# Patient Record
Sex: FEMALE | Race: WHITE | Employment: OTHER | URBAN - METROPOLITAN AREA
[De-identification: names, ages, dates, MRNs, and addresses within clinical notes are randomized per-mention and may not be internally consistent; named-entity substitution may affect disease eponyms.]

---

## 2018-01-12 ENCOUNTER — OFFICE VISIT (OUTPATIENT)
Dept: FAMILY MEDICINE CLINIC | Age: 83
End: 2018-01-12

## 2018-01-12 VITALS
SYSTOLIC BLOOD PRESSURE: 124 MMHG | BODY MASS INDEX: 43.78 KG/M2 | HEART RATE: 88 BPM | WEIGHT: 223 LBS | HEIGHT: 60 IN | TEMPERATURE: 99.2 F | DIASTOLIC BLOOD PRESSURE: 74 MMHG | OXYGEN SATURATION: 92 %

## 2018-01-12 DIAGNOSIS — F32.A DEPRESSION, UNSPECIFIED DEPRESSION TYPE: ICD-10-CM

## 2018-01-12 DIAGNOSIS — G60.9 IDIOPATHIC PERIPHERAL NEUROPATHY: ICD-10-CM

## 2018-01-12 DIAGNOSIS — J44.9 CHRONIC OBSTRUCTIVE PULMONARY DISEASE, UNSPECIFIED COPD TYPE (HCC): ICD-10-CM

## 2018-01-12 DIAGNOSIS — J30.2 CHRONIC SEASONAL ALLERGIC RHINITIS, UNSPECIFIED TRIGGER: Primary | ICD-10-CM

## 2018-01-12 DIAGNOSIS — N39.3 STRESS INCONTINENCE OF URINE: ICD-10-CM

## 2018-01-12 DIAGNOSIS — I50.9 CHRONIC CONGESTIVE HEART FAILURE, UNSPECIFIED CONGESTIVE HEART FAILURE TYPE: ICD-10-CM

## 2018-01-12 DIAGNOSIS — E03.9 HYPOTHYROIDISM, UNSPECIFIED TYPE: ICD-10-CM

## 2018-01-12 PROCEDURE — 99204 OFFICE O/P NEW MOD 45 MIN: CPT | Performed by: PHYSICIAN ASSISTANT

## 2018-01-12 RX ORDER — ALBUTEROL SULFATE 90 UG/1
2 AEROSOL, METERED RESPIRATORY (INHALATION) EVERY 6 HOURS PRN
Qty: 1 INHALER | Refills: 3 | Status: SHIPPED | OUTPATIENT
Start: 2018-01-12 | End: 2019-07-09 | Stop reason: SDUPTHER

## 2018-01-12 RX ORDER — MONTELUKAST SODIUM 10 MG/1
10 TABLET ORAL DAILY
Qty: 30 TABLET | Refills: 5 | Status: SHIPPED | OUTPATIENT
Start: 2018-01-12 | End: 2019-01-17 | Stop reason: SDUPTHER

## 2018-01-12 RX ORDER — OXYBUTYNIN CHLORIDE 5 MG/1
5 TABLET ORAL 3 TIMES DAILY
Qty: 90 TABLET | Refills: 3 | Status: SHIPPED | OUTPATIENT
Start: 2018-01-12 | End: 2018-03-09 | Stop reason: SDUPTHER

## 2018-01-12 RX ORDER — CITALOPRAM 40 MG/1
40 TABLET ORAL DAILY
Qty: 30 TABLET | Refills: 5 | Status: SHIPPED | OUTPATIENT
Start: 2018-01-12 | End: 2018-05-04

## 2018-01-12 RX ORDER — FUROSEMIDE 40 MG/1
40 TABLET ORAL DAILY
Qty: 30 TABLET | Refills: 3 | Status: SHIPPED | OUTPATIENT
Start: 2018-01-12 | End: 2018-04-30 | Stop reason: SDUPTHER

## 2018-01-12 RX ORDER — LEVOTHYROXINE SODIUM 0.07 MG/1
75 TABLET ORAL DAILY
Qty: 30 TABLET | Refills: 3 | Status: SHIPPED | OUTPATIENT
Start: 2018-01-12 | End: 2018-04-12 | Stop reason: SDUPTHER

## 2018-01-12 RX ORDER — FLUTICASONE FUROATE AND VILANTEROL 100; 25 UG/1; UG/1
1 POWDER RESPIRATORY (INHALATION) DAILY
Qty: 1 EACH | Refills: 5 | Status: SHIPPED | OUTPATIENT
Start: 2018-01-12 | End: 2018-08-28 | Stop reason: ALTCHOICE

## 2018-01-12 RX ORDER — GABAPENTIN 300 MG/1
300 CAPSULE ORAL 3 TIMES DAILY
Qty: 90 CAPSULE | Refills: 5 | Status: ON HOLD | OUTPATIENT
Start: 2018-01-12 | End: 2018-07-02 | Stop reason: HOSPADM

## 2018-01-12 ASSESSMENT — ENCOUNTER SYMPTOMS
TROUBLE SWALLOWING: 0
SHORTNESS OF BREATH: 0
DIARRHEA: 0
EYE PAIN: 0
ABDOMINAL PAIN: 0
CHEST TIGHTNESS: 0
SORE THROAT: 0
BACK PAIN: 0
COUGH: 0
CONSTIPATION: 0
VOICE CHANGE: 0

## 2018-01-12 NOTE — PROGRESS NOTES
Subjective:      Patient ID: Lisseth Morales is a [de-identified] y.o. female. HPI  Patient is here today as a new patient to establish care. She is entering [facility] today as a resident. She is just moving here from [place], has been in [place] now for 8 weeks. She has no complaints today. She has been living with her son until today when she will be going to [facility]. She has some leaking and stress incontinence. She has had some diarrhea lately for a few days. She has taken Immodium. She does get routine eye exams. She needs to get new glasses and hearing aids. She does have stable COPD and CHF so she needs a new pulmonologist and cardiologist here in [place]. She has stable depression, taking Citalopram daily. No SE's. She said last labs were a few months ago. She had a colonoscopy last year. Review of Systems   Constitutional: Negative for appetite change, fatigue and unexpected weight change. HENT: Negative for congestion, dental problem, ear pain, hearing loss, sore throat, trouble swallowing and voice change. Eyes: Negative for pain and visual disturbance. Respiratory: Negative for cough, chest tightness and shortness of breath. Cardiovascular: Negative for chest pain and palpitations. Gastrointestinal: Negative for abdominal pain, constipation and diarrhea. Genitourinary: Negative for difficulty urinating. Incontinence   Musculoskeletal: Negative for arthralgias, back pain, myalgias and neck pain. Skin: Negative for rash. Neurological: Positive for numbness (and pain in legs). Negative for dizziness, speech difficulty, weakness and headaches. Hematological: Negative for adenopathy. Psychiatric/Behavioral: Negative for confusion and sleep disturbance. The patient is not nervous/anxious. Objective:   Physical Exam   Constitutional: She is oriented to person, place, and time. Vital signs are normal. She appears well-developed and well-nourished.  She is albuterol sulfate HFA (VENTOLIN HFA) 108 (90 Base) MCG/ACT inhaler; Inhale 2 puffs into the lungs every 6 hours as needed for Wheezing  -     levothyroxine (SYNTHROID) 75 MCG tablet; Take 1 tablet by mouth Daily  -     furosemide (LASIX) 40 MG tablet; Take 1 tablet by mouth daily  -     montelukast (SINGULAIR) 10 MG tablet; Take 1 tablet by mouth daily  -     citalopram (CELEXA) 40 MG tablet; Take 1 tablet by mouth daily  -     gabapentin (NEURONTIN) 300 MG capsule; Take 1 capsule by mouth 3 times daily for 30 days. -     fluticasone-vilanterol (BREO ELLIPTA) 100-25 MCG/INH AEPB inhaler; Inhale 1 puff into the lungs daily             Plan:      Nathaniel Pruitt to go into Nursing home. Return here in 2 months. Getting labs today.

## 2018-01-16 ENCOUNTER — TELEPHONE (OUTPATIENT)
Dept: PULMONOLOGY | Age: 83
End: 2018-01-16

## 2018-02-02 ENCOUNTER — OFFICE VISIT (OUTPATIENT)
Dept: CARDIOLOGY CLINIC | Age: 83
End: 2018-02-02

## 2018-02-02 ENCOUNTER — TELEPHONE (OUTPATIENT)
Dept: FAMILY MEDICINE CLINIC | Age: 83
End: 2018-02-02

## 2018-02-02 VITALS
BODY MASS INDEX: 43.98 KG/M2 | DIASTOLIC BLOOD PRESSURE: 76 MMHG | WEIGHT: 224 LBS | HEART RATE: 78 BPM | SYSTOLIC BLOOD PRESSURE: 128 MMHG | HEIGHT: 60 IN

## 2018-02-02 DIAGNOSIS — R06.02 SOB (SHORTNESS OF BREATH): ICD-10-CM

## 2018-02-02 DIAGNOSIS — I50.9 CHRONIC CONGESTIVE HEART FAILURE, UNSPECIFIED CONGESTIVE HEART FAILURE TYPE: Primary | ICD-10-CM

## 2018-02-02 DIAGNOSIS — I49.9 IRREGULAR HEART RATE: ICD-10-CM

## 2018-02-02 PROBLEM — J44.9 CHRONIC OBSTRUCTIVE PULMONARY DISEASE (HCC): Chronic | Status: ACTIVE | Noted: 2018-01-12

## 2018-02-02 PROCEDURE — 93000 ELECTROCARDIOGRAM COMPLETE: CPT | Performed by: INTERNAL MEDICINE

## 2018-02-02 PROCEDURE — 99204 OFFICE O/P NEW MOD 45 MIN: CPT | Performed by: INTERNAL MEDICINE

## 2018-02-02 RX ORDER — LISINOPRIL 5 MG/1
5 TABLET ORAL DAILY
Qty: 90 TABLET | Refills: 3 | Status: SHIPPED | OUTPATIENT
Start: 2018-02-02 | End: 2018-06-07 | Stop reason: SDUPTHER

## 2018-02-02 NOTE — PROGRESS NOTES
[facility]  Advanced CHF/Pulmonary Hypertension   Cardiac Evaluation      Mery Avila  YOB: 1935    Date of Visit:  2/2/18         Chief Complaint   Patient presents with   Letty Weinberg Doctor     Newly moved from [place]     History of Present Illness:  Mery Avila is a [de-identified] y.o. female who presents from referral from Armando Ho CNP for consultation and management of congestive heart failure. She recently moved from [place] where she lived with her younger son, to [place] to be near more family. She is living at [facility] independent/partially assited living. She states that she has a history of chf and was admitted with fluid overload and was diuresed with improvement in 2016. She has copd and gets shortness of breath with activity and exertion and she is suppose to use her prn oxygen. She has chronic bilateral leg swelling. She has not been wearing compression hose because she is unable to get them on. She uses cpap at night for her sleep apnea. She was managed in [place] by a cardiologist but was told that she does not have heart blockages. Her son is here with her ands state that she had not been compliant with taking her lasix daily because of frequent urination with it. She was recently started on Ditropan by her CNP. [facility] gives her her medications and she is taking it everyday and her leg swelling is down from what it was. She was told that her heart function was weak in the past. She has no problems sleeping. We reviewed blood work from 1/15/2018 and it looks good. She denies chest pain. She has no family history of heart problems her 2 sons are healthy.        No Known Allergies  Current Outpatient Prescriptions   Medication Sig Dispense Refill    albuterol sulfate HFA (VENTOLIN HFA) 108 (90 Base) MCG/ACT inhaler Inhale 2 puffs into the lungs every 6 hours as needed for Wheezing 1 Inhaler 3    levothyroxine (SYNTHROID) 75 MCG tablet Take 1 tablet by mouth Daily 30 tablet 3    furosemide (LASIX) 40 MG tablet Take 1 tablet by mouth daily 30 tablet 3    montelukast (SINGULAIR) 10 MG tablet Take 1 tablet by mouth daily 30 tablet 5    citalopram (CELEXA) 40 MG tablet Take 1 tablet by mouth daily 30 tablet 5    gabapentin (NEURONTIN) 300 MG capsule Take 1 capsule by mouth 3 times daily for 30 days. 90 capsule 5    fluticasone-vilanterol (BREO ELLIPTA) 100-25 MCG/INH AEPB inhaler Inhale 1 puff into the lungs daily 1 each 5    oxybutynin (DITROPAN) 5 MG tablet Take 1 tablet by mouth 3 times daily 90 tablet 3     No current facility-administered medications for this visit. Past Medical History:   Diagnosis Date    Allergic rhinitis     CHF [facility]congestive heart failure) ([facility])     COPD (chronic obstructive pulmonary disease) (HCC)     Depression     Hypothyroidism      Past Surgical History:   Procedure Laterality Date    CARPAL TUNNEL RELEASE      bilateral    CHOLECYSTECTOMY      EYE SURGERY      bilateral cataracts    HYSTERECTOMY, TOTAL ABDOMINAL       Family History   Problem Relation Age of Onset    Cancer Mother      breast    Cancer Father      prostate    Cancer Sister      breast     Social History     Social History    Marital status:      Spouse name: N/A    Number of children: N/A    Years of education: N/A     Occupational History    Not on file. Social History Main Topics    Smoking status: Never Smoker    Smokeless tobacco: Never Used    Alcohol use No    Drug use: No    Sexual activity: Not on file     Other Topics Concern    Not on file     Social History Narrative    No narrative on file       Review of Systems:   · Constitutional: there has been no unanticipated weight loss. There's been no change in energy level, sleep pattern, or activity level. · Eyes: No visual changes or diplopia. No scleral icterus. · ENT: No Headaches, hearing loss or vertigo.  No mouth sores or sore throat. · Cardiovascular: Reviewed in HPI  · Respiratory: No cough or wheezing, no sputum production. No hematemesis. · Gastrointestinal: No abdominal pain, appetite loss, blood in stools. No change in bowel or bladder habits. · Genitourinary: No dysuria, trouble voiding, or hematuria. · Musculoskeletal:  No gait disturbance, weakness or joint complaints. · Integumentary: No rash or pruritis. · Neurological: No headache, diplopia, change in muscle strength, numbness or tingling. No change in gait, balance, coordination, mood, affect, memory, mentation, behavior. · Psychiatric: No anxiety, no depression. · Endocrine: No malaise, fatigue or temperature intolerance. No excessive thirst, fluid intake, or urination. No tremor. · Hematologic/Lymphatic: No abnormal bruising or bleeding, blood clots or swollen lymph nodes. · Allergic/Immunologic: No nasal congestion or hives. Physical Examination:    Vitals:    02/02/18 1049   BP: 128/76   Pulse: 78   Weight: 224 lb (101.6 kg)   Height: 5' (1.524 m)     Body mass index is 43.75 kg/m². Wt Readings from Last 3 Encounters:   01/12/18 223 lb (101.2 kg)     BP Readings from Last 3 Encounters:   01/12/18 124/74     Constitutional and General Appearance:   WD/WN in NAD  HEENT:  NC/AT  DARIN  No problems with hearing  Skin:  Warm, dry  Respiratory:  · Normal excursion and expansion without use of accessory muscles  · Resp Auscultation: Normal breath sounds without dullness  Cardiovascular:  · The apical impulses not displaced  · Heart tones are crisp and normal  · Cervical veins are not engorged  · The carotid upstroke is normal in amplitude and contour without delay or bruit  · JVP less than 8 cm H2O  RRR with nl S1 and S2 without m,r,g  · Peripheral pulses are symmetrical and full  · There is no clubbing, cyanosis of the extremities.   · No edema  · Femoral Arteries: 2+ and equal  · Pedal Pulses: 2+ and equal   Neck:  · No thyromegaly  Abdomen:  · No masses or

## 2018-02-02 NOTE — TELEPHONE ENCOUNTER
Ramy Brown from Dr Marcia García office called to see if we had any records from the patient for her time in [place] - states gave a packet to [address] when they saw her 1/12      Please advise if the records are avail for scanning

## 2018-02-14 ENCOUNTER — OFFICE VISIT (OUTPATIENT)
Dept: PULMONOLOGY | Age: 83
End: 2018-02-14

## 2018-02-14 ENCOUNTER — TELEPHONE (OUTPATIENT)
Dept: CARDIOLOGY CLINIC | Age: 83
End: 2018-02-14

## 2018-02-14 VITALS — OXYGEN SATURATION: 87 % | DIASTOLIC BLOOD PRESSURE: 66 MMHG | HEART RATE: 64 BPM | SYSTOLIC BLOOD PRESSURE: 116 MMHG

## 2018-02-14 DIAGNOSIS — I50.9 CHRONIC CONGESTIVE HEART FAILURE, UNSPECIFIED CONGESTIVE HEART FAILURE TYPE: Chronic | ICD-10-CM

## 2018-02-14 DIAGNOSIS — G47.33 OSA (OBSTRUCTIVE SLEEP APNEA): ICD-10-CM

## 2018-02-14 DIAGNOSIS — R05.9 COUGH: ICD-10-CM

## 2018-02-14 DIAGNOSIS — G47.33 OBSTRUCTIVE SLEEP APNEA ON CPAP: ICD-10-CM

## 2018-02-14 DIAGNOSIS — Z99.89 OBSTRUCTIVE SLEEP APNEA ON CPAP: ICD-10-CM

## 2018-02-14 DIAGNOSIS — J44.9 COPD, SEVERITY TO BE DETERMINED (HCC): Chronic | ICD-10-CM

## 2018-02-14 DIAGNOSIS — R06.02 SOB (SHORTNESS OF BREATH): Primary | ICD-10-CM

## 2018-02-14 PROCEDURE — 99204 OFFICE O/P NEW MOD 45 MIN: CPT | Performed by: INTERNAL MEDICINE

## 2018-02-14 RX ORDER — IPRATROPIUM BROMIDE AND ALBUTEROL SULFATE 2.5; .5 MG/3ML; MG/3ML
1 SOLUTION RESPIRATORY (INHALATION) EVERY 4 HOURS
COMMUNITY
End: 2018-03-13 | Stop reason: SDUPTHER

## 2018-02-14 ASSESSMENT — ENCOUNTER SYMPTOMS
CONSTIPATION: 0
SINUS PRESSURE: 0
NAUSEA: 0
DIARRHEA: 0
ABDOMINAL PAIN: 0

## 2018-02-14 NOTE — PROGRESS NOTES
Nasal mucosa, teeth and gums and oropharynx are clear. Neck: Neck supple. No JVD present. No tracheal deviation present. No thyromegaly (There are no other neck masses noted.) present. Cardiovascular: Normal rate, regular rhythm, normal heart sounds and intact distal pulses. No murmur heard. Pulmonary/Chest: Effort normal and breath sounds normal. No respiratory distress. She has no wheezes. She has no rales. She exhibits no tenderness. The chest is symmetric in. There is no dullness to percussion or tactile fremitus noted. Abdominal: Soft. Bowel sounds are normal. She exhibits no distension. Mass: There is no hepatosplenomegaly. There is no tenderness. Musculoskeletal: She exhibits edema (There is no clubbingor cyanosis of the digits). She exhibits no tenderness (Muscle strength is normal and there is no obvious atrophy). Lymphadenopathy:     She has no cervical adenopathy. She has no axillary adenopathy. Right: No inguinal adenopathy present. Left: No inguinal adenopathy present. Skin: Skin is warm and dry. No rash noted. Psychiatric: She has a normal mood and affect. Oriented to person place and time       Assessment:      1. SOB (shortness of breath)  CT Chest WO Contrast    FULL PFT STUDY   2. MARIAA (obstructive sleep apnea)  [facility] -Nael Mancera   3. Cough  CT Chest WO Contrast   4. COPD, severity to be determined ([address]     5. Chronic congestive heart failure, unspecified congestive heart failure type ([address]             Plan:      1. Dyspnea is multifactorial in her. COPD and heart failure are contributors. However obesity and profound deconditioning are also contributing to dyspnea in her as well. 2.  PFT, CT chest  3. We'll also be interested in results of her echocardiogram  4. Continue Breo  5. Change albuterol HHN to Duoneb TID  6. Have her see Dr Sigrid Akins. She may be better served by BiP.   She does continue to have symptoms of excessive daytime sleepiness despite wearing her CPAP. 7.  She needs to be on oxygen 24 hours a day. Her resting oxygen saturation in the office was low.   8.  Follow-up in one month

## 2018-02-14 NOTE — LETTER
Pulmonary, Critical Care & Sleep  [address], [address] [facility] [address]  Phone: [phone]  Fax: [phone]          February 14, 2018       Patient: Сергей Linn   MR Number: [id number]   YOB: 1935   Date of Visit: 2/14/2018       Dear Dr. Christopher Garcia, PA:    Thank you for the request for consultation. Below are the relevant portions of my assessment and plan of care. If you have questions, please do not hesitate to call me. I look forward to following [place] Fam along with you.     Sincerely,        Christ Lim MD    CC providers:  No Recipients

## 2018-02-15 ENCOUNTER — TELEPHONE (OUTPATIENT)
Dept: SLEEP MEDICINE | Age: 83
End: 2018-02-15

## 2018-02-22 ENCOUNTER — TELEPHONE (OUTPATIENT)
Dept: SLEEP MEDICINE | Age: 83
End: 2018-02-22

## 2018-02-23 ENCOUNTER — HOSPITAL ENCOUNTER (OUTPATIENT)
Dept: PULMONOLOGY | Age: 83
Discharge: HOME | End: 2018-02-23
Attending: INTERNAL MEDICINE | Admitting: INTERNAL MEDICINE

## 2018-02-23 DIAGNOSIS — R06.02 SOB (SHORTNESS OF BREATH): ICD-10-CM

## 2018-02-23 DIAGNOSIS — R05.9 COUGH: ICD-10-CM

## 2018-02-23 DIAGNOSIS — R06.02 SHORTNESS OF BREATH: ICD-10-CM

## 2018-02-23 RX ORDER — ALBUTEROL SULFATE 90 UG/1
4 AEROSOL, METERED RESPIRATORY (INHALATION) ONCE
Status: COMPLETED | OUTPATIENT
Start: 2018-02-23 | End: 2018-02-23

## 2018-02-23 RX ADMIN — ALBUTEROL SULFATE 4 PUFF: 90 AEROSOL, METERED RESPIRATORY (INHALATION) at 14:45

## 2018-02-27 ENCOUNTER — TELEPHONE (OUTPATIENT)
Dept: SLEEP MEDICINE | Age: 83
End: 2018-02-27

## 2018-03-01 NOTE — PROCEDURES
[facility] [address] [facility]                      [facility], [address]                                PULMONARY FUNCTION    PATIENT NAME: Michelet Otero                      :        1935  MED REC NO:   [id number]                          ROOM:  ACCOUNT NO:   [de-identified]                          ADMIT DATE: 2018  PROVIDER:     Nasim Davis MD    DATE OF PROCEDURE:  2018    INTERPRETATION:  Spirometry reveals decreased FVC at 1.65 liters, which is  78% predicted. FEV1 is decreased at 0.81 liters, which is 52% predicted. FEV1/FVC ratio is reduced at 49%. There is no significant change after  inhaled bronchodilators. Lungs volumes reveal normal total lung capacity  and vital capacity. Residual volume has increased at 142% predicted. Diffusion capacity is reduced at 39% predicted. IMPRESSION:  Moderate to severe obstructive lung disease with air trapping.         Janeen Carmona MD    D: 2018 17:11:23       T: 2018 2:50:45     [id number]  Job#: [id number]     Doc#: [id number]    CC:

## 2018-03-09 ENCOUNTER — OFFICE VISIT (OUTPATIENT)
Dept: FAMILY MEDICINE CLINIC | Age: 83
End: 2018-03-09

## 2018-03-09 ENCOUNTER — TELEPHONE (OUTPATIENT)
Dept: FAMILY MEDICINE CLINIC | Age: 83
End: 2018-03-09

## 2018-03-09 VITALS
OXYGEN SATURATION: 96 % | DIASTOLIC BLOOD PRESSURE: 78 MMHG | HEART RATE: 75 BPM | WEIGHT: 235 LBS | SYSTOLIC BLOOD PRESSURE: 124 MMHG | BODY MASS INDEX: 45.9 KG/M2

## 2018-03-09 DIAGNOSIS — R31.9 URINARY TRACT INFECTION WITH HEMATURIA, SITE UNSPECIFIED: ICD-10-CM

## 2018-03-09 DIAGNOSIS — R60.0 BILATERAL LEG EDEMA: ICD-10-CM

## 2018-03-09 DIAGNOSIS — R63.5 WEIGHT GAIN: ICD-10-CM

## 2018-03-09 DIAGNOSIS — E78.2 MIXED HYPERLIPIDEMIA: ICD-10-CM

## 2018-03-09 DIAGNOSIS — E03.9 HYPOTHYROIDISM, UNSPECIFIED TYPE: ICD-10-CM

## 2018-03-09 DIAGNOSIS — F32.A DEPRESSION, UNSPECIFIED DEPRESSION TYPE: ICD-10-CM

## 2018-03-09 DIAGNOSIS — I50.9 CHRONIC CONGESTIVE HEART FAILURE, UNSPECIFIED CONGESTIVE HEART FAILURE TYPE: Primary | Chronic | ICD-10-CM

## 2018-03-09 DIAGNOSIS — N39.3 STRESS INCONTINENCE OF URINE: ICD-10-CM

## 2018-03-09 DIAGNOSIS — N39.0 URINARY TRACT INFECTION WITH HEMATURIA, SITE UNSPECIFIED: ICD-10-CM

## 2018-03-09 DIAGNOSIS — G60.9 IDIOPATHIC PERIPHERAL NEUROPATHY: ICD-10-CM

## 2018-03-09 LAB
BACTERIA URINE, POC: ABNORMAL
BILIRUBIN URINE: 0 MG/DL
BLOOD, URINE: NEGATIVE
CASTS URINE, POC: 0
CLARITY: ABNORMAL
COLOR: YELLOW
CRYSTALS URINE, POC: 0
EPI CELLS URINE, POC: 0
GLUCOSE URINE: NEGATIVE
KETONES, URINE: NEGATIVE
LEUKOCYTE EST, POC: ABNORMAL
NITRITE, URINE: POSITIVE
PH UA: 7 (ref 4.5–8)
PROTEIN UA: NEGATIVE
RBC URINE, POC: 6
SPECIFIC GRAVITY UA: 1.01 (ref 1–1.03)
UROBILINOGEN, URINE: NORMAL
WBC URINE, POC: 0
YEAST URINE, POC: 0

## 2018-03-09 PROCEDURE — 99214 OFFICE O/P EST MOD 30 MIN: CPT | Performed by: PHYSICIAN ASSISTANT

## 2018-03-09 PROCEDURE — 81000 URINALYSIS NONAUTO W/SCOPE: CPT | Performed by: PHYSICIAN ASSISTANT

## 2018-03-09 RX ORDER — NITROFURANTOIN 25; 75 MG/1; MG/1
100 CAPSULE ORAL 2 TIMES DAILY
Qty: 14 CAPSULE | Refills: 0 | Status: SHIPPED | OUTPATIENT
Start: 2018-03-09 | End: 2018-03-19

## 2018-03-09 RX ORDER — ATORVASTATIN CALCIUM 10 MG/1
10 TABLET, FILM COATED ORAL DAILY
Qty: 30 TABLET | Refills: 5 | Status: SHIPPED | OUTPATIENT
Start: 2018-03-09 | End: 2018-08-01 | Stop reason: SDUPTHER

## 2018-03-09 RX ORDER — OXYBUTYNIN CHLORIDE 5 MG/1
5 TABLET ORAL 3 TIMES DAILY
Qty: 90 TABLET | Refills: 3 | Status: SHIPPED | OUTPATIENT
Start: 2018-03-09 | End: 2018-07-16 | Stop reason: SDUPTHER

## 2018-03-09 ASSESSMENT — PATIENT HEALTH QUESTIONNAIRE - PHQ9
1. LITTLE INTEREST OR PLEASURE IN DOING THINGS: 0
SUM OF ALL RESPONSES TO PHQ QUESTIONS 1-9: 0
2. FEELING DOWN, DEPRESSED OR HOPELESS: 0
SUM OF ALL RESPONSES TO PHQ9 QUESTIONS 1 & 2: 0

## 2018-03-09 ASSESSMENT — ENCOUNTER SYMPTOMS
VOMITING: 0
CONSTIPATION: 0
DIARRHEA: 0
NAUSEA: 0
BACK PAIN: 0
SHORTNESS OF BREATH: 0
ABDOMINAL PAIN: 0

## 2018-03-09 NOTE — PATIENT INSTRUCTIONS
Lorraine Fam was seen today for 3 month follow-up. Diagnoses and all orders for this visit:    Chronic congestive heart failure, unspecified congestive heart failure type ([address]    Stress incontinence of urine  -     oxybutynin (DITROPAN) 5 MG tablet; Take 1 tablet by mouth 3 times daily    Weight gain    Idiopathic peripheral neuropathy    Hypothyroidism, unspecified type    Depression, unspecified depression type    Mixed hyperlipidemia  -     atorvastatin (LIPITOR) 10 MG tablet; Take 1 tablet by mouth daily    Bilateral leg edema    Urinary tract infection with hematuria, site unspecified  -     URINE CULTURE  -     nitrofurantoin, macrocrystal-monohydrate, (MACROBID) 100 MG capsule; Take 1 capsule by mouth 2 times daily for 10 days    Other orders  -     POC URINE with Microscopic       Start oxybutynin today TID, start lipitor qhs, wear compression stockings during the day, macrobid for UTI, repeat UA after macrobid is finished, routine labs in a month.

## 2018-03-11 LAB
ORGANISM: ABNORMAL
ORGANISM: ABNORMAL
URINE CULTURE, ROUTINE: ABNORMAL

## 2018-03-13 ENCOUNTER — TELEPHONE (OUTPATIENT)
Dept: FAMILY MEDICINE CLINIC | Age: 83
End: 2018-03-13

## 2018-03-13 DIAGNOSIS — J44.9 COPD, SEVERITY TO BE DETERMINED (HCC): Primary | Chronic | ICD-10-CM

## 2018-03-13 RX ORDER — IPRATROPIUM BROMIDE AND ALBUTEROL SULFATE 2.5; .5 MG/3ML; MG/3ML
1 SOLUTION RESPIRATORY (INHALATION) EVERY 4 HOURS
Qty: 360 ML | Refills: 0 | Status: SHIPPED | OUTPATIENT
Start: 2018-03-13

## 2018-03-13 RX ORDER — IPRATROPIUM BROMIDE AND ALBUTEROL SULFATE 2.5; .5 MG/3ML; MG/3ML
1 SOLUTION RESPIRATORY (INHALATION) EVERY 4 HOURS
Qty: 360 ML | Refills: 0 | Status: SHIPPED | OUTPATIENT
Start: 2018-03-13 | End: 2018-03-13 | Stop reason: CLARIF

## 2018-03-13 NOTE — TELEPHONE ENCOUNTER
ipratropium-albuterol (DUONEB) 0.5-2.5 (3) MG/3ML SOLN nebulizer solution        Sig - Route: Inhale 1 vial into the lungs every 4 hours - Inhalation      [facility] in chart

## 2018-03-19 ENCOUNTER — TELEPHONE (OUTPATIENT)
Dept: SLEEP MEDICINE | Age: 83
End: 2018-03-19

## 2018-03-22 ENCOUNTER — TELEPHONE (OUTPATIENT)
Dept: FAMILY MEDICINE CLINIC | Age: 83
End: 2018-03-22

## 2018-03-22 DIAGNOSIS — R82.79 URINE CULTURE POSITIVE: Primary | ICD-10-CM

## 2018-03-22 RX ORDER — CIPROFLOXACIN 500 MG/1
500 TABLET, FILM COATED ORAL 2 TIMES DAILY
Qty: 28 TABLET | Refills: 0 | Status: SHIPPED | OUTPATIENT
Start: 2018-03-22 | End: 2018-04-05

## 2018-03-26 ENCOUNTER — TELEPHONE (OUTPATIENT)
Dept: PULMONOLOGY | Age: 83
End: 2018-03-26

## 2018-04-02 ENCOUNTER — TELEPHONE (OUTPATIENT)
Dept: FAMILY MEDICINE CLINIC | Age: 83
End: 2018-04-02

## 2018-04-04 ENCOUNTER — OFFICE VISIT (OUTPATIENT)
Dept: PULMONOLOGY | Age: 83
End: 2018-04-04

## 2018-04-04 VITALS — OXYGEN SATURATION: 84 % | DIASTOLIC BLOOD PRESSURE: 70 MMHG | SYSTOLIC BLOOD PRESSURE: 126 MMHG | HEART RATE: 78 BPM

## 2018-04-04 DIAGNOSIS — J18.9 MULTIFOCAL PNEUMONIA: ICD-10-CM

## 2018-04-04 DIAGNOSIS — Z99.89 OBSTRUCTIVE SLEEP APNEA ON CPAP: ICD-10-CM

## 2018-04-04 DIAGNOSIS — J18.9 PNEUMONIA DUE TO ORGANISM: Primary | ICD-10-CM

## 2018-04-04 DIAGNOSIS — G47.33 OBSTRUCTIVE SLEEP APNEA ON CPAP: ICD-10-CM

## 2018-04-04 DIAGNOSIS — J44.9 COPD, SEVERE (HCC): Chronic | ICD-10-CM

## 2018-04-04 DIAGNOSIS — J43.2 CENTRILOBULAR EMPHYSEMA (HCC): ICD-10-CM

## 2018-04-04 PROCEDURE — 99214 OFFICE O/P EST MOD 30 MIN: CPT | Performed by: INTERNAL MEDICINE

## 2018-04-06 ENCOUNTER — TELEPHONE (OUTPATIENT)
Dept: PULMONOLOGY | Age: 83
End: 2018-04-06

## 2018-04-10 ENCOUNTER — TELEPHONE (OUTPATIENT)
Dept: FAMILY MEDICINE CLINIC | Age: 83
End: 2018-04-10

## 2018-04-12 ENCOUNTER — TELEPHONE (OUTPATIENT)
Dept: FAMILY MEDICINE CLINIC | Age: 83
End: 2018-04-12

## 2018-04-12 DIAGNOSIS — E03.9 ACQUIRED HYPOTHYROIDISM: Primary | ICD-10-CM

## 2018-04-12 RX ORDER — LEVOTHYROXINE SODIUM 0.07 MG/1
75 TABLET ORAL DAILY
Qty: 30 TABLET | Refills: 3 | Status: SHIPPED | OUTPATIENT
Start: 2018-04-12 | End: 2018-07-10 | Stop reason: SDUPTHER

## 2018-04-30 RX ORDER — FUROSEMIDE 40 MG/1
40 TABLET ORAL DAILY
Qty: 30 TABLET | Refills: 3 | Status: SHIPPED | OUTPATIENT
Start: 2018-04-30 | End: 2018-06-18 | Stop reason: SDUPTHER

## 2018-05-03 ENCOUNTER — TELEPHONE (OUTPATIENT)
Dept: CARDIOLOGY CLINIC | Age: 83
End: 2018-05-03

## 2018-05-04 ENCOUNTER — HOSPITAL ENCOUNTER (OUTPATIENT)
Dept: ONCOLOGY | Age: 83
Discharge: HOME | End: 2018-05-31
Admitting: CLINICAL NURSE SPECIALIST

## 2018-05-04 ENCOUNTER — OFFICE VISIT (OUTPATIENT)
Dept: CARDIOLOGY CLINIC | Age: 83
End: 2018-05-04

## 2018-05-04 VITALS
HEIGHT: 60 IN | DIASTOLIC BLOOD PRESSURE: 70 MMHG | BODY MASS INDEX: 47.63 KG/M2 | HEART RATE: 80 BPM | SYSTOLIC BLOOD PRESSURE: 110 MMHG | OXYGEN SATURATION: 93 % | WEIGHT: 242.6 LBS

## 2018-05-04 DIAGNOSIS — R60.0 BILATERAL LEG EDEMA: ICD-10-CM

## 2018-05-04 DIAGNOSIS — G47.33 OBSTRUCTIVE SLEEP APNEA ON CPAP: ICD-10-CM

## 2018-05-04 DIAGNOSIS — I50.9 CHRONIC CONGESTIVE HEART FAILURE, UNSPECIFIED CONGESTIVE HEART FAILURE TYPE: Primary | Chronic | ICD-10-CM

## 2018-05-04 DIAGNOSIS — Z99.89 OBSTRUCTIVE SLEEP APNEA ON CPAP: ICD-10-CM

## 2018-05-04 DIAGNOSIS — R63.5 WEIGHT GAIN: ICD-10-CM

## 2018-05-04 DIAGNOSIS — J44.9 COPD, SEVERE (HCC): Chronic | ICD-10-CM

## 2018-05-04 LAB
ANION GAP SERPL CALCULATED.3IONS-SCNC: 11 MMOL/L (ref 3–16)
BUN BLDV-MCNC: 30 MG/DL (ref 7–20)
CALCIUM SERPL-MCNC: 9.4 MG/DL (ref 8.3–10.6)
CHLORIDE BLD-SCNC: 97 MMOL/L (ref 99–110)
CO2: 30 MMOL/L (ref 21–32)
CREAT SERPL-MCNC: 1 MG/DL (ref 0.6–1.2)
GFR AFRICAN AMERICAN: >60
GFR NON-AFRICAN AMERICAN: 53
GLUCOSE BLD-MCNC: 112 MG/DL (ref 70–99)
POTASSIUM SERPL-SCNC: 4.7 MMOL/L (ref 3.5–5.1)
PRO-BNP: 201 PG/ML (ref 0–449)
SODIUM BLD-SCNC: 138 MMOL/L (ref 136–145)

## 2018-05-04 PROCEDURE — 99214 OFFICE O/P EST MOD 30 MIN: CPT | Performed by: CLINICAL NURSE SPECIALIST

## 2018-05-04 RX ORDER — DOXYCYCLINE 100 MG/1
100 CAPSULE ORAL DAILY
Refills: 0 | COMMUNITY
Start: 2018-04-06 | End: 2018-06-21 | Stop reason: ALTCHOICE

## 2018-05-25 ENCOUNTER — OFFICE VISIT (OUTPATIENT)
Dept: PULMONOLOGY | Age: 83
End: 2018-05-25

## 2018-05-25 ENCOUNTER — HOSPITAL ENCOUNTER (OUTPATIENT)
Dept: NON INVASIVE DIAGNOSTICS | Age: 83
Discharge: HOME | End: 2018-05-25
Attending: INTERNAL MEDICINE | Admitting: INTERNAL MEDICINE

## 2018-05-25 VITALS — SYSTOLIC BLOOD PRESSURE: 124 MMHG | HEART RATE: 78 BPM | DIASTOLIC BLOOD PRESSURE: 68 MMHG | OXYGEN SATURATION: 95 %

## 2018-05-25 DIAGNOSIS — J18.9 MULTIFOCAL PNEUMONIA: ICD-10-CM

## 2018-05-25 DIAGNOSIS — R06.02 SHORTNESS OF BREATH: ICD-10-CM

## 2018-05-25 DIAGNOSIS — Z99.89 OBSTRUCTIVE SLEEP APNEA ON CPAP: ICD-10-CM

## 2018-05-25 DIAGNOSIS — I50.9 CHRONIC CONGESTIVE HEART FAILURE, UNSPECIFIED CONGESTIVE HEART FAILURE TYPE: Chronic | ICD-10-CM

## 2018-05-25 DIAGNOSIS — R06.02 SOB (SHORTNESS OF BREATH): Primary | ICD-10-CM

## 2018-05-25 DIAGNOSIS — J44.9 COPD, SEVERE (HCC): Chronic | ICD-10-CM

## 2018-05-25 DIAGNOSIS — G47.33 OBSTRUCTIVE SLEEP APNEA ON CPAP: ICD-10-CM

## 2018-05-25 LAB
LEFT VENTRICULAR EJECTION FRACTION HIGH VALUE: 60 %
LEFT VENTRICULAR EJECTION FRACTION MODE: NORMAL
LV EF: 55 %
LV EF: 58 %
LVEF MODALITY: NORMAL

## 2018-05-25 PROCEDURE — 99214 OFFICE O/P EST MOD 30 MIN: CPT | Performed by: INTERNAL MEDICINE

## 2018-06-01 ENCOUNTER — TELEPHONE (OUTPATIENT)
Dept: CARDIOLOGY CLINIC | Age: 83
End: 2018-06-01

## 2018-06-01 ENCOUNTER — HOSPITAL ENCOUNTER (OUTPATIENT)
Dept: ONCOLOGY | Age: 83
Discharge: HOME | End: 2018-06-30
Attending: CLINICAL NURSE SPECIALIST | Admitting: CLINICAL NURSE SPECIALIST

## 2018-06-07 ENCOUNTER — TELEPHONE (OUTPATIENT)
Dept: FAMILY MEDICINE CLINIC | Age: 83
End: 2018-06-07

## 2018-06-07 ENCOUNTER — TELEPHONE (OUTPATIENT)
Dept: PULMONOLOGY | Age: 83
End: 2018-06-07

## 2018-06-07 DIAGNOSIS — I50.9 CHRONIC CONGESTIVE HEART FAILURE, UNSPECIFIED CONGESTIVE HEART FAILURE TYPE: ICD-10-CM

## 2018-06-07 DIAGNOSIS — I49.9 IRREGULAR HEART RATE: ICD-10-CM

## 2018-06-07 RX ORDER — LISINOPRIL 5 MG/1
5 TABLET ORAL DAILY
Qty: 90 TABLET | Refills: 3 | Status: ON HOLD | OUTPATIENT
Start: 2018-06-07 | End: 2018-07-02 | Stop reason: HOSPADM

## 2018-06-18 RX ORDER — FUROSEMIDE 40 MG/1
TABLET ORAL
Qty: 45 TABLET | Refills: 3 | Status: SHIPPED | OUTPATIENT
Start: 2018-06-18 | End: 2018-06-21 | Stop reason: SDUPTHER

## 2018-06-21 ENCOUNTER — OFFICE VISIT (OUTPATIENT)
Dept: FAMILY MEDICINE CLINIC | Age: 83
End: 2018-06-21

## 2018-06-21 ENCOUNTER — HOSPITAL ENCOUNTER (OUTPATIENT)
Dept: ONCOLOGY | Age: 83
Discharge: HOME | End: 2018-06-22
Admitting: CLINICAL NURSE SPECIALIST

## 2018-06-21 ENCOUNTER — HOSPITAL ENCOUNTER (OUTPATIENT)
Dept: OTHER | Age: 83
Discharge: HOME | End: 2018-06-21
Attending: PHYSICIAN ASSISTANT | Admitting: PHYSICIAN ASSISTANT

## 2018-06-21 ENCOUNTER — OFFICE VISIT (OUTPATIENT)
Dept: CARDIOLOGY CLINIC | Age: 83
End: 2018-06-21

## 2018-06-21 VITALS
OXYGEN SATURATION: 90 % | HEART RATE: 96 BPM | DIASTOLIC BLOOD PRESSURE: 68 MMHG | SYSTOLIC BLOOD PRESSURE: 128 MMHG | BODY MASS INDEX: 49.18 KG/M2 | WEIGHT: 251.8 LBS

## 2018-06-21 VITALS
HEIGHT: 60 IN | WEIGHT: 251 LBS | HEART RATE: 84 BPM | BODY MASS INDEX: 49.28 KG/M2 | DIASTOLIC BLOOD PRESSURE: 68 MMHG | SYSTOLIC BLOOD PRESSURE: 118 MMHG

## 2018-06-21 VITALS — SYSTOLIC BLOOD PRESSURE: 124 MMHG | HEART RATE: 81 BPM | DIASTOLIC BLOOD PRESSURE: 66 MMHG | TEMPERATURE: 96.2 F

## 2018-06-21 DIAGNOSIS — K59.00 CONSTIPATION, UNSPECIFIED CONSTIPATION TYPE: ICD-10-CM

## 2018-06-21 DIAGNOSIS — J44.9 COPD, SEVERE (HCC): Chronic | ICD-10-CM

## 2018-06-21 DIAGNOSIS — R06.02 SHORTNESS OF BREATH: ICD-10-CM

## 2018-06-21 DIAGNOSIS — G47.33 OBSTRUCTIVE SLEEP APNEA ON CPAP: ICD-10-CM

## 2018-06-21 DIAGNOSIS — I50.32 CHRONIC DIASTOLIC HEART FAILURE (HCC): ICD-10-CM

## 2018-06-21 DIAGNOSIS — I50.9 CHRONIC CONGESTIVE HEART FAILURE, UNSPECIFIED CONGESTIVE HEART FAILURE TYPE: Primary | Chronic | ICD-10-CM

## 2018-06-21 DIAGNOSIS — R06.02 SOB (SHORTNESS OF BREATH): ICD-10-CM

## 2018-06-21 DIAGNOSIS — I50.9 CHRONIC CONGESTIVE HEART FAILURE, UNSPECIFIED CONGESTIVE HEART FAILURE TYPE: Chronic | ICD-10-CM

## 2018-06-21 DIAGNOSIS — Z99.89 OBSTRUCTIVE SLEEP APNEA ON CPAP: ICD-10-CM

## 2018-06-21 DIAGNOSIS — R06.02 SHORTNESS OF BREATH: Primary | ICD-10-CM

## 2018-06-21 DIAGNOSIS — R60.0 BILATERAL LOWER EXTREMITY EDEMA: ICD-10-CM

## 2018-06-21 LAB
A/G RATIO: 0.9 (ref 1.1–2.2)
ALBUMIN SERPL-MCNC: 3.8 G/DL (ref 3.4–5)
ALP BLD-CCNC: 79 U/L (ref 40–129)
ALT SERPL-CCNC: 13 U/L (ref 10–40)
ANION GAP SERPL CALCULATED.3IONS-SCNC: 13 MMOL/L (ref 3–16)
AST SERPL-CCNC: 16 U/L (ref 15–37)
BILIRUB SERPL-MCNC: 0.3 MG/DL (ref 0–1)
BUN BLDV-MCNC: 32 MG/DL (ref 7–20)
CALCIUM SERPL-MCNC: 9.4 MG/DL (ref 8.3–10.6)
CHLORIDE BLD-SCNC: 95 MMOL/L (ref 99–110)
CO2: 28 MMOL/L (ref 21–32)
CREAT SERPL-MCNC: 1.1 MG/DL (ref 0.6–1.2)
GFR AFRICAN AMERICAN: 58
GFR NON-AFRICAN AMERICAN: 48
GLOBULIN: 4.3 G/DL
GLUCOSE BLD-MCNC: 115 MG/DL (ref 70–99)
HCT VFR BLD CALC: 36.7 % (ref 36–48)
HEMOGLOBIN: 12.1 G/DL (ref 12–16)
MCH RBC QN AUTO: 29.9 PG (ref 26–34)
MCHC RBC AUTO-ENTMCNC: 32.9 G/DL (ref 31–36)
MCV RBC AUTO: 90.7 FL (ref 80–100)
PDW BLD-RTO: 13.3 % (ref 12.4–15.4)
PLATELET # BLD: 207 K/UL (ref 135–450)
PMV BLD AUTO: 8.7 FL (ref 5–10.5)
POTASSIUM SERPL-SCNC: 4.8 MMOL/L (ref 3.5–5.1)
PRO-BNP: 349 PG/ML (ref 0–449)
RBC # BLD: 4.05 M/UL (ref 4–5.2)
SODIUM BLD-SCNC: 136 MMOL/L (ref 136–145)
TOTAL PROTEIN: 8.1 G/DL (ref 6.4–8.2)
WBC # BLD: 9.4 K/UL (ref 4–11)

## 2018-06-21 PROCEDURE — 99214 OFFICE O/P EST MOD 30 MIN: CPT | Performed by: PHYSICIAN ASSISTANT

## 2018-06-21 PROCEDURE — 99215 OFFICE O/P EST HI 40 MIN: CPT | Performed by: INTERNAL MEDICINE

## 2018-06-21 RX ORDER — FUROSEMIDE 40 MG/1
TABLET ORAL
Qty: 270 TABLET | Refills: 3 | Status: SHIPPED | OUTPATIENT
Start: 2018-06-21 | End: 2018-06-21 | Stop reason: SDUPTHER

## 2018-06-21 RX ORDER — FUROSEMIDE 10 MG/ML
120 INJECTION INTRAMUSCULAR; INTRAVENOUS ONCE
Status: COMPLETED | OUTPATIENT
Start: 2018-06-21 | End: 2018-06-21

## 2018-06-21 RX ORDER — FUROSEMIDE 10 MG/ML
INJECTION INTRAMUSCULAR; INTRAVENOUS
Status: COMPLETED
Start: 2018-06-21 | End: 2018-06-21

## 2018-06-21 RX ORDER — FUROSEMIDE 40 MG/1
TABLET ORAL
Qty: 270 TABLET | Refills: 3 | Status: ON HOLD | OUTPATIENT
Start: 2018-06-21 | End: 2018-07-02 | Stop reason: HOSPADM

## 2018-06-21 RX ORDER — SODIUM CHLORIDE 0.9 % (FLUSH) 0.9 %
SYRINGE (ML) INJECTION
Status: DISPENSED
Start: 2018-06-21 | End: 2018-06-22

## 2018-06-21 RX ADMIN — FUROSEMIDE 120 MG: 10 INJECTION INTRAMUSCULAR; INTRAVENOUS at 14:29

## 2018-06-21 ASSESSMENT — ENCOUNTER SYMPTOMS
BACK PAIN: 0
CONSTIPATION: 1
COUGH: 1
ABDOMINAL PAIN: 0
WHEEZING: 1
SHORTNESS OF BREATH: 1

## 2018-06-22 DIAGNOSIS — S74.20XA: Primary | ICD-10-CM

## 2018-06-25 ENCOUNTER — TELEPHONE (OUTPATIENT)
Dept: OTHER | Age: 83
End: 2018-06-25

## 2018-06-25 ENCOUNTER — HOSPITAL ENCOUNTER (OUTPATIENT)
Dept: OTHER | Age: 83
Discharge: HOME | End: 2018-06-25
Attending: PHYSICIAN ASSISTANT | Admitting: PHYSICIAN ASSISTANT

## 2018-06-25 DIAGNOSIS — S74.20XA: ICD-10-CM

## 2018-06-26 ENCOUNTER — TELEPHONE (OUTPATIENT)
Dept: FAMILY MEDICINE CLINIC | Age: 83
End: 2018-06-26

## 2018-06-27 ENCOUNTER — TELEPHONE (OUTPATIENT)
Dept: FAMILY MEDICINE CLINIC | Age: 83
End: 2018-06-27

## 2018-06-28 ENCOUNTER — TELEPHONE (OUTPATIENT)
Dept: FAMILY MEDICINE CLINIC | Age: 83
End: 2018-06-28

## 2018-06-28 PROBLEM — I10 ESSENTIAL HYPERTENSION: Status: ACTIVE | Noted: 2018-06-28

## 2018-06-28 PROBLEM — J96.11 CHRONIC RESPIRATORY FAILURE WITH HYPOXIA (HCC): Status: ACTIVE | Noted: 2018-06-28

## 2018-06-28 PROBLEM — I50.33 ACUTE ON CHRONIC DIASTOLIC CHF (CONGESTIVE HEART FAILURE) (HCC): Chronic | Status: ACTIVE | Noted: 2018-06-21

## 2018-06-28 PROBLEM — E78.2 MIXED HYPERLIPIDEMIA: Status: ACTIVE | Noted: 2018-06-28

## 2018-06-28 PROBLEM — R60.9 PITTING EDEMA: Status: ACTIVE | Noted: 2018-06-28

## 2018-06-28 PROBLEM — N17.9 AKI (ACUTE KIDNEY INJURY) (HCC): Status: ACTIVE | Noted: 2018-06-28

## 2018-06-29 NOTE — TELEPHONE ENCOUNTER
Spoke to son of patient who just wanted to inform me that his mom is in the hospital with CHF and kidney failure.  Spoke to him and answered whatever I could but just told him that the hospitalist would be taking care of her while she is there, along with her cardiologist and pulmonologist.

## 2018-07-01 ENCOUNTER — HOSPITAL ENCOUNTER (OUTPATIENT)
Dept: ONCOLOGY | Age: 83
Discharge: HOME | End: 2018-07-31
Attending: CLINICAL NURSE SPECIALIST | Admitting: CLINICAL NURSE SPECIALIST

## 2018-07-02 ENCOUNTER — TELEPHONE (OUTPATIENT)
Dept: PULMONOLOGY | Age: 83
End: 2018-07-02

## 2018-07-03 ENCOUNTER — CARE COORDINATION (OUTPATIENT)
Dept: CASE MANAGEMENT | Age: 83
End: 2018-07-03

## 2018-07-03 DIAGNOSIS — I50.32 CHRONIC DIASTOLIC HEART FAILURE (HCC): Primary | Chronic | ICD-10-CM

## 2018-07-03 PROCEDURE — 1111F DSCHRG MED/CURRENT MED MERGE: CPT

## 2018-07-03 NOTE — CARE COORDINATION
[facility] Transitions Initial Follow Up Call    Call within 2 business days of discharge: Yes    Patient: Keyla Estrada Patient : 1935   MRN: [id number]  Reason for Admission: SOB 2/2 COPD exacerbation     Discharge Date: 18 RARS: Readmission Risk Score: 17     Spoke with: [facility]    Non-face-to-face services provided:  Obtained and reviewed discharge summary and/or continuity of care documents  Education of patient/family/caregiver/guardian to support self-management-Discharge Instruction; diet, activity, daily weights,  hospital follow up  Assessment and support for treatment adherence and medication management-medication review, reconciliation   1111f completed    Care Transitions 24 Hour Call    Patient DME:  [facility]  Patient Home Equipment:  Oxygen  Do you have support at home?:  Alone  Are you an active caregiver in your home?:  No  Care Transitions Interventions      Returned to [facility] assisted living and is doing well. Had RN visit and PT/OT visit today as well. Has all of her new medications. Edema to legs has reduced significantly. Breathing is less labored and improving.   Has discharge instructions and reviewed diet: Low sodium, low fat diet,   COPD Zone tools, is using prednisone and antibiotics  Home oxygen 3L/NC, CPAP at HS  CHF following diet, daily weight, taking meds, specifically the furosemide monitoring intake of sodium  Unable to schedule specialist appts today, patient's son acknowledges he will set up HFU appt with pulmonology and PCP on  after the [facility]     Cardiology appt is scheduled for   Care transition will continue to follow    Follow Up  Future Appointments  Date Time Provider Moe Dial   2018 2:00 PM JOSE ALFREDO Patrick - CNP FF Cardio [facility]   2018 10:30 AM Su Kc MD PULM & CC [facility]   2018 2:20 PM Bibiana Flaherty MD FF SLEEP MED [facility]       Nettie Keith, RN

## 2018-07-06 ENCOUNTER — TELEPHONE (OUTPATIENT)
Dept: OTHER | Age: 83
End: 2018-07-06

## 2018-07-09 ENCOUNTER — CARE COORDINATION (OUTPATIENT)
Dept: CASE MANAGEMENT | Age: 83
End: 2018-07-09

## 2018-07-09 NOTE — CARE COORDINATION
[facility] Transitions Follow Up Call    2018    Patient: Leo Busby  Patient : 1935   MRN: [id number]  Reason for Admission: SOB 2/2 COPD exacerbation  Discharge Date: 18 RARS: Readmission Risk Score: 17       Follow up call attempted, left contact info on     Follow Up  Future Appointments  Date Time Provider Moe Dial   2018 11:00 AM [address], Willi Singletary [facility]   2018 1:00 PM JOSE ALFREDO Edgar - CNP FF Cardio [facility]   2018 10:30 AM Sherryll Shone, MD PULM & CC [facility]   2018 2:20 PM Radha Coelho MD FF SLEEP MED [facility]       Brian Us RN

## 2018-07-10 ENCOUNTER — TELEPHONE (OUTPATIENT)
Dept: FAMILY MEDICINE CLINIC | Age: 83
End: 2018-07-10

## 2018-07-10 DIAGNOSIS — F32.A DEPRESSION, UNSPECIFIED DEPRESSION TYPE: ICD-10-CM

## 2018-07-10 DIAGNOSIS — E03.9 ACQUIRED HYPOTHYROIDISM: ICD-10-CM

## 2018-07-10 DIAGNOSIS — F32.A DEPRESSION, UNSPECIFIED DEPRESSION TYPE: Primary | ICD-10-CM

## 2018-07-10 RX ORDER — CITALOPRAM 10 MG/1
10 TABLET ORAL DAILY
Qty: 30 TABLET | Refills: 2 | Status: SHIPPED | OUTPATIENT
Start: 2018-07-10 | End: 2018-07-10 | Stop reason: SDUPTHER

## 2018-07-10 RX ORDER — CITALOPRAM 10 MG/1
10 TABLET ORAL DAILY
Qty: 30 TABLET | Refills: 2 | Status: SHIPPED | OUTPATIENT
Start: 2018-07-10 | End: 2018-09-04 | Stop reason: SDUPTHER

## 2018-07-10 RX ORDER — LEVOTHYROXINE SODIUM 0.07 MG/1
75 TABLET ORAL DAILY
Qty: 30 TABLET | Refills: 3 | Status: SHIPPED | OUTPATIENT
Start: 2018-07-10 | End: 2018-10-30 | Stop reason: SDUPTHER

## 2018-07-10 NOTE — TELEPHONE ENCOUNTER
Talked to patient's son Sabi Berg and informed that celexa 10mg has been sent to the [facility] [address] today.

## 2018-07-11 ENCOUNTER — TELEPHONE (OUTPATIENT)
Dept: FAMILY MEDICINE CLINIC | Age: 83
End: 2018-07-11

## 2018-07-16 DIAGNOSIS — N39.3 STRESS INCONTINENCE OF URINE: ICD-10-CM

## 2018-07-16 RX ORDER — OXYBUTYNIN CHLORIDE 5 MG/1
TABLET ORAL
Qty: 90 TABLET | Refills: 3 | Status: SHIPPED | OUTPATIENT
Start: 2018-07-16 | End: 2018-10-12 | Stop reason: SDUPTHER

## 2018-07-19 ENCOUNTER — OFFICE VISIT (OUTPATIENT)
Dept: PULMONOLOGY | Age: 83
End: 2018-07-19

## 2018-07-19 ENCOUNTER — OFFICE VISIT (OUTPATIENT)
Dept: CARDIOLOGY CLINIC | Age: 83
End: 2018-07-19

## 2018-07-19 VITALS
WEIGHT: 227 LBS | BODY MASS INDEX: 37.77 KG/M2 | DIASTOLIC BLOOD PRESSURE: 56 MMHG | OXYGEN SATURATION: 97 % | SYSTOLIC BLOOD PRESSURE: 100 MMHG | HEART RATE: 80 BPM

## 2018-07-19 VITALS
HEIGHT: 65 IN | OXYGEN SATURATION: 92 % | HEART RATE: 67 BPM | DIASTOLIC BLOOD PRESSURE: 68 MMHG | SYSTOLIC BLOOD PRESSURE: 126 MMHG

## 2018-07-19 DIAGNOSIS — I50.32 CHRONIC DIASTOLIC HEART FAILURE (HCC): Primary | Chronic | ICD-10-CM

## 2018-07-19 DIAGNOSIS — R91.8 PULMONARY NODULES: ICD-10-CM

## 2018-07-19 DIAGNOSIS — G47.33 OSA (OBSTRUCTIVE SLEEP APNEA): ICD-10-CM

## 2018-07-19 DIAGNOSIS — R60.0 BILATERAL LOWER EXTREMITY EDEMA: ICD-10-CM

## 2018-07-19 DIAGNOSIS — I27.20 PULMONARY HYPERTENSION (HCC): ICD-10-CM

## 2018-07-19 DIAGNOSIS — J44.9 COPD, SEVERE (HCC): Chronic | ICD-10-CM

## 2018-07-19 DIAGNOSIS — Z09 HOSPITAL DISCHARGE FOLLOW-UP: Primary | ICD-10-CM

## 2018-07-19 PROCEDURE — 99214 OFFICE O/P EST MOD 30 MIN: CPT | Performed by: NURSE PRACTITIONER

## 2018-07-19 RX ORDER — POLYETHYLENE GLYCOL 3350 17 G/17G
17 POWDER, FOR SOLUTION ORAL DAILY PRN
COMMUNITY
End: 2018-08-01 | Stop reason: SDUPTHER

## 2018-07-19 ASSESSMENT — ENCOUNTER SYMPTOMS
CONSTIPATION: 0
COLOR CHANGE: 0
SHORTNESS OF BREATH: 1
ABDOMINAL PAIN: 0
COUGH: 0

## 2018-07-19 NOTE — PROGRESS NOTES
[facility] Follow Up Note    Subjective:   CHIEF COMPLAINT / HPI: Hospitalization from 6/27-7/2   The patient is [de-identified] y.o. female who presents today for a routine follow up visit. There is a PMH significant for dCHF, severe COPD and depression. She presented to the ED with increased LE edema and SOB. She was initially found to have BASIA. Diuresis was managed by Cardiology but she also had some wheezing thus pulmonary was consulted. Repeat CT scan done in the hospital revealed persisted scattered pulmonary nodules up to 6mm primarily in the RML. This was similar in appearance to CT scan done in February of this year. She was treated with Doxycycline during her hospitalization. She also received 30 days of Doxycyline in April for known nodules. Presently she reports that her breathing has improved since her hospitalization as has her edema. She is not coughing. She has struggled with her low sodium diet. Her daily weights have been acceptable per her report. She is down to 2L NC continuously from previously required 4L / min. She reports compliance with Anoro and Duoneb 3-4 x daily and PRN. She says this makes her jittery.         Past Medical History:   Diagnosis Date    Allergic rhinitis     CHF (congestive heart failure) (HCC)     COPD (chronic obstructive pulmonary disease) (HCC)     Depression     Hypothyroidism      Social History:    History   Smoking Status    Never Smoker   Smokeless Tobacco    Never Used     Current Medications:  Current Outpatient Prescriptions on File Prior to Visit   Medication Sig Dispense Refill    oxybutynin (DITROPAN) 5 MG tablet TAKE ONE TABLET BY MOUTH THREE TIMES A DAY 90 tablet 3    levothyroxine (SYNTHROID) 75 MCG tablet TAKE 1 TABLET BY MOUTH DAILY 30 tablet 3    citalopram (CELEXA) 10 MG tablet Take 1 tablet by mouth daily 30 tablet 2    lisinopril (PRINIVIL;ZESTRIL) 2.5 MG tablet Take 1 tablet by mouth daily 30 tablet 3    furosemide (LASIX) 40 MG tablet Take 1 tablet by mouth 2 times daily 60 tablet 3    ipratropium-albuterol (DUONEB) 0.5-2.5 (3) MG/3ML SOLN nebulizer solution Inhale 3 mLs into the lungs every 4 hours 360 mL 0    atorvastatin (LIPITOR) 10 MG tablet Take 1 tablet by mouth daily 30 tablet 5    albuterol sulfate HFA (VENTOLIN HFA) 108 (90 Base) MCG/ACT inhaler Inhale 2 puffs into the lungs every 6 hours as needed for Wheezing 1 Inhaler 3    montelukast (SINGULAIR) 10 MG tablet Take 1 tablet by mouth daily 30 tablet 5    fluticasone-vilanterol (BREO ELLIPTA) 100-25 MCG/INH AEPB inhaler Inhale 1 puff into the lungs daily 1 each 5     No current facility-administered medications on file prior to visit. Review of Systems   Constitutional: Negative for chills and fever. HENT: Negative for congestion and postnasal drip. Respiratory: Positive for shortness of breath. Negative for cough. Cardiovascular: Positive for leg swelling. Negative for chest pain. Gastrointestinal: Negative for abdominal pain and constipation. Musculoskeletal: Negative for arthralgias and myalgias. Skin: Negative for color change and pallor. Allergic/Immunologic: Negative for environmental allergies and food allergies. Hematological: Negative for adenopathy. Psychiatric/Behavioral: Negative for agitation and confusion. Objective:       VITALS:  /68   Pulse 67   Ht 5' 5\" (1.651 m)   SpO2 92% Comment: 2 lpm      Physical Exam   Constitutional: She is oriented to person, place, and time. She appears well-developed and well-nourished. No distress. HENT:   Head: Normocephalic. Mouth/Throat: No oropharyngeal exudate. Eyes: Conjunctivae are normal. Pupils are equal, round, and reactive to light. Neck: Normal range of motion. Neck supple. No tracheal deviation present. No thyromegaly present. Cardiovascular: Normal rate, regular rhythm and intact distal pulses. Exam reveals no friction rub.     Pulmonary/Chest: Effort normal.

## 2018-07-19 NOTE — PROGRESS NOTES
[facility] - Cardiology      Chief Complaint: \"I am doing better\"    Chief Complaint   Patient presents with    Shortness of Breath       History of present illness: Vini Forte is an [de-identified] y.o. female with past medical history significant for diastolic heart failure and severe COPD. She was hospitalized 6/28-7/2 after presenting to hospital with BLE edema with weeping and shortness of breath. Lasix dose was recently increased for the same and she also received IV Lasix but swelling continued to worsen. CXR with no overt edema, proBNP ~ 1100. Evidence of BASIA on admission. She was treated for COPD exacerbation. Patient is here today for hospital follow up chronic diastolic heart failure. She presents per electric scooter. Patient says she is \"doing better, getting stronger. \" She does not like the low sodium diet though. Lower extremity swelling much improved. Weight today 226 lbs compared to [place] weight 231 lbs. Denies orthopnea, PND. She is resident at [facility] (assisted living). Past Medical History:   Diagnosis Date    Allergic rhinitis     CHF (congestive heart failure) (HCC)     COPD (chronic obstructive pulmonary disease) (HCC)     Depression     Hypothyroidism      Past Surgical History:   Procedure Laterality Date    CARPAL TUNNEL RELEASE      bilateral    CHOLECYSTECTOMY      EYE SURGERY      bilateral cataracts    HYSTERECTOMY, TOTAL ABDOMINAL       Social History   Substance Use Topics    Smoking status: Never Smoker    Smokeless tobacco: Never Used    Alcohol use No       Review of Systems:   Constitutional: + weight loss, + fatigue, + weakness (improved). HEENT: No change in vision or ringing in the ears. Respiratory: + BOSE (improved), no PND, orthopnea or cough. Cardiovascular: No chest pain, palpitations, + edema (improved)  GI: No n/v, abdominal pain, + constipation.  No melena, no hematochezia  : No dysuria or hematuria. Skin: No rash or new skin lesions. Musculoskeletal: No new muscle or joint pain. Neurological: No lightheadedness, dizziness, syncope or TIA-like symptoms. Psychiatric: No anxiety, insomnia or depression    Physical Exam:  BP (!) 100/56   Pulse 80   Wt 227 lb (103 kg)   SpO2 97%   BMI 37.77 kg/m²     General:  Awake, alert, oriented in NAD  Skin:  Warm and dry. No unusual bruising or rash  HEENT: Normocephalic and atraumatic. Oral mucosa moist, no cyanosis. Neck:  Supple. No JVP appreciated  Chest:  Normal effort. Few basilar crackles  Cardiovascular:  RRR, S1/S2, + ARMAND, no gallop/rub  Abdomen:  Soft, nontender, +bowel sounds  Extremities:  1+ BLE edema  Neurological: No focal deficits  Psychological: Normal mood and affect      Current Outpatient Prescriptions   Medication Sig Dispense Refill    oxybutynin (DITROPAN) 5 MG tablet TAKE ONE TABLET BY MOUTH THREE TIMES A DAY 90 tablet 3    levothyroxine (SYNTHROID) 75 MCG tablet TAKE 1 TABLET BY MOUTH DAILY 30 tablet 3    citalopram (CELEXA) 10 MG tablet Take 1 tablet by mouth daily 30 tablet 2    lisinopril (PRINIVIL;ZESTRIL) 2.5 MG tablet Take 1 tablet by mouth daily 30 tablet 3    furosemide (LASIX) 40 MG tablet Take 1 tablet by mouth 2 times daily 60 tablet 3    ipratropium-albuterol (DUONEB) 0.5-2.5 (3) MG/3ML SOLN nebulizer solution Inhale 3 mLs into the lungs every 4 hours 360 mL 0    atorvastatin (LIPITOR) 10 MG tablet Take 1 tablet by mouth daily 30 tablet 5    albuterol sulfate HFA (VENTOLIN HFA) 108 (90 Base) MCG/ACT inhaler Inhale 2 puffs into the lungs every 6 hours as needed for Wheezing 1 Inhaler 3    montelukast (SINGULAIR) 10 MG tablet Take 1 tablet by mouth daily 30 tablet 5    fluticasone-vilanterol (BREO ELLIPTA) 100-25 MCG/INH AEPB inhaler Inhale 1 puff into the lungs daily 1 each 5     No current facility-administered medications for this visit.         Labs:   Lab Results   Component Value Date    WBC 11.3 (H) 07/02/2018    HGB 11.5 (L) 07/02/2018    HCT 35.2 (L) 07/02/2018    MCV 89.1 07/02/2018     07/02/2018     Lab Results   Component Value Date     07/02/2018    K 4.1 07/02/2018    CL 95 07/02/2018    CO2 37 07/02/2018    BUN 32 07/02/2018    CREATININE 1.0 07/02/2018    GLUCOSE 123 07/02/2018    CALCIUM 9.5 07/02/2018      No results found for: TRIG, HDL, LDLCALC, LDLDIRECT, LABVLDL    Diagnostics:    Echo 5/25/2018:  Summary   -Normal left ventricle size and systolic function with an estimated ejection fraction of 55-60%. -There is mild concentric left ventricular hypertrophy.   -No regional wall motion abnormalities are seen.   -Normal diastolic function. -Mild mitral regurgitation.   -Mild aortic stenosis. -Mild tricuspid regurgitation with a RVSP of 58 mmHg. Venous doppler 6/29/2018:  Summary    -No evidence of deep vein or superficial vein thrombosis involving the  bilateral lower extremities. Assessment:  1. Chronic diastolic heart failure. NYHA class III. Appears compensated. On ACE    2.  Pulmonary hypertension. RVSP 58 mmHg. Most likely secondary to combination COPD and diastolic heart failure. 3. Bilateral lower extremity edema. Multifactorial: chronic venous stasis, sedentary lifestyle, chronic diastolic heart failure, pulmonary hypertension. Plan:  1. Continue current medications. 2. Continue no added salt diet. 3. Continue to elevate legs throughout the day. 4. Check labs (BMP/BNP) this week or early next week. Fax results to [phone]   5. Follow up in 4 weeks, earlier for worsening. Thank you for allowing me to participate in the care of your patient. Diego Maki CNP        QUALITY MEASURES  1. Tobacco Cessation Counseling: N/A  2. BP retake if >140/90: N/A  3. Communication to PCP: Office note forwarded/faxed to PCP  4. CAD antiplatelet therapy: N/A  5. CAD lipid lowering therapy: N/A (on atorvastatin)  6. HF A.  Fib on anticoagulation: N/A

## 2018-07-19 NOTE — PATIENT INSTRUCTIONS
1. Continue current medications. 2. Continue no added salt diet. 3. Continue to elevate legs throughout the day. 4. Check labs this week or early next week. Fax results to [phone]     5. Follow up in 4 weeks, earlier for worsening.

## 2018-07-24 ENCOUNTER — TELEPHONE (OUTPATIENT)
Dept: CARDIOLOGY CLINIC | Age: 83
End: 2018-07-24

## 2018-07-30 NOTE — TELEPHONE ENCOUNTER
Pt phone number wrong. She is in [employer] . I spoke with pt and relayed message per [facility]. She verbalized understanding.  Adebayole number changed in Epic

## 2018-08-01 DIAGNOSIS — E78.2 MIXED HYPERLIPIDEMIA: ICD-10-CM

## 2018-08-01 DIAGNOSIS — K59.00 CONSTIPATION, UNSPECIFIED CONSTIPATION TYPE: Primary | ICD-10-CM

## 2018-08-01 RX ORDER — ATORVASTATIN CALCIUM 10 MG/1
TABLET, FILM COATED ORAL
Qty: 30 TABLET | Refills: 2 | Status: SHIPPED | OUTPATIENT
Start: 2018-08-01 | End: 2018-10-30 | Stop reason: SDUPTHER

## 2018-08-01 RX ORDER — POLYETHYLENE GLYCOL 3350 17 G/17G
17 POWDER, FOR SOLUTION ORAL DAILY PRN
Qty: 527 G | Refills: 1 | Status: SHIPPED | OUTPATIENT
Start: 2018-08-01 | End: 2019-03-04 | Stop reason: SDUPTHER

## 2018-08-14 ENCOUNTER — TELEPHONE (OUTPATIENT)
Dept: PULMONOLOGY | Age: 83
End: 2018-08-14

## 2018-08-28 ENCOUNTER — OFFICE VISIT (OUTPATIENT)
Dept: CARDIOLOGY CLINIC | Age: 83
End: 2018-08-28

## 2018-08-28 ENCOUNTER — OFFICE VISIT (OUTPATIENT)
Dept: PULMONOLOGY | Age: 83
End: 2018-08-28

## 2018-08-28 VITALS — SYSTOLIC BLOOD PRESSURE: 133 MMHG | HEART RATE: 79 BPM | OXYGEN SATURATION: 91 % | DIASTOLIC BLOOD PRESSURE: 77 MMHG

## 2018-08-28 VITALS
OXYGEN SATURATION: 90 % | BODY MASS INDEX: 36.49 KG/M2 | RESPIRATION RATE: 17 BRPM | DIASTOLIC BLOOD PRESSURE: 74 MMHG | SYSTOLIC BLOOD PRESSURE: 126 MMHG | HEART RATE: 60 BPM | WEIGHT: 219 LBS | HEIGHT: 65 IN

## 2018-08-28 DIAGNOSIS — I50.32 CHRONIC DIASTOLIC HEART FAILURE (HCC): Chronic | ICD-10-CM

## 2018-08-28 DIAGNOSIS — J44.9 COPD, SEVERE (HCC): Chronic | ICD-10-CM

## 2018-08-28 DIAGNOSIS — G47.33 OSA TREATED WITH BIPAP: Chronic | ICD-10-CM

## 2018-08-28 DIAGNOSIS — R60.0 BILATERAL LOWER EXTREMITY EDEMA: ICD-10-CM

## 2018-08-28 DIAGNOSIS — I50.32 CHRONIC DIASTOLIC HEART FAILURE (HCC): Primary | Chronic | ICD-10-CM

## 2018-08-28 DIAGNOSIS — R06.02 SOB (SHORTNESS OF BREATH): Primary | Chronic | ICD-10-CM

## 2018-08-28 DIAGNOSIS — I27.20 PULMONARY HYPERTENSION (HCC): ICD-10-CM

## 2018-08-28 PROCEDURE — 99214 OFFICE O/P EST MOD 30 MIN: CPT | Performed by: INTERNAL MEDICINE

## 2018-08-28 PROCEDURE — 99214 OFFICE O/P EST MOD 30 MIN: CPT | Performed by: NURSE PRACTITIONER

## 2018-08-28 NOTE — PROGRESS NOTES
ears.  Respiratory: + BOSE (stable), no PND, orthopnea, + cough. Cardiovascular: No chest pain, palpitations, + edema (improved)  GI: No n/v, abdominal pain, change in bowel habits. No melena, no hematochezia  : No dysuria or hematuria. Skin: No rash or new skin lesions. Musculoskeletal: No new muscle or joint pain. Neurological: No lightheadedness, dizziness, syncope or TIA-like symptoms. Psychiatric: No anxiety, insomnia or depression    Physical Exam:  /74 (Site: Left Arm, Position: Sitting, Cuff Size: Large Adult)   Pulse 60   Resp 17   Ht 5' 5\" (1.651 m)   Wt 219 lb (99.3 kg) Comment: at the [facility] this morning  SpO2 90%   BMI 36.44 kg/m²     General:  Awake, alert, oriented in NAD  Skin:  Warm and dry. No unusual bruising or rash  HEENT: Normocephalic and atraumatic. Oral mucosa moist, no cyanosis. Neck:  Supple. No JVP appreciated  Chest:  Normal effort.   Clear to auscultation  Cardiovascular:  RRR, S1/S2, + ARMAND, no gallop/rub  Abdomen:  Soft, nontender, +bowel sounds  Extremities:  1+ BLE edema  Neurological: No focal deficits  Psychological: Normal mood and affect      Current Outpatient Prescriptions   Medication Sig Dispense Refill    atorvastatin (LIPITOR) 10 MG tablet TAKE ONE (1) TABLET BY MOUTH DAILY 30 tablet 2    polyethylene glycol (GLYCOLAX) packet Take 17 g by mouth daily as needed for Constipation 527 g 1    umeclidinium-vilanterol (ANORO ELLIPTA) 62.5-25 MCG/INH AEPB inhaler Inhale 1 puff into the lungs daily      OXYGEN Inhale 2 L into the lungs      oxybutynin (DITROPAN) 5 MG tablet TAKE ONE TABLET BY MOUTH THREE TIMES A DAY 90 tablet 3    levothyroxine (SYNTHROID) 75 MCG tablet TAKE 1 TABLET BY MOUTH DAILY 30 tablet 3    citalopram (CELEXA) 10 MG tablet Take 1 tablet by mouth daily 30 tablet 2    lisinopril (PRINIVIL;ZESTRIL) 2.5 MG tablet Take 1 tablet by mouth daily 30 tablet 3    furosemide (LASIX) 40 MG tablet Take 1 tablet by mouth 2 times daily 60 salt diet. 4. Continue to weigh daily and notify office ([phone]) for weight gain/loss of 5 lbs in a week. 5. Follow up in 6 months, earlier for worsening. Recommended earlier appointment (3 mos) but she prefers to wait until she follows up with Dr Ciaran Rcahel. Thank you for allowing me to participate in the care of your patient. Nagi Torres CNP        QUALITY MEASURES  1. Tobacco Cessation Counseling: N/A  2. BP retake if >140/90: N/A  3. Communication to PCP: Office note forwarded/faxed to PCP  4. CAD antiplatelet therapy: N/A  5. CAD lipid lowering therapy: N/A (on atorvastatin)  6. HF A.  Fib on anticoagulation: N/A

## 2018-08-28 NOTE — PROGRESS NOTES
[facility]  Progress Note  Natalie Mantilla MD       Rishabh Ortega   YOB: 1935    Date of Visit:  8/28/2018    Assessment/Plan:  1. SOB (shortness of breath)  Unchanged. Still pretty short of breath  She is wheelchair bound but more for orthopedic issues then for pulmonary issues    2. COPD, severe ([address]  PFT   INTERPRETATION:  Spirometry reveals decreased FVC at 1.65 liters, which is  78% predicted. FEV1 is decreased at 0.81 liters, which is 52% predicted. FEV1/FVC ratio is reduced at 49%. There is no significant change after  inhaled bronchodilators. Lungs volumes reveal normal total lung capacity  and vital capacity. Residual volume has increased at 142% predicted. Diffusion capacity is reduced at 39% predicted.     IMPRESSION:  Moderate to severe obstructive lung disease with air trapping.     Breo ==> Anoro 2/2 hoarseness  Anoroi ==> Bevespi 2/2 cough with Anoro    3. Multifocal pneumonia  Repeat CT ordered but she didn't get it  She has completed the Abx    4. Obstructive sleep apnea on CPAP  Continue CPAP plus oxygen  Follow-up with Dr. Wesley Finch    5. Chronic congestive heart failure, unspecified congestive heart failure type ([address]  She has significant peripheral pitting edema  Echocardiogram today was reviewed  LVEF is normal  RVSP is elevated  Follow-up with cardiology      FOLLOW UP: 4 months      Chief Complaint   Patient presents with    3 Month Follow-Up     was in hospital back in July and saw Linnette Ryan for [place]. Still having issues with [employer] and trying to get a new mask for her CPAP    Discuss Medications     right when she draws in the Anoro it makes her cough    Cough     still having issues with this. HPI  The patient presents with a chief complaint of Shortness of breath. She is known to have severe COPD by PFTs. She also has chronic diastolic heart failure with an RVSP near 60. She also has history of A. fib.   She

## 2018-08-28 NOTE — PATIENT INSTRUCTIONS
1. Continue current medications. 2. Check labs this week (BMP/BNP) and fax results to [phone]. 3. Continue no added salt diet. 4. Continue to weigh daily and notify office ([phone]) for weight gain/loss of 5 lbs in a week. 5. Follow up in 6 months, earlier for worsening.

## 2018-08-30 ENCOUNTER — TELEPHONE (OUTPATIENT)
Dept: CARDIOLOGY CLINIC | Age: 83
End: 2018-08-30

## 2018-08-30 ENCOUNTER — TELEPHONE (OUTPATIENT)
Dept: FAMILY MEDICINE CLINIC | Age: 83
End: 2018-08-30

## 2018-08-30 DIAGNOSIS — I50.32 CHRONIC DIASTOLIC HEART FAILURE (HCC): Primary | Chronic | ICD-10-CM

## 2018-09-04 DIAGNOSIS — F32.A DEPRESSION, UNSPECIFIED DEPRESSION TYPE: ICD-10-CM

## 2018-09-04 RX ORDER — CITALOPRAM 10 MG/1
10 TABLET ORAL DAILY
Qty: 30 TABLET | Refills: 2 | Status: SHIPPED | OUTPATIENT
Start: 2018-09-04 | End: 2018-11-02 | Stop reason: SDUPTHER

## 2018-09-18 ENCOUNTER — TELEPHONE (OUTPATIENT)
Dept: SLEEP MEDICINE | Age: 83
End: 2018-09-18

## 2018-09-18 ENCOUNTER — OFFICE VISIT (OUTPATIENT)
Dept: SLEEP MEDICINE | Age: 83
End: 2018-09-18

## 2018-09-18 VITALS
HEART RATE: 92 BPM | WEIGHT: 227 LBS | HEIGHT: 60 IN | OXYGEN SATURATION: 95 % | DIASTOLIC BLOOD PRESSURE: 60 MMHG | SYSTOLIC BLOOD PRESSURE: 122 MMHG | BODY MASS INDEX: 44.57 KG/M2

## 2018-09-18 DIAGNOSIS — J96.11 CHRONIC RESPIRATORY FAILURE WITH HYPOXIA (HCC): Chronic | ICD-10-CM

## 2018-09-18 DIAGNOSIS — E03.9 ACQUIRED HYPOTHYROIDISM: Chronic | ICD-10-CM

## 2018-09-18 DIAGNOSIS — G47.33 OBSTRUCTIVE SLEEP APNEA SYNDROME: Primary | ICD-10-CM

## 2018-09-18 DIAGNOSIS — J44.9 COPD, SEVERE (HCC): Chronic | ICD-10-CM

## 2018-09-18 DIAGNOSIS — I10 ESSENTIAL HYPERTENSION: Chronic | ICD-10-CM

## 2018-09-18 DIAGNOSIS — I50.9 CHRONIC CONGESTIVE HEART FAILURE, UNSPECIFIED HEART FAILURE TYPE (HCC): Chronic | ICD-10-CM

## 2018-09-18 DIAGNOSIS — E66.01 MORBID OBESITY, UNSPECIFIED OBESITY TYPE (HCC): Chronic | ICD-10-CM

## 2018-09-18 DIAGNOSIS — F32.A DEPRESSION, UNSPECIFIED DEPRESSION TYPE: Chronic | ICD-10-CM

## 2018-09-18 PROCEDURE — 99215 OFFICE O/P EST HI 40 MIN: CPT | Performed by: INTERNAL MEDICINE

## 2018-09-18 ASSESSMENT — ENCOUNTER SYMPTOMS
RHINORRHEA: 0
CHOKING: 0
ABDOMINAL PAIN: 0
NAUSEA: 0
PHOTOPHOBIA: 0
VOMITING: 0
ALLERGIC/IMMUNOLOGIC NEGATIVE: 1
ABDOMINAL DISTENTION: 0
EYE PAIN: 0
APNEA: 0
SHORTNESS OF BREATH: 1
CHEST TIGHTNESS: 0

## 2018-09-18 ASSESSMENT — SLEEP AND FATIGUE QUESTIONNAIRES
HOW LIKELY ARE YOU TO NOD OFF OR FALL ASLEEP WHILE SITTING AND TALKING TO SOMEONE: 0
HOW LIKELY ARE YOU TO NOD OFF OR FALL ASLEEP WHILE LYING DOWN TO REST IN THE AFTERNOON WHEN CIRCUMSTANCES PERMIT: 1
HOW LIKELY ARE YOU TO NOD OFF OR FALL ASLEEP WHILE WATCHING TV: 2
ESS TOTAL SCORE: 6
NECK CIRCUMFERENCE (INCHES): 15
HOW LIKELY ARE YOU TO NOD OFF OR FALL ASLEEP WHILE SITTING INACTIVE IN A PUBLIC PLACE: 0
HOW LIKELY ARE YOU TO NOD OFF OR FALL ASLEEP WHEN YOU ARE A PASSENGER IN A CAR FOR AN HOUR WITHOUT A BREAK: 0
HOW LIKELY ARE YOU TO NOD OFF OR FALL ASLEEP WHILE SITTING QUIETLY AFTER LUNCH WITHOUT ALCOHOL: 1
HOW LIKELY ARE YOU TO NOD OFF OR FALL ASLEEP IN A CAR, WHILE STOPPED FOR A FEW MINUTES IN TRAFFIC: 0
HOW LIKELY ARE YOU TO NOD OFF OR FALL ASLEEP WHILE SITTING AND READING: 2

## 2018-09-18 NOTE — LETTER
[facility] Sleep Medicine  [address] [place] [address]  Phone: [phone]  Fax: [phone]      September 18, 2018       Patient: Shagufta Saavedra   MR Number: [id number]   YOB: 1935   Date of Visit: 9/18/2018     Thank you for allowing me to participate in the care of Soco Meade. Here is my assessment and plan. Also attached is a copy of her consult note:    ASSESSMENT:  Machelle Rich was seen today for sleep apnea. Diagnoses and all orders for this visit:    Obstructive sleep apnea syndrome  Comments:  Needs work up and old records    Chronic respiratory failure with hypoxia (HCC)    COPD, severe ([address]    Essential hypertension    Chronic congestive heart failure, unspecified heart failure type ([address]    Acquired hypothyroidism    Depression, unspecified depression type    Morbid obesity, unspecified obesity type ([address]        Plan: Will attempt to get old records. Reviewed compliance download with pt. Supplies and parts as needed for her machine. These are medically necessary. Continue medications per her PCP and other physicians. Limit caffeine use after 3pm.  Encouraged her to work on weight loss through diet and exercise. The primary encounter diagnosis was Obstructive sleep apnea syndrome. Diagnoses of Chronic respiratory failure with hypoxia ([address] COPD, severe ([address] Essential hypertension, Chronic congestive heart failure, unspecified heart failure type ([address] Acquired hypothyroidism, Depression, unspecified depression type, and Morbid obesity, unspecified obesity type ([address] were also pertinent to this visit. The chronic medical conditions listed are directly related to the primary diagnosis listed above. The management of the primary diagnosis affects the secondary diagnosis and vice versa.    If able to get old polysomnography then patient will need bilevel titration with O2 to adjust current pressure and O2 needs. If not able to get old polysomnography will do split-night study. Patient may need a new machine at anytime. 2-3 month f/u after titration. If you have questions or concerns, please do not hesitate to call me. I look forward to following Esthela Alas along with you.     Sincerely,      Lesly Bazzi MD    CC providers:  Jonny Finn MD  [address], [address] [facility] [address]  [address] [place] [address] In [address] , [address] [address] [address]  [address] [facility] [address]  VIA In [place]

## 2018-09-18 NOTE — TELEPHONE ENCOUNTER
Spoke to guero they are checking their records to see if they have her sleep study and will call if they dont and fax  if they do.

## 2018-09-18 NOTE — PROGRESS NOTES
referral letter(s). Pertinent data has been documented. Hana - Total score: 6    Caffeine Intake - 1 cups of caffeinated coffee per day(s)    Social History     Social History    Marital status:      Spouse name: N/A    Number of children: N/A    Years of education: N/A     Occupational History    Not on file.      Social History Main Topics    Smoking status: Never Smoker    Smokeless tobacco: Never Used    Alcohol use No    Drug use: No    Sexual activity: No     Other Topics Concern    Not on file     Social History Narrative    No narrative on file        Current Outpatient Prescriptions   Medication Sig Dispense Refill    citalopram (CELEXA) 10 MG tablet TAKE 1 TABLET BY MOUTH DAILY 30 tablet 2    glycopyrrolate-formoterol (BEVESPI AEROSPHERE) 9-4.8 MCG/ACT AERO Inhale 2 puffs into the lungs 2 times daily      glycopyrrolate-formoterol (BEVESPI AEROSPHERE) 9-4.8 MCG/ACT AERO Inhale 2 puffs into the lungs 2 times daily      atorvastatin (LIPITOR) 10 MG tablet TAKE ONE (1) TABLET BY MOUTH DAILY 30 tablet 2    polyethylene glycol (GLYCOLAX) packet Take 17 g by mouth daily as needed for Constipation 527 g 1    OXYGEN Inhale 2 L into the lungs      oxybutynin (DITROPAN) 5 MG tablet TAKE ONE TABLET BY MOUTH THREE TIMES A DAY 90 tablet 3    levothyroxine (SYNTHROID) 75 MCG tablet TAKE 1 TABLET BY MOUTH DAILY 30 tablet 3    lisinopril (PRINIVIL;ZESTRIL) 2.5 MG tablet Take 1 tablet by mouth daily 30 tablet 3    furosemide (LASIX) 40 MG tablet Take 1 tablet by mouth 2 times daily 60 tablet 3    ipratropium-albuterol (DUONEB) 0.5-2.5 (3) MG/3ML SOLN nebulizer solution Inhale 3 mLs into the lungs every 4 hours 360 mL 0    montelukast (SINGULAIR) 10 MG tablet Take 1 tablet by mouth daily 30 tablet 5    albuterol sulfate HFA (VENTOLIN HFA) 108 (90 Base) MCG/ACT inhaler Inhale 2 puffs into the lungs every 6 hours as needed for Wheezing 1 Inhaler 3     No current facility-administered Genitourinary: Negative for difficulty urinating, dysuria, frequency and urgency. Musculoskeletal: Positive for gait problem. Negative for neck pain and neck stiffness. Skin: Negative. Allergic/Immunologic: Negative. Neurological: Negative for tremors, seizures, syncope and weakness. Hematological: Negative for adenopathy. Does not bruise/bleed easily. Psychiatric/Behavioral: Positive for sleep disturbance. Negative for agitation, behavioral problems and confusion. Objective:     Vitals:  Weight BMI   Wt Readings from Last 3 Encounters:   09/18/18 227 lb (103 kg)   08/28/18 219 lb (99.3 kg)   07/19/18 227 lb (103 kg)    Body mass index is 44.33 kg/m². BP HR SaO2   BP Readings from Last 3 Encounters:   09/18/18 122/60   08/28/18 126/74   08/28/18 133/77    Pulse Readings from Last 3 Encounters:   09/18/18 92   08/28/18 60   08/28/18 79    SpO2 Readings from Last 3 Encounters:   09/18/18 95%   08/28/18 90%   08/28/18 91%        Physical Exam   Constitutional: She is oriented to person, place, and time. Vital signs are normal. She appears well-developed and well-nourished. Non-toxic appearance. She does not have a sickly appearance. She is not intubated. HENT:   Head: Normocephalic and atraumatic. Not macrocephalic and not microcephalic. Right Ear: External ear normal.   Left Ear: External ear normal.   Nose: Mucosal edema and septal deviation present. Mouth/Throat: Uvula is midline and mucous membranes are normal. Posterior oropharyngeal edema present. No oropharyngeal exudate or tonsillar abscesses. Tonsils:   absent S/P UP3 changes  Post. Pharynx:   normal mucosa  Neck circumference: 15  Inches     Eyes: Pupils are equal, round, and reactive to light. Conjunctivae, EOM and lids are normal.   Neck: Trachea normal and normal range of motion. Neck supple. No tracheal deviation present. No thyroid mass and no thyromegaly present.    Cardiovascular: Normal rate, regular rhythm, S1 normal

## 2018-09-21 ENCOUNTER — TELEPHONE (OUTPATIENT)
Dept: PULMONOLOGY | Age: 83
End: 2018-09-21

## 2018-09-21 NOTE — TELEPHONE ENCOUNTER
Patient called stated that she couldn't afford the Bevespi and then you sent in for Anoro however, patient stated that one she can't take either and the price is not affordable to her.  She would like a return call [phone]

## 2018-09-21 NOTE — TELEPHONE ENCOUNTER
Called and got pt's Dipak Minus approved [facility]#[id number] good thru 9-21-19.  Pt and FF Pharmay informed

## 2018-09-25 ENCOUNTER — TELEPHONE (OUTPATIENT)
Dept: PULMONOLOGY | Age: 83
End: 2018-09-25

## 2018-09-25 DIAGNOSIS — G47.33 OBSTRUCTIVE SLEEP APNEA (ADULT) (PEDIATRIC): Primary | ICD-10-CM

## 2018-10-08 ENCOUNTER — TELEPHONE (OUTPATIENT)
Dept: CARDIOLOGY CLINIC | Age: 83
End: 2018-10-08

## 2018-10-08 ENCOUNTER — CARE COORDINATION (OUTPATIENT)
Dept: FAMILY MEDICINE CLINIC | Age: 83
End: 2018-10-08

## 2018-10-11 ENCOUNTER — OFFICE VISIT (OUTPATIENT)
Dept: FAMILY MEDICINE CLINIC | Age: 83
End: 2018-10-11
Payer: MEDICARE

## 2018-10-11 ENCOUNTER — TELEPHONE (OUTPATIENT)
Dept: FAMILY MEDICINE CLINIC | Age: 83
End: 2018-10-11

## 2018-10-11 VITALS
DIASTOLIC BLOOD PRESSURE: 80 MMHG | SYSTOLIC BLOOD PRESSURE: 112 MMHG | HEART RATE: 104 BPM | BODY MASS INDEX: 43.36 KG/M2 | WEIGHT: 222 LBS | OXYGEN SATURATION: 96 %

## 2018-10-11 DIAGNOSIS — H66.011 ACUTE SUPPURATIVE OTITIS MEDIA OF RIGHT EAR WITH SPONTANEOUS RUPTURE OF TYMPANIC MEMBRANE, RECURRENCE NOT SPECIFIED: ICD-10-CM

## 2018-10-11 DIAGNOSIS — Z23 NEED FOR IMMUNIZATION AGAINST INFLUENZA: Primary | ICD-10-CM

## 2018-10-11 PROCEDURE — 99213 OFFICE O/P EST LOW 20 MIN: CPT | Performed by: PHYSICIAN ASSISTANT

## 2018-10-11 PROCEDURE — G0008 ADMIN INFLUENZA VIRUS VAC: HCPCS | Performed by: PHYSICIAN ASSISTANT

## 2018-10-11 PROCEDURE — 90662 IIV NO PRSV INCREASED AG IM: CPT | Performed by: PHYSICIAN ASSISTANT

## 2018-10-11 RX ORDER — AMOXICILLIN 500 MG/1
500 CAPSULE ORAL 3 TIMES DAILY
Qty: 30 CAPSULE | Refills: 0 | Status: SHIPPED | OUTPATIENT
Start: 2018-10-11 | End: 2018-10-21

## 2018-10-11 ASSESSMENT — ENCOUNTER SYMPTOMS
COUGH: 0
SORE THROAT: 0
EYE PAIN: 0
WHEEZING: 0

## 2018-10-11 NOTE — TELEPHONE ENCOUNTER
Yuan Lu, Nurse from [facility] states that pt came back from nuvia couch/[facility] on 10/10 w/ATB and needs to know the diagnosis. Needs to know part of body, and why given. P: [phone]      Please advise.

## 2018-10-12 DIAGNOSIS — N39.3 STRESS INCONTINENCE OF URINE: ICD-10-CM

## 2018-10-12 RX ORDER — OXYBUTYNIN CHLORIDE 5 MG/1
TABLET ORAL
Qty: 90 TABLET | Refills: 3 | Status: SHIPPED | OUTPATIENT
Start: 2018-10-12 | End: 2019-03-21 | Stop reason: SDUPTHER

## 2018-10-21 ENCOUNTER — HOSPITAL ENCOUNTER (OUTPATIENT)
Dept: SLEEP CENTER | Age: 83
Discharge: HOME | End: 2018-10-21
Payer: MEDICARE

## 2018-10-21 DIAGNOSIS — G47.33 OBSTRUCTIVE SLEEP APNEA (ADULT) (PEDIATRIC): ICD-10-CM

## 2018-10-21 PROCEDURE — 95811 POLYSOM 6/>YRS CPAP 4/> PARM: CPT

## 2018-10-23 PROCEDURE — 95811 POLYSOM 6/>YRS CPAP 4/> PARM: CPT | Performed by: INTERNAL MEDICINE

## 2018-10-24 ENCOUNTER — TELEPHONE (OUTPATIENT)
Dept: PULMONOLOGY | Age: 83
End: 2018-10-24

## 2018-10-30 ENCOUNTER — CARE COORDINATION (OUTPATIENT)
Dept: FAMILY MEDICINE CLINIC | Age: 83
End: 2018-10-30

## 2018-10-30 DIAGNOSIS — E78.2 MIXED HYPERLIPIDEMIA: ICD-10-CM

## 2018-10-30 DIAGNOSIS — E03.9 ACQUIRED HYPOTHYROIDISM: ICD-10-CM

## 2018-10-30 RX ORDER — LEVOTHYROXINE SODIUM 0.07 MG/1
75 TABLET ORAL DAILY
Qty: 30 TABLET | Refills: 3 | Status: SHIPPED | OUTPATIENT
Start: 2018-10-30 | End: 2018-11-02 | Stop reason: SDUPTHER

## 2018-10-30 RX ORDER — ATORVASTATIN CALCIUM 10 MG/1
TABLET, FILM COATED ORAL
Qty: 30 TABLET | Refills: 2 | Status: SHIPPED | OUTPATIENT
Start: 2018-10-30 | End: 2018-11-02 | Stop reason: SDUPTHER

## 2018-11-02 DIAGNOSIS — E03.9 ACQUIRED HYPOTHYROIDISM: ICD-10-CM

## 2018-11-02 DIAGNOSIS — E78.2 MIXED HYPERLIPIDEMIA: ICD-10-CM

## 2018-11-02 DIAGNOSIS — F32.A DEPRESSION, UNSPECIFIED DEPRESSION TYPE: ICD-10-CM

## 2018-11-02 RX ORDER — LEVOTHYROXINE SODIUM 0.07 MG/1
75 TABLET ORAL DAILY
Qty: 30 TABLET | Refills: 3 | Status: SHIPPED | OUTPATIENT
Start: 2018-11-02 | End: 2019-03-14 | Stop reason: SDUPTHER

## 2018-11-02 RX ORDER — LISINOPRIL 2.5 MG/1
2.5 TABLET ORAL DAILY
Qty: 30 TABLET | Refills: 3 | Status: SHIPPED | OUTPATIENT
Start: 2018-11-02 | End: 2019-03-01 | Stop reason: SDUPTHER

## 2018-11-02 RX ORDER — FUROSEMIDE 40 MG/1
40 TABLET ORAL 2 TIMES DAILY
Qty: 60 TABLET | Refills: 3 | Status: SHIPPED | OUTPATIENT
Start: 2018-11-02 | End: 2019-03-14 | Stop reason: SDUPTHER

## 2018-11-02 RX ORDER — CITALOPRAM 10 MG/1
10 TABLET ORAL DAILY
Qty: 30 TABLET | Refills: 2 | Status: SHIPPED | OUTPATIENT
Start: 2018-11-02 | End: 2019-04-08 | Stop reason: SDUPTHER

## 2018-11-02 RX ORDER — ATORVASTATIN CALCIUM 10 MG/1
TABLET, FILM COATED ORAL
Qty: 30 TABLET | Refills: 3 | Status: SHIPPED | OUTPATIENT
Start: 2018-11-02 | End: 2019-07-12 | Stop reason: SDUPTHER

## 2018-11-05 ENCOUNTER — TELEPHONE (OUTPATIENT)
Dept: PULMONOLOGY | Age: 83
End: 2018-11-05

## 2018-11-07 ENCOUNTER — TELEPHONE (OUTPATIENT)
Dept: PULMONOLOGY | Age: 83
End: 2018-11-07

## 2018-11-07 ENCOUNTER — CARE COORDINATION (OUTPATIENT)
Dept: FAMILY MEDICINE CLINIC | Age: 83
End: 2018-11-07

## 2018-11-16 ENCOUNTER — CARE COORDINATION (OUTPATIENT)
Dept: FAMILY MEDICINE CLINIC | Age: 83
End: 2018-11-16

## 2018-12-06 ENCOUNTER — TELEPHONE (OUTPATIENT)
Dept: FAMILY MEDICINE CLINIC | Age: 83
End: 2018-12-06

## 2018-12-10 ENCOUNTER — TELEPHONE (OUTPATIENT)
Dept: FAMILY MEDICINE CLINIC | Age: 83
End: 2018-12-10

## 2019-01-09 ENCOUNTER — TELEPHONE (OUTPATIENT)
Dept: PULMONOLOGY | Age: 84
End: 2019-01-09

## 2019-01-15 ENCOUNTER — OFFICE VISIT (OUTPATIENT)
Dept: PULMONOLOGY | Age: 84
End: 2019-01-15
Payer: MEDICARE

## 2019-01-15 VITALS
WEIGHT: 222 LBS | BODY MASS INDEX: 43.59 KG/M2 | OXYGEN SATURATION: 97 % | DIASTOLIC BLOOD PRESSURE: 70 MMHG | HEART RATE: 80 BPM | SYSTOLIC BLOOD PRESSURE: 112 MMHG | HEIGHT: 60 IN

## 2019-01-15 DIAGNOSIS — I10 ESSENTIAL HYPERTENSION: Chronic | ICD-10-CM

## 2019-01-15 DIAGNOSIS — E66.01 MORBID OBESITY, UNSPECIFIED OBESITY TYPE (HCC): ICD-10-CM

## 2019-01-15 DIAGNOSIS — E03.9 ACQUIRED HYPOTHYROIDISM: Chronic | ICD-10-CM

## 2019-01-15 DIAGNOSIS — I50.32 CHRONIC DIASTOLIC HEART FAILURE (HCC): Chronic | ICD-10-CM

## 2019-01-15 DIAGNOSIS — J44.9 COPD, SEVERE (HCC): Chronic | ICD-10-CM

## 2019-01-15 DIAGNOSIS — G47.33 OBSTRUCTIVE SLEEP APNEA (ADULT) (PEDIATRIC): Primary | Chronic | ICD-10-CM

## 2019-01-15 PROCEDURE — 99214 OFFICE O/P EST MOD 30 MIN: CPT | Performed by: INTERNAL MEDICINE

## 2019-01-15 ASSESSMENT — ENCOUNTER SYMPTOMS
SHORTNESS OF BREATH: 1
APNEA: 0
CHOKING: 0
COUGH: 0
RHINORRHEA: 0

## 2019-01-15 ASSESSMENT — SLEEP AND FATIGUE QUESTIONNAIRES
HOW LIKELY ARE YOU TO NOD OFF OR FALL ASLEEP WHEN YOU ARE A PASSENGER IN A CAR FOR AN HOUR WITHOUT A BREAK: 0
HOW LIKELY ARE YOU TO NOD OFF OR FALL ASLEEP WHILE SITTING INACTIVE IN A PUBLIC PLACE: 0
HOW LIKELY ARE YOU TO NOD OFF OR FALL ASLEEP IN A CAR, WHILE STOPPED FOR A FEW MINUTES IN TRAFFIC: 0
HOW LIKELY ARE YOU TO NOD OFF OR FALL ASLEEP WHILE LYING DOWN TO REST IN THE AFTERNOON WHEN CIRCUMSTANCES PERMIT: 1
HOW LIKELY ARE YOU TO NOD OFF OR FALL ASLEEP WHILE SITTING AND TALKING TO SOMEONE: 0
ESS TOTAL SCORE: 5
HOW LIKELY ARE YOU TO NOD OFF OR FALL ASLEEP WHILE WATCHING TV: 2
HOW LIKELY ARE YOU TO NOD OFF OR FALL ASLEEP WHILE SITTING QUIETLY AFTER LUNCH WITHOUT ALCOHOL: 0
HOW LIKELY ARE YOU TO NOD OFF OR FALL ASLEEP WHILE SITTING AND READING: 2

## 2019-01-17 ENCOUNTER — TELEPHONE (OUTPATIENT)
Dept: FAMILY MEDICINE CLINIC | Age: 84
End: 2019-01-17

## 2019-01-17 RX ORDER — MONTELUKAST SODIUM 10 MG/1
10 TABLET ORAL DAILY
Qty: 30 TABLET | Refills: 5 | Status: SHIPPED | OUTPATIENT
Start: 2019-01-17 | End: 2019-06-11 | Stop reason: SDUPTHER

## 2019-02-26 ENCOUNTER — OFFICE VISIT (OUTPATIENT)
Dept: PULMONOLOGY | Age: 84
End: 2019-02-26
Payer: MEDICARE

## 2019-02-26 VITALS — SYSTOLIC BLOOD PRESSURE: 112 MMHG | DIASTOLIC BLOOD PRESSURE: 61 MMHG | HEART RATE: 84 BPM | OXYGEN SATURATION: 93 %

## 2019-02-26 DIAGNOSIS — R91.1 PULMONARY NODULE: ICD-10-CM

## 2019-02-26 DIAGNOSIS — R06.02 SOB (SHORTNESS OF BREATH): Primary | Chronic | ICD-10-CM

## 2019-02-26 DIAGNOSIS — I50.32 CHRONIC DIASTOLIC HEART FAILURE (HCC): Chronic | ICD-10-CM

## 2019-02-26 DIAGNOSIS — J18.9 MULTIFOCAL PNEUMONIA: ICD-10-CM

## 2019-02-26 DIAGNOSIS — G47.33 OBSTRUCTIVE SLEEP APNEA (ADULT) (PEDIATRIC): Chronic | ICD-10-CM

## 2019-02-26 DIAGNOSIS — J44.9 COPD, SEVERE (HCC): Chronic | ICD-10-CM

## 2019-02-26 PROCEDURE — 99214 OFFICE O/P EST MOD 30 MIN: CPT | Performed by: INTERNAL MEDICINE

## 2019-02-26 RX ORDER — FORMOTEROL FUMARATE 20 UG/2ML
20 SOLUTION RESPIRATORY (INHALATION) 2 TIMES DAILY
Qty: 120 ML | Refills: 11 | Status: SHIPPED | OUTPATIENT
Start: 2019-02-26 | End: 2019-07-09 | Stop reason: SDUPTHER

## 2019-03-01 DIAGNOSIS — I10 ESSENTIAL HYPERTENSION: Primary | Chronic | ICD-10-CM

## 2019-03-01 RX ORDER — LISINOPRIL 2.5 MG/1
2.5 TABLET ORAL DAILY
Qty: 30 TABLET | Refills: 5 | Status: SHIPPED | OUTPATIENT
Start: 2019-03-01 | End: 2019-07-29 | Stop reason: SDUPTHER

## 2019-03-04 DIAGNOSIS — K59.00 CONSTIPATION, UNSPECIFIED CONSTIPATION TYPE: ICD-10-CM

## 2019-03-04 RX ORDER — POLYETHYLENE GLYCOL 3350 17 G/17G
17 POWDER, FOR SOLUTION ORAL DAILY PRN
Qty: 527 G | Refills: 1 | Status: SHIPPED | OUTPATIENT
Start: 2019-03-04 | End: 2020-01-14 | Stop reason: ALTCHOICE

## 2019-03-07 ENCOUNTER — TELEPHONE (OUTPATIENT)
Dept: PULMONOLOGY | Age: 84
End: 2019-03-07

## 2019-03-07 NOTE — TELEPHONE ENCOUNTER
Mar from [facility] said a peer to peer would need to be done for patients CT scan since she had one done recently. Please call [phone] for peer to peer.

## 2019-03-11 ENCOUNTER — TELEPHONE (OUTPATIENT)
Dept: PULMONOLOGY | Age: 84
End: 2019-03-11

## 2019-03-14 DIAGNOSIS — R60.0 BILATERAL LEG EDEMA: Primary | ICD-10-CM

## 2019-03-14 DIAGNOSIS — E03.9 ACQUIRED HYPOTHYROIDISM: ICD-10-CM

## 2019-03-14 RX ORDER — FUROSEMIDE 40 MG/1
40 TABLET ORAL 2 TIMES DAILY
Qty: 60 TABLET | Refills: 3 | Status: SHIPPED | OUTPATIENT
Start: 2019-03-14 | End: 2019-06-11 | Stop reason: SDUPTHER

## 2019-03-14 RX ORDER — LEVOTHYROXINE SODIUM 0.07 MG/1
75 TABLET ORAL DAILY
Qty: 30 TABLET | Refills: 3 | Status: SHIPPED | OUTPATIENT
Start: 2019-03-14 | End: 2019-06-11 | Stop reason: SDUPTHER

## 2019-03-21 DIAGNOSIS — N39.3 STRESS INCONTINENCE OF URINE: ICD-10-CM

## 2019-03-21 RX ORDER — OXYBUTYNIN CHLORIDE 5 MG/1
TABLET ORAL
Qty: 90 TABLET | Refills: 3 | Status: SHIPPED | OUTPATIENT
Start: 2019-03-21 | End: 2019-06-20 | Stop reason: SDUPTHER

## 2019-04-16 ENCOUNTER — OFFICE VISIT (OUTPATIENT)
Dept: FAMILY MEDICINE CLINIC | Age: 84
End: 2019-04-16
Payer: MEDICARE

## 2019-04-16 VITALS
SYSTOLIC BLOOD PRESSURE: 100 MMHG | WEIGHT: 210 LBS | BODY MASS INDEX: 41.01 KG/M2 | OXYGEN SATURATION: 98 % | HEART RATE: 74 BPM | DIASTOLIC BLOOD PRESSURE: 60 MMHG

## 2019-04-16 DIAGNOSIS — G47.33 OBSTRUCTIVE SLEEP APNEA (ADULT) (PEDIATRIC): Chronic | ICD-10-CM

## 2019-04-16 DIAGNOSIS — E66.01 MORBID OBESITY, UNSPECIFIED OBESITY TYPE (HCC): Chronic | ICD-10-CM

## 2019-04-16 DIAGNOSIS — E03.9 ACQUIRED HYPOTHYROIDISM: Chronic | ICD-10-CM

## 2019-04-16 DIAGNOSIS — I10 ESSENTIAL HYPERTENSION: Chronic | ICD-10-CM

## 2019-04-16 DIAGNOSIS — F32.A DEPRESSION, UNSPECIFIED DEPRESSION TYPE: ICD-10-CM

## 2019-04-16 DIAGNOSIS — H66.004 RECURRENT ACUTE SUPPURATIVE OTITIS MEDIA OF RIGHT EAR WITHOUT SPONTANEOUS RUPTURE OF TYMPANIC MEMBRANE: ICD-10-CM

## 2019-04-16 DIAGNOSIS — I50.9 CHRONIC CONGESTIVE HEART FAILURE, UNSPECIFIED HEART FAILURE TYPE (HCC): Primary | Chronic | ICD-10-CM

## 2019-04-16 DIAGNOSIS — E78.2 MIXED HYPERLIPIDEMIA: ICD-10-CM

## 2019-04-16 PROCEDURE — 99214 OFFICE O/P EST MOD 30 MIN: CPT | Performed by: PHYSICIAN ASSISTANT

## 2019-04-16 RX ORDER — AMOXICILLIN 500 MG/1
500 CAPSULE ORAL 3 TIMES DAILY
Qty: 30 CAPSULE | Refills: 0 | Status: SHIPPED | OUTPATIENT
Start: 2019-04-16 | End: 2019-04-26

## 2019-04-16 ASSESSMENT — ENCOUNTER SYMPTOMS
VOICE CHANGE: 0
NAUSEA: 0
COUGH: 1
VOMITING: 0
ABDOMINAL PAIN: 0
CHEST TIGHTNESS: 0
TROUBLE SWALLOWING: 0
RHINORRHEA: 0
SORE THROAT: 0
EYE PAIN: 0
CONSTIPATION: 1
SHORTNESS OF BREATH: 1
DIARRHEA: 0

## 2019-04-16 NOTE — PROGRESS NOTES
Subjective:      Patient ID: Osmel Beckham is a 82 y.o. female. HPI Patient is here for management of chronic diseases. Acutely, patient is complaining of ear drainage that has been occurring for 3 weeks. She states she is having a clear jelly with a little bit of blood as well. She has been putting cotton balls in her ear. She denies ear pain. She states she is not aron to wear her hearing aid on that side. Patient denies dizziness, vertigo, fevers, chills. Depression: Patient states her depression has been well controlled on her current medication. She is taking celexa daily with no SEs. She states she feels she does not need the medication any more. She would like to decrease the dose if possible. Thyroid: She is taking 75mcg Levothyroxine daily. No SEs. Needs new TSH levels drawn. Incontinence: States she pees a lot because of her lasix, but is still taking her oxybutynin. CHF, HTN, HLD : Managed by cardiology. She is taking her atrovastatin, lisinopril, and lasix daily. She needs to schedule an appointment for her cardiologist.     COPD: managed my pulmonology. She sees them again in 1 month. They started her on Perforomist BID in her nebulizer. She is wondering if this can be switched because it costs her $200 out of pocket. She states she is only taking this new nebulizer and her albuterol as needed. She also uses oxygen daily when sitting in her chair on 3L. Sleep apnea: Patient saw a sleep doctor in October. They diagnosed her with sleep apnea. She wears a bipap nightly. She states she constantly fights with the machine because feels it doesn't feel she is used to it. She is not sure when she is supposed to see him again. On note from 09/2018 it states to follow up with Dr. Nani Torres in 2-3 months. She has not followed up. No diarrhea she has some constipation. She takes miralax daily. She needs to find a dentist. She states she saw the eye doctor last year.  She needs to 6 months.          Gallo Rosas

## 2019-04-25 ENCOUNTER — CLINICAL DOCUMENTATION (OUTPATIENT)
Dept: PULMONOLOGY | Age: 84
End: 2019-04-25

## 2019-04-25 ENCOUNTER — TELEPHONE (OUTPATIENT)
Dept: PULMONOLOGY | Age: 84
End: 2019-04-25

## 2019-04-25 NOTE — TELEPHONE ENCOUNTER
Patient called stating her insurance co needs a letter that she tried and failed C-Pap and needs a Bi-Pap. [phone]

## 2019-04-25 NOTE — TELEPHONE ENCOUNTER
Faxed letter tried and failed cpap to [facility]

## 2019-04-26 ENCOUNTER — TELEPHONE (OUTPATIENT)
Dept: PULMONOLOGY | Age: 84
End: 2019-04-26

## 2019-04-26 NOTE — TELEPHONE ENCOUNTER
Patient called today. LOV 01/15    See prior note regarding letter needed for insurance from Dr. Vianca Alvarez confirming Bi-Pap working better for patient than C-Pap. Patient said she spoke w/Jenna regarding the letter needed for insurance company. Told her the DME provider is Surjit Webb not [facility] (she looked on the machine and confirmed it said Surjit Webb). Told her this letter was sent or tried to be sent yesterday and she needed to call Surjit Webb to see if received letter. She needed to confirm w/[facility] if the letter was actually needed since the info was actully suggested by [facility]. If needed or not, Surjit Webb could also confirm with her how to get needed supplies. Her callbk # is [phone].

## 2019-05-01 ENCOUNTER — TELEPHONE (OUTPATIENT)
Dept: FAMILY MEDICINE CLINIC | Age: 84
End: 2019-05-01

## 2019-05-02 ENCOUNTER — TELEPHONE (OUTPATIENT)
Dept: FAMILY MEDICINE CLINIC | Age: 84
End: 2019-05-02

## 2019-05-02 DIAGNOSIS — Z86.69 HISTORY OF RECURRENT EAR INFECTION: Primary | ICD-10-CM

## 2019-05-02 NOTE — TELEPHONE ENCOUNTER
Georges Jeff has been advised, referral to dr. Zachary Rivero is already in chart, nurse has been advised.

## 2019-05-02 NOTE — TELEPHONE ENCOUNTER
Soledad Pagan with [facility]  [phone] is calling because the patient just finished a round of  antibiotics for ear infection in right ear and  Is still  complaining of  Drainage and pain . She wants to know does she need to have another round of antibiotics or should she follow up with an ENT ? Please advise .

## 2019-05-08 ENCOUNTER — TELEPHONE (OUTPATIENT)
Dept: PULMONOLOGY | Age: 84
End: 2019-05-08

## 2019-05-09 DIAGNOSIS — G47.33 OBSTRUCTIVE SLEEP APNEA (ADULT) (PEDIATRIC): Primary | ICD-10-CM

## 2019-05-09 NOTE — TELEPHONE ENCOUNTER
Spoke to [employer] ext [phone] at [facility], she explained to me that this pt is unable to get supplies due to non complaint within her first 90 days of usage of the bipap machine, for this pt to be able to keep the machine and get supplies moving forward she would have to do the following, have an ov with the doctor within 6 months, a new sleep study (she recommended a split night or cpap titration), and a new order for the bipap to be sent to [facility] within 90 days. I did tell her pt had a ov 1/15/19 and read to her what it stated and she that was fine she did not have to be reseen, as long as the study and new order were done prior to 7/15/19. I called pt to explain this to her, she understood but wanted to know if her insurance would pay for the study and advised her to call her insurance to find out. I advised her also that I would have geoffrey at the sleep center contact her to schedule the study.

## 2019-05-13 ENCOUNTER — TELEPHONE (OUTPATIENT)
Dept: PULMONOLOGY | Age: 84
End: 2019-05-13

## 2019-05-13 NOTE — TELEPHONE ENCOUNTER
Spoke w/ Arvin Martinez regarding patient's appointments/transportation. They were not aware of 05/14 appt (which is now being cancelled and needs to be rescheduled after 05/23 sleep study). Confirmed w/staff (Felisa/Luly) that 05/14 appointment will be cancelled but patient still needs transport for 05/23 08:00 p.m. Sleep study. Mssgs also left on patient's voice mail.

## 2019-05-23 ENCOUNTER — HOSPITAL ENCOUNTER (OUTPATIENT)
Dept: SLEEP CENTER | Age: 84
Discharge: HOME | End: 2019-05-23
Payer: MEDICARE

## 2019-05-23 DIAGNOSIS — G47.33 OBSTRUCTIVE SLEEP APNEA (ADULT) (PEDIATRIC): ICD-10-CM

## 2019-05-23 PROCEDURE — 95811 POLYSOM 6/>YRS CPAP 4/> PARM: CPT

## 2019-05-28 ENCOUNTER — TELEPHONE (OUTPATIENT)
Dept: PULMONOLOGY | Age: 84
End: 2019-05-28

## 2019-05-28 PROCEDURE — 95811 POLYSOM 6/>YRS CPAP 4/> PARM: CPT | Performed by: INTERNAL MEDICINE

## 2019-05-30 ENCOUNTER — TELEPHONE (OUTPATIENT)
Dept: PULMONOLOGY | Age: 84
End: 2019-05-30

## 2019-05-30 NOTE — TELEPHONE ENCOUNTER
Spoke with pt 05/29/2019. Pt has a unit and needs pressure changes made. Pt also states she needs supplies. DME was notified and order was sent for changes. F/U appointment was scheduled.

## 2019-06-04 ENCOUNTER — OFFICE VISIT (OUTPATIENT)
Dept: ENT CLINIC | Age: 84
End: 2019-06-04
Payer: MEDICARE

## 2019-06-04 VITALS
RESPIRATION RATE: 14 BRPM | SYSTOLIC BLOOD PRESSURE: 131 MMHG | OXYGEN SATURATION: 87 % | BODY MASS INDEX: 41.23 KG/M2 | WEIGHT: 210 LBS | HEART RATE: 81 BPM | DIASTOLIC BLOOD PRESSURE: 60 MMHG | HEIGHT: 60 IN

## 2019-06-04 DIAGNOSIS — H66.011 ACUTE SUPPURATIVE OTITIS MEDIA OF RIGHT EAR WITH SPONTANEOUS RUPTURE OF TYMPANIC MEMBRANE, RECURRENCE NOT SPECIFIED: Primary | ICD-10-CM

## 2019-06-04 DIAGNOSIS — H60.331 ACUTE SWIMMER'S EAR OF RIGHT SIDE: ICD-10-CM

## 2019-06-04 DIAGNOSIS — H72.01 CENTRAL PERFORATION OF TYMPANIC MEMBRANE OF RIGHT EAR: ICD-10-CM

## 2019-06-04 PROCEDURE — 99203 OFFICE O/P NEW LOW 30 MIN: CPT | Performed by: OTOLARYNGOLOGY

## 2019-06-04 RX ORDER — OXYBUTYNIN CHLORIDE 5 MG/1
TABLET, EXTENDED RELEASE ORAL
Refills: 3 | COMMUNITY
Start: 2019-03-21 | End: 2019-06-04 | Stop reason: SDUPTHER

## 2019-06-04 RX ORDER — CIPROFLOXACIN AND DEXAMETHASONE 3; 1 MG/ML; MG/ML
4 SUSPENSION/ DROPS AURICULAR (OTIC) 2 TIMES DAILY
Qty: 1 BOTTLE | Refills: 0 | Status: SHIPPED | OUTPATIENT
Start: 2019-06-04 | End: 2019-07-31 | Stop reason: ALTCHOICE

## 2019-06-04 RX ORDER — CEFUROXIME AXETIL 250 MG/1
250 TABLET ORAL 2 TIMES DAILY
Qty: 20 TABLET | Refills: 0 | Status: SHIPPED | OUTPATIENT
Start: 2019-06-04 | End: 2019-06-14

## 2019-06-04 NOTE — PROGRESS NOTES
APPEARANCE: WDWN NAD, A&Ox3. ABILITY TO COMMUNICATE/QUALITY OF VOICE:  Normal, no hoarseness or hot potato quality. SALIVARY GLANDS:  Parotid gland and submandibular gland normal bilaterally. INSPECTION, HEAD AND FACE:  Normal with no masses, lesions, tenderness, or deformities. FACIAL STRENGTH, MOTION:  Facial nerve function intact and equal bilaterally for all 5 branches. SINUSES:  Frontal sinuses and maxillary sinuses nontender, bilaterally. HEARING ASSESSMENT:  Hearing was intact to finger rub at the external meatus bilaterally. Tuning fork tests showed the Richardson test midline and Rinne test air conduction greater and bone conduction bilaterally, using a 512 Hz tuning fork. EXTERNAL EAR/NOSE:  The pinnae, mastoids, and external nose were normal bilaterally. EARS, OTOSCOPY:  The tympanic membranes and external auditory canals were normal bilaterally. (+) EARS, OTOMICROSCOPY:  The right TM was dull, thick, and  erythematous with a mound of granulation tissue over most of the posterior half of the TM with a pinpoint perforation in the center of the granulation tissue draining thick mucopurulent exudate. The EAC was mildly erythematous and edematous. Ciprodex drops instilled and cotton ball plug placed. The left TM was nonerythematous dull and thick with patches of sclerosis, and was normally mobile. The left EAC was normal  NOSE:  The nasal septum was midline. The inferior turbinates were normal bilaterally. Nasal mucosa and nasal secretions were normal bilaterally. LIPS, TEETH AND GUMS:  Normal.  (+) OROPHARYNX/ORAL CAVITY:  Post UPPP/tonsillectomy. Normal mucosa with no ulcerations, masses, lesions, erythema, exudate, or structural abnormalities. NECK:  Normal with no masses, tenderness, or tracheal deviation. THYROID:  Normal, with no nodules, goiter, or tenderness bilaterally. LYMPH NODES, CERVICAL, FACIAL AND SUPRACLAVICULAR:  No lymphadenopathy detected.         Page Coley / Stephanie Ryan / ORDERS:       Vasyl Gaffney was seen today for ear problem. Diagnoses and all orders for this visit:    Acute suppurative otitis media of right ear with spontaneous rupture of tympanic membrane, recurrence not specified  -     ciprofloxacin-dexamethasone (CIPRODEX) 0.3-0.1 % otic suspension; Place 4 drops into the right ear 2 times daily  -     cefUROXime (CEFTIN) 250 MG tablet; Take 1 tablet by mouth 2 times daily for 10 days    Acute swimmer's ear of right side  -     ciprofloxacin-dexamethasone (CIPRODEX) 0.3-0.1 % otic suspension; Place 4 drops into the right ear 2 times daily    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         RECOMMENDATIONS/PLAN:    Patient Instructions     WATER PRECAUTIONS  · Do not allow water to get into the right ear, as this may cause or worsen an ear infection. · Before bathing, showering or washing the hair, a plug of cotton coated with petroleum jelly should be placed in the opening of the ear canal.  After bathing the cotton should be removed and the outer part of the ear dried with a soft towel. Alternatively, you may use a silicone putty ear plug purchased from your pharmacy. · If water does enter the ear, drain the water out into a tissue or cotton ball. Then, instill into the ear four drops of the eardrops you were given. Follow-up  Return in about 2 weeks (around 6/18/2019) for recheck/follow-up, and sooner if condition worsens. >> Please note portions of this note were completed with a voice recognition program.  Efforts were made to edit the dictations but occasionally words are mis-transcribed.

## 2019-06-04 NOTE — PATIENT INSTRUCTIONS
WATER PRECAUTIONS  · Do not allow water to get into the right ear, as this may cause or worsen an ear infection. · Before bathing, showering or washing the hair, a plug of cotton coated with petroleum jelly should be placed in the opening of the ear canal.  After bathing the cotton should be removed and the outer part of the ear dried with a soft towel. Alternatively, you may use a silicone putty ear plug purchased from your pharmacy. · If water does enter the ear, drain the water out into a tissue or cotton ball. Then, instill into the ear four drops of the eardrops you were given.

## 2019-06-18 ENCOUNTER — OFFICE VISIT (OUTPATIENT)
Dept: ENT CLINIC | Age: 84
End: 2019-06-18
Payer: MEDICARE

## 2019-06-18 VITALS
OXYGEN SATURATION: 93 % | WEIGHT: 210 LBS | BODY MASS INDEX: 41.23 KG/M2 | RESPIRATION RATE: 14 BRPM | HEIGHT: 60 IN | DIASTOLIC BLOOD PRESSURE: 76 MMHG | HEART RATE: 66 BPM | SYSTOLIC BLOOD PRESSURE: 112 MMHG

## 2019-06-18 DIAGNOSIS — H66.011 ACUTE SUPPURATIVE OTITIS MEDIA OF RIGHT EAR WITH SPONTANEOUS RUPTURE OF TYMPANIC MEMBRANE, RECURRENCE NOT SPECIFIED: ICD-10-CM

## 2019-06-18 DIAGNOSIS — H60.331 ACUTE SWIMMER'S EAR OF RIGHT SIDE: ICD-10-CM

## 2019-06-18 DIAGNOSIS — H91.93 BILATERAL HEARING LOSS, UNSPECIFIED HEARING LOSS TYPE: ICD-10-CM

## 2019-06-18 DIAGNOSIS — H72.01 CENTRAL PERFORATION OF TYMPANIC MEMBRANE OF RIGHT EAR: Primary | ICD-10-CM

## 2019-06-18 PROCEDURE — 99213 OFFICE O/P EST LOW 20 MIN: CPT | Performed by: OTOLARYNGOLOGY

## 2019-06-18 NOTE — PROGRESS NOTES
[facility]        PCP:  ELISE Cevallos      CHIEF COMPLAINT:  Chief Complaint   Patient presents with    Otitis Media       HISTORY OF PRESENT ILLNESS:   Carli Calderon is a 82 y.o. female here for recheck and follow-up of a problem located in the right ear. The quality is ear infection. Associated symptoms include hearing loss in right ear. REVIEW OF SYSTEMS:   EARS, NOSE, THROAT:  (+) Otorrhea, there is some drainage from right ear at night when I go to bed.  (+) Chronic hearing loss bilateral, wears hearing aids. Denied otalgia and tinnitus. EXAMINATION:      VITALS SIGNS reviewed. Vitals:    06/18/19 1351   BP: 112/76   Pulse: 66   Resp: 14   SpO2: 93%   Weight: 210 lb (95.3 kg)   Height: 5' (1.524 m)      GENERAL APPEARANCE:  Well developed, well nourished, no apparent distress, no apparent deformities. ABILITY TO COMMUNICATE/QUALITY OF VOICE:  Communicated without difficulty. Normal voice. No hot potato voice. No hoarseness. EXTERNAL EAR/NOSE:  Normal pinnae and mastoids. Normal external nose. (+) HEARING ASSESSMENT:  Unable to hear finger rub bilaterally. Tuning fork tests, 512 Hertz tuning fork:  Unable to hear Richardson. Rinne air > bone   (+) EARS, OTOMICROSCOPY:  The left TM was non-erythematous, dull, and thickened with a persistent pinpoint perforation in the posterior-inferior quadrant, approximated 0.5 x 0.8 mm as measured using a #3 suction tip, and no exudate/PLACIDO. The right TM was dull, sclerotic, thickened, intact with no erythema. The EAC appeared to be normal bilaterally. MOOD AND AFFECT: Normal.  No evidence of depression, anxiety or agitation. IMPRESSION / Abhay Sero / Jodie Muckle / PROCEDURES:       Carli Calderon was seen today for otitis media.     Diagnoses and all orders for this visit:    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    Acute suppurative otitis media of right ear with spontaneous rupture of tympanic membrane, recurrence not specified  Comments:  resolved. Acute swimmer's ear of right side  Comments:  resolved. Bilateral hearing loss, unspecified hearing loss type           RECOMMENDATIONS / PLAN:    Audiogram.  See patient instructions on file for this visit, which were fully discussed with the patient. Patient Instructions   1. Proceed with audiogram.  If you have the testing done at your hearing aid provider, have a copy sent to me for review. 2. See your hearing aid provider to have your hearing aid device checked. WATER PRECAUTIONS  · Do not allow water to get into the right ear, as this may cause an ear infection. · Before bathing, showering or washing the hair, a plug of cotton coated with petroleum jelly should be placed in the opening of the ear canal.  After bathing the cotton should be removed and the outer part of the ear dried with a soft towel. Alternatively, you may use a silicone putty ear plug purchased from your pharmacy. · If water does enter the ear, drain the water out into a tissue or cotton ball. Follow-up  Return in about 6 weeks (around 7/30/2019). >> Please note portions of this note were completed with a voice recognition program.  Efforts were made to edit the dictations but occasionally words are mis-transcribed.

## 2019-06-18 NOTE — PATIENT INSTRUCTIONS
1. Proceed with audiogram.  If you have the testing done at your hearing aid provider, have a copy sent to me for review. 2. See your hearing aid provider to have your hearing aid device checked. WATER PRECAUTIONS  · Do not allow water to get into the right ear, as this may cause an ear infection. · Before bathing, showering or washing the hair, a plug of cotton coated with petroleum jelly should be placed in the opening of the ear canal.  After bathing the cotton should be removed and the outer part of the ear dried with a soft towel. Alternatively, you may use a silicone putty ear plug purchased from your pharmacy. · If water does enter the ear, drain the water out into a tissue or cotton ball.

## 2019-06-20 DIAGNOSIS — N39.3 STRESS INCONTINENCE OF URINE: ICD-10-CM

## 2019-06-20 RX ORDER — OXYBUTYNIN CHLORIDE 5 MG/1
TABLET ORAL
Qty: 90 TABLET | Refills: 1 | Status: SHIPPED | OUTPATIENT
Start: 2019-06-20 | End: 2019-08-19 | Stop reason: SDUPTHER

## 2019-07-02 ENCOUNTER — PROCEDURE VISIT (OUTPATIENT)
Dept: AUDIOLOGY | Age: 84
End: 2019-07-02
Payer: MEDICARE

## 2019-07-02 DIAGNOSIS — H90.8 MIXED CONDUCTIVE AND SENSORINEURAL HEARING LOSS, UNSPECIFIED LATERALITY: Primary | ICD-10-CM

## 2019-07-02 PROCEDURE — 92550 TYMPANOMETRY & REFLEX THRESH: CPT | Performed by: AUDIOLOGIST

## 2019-07-02 PROCEDURE — 92557 COMPREHENSIVE HEARING TEST: CPT | Performed by: AUDIOLOGIST

## 2019-07-02 NOTE — PROGRESS NOTES
Subjective:      Patient ID: Gerome Hammans is a 82 y.o. female who has a history of hearing loss and hearing aid use. Objective:       Assessment:      Moderate sensorineural bilaterally. Stiff tympanogram with large volume cavity noted right ear. Essentially normal tympanogram left ear.       Plan:      Medical follow-up  Water precaution  Hearing retest as needed  Repair of right hearing aid        Jose Pacheco, AuD

## 2019-07-05 ENCOUNTER — TELEPHONE (OUTPATIENT)
Dept: PULMONOLOGY | Age: 84
End: 2019-07-05

## 2019-07-08 ENCOUNTER — TELEPHONE (OUTPATIENT)
Dept: PULMONOLOGY | Age: 84
End: 2019-07-08

## 2019-07-09 ENCOUNTER — OFFICE VISIT (OUTPATIENT)
Dept: PULMONOLOGY | Age: 84
End: 2019-07-09
Payer: MEDICARE

## 2019-07-09 VITALS
SYSTOLIC BLOOD PRESSURE: 107 MMHG | DIASTOLIC BLOOD PRESSURE: 59 MMHG | BODY MASS INDEX: 34.99 KG/M2 | WEIGHT: 210 LBS | OXYGEN SATURATION: 95 % | RESPIRATION RATE: 18 BRPM | HEART RATE: 65 BPM | HEIGHT: 65 IN

## 2019-07-09 DIAGNOSIS — I50.32 CHRONIC DIASTOLIC HEART FAILURE (HCC): Chronic | ICD-10-CM

## 2019-07-09 DIAGNOSIS — J44.9 COPD, SEVERE (HCC): Chronic | ICD-10-CM

## 2019-07-09 DIAGNOSIS — J96.11 CHRONIC RESPIRATORY FAILURE WITH HYPOXIA (HCC): ICD-10-CM

## 2019-07-09 DIAGNOSIS — G47.33 OBSTRUCTIVE SLEEP APNEA (ADULT) (PEDIATRIC): Chronic | ICD-10-CM

## 2019-07-09 DIAGNOSIS — J30.2 CHRONIC SEASONAL ALLERGIC RHINITIS: ICD-10-CM

## 2019-07-09 DIAGNOSIS — R06.02 SOB (SHORTNESS OF BREATH): Primary | Chronic | ICD-10-CM

## 2019-07-09 PROCEDURE — 99214 OFFICE O/P EST MOD 30 MIN: CPT | Performed by: INTERNAL MEDICINE

## 2019-07-09 RX ORDER — ALBUTEROL SULFATE 90 UG/1
2 AEROSOL, METERED RESPIRATORY (INHALATION) EVERY 6 HOURS PRN
Qty: 1 INHALER | Refills: 3 | Status: SHIPPED | OUTPATIENT
Start: 2019-07-09 | End: 2022-10-11 | Stop reason: SDUPTHER

## 2019-07-09 RX ORDER — FORMOTEROL FUMARATE 20 UG/2ML
20 SOLUTION RESPIRATORY (INHALATION) 2 TIMES DAILY
Qty: 120 ML | Refills: 11 | Status: SHIPPED | OUTPATIENT
Start: 2019-07-09 | End: 2020-01-14 | Stop reason: SDUPTHER

## 2019-07-12 DIAGNOSIS — E78.2 MIXED HYPERLIPIDEMIA: ICD-10-CM

## 2019-07-12 RX ORDER — ATORVASTATIN CALCIUM 10 MG/1
TABLET, FILM COATED ORAL
Qty: 30 TABLET | Refills: 5 | Status: SHIPPED | OUTPATIENT
Start: 2019-07-12

## 2019-07-22 DIAGNOSIS — N39.3 STRESS INCONTINENCE OF URINE: ICD-10-CM

## 2019-07-22 RX ORDER — OXYBUTYNIN CHLORIDE 5 MG/1
TABLET ORAL
Qty: 90 TABLET | Refills: 1 | OUTPATIENT
Start: 2019-07-22

## 2019-07-29 ENCOUNTER — TELEPHONE (OUTPATIENT)
Dept: PULMONOLOGY | Age: 84
End: 2019-07-29

## 2019-07-29 DIAGNOSIS — I10 ESSENTIAL HYPERTENSION: Chronic | ICD-10-CM

## 2019-07-29 RX ORDER — LISINOPRIL 2.5 MG/1
TABLET ORAL
Qty: 30 TABLET | Refills: 2 | Status: SHIPPED | OUTPATIENT
Start: 2019-07-29 | End: 2021-05-21 | Stop reason: SDUPTHER

## 2019-07-31 ENCOUNTER — OFFICE VISIT (OUTPATIENT)
Dept: ENT CLINIC | Age: 84
End: 2019-07-31
Payer: MEDICARE

## 2019-07-31 VITALS
WEIGHT: 211.6 LBS | BODY MASS INDEX: 35.21 KG/M2 | DIASTOLIC BLOOD PRESSURE: 67 MMHG | SYSTOLIC BLOOD PRESSURE: 110 MMHG | HEART RATE: 86 BPM

## 2019-07-31 DIAGNOSIS — H90.3 BILATERAL SENSORINEURAL HEARING LOSS: ICD-10-CM

## 2019-07-31 DIAGNOSIS — H72.01 CENTRAL PERFORATION OF TYMPANIC MEMBRANE OF RIGHT EAR: Primary | ICD-10-CM

## 2019-07-31 PROCEDURE — 99214 OFFICE O/P EST MOD 30 MIN: CPT | Performed by: OTOLARYNGOLOGY

## 2019-08-19 DIAGNOSIS — N39.3 STRESS INCONTINENCE OF URINE: ICD-10-CM

## 2019-08-20 ENCOUNTER — OFFICE VISIT (OUTPATIENT)
Dept: PULMONOLOGY | Age: 84
End: 2019-08-20
Payer: MEDICARE

## 2019-08-20 VITALS
OXYGEN SATURATION: 98 % | SYSTOLIC BLOOD PRESSURE: 108 MMHG | BODY MASS INDEX: 35.16 KG/M2 | WEIGHT: 211 LBS | DIASTOLIC BLOOD PRESSURE: 58 MMHG | HEART RATE: 60 BPM | HEIGHT: 65 IN

## 2019-08-20 DIAGNOSIS — G47.33 OBSTRUCTIVE SLEEP APNEA (ADULT) (PEDIATRIC): Primary | Chronic | ICD-10-CM

## 2019-08-20 DIAGNOSIS — I10 ESSENTIAL HYPERTENSION: Chronic | ICD-10-CM

## 2019-08-20 DIAGNOSIS — J44.9 COPD, SEVERE (HCC): Chronic | ICD-10-CM

## 2019-08-20 DIAGNOSIS — I50.32 CHRONIC DIASTOLIC HEART FAILURE (HCC): Chronic | ICD-10-CM

## 2019-08-20 DIAGNOSIS — E66.01 MORBID OBESITY, UNSPECIFIED OBESITY TYPE (HCC): Chronic | ICD-10-CM

## 2019-08-20 PROCEDURE — 99214 OFFICE O/P EST MOD 30 MIN: CPT | Performed by: NURSE PRACTITIONER

## 2019-08-20 RX ORDER — OXYBUTYNIN CHLORIDE 5 MG/1
TABLET ORAL
Qty: 90 TABLET | Refills: 1 | Status: SHIPPED | OUTPATIENT
Start: 2019-08-20

## 2019-08-20 ASSESSMENT — ENCOUNTER SYMPTOMS
EYE REDNESS: 0
SHORTNESS OF BREATH: 0
RHINORRHEA: 0
SINUS PRESSURE: 0
EYE PAIN: 0
ABDOMINAL DISTENTION: 0
APNEA: 0
ABDOMINAL PAIN: 0
COUGH: 0

## 2019-08-20 ASSESSMENT — SLEEP AND FATIGUE QUESTIONNAIRES
HOW LIKELY ARE YOU TO NOD OFF OR FALL ASLEEP WHILE WATCHING TV: 1
HOW LIKELY ARE YOU TO NOD OFF OR FALL ASLEEP WHILE LYING DOWN TO REST IN THE AFTERNOON WHEN CIRCUMSTANCES PERMIT: 1
HOW LIKELY ARE YOU TO NOD OFF OR FALL ASLEEP WHILE SITTING AND TALKING TO SOMEONE: 0
HOW LIKELY ARE YOU TO NOD OFF OR FALL ASLEEP WHEN YOU ARE A PASSENGER IN A CAR FOR AN HOUR WITHOUT A BREAK: 0
HOW LIKELY ARE YOU TO NOD OFF OR FALL ASLEEP IN A CAR, WHILE STOPPED FOR A FEW MINUTES IN TRAFFIC: 0
HOW LIKELY ARE YOU TO NOD OFF OR FALL ASLEEP WHILE SITTING QUIETLY AFTER LUNCH WITHOUT ALCOHOL: 0
HOW LIKELY ARE YOU TO NOD OFF OR FALL ASLEEP WHILE SITTING AND READING: 0
ESS TOTAL SCORE: 2
HOW LIKELY ARE YOU TO NOD OFF OR FALL ASLEEP WHILE SITTING INACTIVE IN A PUBLIC PLACE: 0

## 2019-08-20 NOTE — PROGRESS NOTES
Understands how to change humidification and/or tubing temperature for comfort while at home  [x] Yes  [] No     Difficulties falling asleep  [] Yes  [x] No   Difficulties staying asleep  [] Yes  [x] No   Approximate time to bed  9pm   Approximate wake time  4:45-6am   Taking Naps  occasional   If taking naps usual length  1+ hours    If taking naps using the machine  [x] Yes  [] No  [] NA   Drowsy when driving  [] Yes  [x] No     Does patient carry a DOT/CDL  [] Yes  [x] No     Does patient carry FAA/[employer] License   [] Yes  [x] No      Any concerns noted with the machine at this time  [] Yes  [x] No        Diagnosis Orders   1. Obstructive sleep apnea (adult) (pediatric)     2. Morbid obesity, unspecified obesity type ([facility]     3. Essential hypertension     4. Chronic diastolic heart failure ([facility] [address] [facility]     5. COPD, severe ([facility] [address]         The chronic medical conditions listed are directly related to the primary diagnosis listed above. The management of the primary diagnosis affects the secondary diagnosis and vice versa. Review of Systems   Constitutional: Negative for appetite change, chills, fatigue and fever. HENT: Negative for congestion, nosebleeds, rhinorrhea and sinus pressure. Eyes: Negative for pain and redness. Respiratory: Negative for apnea, cough and shortness of breath. Cardiovascular: Negative for chest pain and palpitations. Gastrointestinal: Negative for abdominal distention and abdominal pain. Neurological: Negative for dizziness and headaches. Psychiatric/Behavioral: Negative for sleep disturbance. Social History     Socioeconomic History    Marital status:       Spouse name: Not on file    Number of children: Not on file    Years of education: Not on file    Highest education level: Not on file   Occupational History    Not on file   Social Needs    Financial resource strain: Not on file    Food insecurity:     Worry: Not on file     Inability: Not on file   Sanjay Crowe Transportation needs:     Medical: Not on file     Non-medical: Not on file   Tobacco Use    Smoking status: Never Smoker    Smokeless tobacco: Never Used   Substance and Sexual Activity    Alcohol use: No    Drug use: No    Sexual activity: Never   Lifestyle    Physical activity:     Days per week: Not on file     Minutes per session: Not on file    Stress: Not on file   Relationships    Social connections:     Talks on phone: Not on file     Gets together: Not on file     Attends Jain service: Not on file     Active member of club or organization: Not on file     Attends meetings of clubs or organizations: Not on file     Relationship status: Not on file    Intimate partner violence:     Fear of current or ex partner: Not on file     Emotionally abused: Not on file     Physically abused: Not on file     Forced sexual activity: Not on file   Other Topics Concern    Not on file   Social History Narrative    Not on file       Prior to Admission medications    Medication Sig Start Date End Date Taking?  Authorizing Provider   oxybutynin (DITROPAN) 5 MG tablet TAKE ONE TABLET BY MOUTH THREE TIMES A DAY 8/20/19  Yes Jessa Brooks MD   lisinopril (PRINIVIL;ZESTRIL) 2.5 MG tablet TAKE ONE (1) TABLET BY MOUTH DAILY 7/29/19  Yes Jessa Brooks MD   atorvastatin (LIPITOR) 10 MG tablet TAKE 1 TABLET BY MOUTH DAILY AT BEDTIME. 7/12/19  Yes Nuria Mckeon MD   formoterol (PERFOROMIST) 20 MCG/2ML nebulizer solution Take 2 mLs by nebulization 2 times daily 7/9/19  Yes Doroteo Merrill MD   albuterol sulfate HFA (VENTOLIN HFA) 108 (90 Base) MCG/ACT inhaler Inhale 2 puffs into the lungs every 6 hours as needed for Wheezing 7/9/19  Yes Doroteo Merrill MD   furosemide (LASIX) 40 MG tablet TAKE 1 TABLET BY MOUTH 2 TIMES DAILY 6/11/19  Yes Jessa Brooks MD   levothyroxine (SYNTHROID) 75 MCG tablet TAKE 1 TABLET BY MOUTH DAILY 6/11/19  Yes Jessa Brooks MD   montelukast (SINGULAIR) 10 MG tablet TAKE BiPAP 2/14/2018       Past Surgical History:   Procedure Laterality Date    CARPAL TUNNEL RELEASE      bilateral    CHOLECYSTECTOMY      EYE SURGERY      bilateral cataracts    HYSTERECTOMY, TOTAL ABDOMINAL      TONSILLECTOMY      UVULECTOMY         Family History   Problem Relation Age of Onset    Cancer Mother         breast    Cancer Father         prostate    Cancer Sister         breast       Vitals:  Weight BMI   Wt Readings from Last 3 Encounters:   08/20/19 211 lb (95.7 kg)   07/31/19 211 lb 9.6 oz (96 kg)   07/09/19 210 lb (95.3 kg)    Body mass index is 35.11 kg/m². BP HR SaO2   BP Readings from Last 3 Encounters:   08/20/19 (!) 108/58   07/31/19 110/67   07/09/19 (!) 107/59    Pulse Readings from Last 3 Encounters:   08/20/19 60   07/31/19 86   07/09/19 65    SpO2 Readings from Last 3 Encounters:   08/20/19 98%   07/09/19 95%   06/18/19 93%        Assessment/Plan: Morbid obesity, unspecified obesity type ([address]  Chronic-Stable. Encouraged her to work on weight loss through diet and exercise. Essential hypertension  Chronic- Stable. Cont meds per PCP and other physicians. Chronic diastolic heart failure (HCC)  Chronic- Stable. Cont meds per PCP and other physicians. COPD, severe ([address]  Chronic- Stable. Cont meds per PCP and other physicians. Obstructive sleep apnea (adult) (pediatric)  Reviewed compliance download with pt. Supplies and parts as needed for her machine. These are medically necessary. Continue medications per her PCP and other physicians. Limit caffeine use after 3pm.  Encouraged her to work on weight loss through diet and exercise. Diagnoses of Morbid obesity, unspecified obesity type ([address] Essential hypertension, Chronic diastolic heart failure ([address] and COPD, severe ([address] were pertinent to this visit. The chronic medical conditions listed are directly related to the primary diagnosis listed above.   The management of the primary diagnosis

## 2019-08-20 NOTE — PROGRESS NOTES
Avery Solo         : 1935    Diagnosis: [x] MARIAA (G47.33) [] CSA (G47.31) [x] Apnea (G47.30)   Length of Need: [x] 12 Months [] 99 Months [x] Other: Hypoxia   Machine (BERTRAND!): [x] Respironics Dream Station      Auto [] ResMed AirSense     Auto [] Other:     []  CPAP () [] Bilevel ()   Mode: [] Auto [] Spontaneous    Mode: [] Auto [] Spontaneous                            Comfort Settings:   - Ramp Pressure:  cmH2O                                        - Ramp time: 15 min                                     -  Flex/EPR - 3 full time                                    - For ResMed Bilevel (TiMax-4 sec   TiMin- 0.2 sec)     Humidifier: [x] Heated ()        [] Water chamber replacement ()/ 1 per 6 months        Mask:   [] Nasal () /1 per 3 months [] Full Face () /1 per 3 months   [] Patient choice -Size and fit mask [] Patient Choice - Size and fit mask   [] Dispense:  [] Dispense:    [] Headgear () / 1 per 3 months [] Headgear () / 1 per 3 months   [] Replacement Nasal Cushion ()/2 per month [] Interface Replacement ()/1 per month   [] Replacement Nasal Pillows ()/2 per month         Tubing: [x] Heated ()/1 per 3 months    [] Standard ()/1 per 3 months [] Other:           Filters: [] Non-disposable ()/1 per 6 months     [] Ultra-Fine, Disposable ()/2 per month        Miscellaneous: [] Chin Strap ()/ 1 per 6 months [] O2 bleed-in:       LPM   [x] Oximetry on CPAP/Bilevel []  Other:          Start Order Date: 19    MEDICAL JUSTIFICATION:  I, the undersigned, certify that the above prescribed supplies are medically necessary for this patients wellbeing. In my opinion, the supplies are both reasonable and necessary in reference to accepted standards of medicalpractice in treatment of this patients condition.     JOSE ALFREDO Yuan - CNP      NPI: [id number]       Order Signed Date: 19    Electronically signed by

## 2019-08-20 NOTE — ASSESSMENT & PLAN NOTE
Reviewed compliance download with pt. Supplies and parts as needed for her machine. These are medically necessary. Continue medications per her PCP and other physicians. Limit caffeine use after 3pm.  Encouraged her to work on weight loss through diet and exercise. Diagnoses of Morbid obesity, unspecified obesity type ([address] Essential hypertension, Chronic diastolic heart failure ([address] and COPD, severe ([address] were pertinent to this visit. The chronic medical conditions listed are directly related to the primary diagnosis listed above. The management of the primary diagnosis affects the secondary diagnosis and vice versa.

## 2019-08-20 NOTE — PROGRESS NOTES
Andrae Mow         : 1935    Diagnosis: [x] MARIAA (G47.33) [] CSA (G47.31) [] Apnea (G47.30)   Length of Need: [x] 12 Months [] 99 Months [] Other:    Machine (BERTRAND!): [x] Respironics Dream Station      Auto [] ResMed AirSense     Auto [] Other:     []  CPAP () [] Bilevel ()   Mode: [] Auto [] Spontaneous    Mode: [] Auto [] Spontaneous                            Comfort Settings:   - Ramp Pressure:  cmH2O                                        - Ramp time: 15 min                                     -  Flex/EPR - 3 full time                                    - For ResMed Bilevel (TiMax-4 sec   TiMin- 0.2 sec)     Humidifier: [x] Heated ()        [x] Water chamber replacement ()/ 1 per 6 months        Mask:   [] Nasal () /1 per 3 months [x] Full Face () /1 per 3 months   [] Patient choice -Size and fit mask [x] Patient Choice - Size and fit mask   [] Dispense:  [] Dispense:    [] Headgear () / 1 per 3 months [x] Headgear () / 1 per 3 months   [] Replacement Nasal Cushion ()/2 per month [x] Interface Replacement ()/1 per month   [] Replacement Nasal Pillows ()/2 per month         Tubing: [x] Heated ()/1 per 3 months    [] Standard ()/1 per 3 months [] Other:           Filters: [x] Non-disposable ()/1 per 6 months     [x] Ultra-Fine, Disposable ()/2 per month        Miscellaneous: [] Chin Strap ()/ 1 per 6 months [] O2 bleed-in:       LPM   [] Oximetry on CPAP/Bilevel []  Other:          Start Order Date: 19    MEDICAL JUSTIFICATION:  I, the undersigned, certify that the above prescribed supplies are medically necessary for this patients wellbeing. In my opinion, the supplies are both reasonable and necessary in reference to accepted standards of medicalpractice in treatment of this patients condition.     Fred VelasquezJOSE ALFREDO - CNP      NPI: [id number]       Order Signed Date: 19    Electronically signed by JOSE ALFREDO Ayon - CNP on 8/20/2019 at 11:53 AM

## 2019-08-27 ENCOUNTER — TELEPHONE (OUTPATIENT)
Dept: PULMONOLOGY | Age: 84
End: 2019-08-27

## 2019-08-27 NOTE — TELEPHONE ENCOUNTER
[facility] WILL END THE OVERNIGHT OXIMETRY OUT TO THE PATIENT.  THE ORDER WAS SENT 08/20/19

## 2019-09-20 ENCOUNTER — TELEPHONE (OUTPATIENT)
Dept: PULMONOLOGY | Age: 84
End: 2019-09-20

## 2019-09-20 NOTE — TELEPHONE ENCOUNTER
Spoke to the regarding her overnight oximetry results.  She is to continue to use 2 L oxygen with her bipap

## 2020-01-14 ENCOUNTER — OFFICE VISIT (OUTPATIENT)
Dept: PULMONOLOGY | Age: 85
End: 2020-01-14
Payer: MEDICARE

## 2020-01-14 VITALS — OXYGEN SATURATION: 95 % | SYSTOLIC BLOOD PRESSURE: 107 MMHG | DIASTOLIC BLOOD PRESSURE: 61 MMHG | HEART RATE: 74 BPM

## 2020-01-14 PROCEDURE — 99214 OFFICE O/P EST MOD 30 MIN: CPT | Performed by: INTERNAL MEDICINE

## 2020-01-14 RX ORDER — ARFORMOTEROL TARTRATE 15 UG/2ML
1 SOLUTION RESPIRATORY (INHALATION) 2 TIMES DAILY
Qty: 120 ML | Refills: 11 | Status: SHIPPED | OUTPATIENT
Start: 2020-01-14 | End: 2020-07-28 | Stop reason: ALTCHOICE

## 2020-01-14 RX ORDER — FORMOTEROL FUMARATE 20 UG/2ML
20 SOLUTION RESPIRATORY (INHALATION) 2 TIMES DAILY
Qty: 120 ML | Refills: 11 | Status: SHIPPED | OUTPATIENT
Start: 2020-01-14 | End: 2020-07-28 | Stop reason: SDUPTHER

## 2020-01-14 NOTE — PROGRESS NOTES
[facility]  Progress Note  Maximus Cerna MD       Martín Barnett   YOB: 1935    Date of Visit:  1/14/2020    Assessment/Plan:  1. SOB (shortness of breath)  Stable    2. COPD, severe ([address]  PFT 2018:  INTERPRETATION:  Spirometry reveals decreased FVC at 1.65 liters, which is  78% predicted. FEV1 is decreased at 0.81 liters, which is 52% predicted. FEV1/FVC ratio is reduced at 49%. There is no significant change after  inhaled bronchodilators. Lungs volumes reveal normal total lung capacity  and vital capacity. Residual volume has increased at 142% predicted. Diffusion capacity is reduced at 39% predicted.     IMPRESSION:  Moderate to severe obstructive lung disease with air trapping.     Breo ==> hoarseness  Anoroi ==> cough  Bevespi ==> expense + she couldn't get a good treatment    Perforomist  Budesonide ==> Changed to Bevespit by the ECF  Patient states that she is not getting this  Also states that she just coughs out the medication from [facility]  Put her back on [place] as we had before. Albuterol    3. Multifocal pneumonia/PN  Still has not had CT chest  Patient now claims insurance wont cover for two years. This would not be the standard of care for nodules of 6 mm or greateer    4. Obstructive sleep apnea on BiPAP  Continue BiPAP plus oxygen  This has really helped her  Follow-up with Dr. Bashir Garcia    5.  Chronic diastolic congestive heart failure,  She has minimal peripheral pitting edema  Echocardiogram today was reviewed  LVEF is normal  RVSP is elevated  Follow-up with cardiology      FOLLOW UP: 6 months      Chief Complaint   Patient presents with    Shortness of Breath     6 month f.u.    Discuss Medications     pt would like to know how long she needs to stay on Performamist    Results     pt could not get CT Chest done because her insurance told her that theyu will only cover one in a 2 year period       HPI  The patient presents with a chief complaint of moderate Shortness of breath related to severe COPD of many years duration. She has moderate associated cough with clear sputum. As a modifying factor, she also has chronic diastolic heart failure with an RVSP near 60. She also has history of A. fib. She has obstructive sleep apnea and now uses BiPAP which she really likes. She is at [facility]. They changed her Perforomist and Brovana to [address] [place]. . There is no complaint of chest pain, nausea or vomiting. Review of Systems  As documented in HPI     Physical Exam:  Well developed, well nourished  Alert and oriented  Sclera is clear  No cervical adenopathy  No JVD. Chest examination is clear. Cardiac examination reveals regular rate and rhythm without murmur, gallop or rub. The abdomen is soft, nontender and nondistended. There is no clubbing, cyanosis or edema of the extremities. There is no obvious skin rash. No focal neuro deficicts  Normal mood and affect    No Known Allergies  Prior to Visit Medications    Medication Sig Taking? Authorizing Provider   glycopyrrolate-formoterol (BEVESPI AEROSPHERE) 9-4.8 MCG/ACT AERO Inhale 2 puffs into the lungs 2 times daily Yes Historical Provider, MD   oxybutynin (DITROPAN) 5 MG tablet TAKE ONE TABLET BY MOUTH THREE TIMES A DAY Yes Norm Terry MD   lisinopril (PRINIVIL;ZESTRIL) 2.5 MG tablet TAKE ONE (1) TABLET BY MOUTH DAILY  Patient taking differently: 5 mg  Yes Norm Terry MD   atorvastatin (LIPITOR) 10 MG tablet TAKE 1 TABLET BY MOUTH DAILY AT BEDTIME.  Yes Yoav Maynard MD   formoterol (PERFOROMIST) 20 MCG/2ML nebulizer solution Take 2 mLs by nebulization 2 times daily Yes Serge Cogan, MD   albuterol sulfate HFA (VENTOLIN HFA) 108 (90 Base) MCG/ACT inhaler Inhale 2 puffs into the lungs every 6 hours as needed for Wheezing Yes Serge Cogan, MD   furosemide (LASIX) 40 MG tablet TAKE 1 TABLET BY MOUTH 2 TIMES DAILY Yes Norm Terry MD   levothyroxine (SYNTHROID) 75 MCG tablet TAKE 1 TABLET BY MOUTH DAILY Yes Bobette Holter, MD   montelukast (SINGULAIR) 10 MG tablet TAKE ONE (1) TABLET BY MOUTH DAILY Yes Bobette Holter, MD   citalopram (CELEXA) 10 MG tablet TAKE ONE (1) TABLET BY MOUTH DAILY Yes Bobette Holter, MD   OXYGEN Inhale 2 L into the lungs Yes Andreina Provider, MD   ipratropium-albuterol (DUONEB) 0.5-2.5 (3) MG/3ML SOLN nebulizer solution Inhale 3 mLs into the lungs every 4 hours Yes Bobette Holter, MD       Vitals:    01/14/20 0932   BP: 107/61   Pulse: 74   SpO2: 95%     There is no height or weight on file to calculate BMI.      Wt Readings from Last 3 Encounters:   08/20/19 211 lb (95.7 kg)   07/31/19 211 lb 9.6 oz (96 kg)   07/09/19 210 lb (95.3 kg)     BP Readings from Last 3 Encounters:   01/14/20 107/61   08/20/19 (!) 108/58   07/31/19 110/67        Social History     Tobacco Use   Smoking Status Never Smoker   Smokeless Tobacco Never Used

## 2020-02-25 ENCOUNTER — OFFICE VISIT (OUTPATIENT)
Dept: PULMONOLOGY | Age: 85
End: 2020-02-25
Payer: MEDICARE

## 2020-02-25 VITALS
DIASTOLIC BLOOD PRESSURE: 62 MMHG | OXYGEN SATURATION: 91 % | WEIGHT: 211 LBS | SYSTOLIC BLOOD PRESSURE: 108 MMHG | HEIGHT: 65 IN | HEART RATE: 71 BPM | BODY MASS INDEX: 35.16 KG/M2

## 2020-02-25 PROBLEM — E66.9 CLASS 2 OBESITY WITHOUT SERIOUS COMORBIDITY WITH BODY MASS INDEX (BMI) OF 35.0 TO 35.9 IN ADULT: Status: ACTIVE | Noted: 2019-01-15

## 2020-02-25 PROCEDURE — 99214 OFFICE O/P EST MOD 30 MIN: CPT | Performed by: NURSE PRACTITIONER

## 2020-02-25 ASSESSMENT — SLEEP AND FATIGUE QUESTIONNAIRES
HOW LIKELY ARE YOU TO NOD OFF OR FALL ASLEEP WHILE SITTING INACTIVE IN A PUBLIC PLACE: 0
ESS TOTAL SCORE: 1
HOW LIKELY ARE YOU TO NOD OFF OR FALL ASLEEP WHEN YOU ARE A PASSENGER IN A CAR FOR AN HOUR WITHOUT A BREAK: 0
HOW LIKELY ARE YOU TO NOD OFF OR FALL ASLEEP WHILE SITTING AND READING: 0
HOW LIKELY ARE YOU TO NOD OFF OR FALL ASLEEP WHILE LYING DOWN TO REST IN THE AFTERNOON WHEN CIRCUMSTANCES PERMIT: 1
HOW LIKELY ARE YOU TO NOD OFF OR FALL ASLEEP WHILE WATCHING TV: 0
HOW LIKELY ARE YOU TO NOD OFF OR FALL ASLEEP IN A CAR, WHILE STOPPED FOR A FEW MINUTES IN TRAFFIC: 0
HOW LIKELY ARE YOU TO NOD OFF OR FALL ASLEEP WHILE SITTING QUIETLY AFTER LUNCH WITHOUT ALCOHOL: 0
HOW LIKELY ARE YOU TO NOD OFF OR FALL ASLEEP WHILE SITTING AND TALKING TO SOMEONE: 0

## 2020-02-25 ASSESSMENT — ENCOUNTER SYMPTOMS
ABDOMINAL PAIN: 0
COUGH: 0
EYE REDNESS: 0
SINUS PRESSURE: 0
ABDOMINAL DISTENTION: 0
APNEA: 0
EYE PAIN: 0
RHINORRHEA: 0
SHORTNESS OF BREATH: 0

## 2020-02-25 NOTE — PROGRESS NOTES
Tova Galloway MD, FAASM, [facility]  Kevin Herring, MSN, RN, CNP     [address] [place] [address] SLEEP MEDICINE  [address] [facility]  Keon Riggins [address]  Dept: [phone]  Dept Fax: : [facility] SLEEP MEDICINE  [address] [place] [id number] [phone]    Subjective:     Patient ID: Broderick Jennings is a 82 y.o. female. Chief Complaint   Patient presents with    Sleep Apnea       HPI:      Machine Present today:  Yes    Machine Modem/Download Info:  Compliance (hours/night): 8.25 hrs/night  Download AHI (/hour): 2.2 /HR     Average IPAP Pressure: 19.3 cmH2O  Average EPAP Pressure: 12.7 cmH2O         AUTO BIPAP - Settings (Yasir)  IPAP Max: 25 cmH2O  EPAP Min: 12 cmH2O  Pressure Support Min: 6  Pressure Support Max: 8             Comfort Settings  Humidity Level (0-8): 1  Heated Tubing (Yes/No): Yes  Flex/EPR (0-3): 3 PAP Mask  Mask Type: Full Face mask     [place] - Total score: 1    Follow-up :     Last Visit : August 2019      Patient reports the listed chronic Co-morbidities: CHF, HTN, Obesity, Resp failure, COPD    are well controlled and stable at this time. Subjective Health Changes: None      Over Night Oximetry: [x] Yes  [] No  [] NA [] WNL   Using O2: [x] Yes  [] No  [] NA    2 LO2   Patient is compliant with the machine  [x] Yes  [] No   Feeling rested when using the machine   [x] Yes  [] No     Pressure is comfortable with inspiration and expiration  [x] Yes  [] No     Noticed changes in pressure   [] Yes  [] No  [x] NA   Mask is fitting well  [x] Yes  [] No   Noting Mask Air Leak  [] Yes  [x] No   Having painful Aerophagia  [] Yes  [x] No   Nocturia   1  per night.    Having  HA upon waking  [] Yes  [x] No   Dry mouth upon waking   Dry Nose  Dry Eyes  [x] Yes  [] No   Congestion upon waking   [] Yes  [x] No    Nose Bleeds  [] Yes  [x] No   Using Sleep Aides    [x] NA Understands how to change humidification and/or tubing temperature for comfort while at home  [x] Yes  [] No     Difficulties falling asleep  [] Yes  [x] No   Difficulties staying asleep  [] Yes  [x] No   Approximate time to bed  8-8:30am   Approximate wake time  4:45-5am   Taking Naps  no   If taking naps usual length    [x] NA   If taking naps using the machine  [] Yes  [] No  [x] NA [] With and With out    Drowsy when driving  [] Yes  [x] No     Does patient carry a DOT/CDL  [] Yes  [x] No     Does patient carry FAA/Pilots License   [] Yes  [x] No      Any concerns noted with the machine at this time  [] Yes  [x] No        Diagnosis Orders   1. Obstructive sleep apnea (adult) (pediatric)     2. Chronic diastolic heart failure ([facility]     3. Essential hypertension     4. Class 2 obesity without serious comorbidity with body mass index (BMI) of 35.0 to 35.9 in adult, unspecified obesity type     5. Chronic respiratory failure with hypoxia (HCC)     6. COPD, severe ([address]         The chronic medical conditions listed are directly related to the primary diagnosis listed above. The management of the primary diagnosis affects the secondary diagnosis and vice versa. Review of Systems   Constitutional: Negative for appetite change, chills, fatigue and fever. HENT: Negative for congestion, nosebleeds, rhinorrhea and sinus pressure. Eyes: Negative for pain and redness. Respiratory: Negative for apnea, cough and shortness of breath. Cardiovascular: Negative for chest pain and palpitations. Gastrointestinal: Negative for abdominal distention and abdominal pain. Neurological: Negative for dizziness and headaches. Psychiatric/Behavioral: Negative for sleep disturbance. Social History     Socioeconomic History    Marital status:       Spouse name: Not on file    Number of children: Not on file    Years of education: Not on file    Highest education level: Not on file   Occupational History hypertension, Class 2 obesity without serious comorbidity with body mass index (BMI) of 35.0 to 35.9 in adult, unspecified obesity type, Chronic respiratory failure with hypoxia ([address] and COPD, severe ([address] were pertinent to this visit. The chronic medical conditions listed are directly related to the primary diagnosis listed above. The management of the primary diagnosis affects the secondary diagnosis and vice versa. The primary encounter diagnosis was Obstructive sleep apnea (adult) (pediatric). Diagnoses of Chronic diastolic heart failure ([address] Essential hypertension, Class 2 obesity without serious comorbidity with body mass index (BMI) of 35.0 to 35.9 in adult, unspecified obesity type, Chronic respiratory failure with hypoxia ([address] and COPD, severe ([address] were also pertinent to this visit. The chronic medical conditions listed are directly related to the primary diagnosis listed above. The management of the primary diagnosis affects the secondary diagnosis and vice versa. - Educated patient and reviewed compliance download with pt.    -Supplies and parts as needed for her machine, these are medically necessary.    - Patient using [employer] for supplies  -Continue medications per her PCP and other physicians.   -Limit caffeine use after 3pm.    -Encouraged her to work on weight loss through diet and exercise. - Will see need to speak to DME about a new mask  - Will need a over night oximetry on the next visit   -F/U: 6 month. No orders of the defined types were placed in this encounter. No orders of the defined types were placed in this encounter. No orders of the defined types were placed in this encounter.       Taryn Birch, MSN, RN, CNP

## 2020-02-25 NOTE — ASSESSMENT & PLAN NOTE
Reviewed compliance download with pt. Supplies and parts as needed for her machine. These are medically necessary. Continue medications per her PCP and other physicians. Limit caffeine use after 3pm.  Encouraged her to work on weight loss through diet and exercise. Diagnoses of Chronic diastolic heart failure ([address] Essential hypertension, Class 2 obesity without serious comorbidity with body mass index (BMI) of 35.0 to 35.9 in adult, unspecified obesity type, Chronic respiratory failure with hypoxia ([address] and COPD, severe ([address] were pertinent to this visit. The chronic medical conditions listed are directly related to the primary diagnosis listed above. The management of the primary diagnosis affects the secondary diagnosis and vice versa.

## 2020-02-25 NOTE — PROGRESS NOTES
Arleth Singleton         : 1935    Diagnosis: [x] MARIAA (G47.33) [] CSA (G47.31) [] Apnea (G47.30)   Length of Need: [x] 12 Months [] 99 Months [] Other:    Machine (BERTRAND!): [x] Respironics Dream Station      Auto [] ResMed AirSense     Auto [] Other:     []  CPAP () [] Bilevel ()   Mode: [] Auto [] Spontaneous    Mode: [] Auto [] Spontaneous                            Comfort Settings:   - Ramp Pressure:  cmH2O                                        - Ramp time: 15 min                                     -  Flex/EPR - 3 full time                                    - For ResMed Bilevel (TiMax-4 sec   TiMin- 0.2 sec)     Humidifier: [x] Heated ()        [x] Water chamber replacement ()/ 1 per 6 months        Mask:   [] Nasal () /1 per 3 months [x] Full Face () /1 per 3 months   [] Patient choice -Size and fit mask [x] Patient Choice - Size and fit mask   [] Dispense:  [] Dispense:    [] Headgear () / 1 per 3 months [x] Headgear () / 1 per 3 months   [] Replacement Nasal Cushion ()/2 per month [x] Interface Replacement ()/1 per month   [] Replacement Nasal Pillows ()/2 per month         Tubing: [x] Heated ()/1 per 3 months    [] Standard ()/1 per 3 months [] Other:           Filters: [x] Non-disposable ()/1 per 6 months     [x] Ultra-Fine, Disposable ()/2 per month        Miscellaneous: [] Chin Strap ()/ 1 per 6 months [] O2 bleed-in:       LPM   [] Oximetry on CPAP/Bilevel []  Other:    [x] Modem: ()         Start Order Date: 20    MEDICAL JUSTIFICATION:  I, the undersigned, certify that the above prescribed supplies are medically necessary for this patients wellbeing. In my opinion, the supplies are both reasonable and necessary in reference to accepted standards of medicalpractice in treatment of this patients condition.     JOSE ALFREDO Melendez - CNP      NPI: [id number]       Order Signed Date:

## 2020-07-13 ENCOUNTER — HOSPITAL ENCOUNTER (EMERGENCY)
Age: 85
Discharge: HOME | End: 2020-07-13
Attending: EMERGENCY MEDICINE
Payer: MEDICARE

## 2020-07-13 ENCOUNTER — APPOINTMENT (OUTPATIENT)
Dept: CT IMAGING | Age: 85
End: 2020-07-13
Payer: MEDICARE

## 2020-07-13 VITALS
BODY MASS INDEX: 42.21 KG/M2 | WEIGHT: 215 LBS | OXYGEN SATURATION: 96 % | RESPIRATION RATE: 16 BRPM | TEMPERATURE: 98.2 F | DIASTOLIC BLOOD PRESSURE: 55 MMHG | HEART RATE: 92 BPM | SYSTOLIC BLOOD PRESSURE: 118 MMHG | HEIGHT: 60 IN

## 2020-07-13 LAB
A/G RATIO: 0.9 (ref 1.1–2.2)
ALBUMIN SERPL-MCNC: 3.9 G/DL (ref 3.4–5)
ALP BLD-CCNC: 85 U/L (ref 40–129)
ALT SERPL-CCNC: 25 U/L (ref 10–40)
ANION GAP SERPL CALCULATED.3IONS-SCNC: 12 MMOL/L (ref 3–16)
AST SERPL-CCNC: 27 U/L (ref 15–37)
BACTERIA: ABNORMAL /HPF
BASOPHILS ABSOLUTE: 0.1 K/UL (ref 0–0.2)
BASOPHILS RELATIVE PERCENT: 1.2 %
BILIRUB SERPL-MCNC: 0.3 MG/DL (ref 0–1)
BILIRUBIN URINE: NEGATIVE
BLOOD, URINE: ABNORMAL
BUN BLDV-MCNC: 18 MG/DL (ref 7–20)
CALCIUM SERPL-MCNC: 9.5 MG/DL (ref 8.3–10.6)
CHLORIDE BLD-SCNC: 99 MMOL/L (ref 99–110)
CLARITY: CLEAR
CO2: 28 MMOL/L (ref 21–32)
COLOR: YELLOW
CREAT SERPL-MCNC: 0.8 MG/DL (ref 0.6–1.2)
EOSINOPHILS ABSOLUTE: 0.3 K/UL (ref 0–0.6)
EOSINOPHILS RELATIVE PERCENT: 2.2 %
EPITHELIAL CELLS, UA: 0 /HPF (ref 0–5)
GFR AFRICAN AMERICAN: >60
GFR NON-AFRICAN AMERICAN: >60
GLOBULIN: 4.2 G/DL
GLUCOSE BLD-MCNC: 113 MG/DL (ref 70–99)
GLUCOSE URINE: NEGATIVE MG/DL
HCT VFR BLD CALC: 39.4 % (ref 36–48)
HEMOGLOBIN: 12.9 G/DL (ref 12–16)
HYALINE CASTS: 0 /LPF (ref 0–8)
KETONES, URINE: NEGATIVE MG/DL
LEUKOCYTE ESTERASE, URINE: ABNORMAL
LYMPHOCYTES ABSOLUTE: 2.5 K/UL (ref 1–5.1)
LYMPHOCYTES RELATIVE PERCENT: 21.1 %
MCH RBC QN AUTO: 28.9 PG (ref 26–34)
MCHC RBC AUTO-ENTMCNC: 32.8 G/DL (ref 31–36)
MCV RBC AUTO: 88.3 FL (ref 80–100)
MICROSCOPIC EXAMINATION: YES
MONOCYTES ABSOLUTE: 1.4 K/UL (ref 0–1.3)
MONOCYTES RELATIVE PERCENT: 12.1 %
NEUTROPHILS ABSOLUTE: 7.6 K/UL (ref 1.7–7.7)
NEUTROPHILS RELATIVE PERCENT: 63.4 %
NITRITE, URINE: POSITIVE
PDW BLD-RTO: 13.1 % (ref 12.4–15.4)
PH UA: 6 (ref 5–8)
PLATELET # BLD: 219 K/UL (ref 135–450)
PMV BLD AUTO: 8.4 FL (ref 5–10.5)
POTASSIUM REFLEX MAGNESIUM: 4 MMOL/L (ref 3.5–5.1)
PROTEIN UA: NEGATIVE MG/DL
RBC # BLD: 4.47 M/UL (ref 4–5.2)
RBC UA: 1 /HPF (ref 0–4)
SODIUM BLD-SCNC: 139 MMOL/L (ref 136–145)
SPECIFIC GRAVITY UA: 1.01 (ref 1–1.03)
TOTAL PROTEIN: 8.1 G/DL (ref 6.4–8.2)
URINE TYPE: ABNORMAL
UROBILINOGEN, URINE: 0.2 E.U./DL
WBC # BLD: 12 K/UL (ref 4–11)
WBC UA: 22 /HPF (ref 0–5)

## 2020-07-13 PROCEDURE — 6360000002 HC RX W HCPCS: Performed by: EMERGENCY MEDICINE

## 2020-07-13 PROCEDURE — 96375 TX/PRO/DX INJ NEW DRUG ADDON: CPT

## 2020-07-13 PROCEDURE — 85025 COMPLETE CBC W/AUTO DIFF WBC: CPT

## 2020-07-13 PROCEDURE — 74176 CT ABD & PELVIS W/O CONTRAST: CPT

## 2020-07-13 PROCEDURE — 96374 THER/PROPH/DIAG INJ IV PUSH: CPT

## 2020-07-13 PROCEDURE — 81001 URINALYSIS AUTO W/SCOPE: CPT

## 2020-07-13 PROCEDURE — 80053 COMPREHEN METABOLIC PANEL: CPT

## 2020-07-13 PROCEDURE — 99284 EMERGENCY DEPT VISIT MOD MDM: CPT

## 2020-07-13 RX ORDER — PHENAZOPYRIDINE HYDROCHLORIDE 100 MG/1
100 TABLET, FILM COATED ORAL 3 TIMES DAILY PRN
Qty: 6 TABLET | Refills: 0 | Status: SHIPPED | OUTPATIENT
Start: 2020-07-13 | End: 2020-07-16

## 2020-07-13 RX ORDER — ONDANSETRON 4 MG/1
4 TABLET, FILM COATED ORAL DAILY PRN
Qty: 30 TABLET | Refills: 0 | Status: SHIPPED | OUTPATIENT
Start: 2020-07-13

## 2020-07-13 RX ORDER — CEFUROXIME AXETIL 250 MG/1
250 TABLET ORAL 2 TIMES DAILY
Qty: 14 TABLET | Refills: 0 | Status: SHIPPED | OUTPATIENT
Start: 2020-07-13 | End: 2020-07-20

## 2020-07-13 RX ORDER — HYDROMORPHONE HYDROCHLORIDE 1 MG/ML
0.5 INJECTION, SOLUTION INTRAMUSCULAR; INTRAVENOUS; SUBCUTANEOUS ONCE
Status: COMPLETED | OUTPATIENT
Start: 2020-07-13 | End: 2020-07-13

## 2020-07-13 RX ORDER — HYDROCODONE BITARTRATE AND ACETAMINOPHEN 5; 325 MG/1; MG/1
1 TABLET ORAL EVERY 4 HOURS PRN
Qty: 12 TABLET | Refills: 0 | Status: SHIPPED | OUTPATIENT
Start: 2020-07-13 | End: 2020-07-17 | Stop reason: SDUPTHER

## 2020-07-13 RX ORDER — KETOROLAC TROMETHAMINE 30 MG/ML
15 INJECTION, SOLUTION INTRAMUSCULAR; INTRAVENOUS ONCE
Status: COMPLETED | OUTPATIENT
Start: 2020-07-13 | End: 2020-07-13

## 2020-07-13 RX ADMIN — KETOROLAC TROMETHAMINE 15 MG: 30 INJECTION, SOLUTION INTRAMUSCULAR at 09:11

## 2020-07-13 RX ADMIN — HYDROMORPHONE HYDROCHLORIDE 0.5 MG: 1 INJECTION, SOLUTION INTRAMUSCULAR; INTRAVENOUS; SUBCUTANEOUS at 06:26

## 2020-07-13 RX ADMIN — Medication 1 G: at 06:35

## 2020-07-13 ASSESSMENT — PAIN SCALES - GENERAL
PAINLEVEL_OUTOF10: 6
PAINLEVEL_OUTOF10: 3
PAINLEVEL_OUTOF10: 10

## 2020-07-13 ASSESSMENT — PAIN DESCRIPTION - LOCATION: LOCATION: FLANK

## 2020-07-13 ASSESSMENT — PAIN DESCRIPTION - PAIN TYPE: TYPE: ACUTE PAIN

## 2020-07-13 NOTE — ED NOTES
Family is coming to take pt back to [facility] in private vehicle .      Angella Arnold RN  07/13/20 0171

## 2020-07-13 NOTE — ED PROVIDER NOTES
Perfusion:  intact  Respiratory:  Lungs clear to auscultation bilaterally. Respirations nonlabored. Abdominal:  llq tenderness to palpation, no rebound guarding or rigidity, neg Beltrán's sign, neg McBurney's point tenderness to palpation, normal bowel sounds, no masses, no organomegaly, no pulsatile masses  Back:  No CVA TTP  Extremity:    normal ROM   Neurological:  Alert and oriented times 3.       I have reviewed and interpreted all of the currently available lab results from this visit (if applicable):  Results for orders placed or performed during the hospital encounter of 07/13/20   CBC Auto Differential   Result Value Ref Range    WBC 12.0 (H) 4.0 - 11.0 K/uL    RBC 4.47 4.00 - 5.20 M/uL    Hemoglobin 12.9 12.0 - 16.0 g/dL    Hematocrit 39.4 36.0 - 48.0 %    MCV 88.3 80.0 - 100.0 fL    MCH 28.9 26.0 - 34.0 pg    MCHC 32.8 31.0 - 36.0 g/dL    RDW 13.1 12.4 - 15.4 %    Platelets 196 942 - 522 K/uL    MPV 8.4 5.0 - 10.5 fL    Neutrophils % 63.4 %    Lymphocytes % 21.1 %    Monocytes % 12.1 %    Eosinophils % 2.2 %    Basophils % 1.2 %    Neutrophils Absolute 7.6 1.7 - 7.7 K/uL    Lymphocytes Absolute 2.5 1.0 - 5.1 K/uL    Monocytes Absolute 1.4 (H) 0.0 - 1.3 K/uL    Eosinophils Absolute 0.3 0.0 - 0.6 K/uL    Basophils Absolute 0.1 0.0 - 0.2 K/uL   Comprehensive Metabolic Panel w/ Reflex to MG   Result Value Ref Range    Sodium 139 136 - 145 mmol/L    Potassium reflex Magnesium 4.0 3.5 - 5.1 mmol/L    Chloride 99 99 - 110 mmol/L    CO2 28 21 - 32 mmol/L    Anion Gap 12 3 - 16    Glucose 113 (H) 70 - 99 mg/dL    BUN 18 7 - 20 mg/dL    CREATININE 0.8 0.6 - 1.2 mg/dL    GFR Non-African American >60 >60    GFR African American >60 >60    Calcium 9.5 8.3 - 10.6 mg/dL    Total Protein 8.1 6.4 - 8.2 g/dL    Alb 3.9 3.4 - 5.0 g/dL    Albumin/Globulin Ratio 0.9 (L) 1.1 - 2.2    Total Bilirubin 0.3 0.0 - 1.0 mg/dL    Alkaline Phosphatase 85 40 - 129 U/L    ALT 25 10 - 40 U/L    AST 27 15 - 37 U/L    Globulin 4.2 g/dL Urinalysis, reflex to microscopic   Result Value Ref Range    Color, UA YELLOW Straw/Yellow    Clarity, UA Clear Clear    Glucose, Ur Negative Negative mg/dL    Bilirubin Urine Negative Negative    Ketones, Urine Negative Negative mg/dL    Specific Gravity, UA 1.008 1.005 - 1.030    Blood, Urine TRACE (A) Negative    pH, UA 6.0 5.0 - 8.0    Protein, UA Negative Negative mg/dL    Urobilinogen, Urine 0.2 <2.0 E.U./dL    Nitrite, Urine POSITIVE (A) Negative    Leukocyte Esterase, Urine MODERATE (A) Negative    Microscopic Examination YES     Urine Type NotGiven    Microscopic Urinalysis   Result Value Ref Range    Bacteria, UA 2+ (A) None Seen /HPF    Hyaline Casts, UA 0 0 - 8 /LPF    WBC, UA 22 (H) 0 - 5 /HPF    RBC, UA 1 0 - 4 /HPF    Epithelial Cells, UA 0 0 - 5 /HPF      Radiographs (if obtained):  Radiologist's Report Reviewed:  No results found. EKG (if obtained): (All EKG's are interpreted by myself in the absence of a cardiologist)      MDM:  Patient here with abdominal pain. I estimate there is LOW risk for an acute emergent intraabdominal process including, but not limited to, acute appendicitis, bowel obstruction, acute cholecystitis, ruptured diverticulitis, incarcerated/strangling hernia,  perforated bowel/ulcer. Workup reveals once of acute UTI with possible early pyelonephritis, with no distal obstructing stones. Patient is hemodynamically stable IV Rocephin was administered, analgesia she stable for outpatient management, oral antibiotics, return if worse or new symptoms    Patient's abdominal exam in the ED is nonsurgical.  I do feel the Patient can be discharged home. I have discussed the results, plan for treatment and possible risks, and patient agrees with discharging home with close follow-up. Patient understands and agrees with the plan, return warnings given. We have discussed the symptoms which are most concerning that necessitate immediate return. Clinical Impression:  1.  Urinary tract infection without hematuria, site unspecified      Disposition referral (if applicable):  Barry García, [address] [facility] [address]  [phone]          Disposition medications (if applicable):  Discharge Medication List as of 7/13/2020  9:14 AM      START taking these medications    Details   phenazopyridine (PYRIDIUM) 100 MG tablet Take 1 tablet by mouth 3 times daily as needed for Pain, Disp-6 tablet,R-0Print      cefUROXime (CEFTIN) 250 MG tablet Take 1 tablet by mouth 2 times daily for 7 days, Disp-14 tablet,R-0Print      ondansetron (ZOFRAN) 4 MG tablet Take 1 tablet by mouth daily as needed for Nausea or Vomiting, Disp-30 tablet,R-0Print      HYDROcodone-acetaminophen (NORCO) 5-325 MG per tablet Take 1 tablet by mouth every 4 hours as needed for Pain for up to 3 days. Intended supply: 3 days. Take lowest dose possible to manage pain, Disp-12 tablet,R-0Print             Comment: Please note this report has been produced using speech recognition software and may contain errors related to that system including errors in grammar, punctuation, and spelling, as well as words and phrases that may be inappropriate. Efforts were made to edit the dictations.       Kenneth Lazo MD  46/43/58 [phone]

## 2020-07-17 ENCOUNTER — TELEPHONE (OUTPATIENT)
Dept: FAMILY MEDICINE CLINIC | Age: 85
End: 2020-07-17

## 2020-07-17 RX ORDER — HYDROCODONE BITARTRATE AND ACETAMINOPHEN 5; 325 MG/1; MG/1
1 TABLET ORAL EVERY 6 HOURS PRN
Qty: 20 TABLET | Refills: 0 | Status: SHIPPED | OUTPATIENT
Start: 2020-07-17 | End: 2020-07-22

## 2020-07-17 RX ORDER — METHOCARBAMOL 500 MG/1
500 TABLET, FILM COATED ORAL 3 TIMES DAILY PRN
Qty: 30 TABLET | Refills: 0 | Status: SHIPPED | OUTPATIENT
Start: 2020-07-17 | End: 2020-07-27

## 2020-07-17 NOTE — TELEPHONE ENCOUNTER
----- Message from Mine Rebolledo sent at 7/17/2020  7:25 AM EDT -----  Subject: Hospital Follow Up    QUESTIONS  What hospital was the Patient Discharged [facility]  Date of Discharge? 2020-07-13  Discharge Location? Home  Reason for hospitalization as patient stated? PT FELL AND TWISTED BACK   SHE ALSO HAD A UTI  What question does the patient have   if applicable? PT SON CALLED REQUESTING NARCO (3 DAYS OF MED WAS GIVEN BY   ED VISIT) AND A MUSCLE RELAXER   DUE TO PT BEING IN PAIN. OKAY TO CALL . [phone]  ---------------------------------------------------------------------------  --------------  CALL BACK INFO  What is the best way for the office to contact you? OK to leave message on   voicemail  Preferred Call Back Phone Number? [phone]  ---------------------------------------------------------------------------  --------------  SCRIPT ANSWERS  Relationship to Patient? Other  Representative Name? JLUIS  Is the Representative on the appropriate HIPAA document in Epic?  Yes

## 2020-07-17 NOTE — TELEPHONE ENCOUNTER
Patient son is calling back into the office stating that the patient medication was sent to the wrong pharmacy. He would  Like it to go to this place. [facility] pharmacy is [facility] the [facility]

## 2020-07-20 NOTE — TELEPHONE ENCOUNTER
Yes, but original rx's need to be cancelled at wrong pharmacy before I send new ones in. Please make sure I get the medicines pended to me to correct pharmacy.

## 2020-07-28 ENCOUNTER — OFFICE VISIT (OUTPATIENT)
Dept: PULMONOLOGY | Age: 85
End: 2020-07-28
Payer: MEDICARE

## 2020-07-28 VITALS — SYSTOLIC BLOOD PRESSURE: 136 MMHG | OXYGEN SATURATION: 91 % | DIASTOLIC BLOOD PRESSURE: 74 MMHG | HEART RATE: 82 BPM

## 2020-07-28 PROCEDURE — 99214 OFFICE O/P EST MOD 30 MIN: CPT | Performed by: INTERNAL MEDICINE

## 2020-07-28 RX ORDER — FORMOTEROL FUMARATE 20 UG/2ML
20 SOLUTION RESPIRATORY (INHALATION) EVERY 12 HOURS
Qty: 120 ML | Refills: 6 | Status: SHIPPED | OUTPATIENT
Start: 2020-07-28 | End: 2022-10-11

## 2020-07-28 RX ORDER — BUDESONIDE 0.5 MG/2ML
500 INHALANT ORAL 2 TIMES DAILY
Qty: 60 AMPULE | Refills: 6 | Status: SHIPPED | OUTPATIENT
Start: 2020-07-28 | End: 2022-07-26

## 2020-07-28 NOTE — PROGRESS NOTES
[facility]  Progress Note  Fe Chacko MD       Ebergreg New   YOB: 1935    Date of Visit:  7/28/2020    Assessment/Plan:  1. SOB (shortness of breath)  Stable    2. COPD, severe ([address]  PFT 2018:  INTERPRETATION:  Spirometry reveals decreased FVC at 1.65 liters, which is  78% predicted. FEV1 is decreased at 0.81 liters, which is 52% predicted. FEV1/FVC ratio is reduced at 49%. There is no significant change after  inhaled bronchodilators. Lungs volumes reveal normal total lung capacity  and vital capacity. Residual volume has increased at 142% predicted. Diffusion capacity is reduced at 39% predicted.     IMPRESSION:  Moderate to severe obstructive lung disease with air trapping.     Breo ==> hoarseness  Anoro ==> cough  Bevespi ==> expense + she couldn't get a good treatment    She is taking both Brovana and Perforomist!  Stop one of those and start her on Budesonide. She can continue Duoneb prn. 3. Multifocal pneumonia/PN  Still has not had CT chest  Patient now claims insurance wont cover for two years. This would not be the standard of care for nodules of 6 mm or greater  Has now been two years, repeat CT chest.    4. Obstructive sleep apnea on BiPAP  Continue BiPAP plus oxygen  This has really helped her  Follow-up with Dr. Patricia Metz    5. Chronic diastolic congestive heart failure,  She has minimal peripheral pitting edema  Echocardiogram was reviewed  LVEF is normal  RVSP is elevated  Follow-up with cardiology      FOLLOW UP: 6 months      Chief Complaint   Patient presents with    Shortness of Breath     6 month Was just here at [address] for UTI and still dealing with  this Wears O2 at night with Bipap    Medication Problem     Brovana and Perforomist are very expensive       HPI  The patient presents with a chief complaint of moderate Shortness of breath related to severe COPD of many years duration.  She has moderate associated cough with clear sputum. As a modifying factor, she also has chronic diastolic heart failure with an RVSP near 60. She also has history of A. fib. She has obstructive sleep apnea and now uses BiPAP which she really likes. She is at [facility]. There is no complaint of chest pain, nausea or vomiting. Review of Systems  As documented in HPI     Physical Exam:  Well developed, well nourished  Alert and oriented  Sclera is clear  No cervical adenopathy  No JVD. Chest examination is clear. Cardiac examination reveals regular rate and rhythm without murmur, gallop or rub. The abdomen is soft, nontender and nondistended. There is no clubbing, cyanosis or edema of the extremities. There is no obvious skin rash. No focal neuro deficicts  Normal mood and affect    No Known Allergies  Prior to Visit Medications    Medication Sig Taking? Authorizing Provider   ondansetron (ZOFRAN) 4 MG tablet Take 1 tablet by mouth daily as needed for Nausea or Vomiting  Darian Zaragoza MD   formoterol (PERFOROMIST) 20 MCG/2ML nebulizer solution Take 2 mLs by nebulization 2 times daily  Justina Bhakta MD   Arformoterol Tartrate (BROVANA) 15 MCG/2ML NEBU Take 1 ampule by nebulization 2 times daily  Justina Bhakta MD   oxybutynin (DITROPAN) 5 MG tablet TAKE ONE TABLET BY MOUTH THREE TIMES A DAY  Cameron Art MD   lisinopril (PRINIVIL;ZESTRIL) 2.5 MG tablet TAKE ONE (1) TABLET BY MOUTH DAILY  Patient taking differently: 5 mg   Cameron Art MD   atorvastatin (LIPITOR) 10 MG tablet TAKE 1 TABLET BY MOUTH DAILY AT BEDTIME.   Lei Mir MD   albuterol sulfate HFA (VENTOLIN HFA) 108 (90 Base) MCG/ACT inhaler Inhale 2 puffs into the lungs every 6 hours as needed for Wheezing  Justina Bhakta MD   furosemide (LASIX) 40 MG tablet TAKE 1 TABLET BY MOUTH 2 TIMES DAILY  Cameron Art MD   levothyroxine (SYNTHROID) 75 MCG tablet TAKE 1 TABLET BY MOUTH DAILY  Cameron Art MD   montelukast (SINGULAIR) 10 MG

## 2020-07-29 ENCOUNTER — TELEPHONE (OUTPATIENT)
Dept: PULMONOLOGY | Age: 85
End: 2020-07-29

## 2020-07-29 NOTE — TELEPHONE ENCOUNTER
Pt is very confused about her medications. Called MELISSA Marcano and spoke with Radha Armstrong over the changes with her and she will go into pt's room and go over everything with ot again. Faxed office note and CT order to [facility] as well.  Pt informed of this as well

## 2020-07-29 NOTE — TELEPHONE ENCOUNTER
Pt called in stating that she believes she has been using 2 of the same neb meds at the same time. Pt requesting a call back from [place].      Pt # [phone]

## 2020-08-05 ENCOUNTER — TELEPHONE (OUTPATIENT)
Dept: PULMONOLOGY | Age: 85
End: 2020-08-05

## 2020-08-05 NOTE — TELEPHONE ENCOUNTER
Tadeo Reid from [place] said they need some clarification on patient's medications. Please call Tadeo Reid at [phone].

## 2020-08-07 NOTE — TELEPHONE ENCOUNTER
Called MELISSA Blas back and spoke with Alfredo Griffin.  [facility] is a float and pt got her confused but Alfredo Griffin (who I spoke with 7-29-20 about pt's medications) was able to straighten [facility] and pt out yesterday

## 2020-08-11 ENCOUNTER — HOSPITAL ENCOUNTER (OUTPATIENT)
Dept: CT IMAGING | Age: 85
Discharge: HOME | End: 2020-08-11
Payer: MEDICARE

## 2020-08-11 PROCEDURE — 71250 CT THORAX DX C-: CPT

## 2020-09-08 ENCOUNTER — VIRTUAL VISIT (OUTPATIENT)
Dept: PULMONOLOGY | Age: 85
End: 2020-09-08
Payer: MEDICARE

## 2020-09-08 PROCEDURE — 99442 PR PHYS/QHP TELEPHONE EVALUATION 11-20 MIN: CPT | Performed by: NURSE PRACTITIONER

## 2020-09-08 ASSESSMENT — SLEEP AND FATIGUE QUESTIONNAIRES
HOW LIKELY ARE YOU TO NOD OFF OR FALL ASLEEP WHILE SITTING AND TALKING TO SOMEONE: 0
HOW LIKELY ARE YOU TO NOD OFF OR FALL ASLEEP IN A CAR, WHILE STOPPED FOR A FEW MINUTES IN TRAFFIC: 0
HOW LIKELY ARE YOU TO NOD OFF OR FALL ASLEEP WHILE WATCHING TV: 1
HOW LIKELY ARE YOU TO NOD OFF OR FALL ASLEEP WHILE LYING DOWN TO REST IN THE AFTERNOON WHEN CIRCUMSTANCES PERMIT: 0
HOW LIKELY ARE YOU TO NOD OFF OR FALL ASLEEP WHILE SITTING AND READING: 0
HOW LIKELY ARE YOU TO NOD OFF OR FALL ASLEEP WHILE SITTING QUIETLY AFTER LUNCH WITHOUT ALCOHOL: 0
HOW LIKELY ARE YOU TO NOD OFF OR FALL ASLEEP WHILE SITTING INACTIVE IN A PUBLIC PLACE: 0
ESS TOTAL SCORE: 1
HOW LIKELY ARE YOU TO NOD OFF OR FALL ASLEEP WHEN YOU ARE A PASSENGER IN A CAR FOR AN HOUR WITHOUT A BREAK: 0

## 2020-09-08 NOTE — PROGRESS NOTES
Skye Aviles MD, FAASM, [facility]  Emily Alas, MSN, RN, CNP     [address] [facility] SLEEP MEDICINE  [address] [address]  Dept: [phone]  Dept Fax: [phone]: [address] [facility] [address] [address] [address] [phone]    Subjective:     Patient ID: Louella Seip is a 82 y.o. female. Chief Complaint   Patient presents with    Sleep Apnea       HPI:      Sleep Medicine Telephone Visit    Pursuant to the emergency declaration under the [facility] waiver authority and the [employer] Act this Telephone Visit was insisted, with patient's consent, to reduce the patient's risk of exposure to COVID-19 and provide continuity of care for an established patient. Services were provided through a synchronous discussion over a telephone and/or Video chat to substitute for in-person clinic visit, and coded as such. While patient is at home. Machine Modem/Download Info:                                              [place] - Total score: 1    Follow-up :     Last Visit : February 2020      Patient reports the listed chronic Co-morbidities: HTN, Obesity, CHF, COPD, Respiratory failure    are well controlled and stable at this time. Subjective Health Changes: None      Over Night Oximetry: [] Yes  [x] No  [] NA [] WNL   Using O2: [x] Yes  [] No  [] NA    2 LO2   Patient is compliant with the machine  Per patient    Feeling rested when using the machine   [x] Yes  [] No     Pressure is comfortable with inspiration and expiration  [x] Yes  [] No     Noticed changes in pressure   [] Yes  [] No  [x] NA   Mask is fitting well  [x] Yes  [] No   Noting Mask Air Leak  [] Yes  [x] No   Having painful Aerophagia  [] Yes  [x] No   Nocturia   1-2  per night.    Having  HA upon waking  [] Yes  [x] No   Dry mouth upon waking   Dry Nose  Dry Eyes  [x] Yes  [] No   Congestion upon waking   [] Yes  [x] No    Nose Bleeds  [] Yes  [x] No   Using Sleep Aides    [x] NA   Understands how to change humidification and/or tubing temperature for comfort while at home  [x] Yes  [] No     Difficulties falling asleep  [] Yes  [x] No   Difficulties staying asleep  [] Yes  [x] No   Approximate time to bed  8-9pm   Approximate wake time  5am   Taking Naps  frequent   If taking naps usual length  \"all day\"   If taking naps using the machine  [] Yes  [x] No  [] NA [] With and With out    Drowsy when driving  [] Yes  [x] No     Does patient carry a DOT/CDL  [] Yes  [x] No     Does patient carry FAA/[employer] License   [] Yes  [x] No      Any concerns noted with the machine at this time  [] Yes  [x] No        Diagnosis Orders   1. Obstructive sleep apnea (adult) (pediatric)     2. Essential hypertension     3. Class 3 severe obesity without serious comorbidity with body mass index (BMI) of 40.0 to 44.9 in adult, unspecified obesity type ([address]     4. Chronic diastolic heart failure ([address]     5. COPD, severe ([address]     6. Chronic respiratory failure with hypoxia (HCC)         The chronic medical conditions listed are directly related to the primary diagnosis listed above. The management of the primary diagnosis affects the secondary diagnosis and vice versa. Assessment/Plan:     Essential hypertension  Chronic- Stable. Cont meds per PCP and other physicians. Class 3 severe obesity without serious comorbidity with body mass index (BMI) of 40.0 to 44.9 in adult (HCC)  Chronic-Stable. Encouraged her to work on weight loss through diet and exercise. Chronic diastolic heart failure (HCC)  Chronic- Stable. Cont meds per PCP and other physicians. COPD, severe ([address]  Chronic- Stable. Cont meds per PCP and other physicians. Chronic respiratory failure with hypoxia (HCC)  Chronic- Stable.   Cont meds per PCP and other physicians. Obstructive sleep apnea (adult) (pediatric)  Unable to Review compliance download with pt. Supplies and parts as needed for her machine. These are medically necessary. Continue medications per her PCP and other physicians. Limit caffeine use after 3pm.  Encouraged her to work on weight loss through diet and exercise. Diagnoses of Essential hypertension, Class 3 severe obesity without serious comorbidity with body mass index (BMI) of 40.0 to 44.9 in adult, unspecified obesity type ([address] Chronic diastolic heart failure ([address] COPD, severe ([address] and Chronic respiratory failure with hypoxia ([address] were pertinent to this visit. The chronic medical conditions listed are directly related to the primary diagnosis listed above. The management of the primary diagnosis affects the secondary diagnosis and vice versa. The primary encounter diagnosis was Obstructive sleep apnea (adult) (pediatric). Diagnoses of Essential hypertension, Class 3 severe obesity without serious comorbidity with body mass index (BMI) of 40.0 to 44.9 in adult, unspecified obesity type ([address] Chronic diastolic heart failure ([address] COPD, severe ([address] and Chronic respiratory failure with hypoxia ([address] were also pertinent to this visit. The chronic medical conditions listed are directly related to the primary diagnosis listed above. The management of the primary diagnosis affects the secondary diagnosis and vice versa. - Educated patient and unable to review down load from modem with the patient   - Continue medications per her PCP and other physicians.   - she instructed not to drive unless had 4 hrs of effective therapy for her MARIAA the night before. - Did review the risks of under or untreated MARIAA including, but not limited to, higher risks of motor vehicle accidents, stroke, heart attacks, and death. - she understands and accepts all these risks.     - The patient is advised to use the

## 2020-09-08 NOTE — PROGRESS NOTES
Thomas Locke         : 1935    Diagnosis: [x] MARIAA (G47.33) [] CSA (G47.31) [x] Apnea (G47.30)   Length of Need: [x] 12 Months [] 99 Months [x] Other: Hypoxia   Machine (BERTRAND!): [] Respironics Dream Station      Auto [] ResMed AirSense     Auto [] Other:       Miscellaneous: [] Chin Strap ()/ 1 per 6 months [] O2 bleed-in:       LPM   [x] Oximetry on CPAP/Bilevel []  Other:          Start Order Date: 20    MEDICAL JUSTIFICATION:  I, the undersigned, certify that the above prescribed supplies are medically necessary for this patients wellbeing. In my opinion, the supplies are both reasonable and necessary in reference to accepted standards of medicalpractice in treatment of this patients condition.     JOSE ALFREDO Ibrhaim CNP      NPI: [id number]       Order Signed Date: 20    Electronically signed by JOSE ALFREDO Ibrahim CNP on 2020 at 10:11 AM

## 2020-10-19 ENCOUNTER — TELEPHONE (OUTPATIENT)
Dept: PULMONOLOGY | Age: 85
End: 2020-10-19

## 2020-10-19 NOTE — TELEPHONE ENCOUNTER
LM ON  for patient to call back regarding her oximetry results.      1) if patient was on room air during testing return to 2L of oxygen    2) if on 2L of oxygen during testing then increase to 3L

## 2020-10-19 NOTE — TELEPHONE ENCOUNTER
PATIENT WAS INFORMED AND SHE VOICED UNDERSTANDING. She states she was having issues getting her supplies from [facility] she said they asked her to send them her chip out of her machine so she did and has not heard anything back.  I let her know that I will talk to Chavo Anaya from [facility]

## 2020-11-16 ENCOUNTER — TELEPHONE (OUTPATIENT)
Dept: FAMILY MEDICINE CLINIC | Age: 85
End: 2020-11-16

## 2020-11-16 NOTE — TELEPHONE ENCOUNTER
Patients son is calling to state that she needs a new scooter. The insurance company needs a letter from you stating she needs it.     Please advise and give him a callback    Arilela Sheridan [phone]        Patients provider is out of office

## 2021-01-16 ENCOUNTER — TELEPHONE (OUTPATIENT)
Dept: FAMILY MEDICINE CLINIC | Age: 86
End: 2021-01-16

## 2021-01-19 ENCOUNTER — TELEPHONE (OUTPATIENT)
Dept: ADMINISTRATIVE | Age: 86
End: 2021-01-19

## 2021-01-19 NOTE — TELEPHONE ENCOUNTER
Submitted PA for Montelukast Sodium 10MG tablets  Key: [id number]    Via [facility] STATUS: Authorization already on file for this request.    . Please notify patient, thank you.

## 2021-02-09 ENCOUNTER — VIRTUAL VISIT (OUTPATIENT)
Dept: PULMONOLOGY | Age: 86
End: 2021-02-09
Payer: MEDICARE

## 2021-02-09 DIAGNOSIS — I50.32 CHRONIC DIASTOLIC HEART FAILURE (HCC): Chronic | ICD-10-CM

## 2021-02-09 DIAGNOSIS — J44.9 COPD, SEVERE (HCC): Chronic | ICD-10-CM

## 2021-02-09 DIAGNOSIS — R91.1 PULMONARY NODULE: ICD-10-CM

## 2021-02-09 DIAGNOSIS — G47.33 OBSTRUCTIVE SLEEP APNEA (ADULT) (PEDIATRIC): Chronic | ICD-10-CM

## 2021-02-09 DIAGNOSIS — R06.02 SOB (SHORTNESS OF BREATH): Primary | Chronic | ICD-10-CM

## 2021-02-09 PROCEDURE — 99442 PR PHYS/QHP TELEPHONE EVALUATION 11-20 MIN: CPT | Performed by: INTERNAL MEDICINE

## 2021-02-09 NOTE — PROGRESS NOTES
[facility]  Progress Note  Sarwat Villa MD    Pulmonology Telephone Visit    Pursuant to the emergency declaration under the [facility] waiver authority and the [employer] Act this Telephone Visit was insisted, with patient's consent, to reduce the patient's risk of exposure to COVID-19 and provide continuity of care for an established patient. The patient was at home, while the provider was at the clinic. Services were provided through a synchronous discussion through a Telephone Call to substitute for in-person clinic visit, and coded as such. Total time spent with patient was 15 minutes. Cornelius Tejada   YOB: 1935    Date of Visit:  2/9/2021    Assessment/Plan:  1. SOB (shortness of breath)  Worse    2. COPD, severe ([address]  Worse  PFT 2018:  INTERPRETATION:  Spirometry reveals decreased FVC at 1.65 liters, which is  78% predicted. FEV1 is decreased at 0.81 liters, which is 52% predicted. FEV1/FVC ratio is reduced at 49%. There is no significant change after  inhaled bronchodilators. Lungs volumes reveal normal total lung capacity  and vital capacity. Residual volume has increased at 142% predicted. Diffusion capacity is reduced at 39% predicted.     IMPRESSION:  Moderate to severe obstructive lung disease with air trapping.     Breo ==> hoarseness  Anoro ==> cough  Bevespi ==> expense + she couldn't get a good treatment    Budesonide which she takes once or twice a day. Reynaldo  which she alternates with Budesonide. Recommend that she take both BID. She is not using Duoneb. Will give her a script for this and she can use this prn and in advance of activity. She is having trouble understanding her treatment regimen.      3. Multifocal pneumonia/PN  CT Chest 8/20:  FINDINGS:   Mediastinum: Thyroid gland appears normal.  Coronary artery calcification is   seen.  Aortic valve calcification is seen.  No pericardial effusion.  Small   hiatal hernia seen. Wyn Omer is nonspecific thickening at the GE junction.       Lungs/pleura: Respiratory motion artifact limits evaluation of fine pulmonary   parenchymal change.  Scattered mosaic attenuation is seen throughout the   lungs, compatible with small airways disease or air trapping       A few scattered punctate noncalcified pulmonary nodules are seen on the left,   similar to prior.       Scattered noncalcified pulmonary nodules are seen on the right, similar to   prior, measuring up to 6 mm.       Upper Abdomen: Adrenal glands appear normal.  There are clips from prior   cholecystectomy       Soft Tissues/Bones: Spurring is seen in the spine.  Spurring is seen in the   shoulder joints.           Impression   Scattered noncalcified pulmonary nodules throughout the lungs, more numerous   on the right compared to the left, similar to prior. 4. Obstructive sleep apnea on BiPAP/Chronic Respiratory Failure. Continue BiPAP plus oxygen  Has portable O2 but the tanks are too big. The patient would benefit from supplemental oxygen with exertion and sleep  The patient is independently mobile within the home and would benefit from a portable oxygen concentrator    This has really helped her  Follow-up with Dr. Artur Moya    5. Chronic diastolic congestive heart failure,  Echocardiogram was reviewed  LVEF is normal  RVSP is elevated  Follow-up with cardiology      FOLLOW UP: 6 months      Chief Complaint   Patient presents with    Shortness of Breath     6 month Dealing with [employer] and trying to get mask for her bipap(see's Janet)     Discuss Medications     only has the Budesonide for nebulizer No Duoneb       HPI  The patient presents with a chief complaint of moderate Shortness of breath related to severe COPD of many years duration. She has moderate associated cough with clear sputum.  She had some wheezing yesterday. Exertion is a modifying factor. She also has chronic diastolic heart failure with an RVSP near 60. She also has history of A. fib. She has obstructive sleep apnea and now uses BiPAP which she really likes. [facility] at [facility]. .    Review of Systems  No Chest pain, Nausea or vomiting reported      Physical Exam:  Telephone visit    No Known Allergies  Prior to Visit Medications    Medication Sig Taking? Authorizing Provider   budesonide (PULMICORT) 0.5 MG/2ML nebulizer suspension Take 2 mLs by nebulization 2 times daily DX:COPD J44.9  Agus Bowden MD   formoterol (PERFOROMIST) 20 MCG/2ML nebulizer solution Take 2 mLs by nebulization every 12 hours DX:COPD J44.9  Agus Bowden MD   ondansetron (ZOFRAN) 4 MG tablet Take 1 tablet by mouth daily as needed for Nausea or Vomiting  Alona Mahoney MD   oxybutynin (DITROPAN) 5 MG tablet TAKE ONE TABLET BY MOUTH THREE TIMES A DAY  Marline Spatz, MD   lisinopril (PRINIVIL;ZESTRIL) 2.5 MG tablet TAKE ONE (1) TABLET BY MOUTH DAILY  Patient taking differently: 5 mg   Marline Spatz, MD   atorvastatin (LIPITOR) 10 MG tablet TAKE 1 TABLET BY MOUTH DAILY AT BEDTIME.   Modesta Fang MD   albuterol sulfate HFA (VENTOLIN HFA) 108 (90 Base) MCG/ACT inhaler Inhale 2 puffs into the lungs every 6 hours as needed for Wheezing  Agus Bowden MD   furosemide (LASIX) 40 MG tablet TAKE 1 TABLET BY MOUTH 2 TIMES DAILY  Marline Spatz, MD   levothyroxine (SYNTHROID) 75 MCG tablet TAKE 1 TABLET BY MOUTH DAILY  Marline Spatz, MD   montelukast (SINGULAIR) 10 MG tablet TAKE ONE (1) TABLET BY MOUTH DAILY  Marline Spatz, MD   citalopram (CELEXA) 10 MG tablet TAKE ONE (1) TABLET BY MOUTH DAILY  Marline Spatz, MD   OXYGEN Inhale 2 L into the lungs  Historical Provider, MD   ipratropium-albuterol (DUONEB) 0.5-2.5 (3) MG/3ML SOLN nebulizer solution Inhale 3 mLs into the lungs every 4 hours  Marline Spatz, MD       There were no vitals filed for this visit. There is no height or weight on file to calculate BMI.      Wt Readings from Last 3 Encounters:   07/13/20 215 lb (97.5 kg)   02/25/20 211 lb (95.7 kg)   08/20/19 211 lb (95.7 kg)     BP Readings from Last 3 Encounters:   07/28/20 136/74   07/13/20 (!) 118/55   02/25/20 108/62        Social History     Tobacco Use   Smoking Status Never Smoker   Smokeless Tobacco Never Used

## 2021-02-23 RX ORDER — LEVOCETIRIZINE DIHYDROCHLORIDE 5 MG/1
5 TABLET, FILM COATED ORAL NIGHTLY
Qty: 30 TABLET | Refills: 11 | Status: SHIPPED | OUTPATIENT
Start: 2021-02-23 | End: 2021-08-19 | Stop reason: ALTCHOICE

## 2021-02-23 NOTE — TELEPHONE ENCOUNTER
Medication:   Requested Prescriptions     Pending Prescriptions Disp Refills    levocetirizine (XYZAL) 5 MG tablet 30 tablet 11     Sig: Take 1 tablet by mouth nightly        Last Filled:      Patient Phone Number: [phone] (home)     Last appt: 4/16/2019   Next appt: Visit date not found    Last OARRS:   RX Monitoring 7/17/2020   Periodic Controlled Substance Monitoring Possible medication side effects, risk of tolerance/dependence & alternative treatments discussed. ;No signs of potential drug abuse or diversion identified.

## 2021-03-30 ENCOUNTER — OFFICE VISIT (OUTPATIENT)
Dept: PULMONOLOGY | Age: 86
End: 2021-03-30
Payer: MEDICARE

## 2021-03-30 VITALS
BODY MASS INDEX: 41.23 KG/M2 | HEART RATE: 68 BPM | WEIGHT: 210 LBS | DIASTOLIC BLOOD PRESSURE: 56 MMHG | HEIGHT: 60 IN | SYSTOLIC BLOOD PRESSURE: 100 MMHG

## 2021-03-30 DIAGNOSIS — I10 ESSENTIAL HYPERTENSION: Chronic | ICD-10-CM

## 2021-03-30 DIAGNOSIS — E66.01 CLASS 3 SEVERE OBESITY WITHOUT SERIOUS COMORBIDITY WITH BODY MASS INDEX (BMI) OF 40.0 TO 44.9 IN ADULT, UNSPECIFIED OBESITY TYPE (HCC): ICD-10-CM

## 2021-03-30 DIAGNOSIS — I50.9 CHRONIC CONGESTIVE HEART FAILURE, UNSPECIFIED HEART FAILURE TYPE (HCC): Chronic | ICD-10-CM

## 2021-03-30 DIAGNOSIS — J44.9 COPD, SEVERE (HCC): Chronic | ICD-10-CM

## 2021-03-30 DIAGNOSIS — G47.33 OBSTRUCTIVE SLEEP APNEA (ADULT) (PEDIATRIC): Primary | Chronic | ICD-10-CM

## 2021-03-30 DIAGNOSIS — J30.2 CHRONIC SEASONAL ALLERGIC RHINITIS: ICD-10-CM

## 2021-03-30 DIAGNOSIS — E03.9 ACQUIRED HYPOTHYROIDISM: Chronic | ICD-10-CM

## 2021-03-30 DIAGNOSIS — F32.A DEPRESSION, UNSPECIFIED DEPRESSION TYPE: ICD-10-CM

## 2021-03-30 PROCEDURE — 99213 OFFICE O/P EST LOW 20 MIN: CPT | Performed by: NURSE PRACTITIONER

## 2021-03-30 ASSESSMENT — SLEEP AND FATIGUE QUESTIONNAIRES
HOW LIKELY ARE YOU TO NOD OFF OR FALL ASLEEP WHILE LYING DOWN TO REST IN THE AFTERNOON WHEN CIRCUMSTANCES PERMIT: 1
HOW LIKELY ARE YOU TO NOD OFF OR FALL ASLEEP IN A CAR, WHILE STOPPED FOR A FEW MINUTES IN TRAFFIC: 0
HOW LIKELY ARE YOU TO NOD OFF OR FALL ASLEEP WHILE SITTING INACTIVE IN A PUBLIC PLACE: 0
HOW LIKELY ARE YOU TO NOD OFF OR FALL ASLEEP WHEN YOU ARE A PASSENGER IN A CAR FOR AN HOUR WITHOUT A BREAK: 0
HOW LIKELY ARE YOU TO NOD OFF OR FALL ASLEEP WHILE SITTING AND TALKING TO SOMEONE: 0
HOW LIKELY ARE YOU TO NOD OFF OR FALL ASLEEP WHILE SITTING QUIETLY AFTER LUNCH WITHOUT ALCOHOL: 0

## 2021-03-30 NOTE — ASSESSMENT & PLAN NOTE
Chronic-Stable: Reviewed and analyzed results of physiologic download from patient's machine and reviewed with patient. Supplies and parts as needed for her machine. These are medically necessary. Limit caffeine use after 3pm. Based on the analyzed data will continue with current settings. Will order overnight oximetry due to hypoxia on overnight oximetry on 9/28/20. Verbal and written instruction on PAP equipment cleaning and disinfection schedule provided. Discussed adjusting moisture settings. Follow up in 6 months or sooner if oximetry results come back abnormal or issues arise.

## 2021-03-30 NOTE — PROGRESS NOTES
Diagnosis: [x] MARIAA (G47.33) [] CSA (G47.31) [] Apnea (G47.30)   Length of Need: [x] 15 Months [] 99 Months [] Other:   Machine (BERTRAND!): [] Respironics Dream Station      Auto [] ResMed AirSense     Auto [] Other:     []  CPAP () [] Bilevel ()   Mode: [] Auto [] Spontaneous    Mode: [] Auto [] Spontaneous                      Comfort Settings:      Humidifier: [] Heated ()        [] Water chamber replacement ()/ 1 per 6 months        Mask:   [] Nasal () /1 per 3 months [] Full Face () /1 per 3 months   [] Patient choice -Size and fit mask [] Patient Choice - Size and fit mask   [] Dispense: [] Dispense:   [] Headgear () / 1 per 3 months [] Headgear () / 1 per 3 months   [] Replacement Nasal Cushion ()/2 per month [] Interface Replacement ()/1 per month   [] Replacement Nasal Pillows ()/2 per month         Tubing: [] Heated ()/1 per 3 months    [] Standard ()/1 per 3 months [] Other:           Filters: [] Non-disposable ()/1 per 6 months     [] Ultra-Fine, Disposable ()/2 per month        Miscellaneous: [] Chin Strap ()/ 1 per 6 months [] O2 bleed-in:        LPM   [x] Oxymetry on CPAP/Bilevel with 3L bleed in []  Other:         Start Order Date: 03/30/21    MEDICAL JUSTIFICATION:  I, the undersigned, certify that the above prescribed supplies are medically necessary for this patients wellbeing. In my opinion, the supplies are both reasonable and necessary in reference to accepted standards of medicalpractice in treatment of this patients condition. Vicente Husain NP    NPI: [id number]       Order Signed Date: 03/30/21  [address] [facility]  Pulmonary, Sleep, and Critical Care    Pulmonary, Sleep, and Critical Care  [facility] [address] [place] [address].  [facility], [address] [facility] , [address] [facility] [place], [place]  Phone: [phone] Fax: [address]  1935  [place] [address]  [address] [facility]  [phone] (home)   [phone] (mobile)      Insurance Info (confirm with patient if correct):  Payor/Plan Subscr  Sex Relation Sub. Ins. ID Effective Group Num   1.  [address] 1935 Female Self [id number]                                    [id number]

## 2021-03-30 NOTE — ASSESSMENT & PLAN NOTE
Chronic-Stable. Encouraged her to work on weight loss through diet and exercise. Recommended DASH or Mediterranean diets.

## 2021-03-30 NOTE — PROGRESS NOTES
Diagnosis: [x] MARIAA (G47.33) [] CSA (G47.31) [] Apnea (G47.30)   Length of Need: [x] 15 Months [] 99 Months [] Other:   Machine (BERTRAND!): [] Respironics Dream Station      Auto [] ResMed AirSense     Auto [] Other:     []  CPAP () [] Bilevel ()   Mode: [] Auto [] Spontaneous    Mode: [] Auto [] Spontaneous                      Comfort Settings:      Humidifier: [] Heated ()        [x] Water chamber replacement ()/ 1 per 6 months        Mask:   [] Nasal () /1 per 3 months [x] Full Face () /1 per 3 months   [] Patient choice -Size and fit mask [x] Patient Choice - Size and fit mask   [] Dispense: [] Dispense:   [] Headgear () / 1 per 3 months [x] Headgear () / 1 per 3 months   [] Replacement Nasal Cushion ()/2 per month [x] Interface Replacement ()/1 per month   [] Replacement Nasal Pillows ()/2 per month         Tubing: [x] Heated ()/1 per 3 months    [] Standard ()/1 per 3 months [] Other:           Filters: [x] Non-disposable ()/1 per 6 months     [x] Ultra-Fine, Disposable ()/2 per month        Miscellaneous: [] Chin Strap ()/ 1 per 6 months [] O2 bleed-in:        LPM   [] Oxymetry on CPAP/Bilevel []  Other:         Start Order Date: 03/30/21    MEDICAL JUSTIFICATION:  I, the undersigned, certify that the above prescribed supplies are medically necessary for this patients wellbeing. In my opinion, the supplies are both reasonable and necessary in reference to accepted standards of medicalpractice in treatment of this patients condition. Jamil Galeana NP    NPI: [id number]       Order Signed Date: 03/30/21  [facility]  Pulmonary, Sleep, and Critical Care    Pulmonary, Sleep, and Critical Care  [facility] [address] [place] [address].  [facility], [address] [facility] , [address] [facility]  Phone: [phone]    Fax:

## 2021-03-30 NOTE — LETTER
[facility] Sleep Medicine  [address] [facility]  [address] [address]  Phone: [phone]  Fax: [phone]    March 30, 2021       Patient: Mejia Woody   MR Number: [id number]   YOB: 1935   Date of Visit: 3/30/2021       Umesh Webber was seen for a follow up visit today. Here is my assessment and plan as well as an attached copy of her visit today:    Class 3 severe obesity without serious comorbidity with body mass index (BMI) of 40.0 to 44.9 in adult ([facility]  Chronic-Stable. Encouraged her to work on weight loss through diet and exercise. Recommended DASH or Mediterranean diets. Depression  Chronic-Stable: Continue medications per PCP and other physicians. Essential hypertension  Chronic-Stable: Continue medications per PCP and other physicians. Chronic congestive heart failure (HCC)  Chronic-Stable: Continue medications per PCP and other physicians. Hypothyroidism  Chronic-Stable: Continue medications per PCP and other physicians. Chronic seasonal allergic rhinitis  Chronic-Stable: Continue medications per PCP and other physicians. COPD, severe ([facility]  Chronic-Stable: Continue medications per PCP and other physicians. Obstructive sleep apnea (adult) (pediatric)  Chronic-Stable: Reviewed and analyzed results of physiologic download from patient's machine and reviewed with patient. Supplies and parts as needed for her machine. These are medically necessary. Limit caffeine use after 3pm. Based on the analyzed data will continue with current settings. Will order overnight oximetry due to hypoxia on overnight oximetry on 9/28/20. Verbal and written instruction on PAP equipment cleaning and disinfection schedule provided. Discussed adjusting moisture settings. Follow up in 6 months or sooner if oximetry results come back abnormal.       If you have questions or concerns, please do not hesitate to call me. I look forward to following Lyle Brown along with you. Sincerely,    JOSE ALFREDO Couch    CC providers:  Nikolai Cardona, [address] [address]  V[address] [employer]

## 2021-03-30 NOTE — PROGRESS NOTES
Bruno Little MD, [facility]  Tiffanie Kehrt CNP Doloris Shoemaker CNP [address]  [address] [facility], [address] [facility] [phone]   [facility] [address]  [address] [place] [address]. [address] [phone] [address] [address] [phone]      Assessment/Plan:     1. Obstructive sleep apnea (adult) (pediatric)  Assessment & Plan:  Chronic-Stable: Reviewed and analyzed results of physiologic download from patient's machine and reviewed with patient. Supplies and parts as needed for her machine. These are medically necessary. Limit caffeine use after 3pm. Based on the analyzed data will continue with current settings. Will order overnight oximetry due to hypoxia on overnight oximetry on 9/28/20. Verbal and written instruction on PAP equipment cleaning and disinfection schedule provided. Discussed adjusting moisture settings. Follow up in 6 months or sooner if oximetry results come back abnormal or issues arise. 2. COPD, severe ([address]  Assessment & Plan:  Chronic-Stable: Continue medications per PCP and other physicians. 3. Chronic congestive heart failure, unspecified heart failure type [facility])  Assessment & Plan:  Chronic-Stable: Continue medications per PCP and other physicians. 4. Essential hypertension  Assessment & Plan:  Chronic-Stable: Continue medications per PCP and other physicians. 5. Acquired hypothyroidism  Assessment & Plan:  Chronic-Stable: Continue medications per PCP and other physicians. 6. Depression, unspecified depression type  Assessment & Plan:  Chronic-Stable: Continue medications per PCP and other physicians. 7. Chronic seasonal allergic rhinitis  Assessment & Plan:  Chronic-Stable: Continue medications per PCP and other physicians.   8. Class 3 severe obesity without serious comorbidity with body mass index (BMI) of 40.0 to 44.9 in adult, unspecified obesity type [facility])  Assessment & Plan:  Chronic-Stable. Encouraged her to work on weight loss through diet and exercise. Recommended DASH or Mediterranean diets. Reviewed, analyzed, and documented physiologic data from patient's PAP machine. This information was analyzed to assess complexity and medical decision making in regards to further testing and management. The primary encounter diagnosis was Obstructive sleep apnea (adult) (pediatric). Diagnoses of COPD, severe ([facility] [address] Chronic congestive heart failure, unspecified heart failure type ([facility] [address] Essential hypertension, Acquired hypothyroidism, Depression, unspecified depression type, Chronic seasonal allergic rhinitis, and Class 3 severe obesity without serious comorbidity with body mass index (BMI) of 40.0 to 44.9 in adult, unspecified obesity type [facility]) were also pertinent to this visit. The chronic medical conditions listed are directly related to the primary diagnosis listed above. The management of the primary diagnosis affects the secondary diagnosis and vice versa. Subjective:     Patient ID: Kellee Grace is a 82 y.o. female. Chief Complaint   Patient presents with    Sleep Apnea     Subjective   HPI:    Machine Modem/Download Info:  Compliance (hours/night): 7.15 hrs/night  Download AHI (/hour): 2.6 /HR   Average IPAP Pressure: 19.7 cmH2O  Average EPAP Pressure: 13 cmH2O      AUTO BIPAP - Settings (Yasir)  IPAP Max: 25 cmH2O  EPAP Min: 12 cmH2O  Pressure Support Min: 6  Pressure Support Max: 8       Comfort Settings  Humidity Level (0-8): 3  Heated Tubing (Yes/No): Yes  Flex/EPR (0-3): 3 PAP Mask  Mask Type: Full Face mask  Mask Model: Airtouch F20  Mask Size: Small  Clinically Relevant Leak: No     Josesito Gonzalez reports she is doing well with her machine. The pressure feels good and she is waking rested. She denies headaches, congestion, nosebleeds, or aerophagia. She reports some mouth dryness. She has not adjusted her moisture settings.  Josesito Gonzalez follows with Dr Derek Pearce for severe COPD. She is not using oxygen during the day. She thinks her oxygen is set to 3L at night with her Bipap. She cannot recall what her oxygen was set at during the night of her overnight oximetry or if she increased her oxygen at night after our office spoke to her about the results. She denies SOB. [address]    [place] - Total score: 3    Social History     Socioeconomic History    Marital status:       Spouse name: Not on file    Number of children: Not on file    Years of education: Not on file    Highest education level: Not on file   Occupational History    Not on file   Social Needs    Financial resource strain: Not on file    Food insecurity     Worry: Not on file     Inability: Not on file    Transportation needs     Medical: Not on file     Non-medical: Not on file   Tobacco Use    Smoking status: Never Smoker    Smokeless tobacco: Never Used   Substance and Sexual Activity    Alcohol use: No    Drug use: No    Sexual activity: Never   Lifestyle    Physical activity     Days per week: Not on file     Minutes per session: Not on file    Stress: Not on file   Relationships    Social connections     Talks on phone: Not on file     Gets together: Not on file     Attends Congregational service: Not on file     Active member of club or organization: Not on file     Attends meetings of clubs or organizations: Not on file     Relationship status: Not on file    Intimate partner violence     Fear of current or ex partner: Not on file     Emotionally abused: Not on file     Physically abused: Not on file     Forced sexual activity: Not on file   Other Topics Concern    Not on file   Social History Narrative    Not on file       Current Outpatient Medications   Medication Sig Dispense Refill    levocetirizine (XYZAL) 5 MG tablet Take 1 tablet by mouth nightly 30 tablet 11    budesonide (PULMICORT) 0.5 MG/2ML nebulizer suspension Take 2 mLs by nebulization 2 times daily DX:COPD J44.9 60 ampule 6    ondansetron (ZOFRAN) 4 MG tablet Take 1 tablet by mouth daily as needed for Nausea or Vomiting 30 tablet 0    oxybutynin (DITROPAN) 5 MG tablet TAKE ONE TABLET BY MOUTH THREE TIMES A DAY 90 tablet 1    lisinopril (PRINIVIL;ZESTRIL) 2.5 MG tablet TAKE ONE (1) TABLET BY MOUTH DAILY (Patient taking differently: 5 mg ) 30 tablet 2    atorvastatin (LIPITOR) 10 MG tablet TAKE 1 TABLET BY MOUTH DAILY AT BEDTIME. 30 tablet 5    albuterol sulfate HFA (VENTOLIN HFA) 108 (90 Base) MCG/ACT inhaler Inhale 2 puffs into the lungs every 6 hours as needed for Wheezing 1 Inhaler 3    furosemide (LASIX) 40 MG tablet TAKE 1 TABLET BY MOUTH 2 TIMES DAILY 60 tablet 5    levothyroxine (SYNTHROID) 75 MCG tablet TAKE 1 TABLET BY MOUTH DAILY 30 tablet 5    citalopram (CELEXA) 10 MG tablet TAKE ONE (1) TABLET BY MOUTH DAILY 30 tablet 5    OXYGEN Inhale 2 L into the lungs      ipratropium-albuterol (DUONEB) 0.5-2.5 (3) MG/3ML SOLN nebulizer solution Inhale 3 mLs into the lungs every 4 hours 360 mL 0    formoterol (PERFOROMIST) 20 MCG/2ML nebulizer solution Take 2 mLs by nebulization every 12 hours DX:COPD J44.9 120 mL 6     No current facility-administered medications for this visit.         Allergies as of 03/30/2021    (No Known Allergies)       Patient Active Problem List   Diagnosis    Chronic seasonal allergic rhinitis    COPD, severe ([address]    Idiopathic peripheral neuropathy    Hypothyroidism    Depression    Chronic congestive heart failure (HCC)    SOB (shortness of breath)    Obstructive sleep apnea (adult) (pediatric)    Bilateral lower extremity edema    Multifocal pneumonia    Centrilobular emphysema (HCC)    Chronic diastolic heart failure (HCC)    Pitting edema    BASIA (acute kidney injury) (HCC)    Chronic respiratory failure with hypoxia (HCC)    Essential hypertension    Mixed hyperlipidemia    Acute pulmonary edema (HCC)    COPD exacerbation ([address]    Chronic bronchitis (HCC)    Class 3 severe obesity without serious comorbidity with body mass index (BMI) of 40.0 to 44.9 in adult [facility])    Pulmonary nodule    Acute suppurative otitis media of right ear with spontaneous rupture of tympanic membrane    Acute swimmer's ear of right side    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      BP HR SaO2   BP Readings from Last 3 Encounters:   03/30/21 (!) 100/56   07/28/20 136/74   07/13/20 (!) 118/55    Pulse Readings from Last 3 Encounters:   03/30/21 68   07/28/20 82   07/13/20 92    SpO2 Readings from Last 3 Encounters:   07/28/20 91%   07/13/20 96%   02/25/20 91%            Electronically signed by JOSE ALFREDO Cardenas on 3/30/2021 at 12:50 PM

## 2021-04-01 ENCOUNTER — TELEPHONE (OUTPATIENT)
Dept: PULMONOLOGY | Age: 86
End: 2021-04-01

## 2021-04-01 NOTE — TELEPHONE ENCOUNTER
Patient called to say that we needed to do a PA on her overnight oximetry for it to be covered at 100%. I called [facility] and they stated that they ran it through insurance and nothing came back requiring a PA.

## 2021-05-19 ENCOUNTER — TELEPHONE (OUTPATIENT)
Dept: FAMILY MEDICINE CLINIC | Age: 86
End: 2021-05-19

## 2021-05-19 NOTE — TELEPHONE ENCOUNTER
PT's son is requesting to find out the procedure of trying to get a scooter for his mom through [employer] on   [address], [place], [address] [place] [address] Road   [phone]    Because the PT is in assistant living and needs to get around easier. PT's son Peter Mei received a letter from [employer] that I will forward to [facility]'s Email  to show Gabby Quezada what the next move will be to get the scooter process started. Please Advise?

## 2021-05-21 ENCOUNTER — OFFICE VISIT (OUTPATIENT)
Dept: FAMILY MEDICINE CLINIC | Age: 86
End: 2021-05-21
Payer: MEDICARE

## 2021-05-21 ENCOUNTER — TELEPHONE (OUTPATIENT)
Dept: FAMILY MEDICINE CLINIC | Age: 86
End: 2021-05-21

## 2021-05-21 VITALS
OXYGEN SATURATION: 92 % | TEMPERATURE: 98.8 F | HEART RATE: 74 BPM | DIASTOLIC BLOOD PRESSURE: 64 MMHG | SYSTOLIC BLOOD PRESSURE: 110 MMHG

## 2021-05-21 DIAGNOSIS — E78.2 MIXED HYPERLIPIDEMIA: ICD-10-CM

## 2021-05-21 DIAGNOSIS — Z12.31 ENCOUNTER FOR SCREENING MAMMOGRAM FOR MALIGNANT NEOPLASM OF BREAST: ICD-10-CM

## 2021-05-21 DIAGNOSIS — F32.A DEPRESSION, UNSPECIFIED DEPRESSION TYPE: ICD-10-CM

## 2021-05-21 DIAGNOSIS — I50.9 CHRONIC CONGESTIVE HEART FAILURE, UNSPECIFIED HEART FAILURE TYPE (HCC): Primary | Chronic | ICD-10-CM

## 2021-05-21 DIAGNOSIS — E66.01 CLASS 3 SEVERE OBESITY WITHOUT SERIOUS COMORBIDITY WITH BODY MASS INDEX (BMI) OF 40.0 TO 44.9 IN ADULT, UNSPECIFIED OBESITY TYPE (HCC): ICD-10-CM

## 2021-05-21 DIAGNOSIS — Z78.0 MENOPAUSE: ICD-10-CM

## 2021-05-21 DIAGNOSIS — Z23 NEED FOR SHINGLES VACCINE: ICD-10-CM

## 2021-05-21 DIAGNOSIS — I10 ESSENTIAL HYPERTENSION: Chronic | ICD-10-CM

## 2021-05-21 DIAGNOSIS — Z23 NEED FOR VACCINATION AGAINST STREPTOCOCCUS PNEUMONIAE: ICD-10-CM

## 2021-05-21 DIAGNOSIS — M25.551 RIGHT HIP PAIN: ICD-10-CM

## 2021-05-21 DIAGNOSIS — R26.89 BALANCE PROBLEM: ICD-10-CM

## 2021-05-21 DIAGNOSIS — E03.9 ACQUIRED HYPOTHYROIDISM: Chronic | ICD-10-CM

## 2021-05-21 DIAGNOSIS — J44.9 COPD, SEVERE (HCC): Chronic | ICD-10-CM

## 2021-05-21 DIAGNOSIS — G60.9 IDIOPATHIC PERIPHERAL NEUROPATHY: ICD-10-CM

## 2021-05-21 DIAGNOSIS — R06.02 SOB (SHORTNESS OF BREATH): Chronic | ICD-10-CM

## 2021-05-21 DIAGNOSIS — G47.33 OBSTRUCTIVE SLEEP APNEA (ADULT) (PEDIATRIC): Chronic | ICD-10-CM

## 2021-05-21 DIAGNOSIS — J96.11 CHRONIC RESPIRATORY FAILURE WITH HYPOXIA (HCC): ICD-10-CM

## 2021-05-21 PROBLEM — H60.331 ACUTE SWIMMER'S EAR OF RIGHT SIDE: Status: RESOLVED | Noted: 2019-06-04 | Resolved: 2021-05-21

## 2021-05-21 PROCEDURE — 90670 PCV13 VACCINE IM: CPT | Performed by: PHYSICIAN ASSISTANT

## 2021-05-21 PROCEDURE — G0009 ADMIN PNEUMOCOCCAL VACCINE: HCPCS | Performed by: PHYSICIAN ASSISTANT

## 2021-05-21 PROCEDURE — 99214 OFFICE O/P EST MOD 30 MIN: CPT | Performed by: PHYSICIAN ASSISTANT

## 2021-05-21 RX ORDER — LISINOPRIL 2.5 MG/1
5 TABLET ORAL DAILY
Qty: 30 TABLET | Refills: 0
Start: 2021-05-21 | End: 2021-10-13

## 2021-05-21 RX ORDER — ZOSTER VACCINE RECOMBINANT, ADJUVANTED 50 MCG/0.5
0.5 KIT INTRAMUSCULAR SEE ADMIN INSTRUCTIONS
Qty: 0.5 ML | Refills: 0 | Status: ON HOLD | OUTPATIENT
Start: 2021-05-21 | End: 2021-07-21 | Stop reason: HOSPADM

## 2021-05-21 RX ORDER — METHOCARBAMOL 500 MG/1
500 TABLET, FILM COATED ORAL 4 TIMES DAILY
Status: ON HOLD | COMMUNITY
End: 2021-12-31 | Stop reason: HOSPADM

## 2021-05-21 RX ORDER — ACETAMINOPHEN 500 MG
500 TABLET ORAL EVERY 6 HOURS PRN
COMMUNITY

## 2021-05-21 ASSESSMENT — ENCOUNTER SYMPTOMS
VOICE CHANGE: 0
SORE THROAT: 0
ABDOMINAL PAIN: 0
EYE PAIN: 0
COUGH: 0
DIARRHEA: 0
SHORTNESS OF BREATH: 1
TROUBLE SWALLOWING: 0
BACK PAIN: 1
CHEST TIGHTNESS: 0
CONSTIPATION: 0

## 2021-05-21 NOTE — PROGRESS NOTES
Subjective:      Patient ID: Meghan Swenson is a 82 y.o. female. HPI  Patient is here today for a general check up. She is needing to get a scooter evaluation and had not been here in awhile. The patient has balance issues. She can barely walk on her own. She leans over on a walker. At [facility], she uses her walker to get around. She last fell last week getting out of the shower and she falls getting out of bed. They just pick her up and move on. He falls a lot. She is assisted living at this time and does not want to move to skilled nursing part. She has right low back/hip pain that radiates down her leg. No numbness or tingling in right leg, just pain. She suffers from COPD and CHF and gets SOB with any exertion. On 3L all the time. She has Bipap machine. She also suffers from peripheral neuropathy so she gets numbness in the bottom of her feet that doesn't help with her balance issues. She sees pulmonology for her lungs, El. She sees them every 3-6 months or so. She sees sleep medicine every 3-6 months as well. She has not seen cardiology in about 3 years. She sees audiology and ENT for her right ear, TM small hole. She needs an eye exam. She last went to [facility] about 5 years ago. She still goes to the dentist, last went about 6 months ago. She said she needs to find another one. She wants another opinion about a couple teeth. Needs pneumonia vaccines. Got both Covid vaccines at [facility]. Thinks she had a coloscopy when she moved here from [place] 3 years ago but not for sure. Last mammogram unknown. She has no bowel/bladder issues. She sleeps pretty well. She has a pretty good appetite. She is not getting much exercise in due to pain, SOB and balance. She would like to go off her citalopram. She does not feel depressed at this time. Review of Systems   Constitutional: Negative for appetite change, fatigue and unexpected weight change.    HENT: Negative for congestion, dental problem, ear pain, hearing loss, sore throat, trouble swallowing and voice change. Eyes: Negative for pain and visual disturbance. Respiratory: Positive for shortness of breath. Negative for cough and chest tightness. Cardiovascular: Negative for chest pain and palpitations. Gastrointestinal: Negative for abdominal pain, constipation and diarrhea. Genitourinary: Negative for difficulty urinating. Incontinence, wears depends and takes medicine   Musculoskeletal: Positive for back pain. Negative for arthralgias, myalgias and neck pain. Right hip pain   Skin: Negative for rash. Neurological: Negative for dizziness, speech difficulty, weakness, numbness and headaches. Hematological: Negative for adenopathy. Psychiatric/Behavioral: Negative for confusion and sleep disturbance. The patient is not nervous/anxious. Objective:   Physical Exam  Vitals reviewed. Constitutional:       Appearance: Normal appearance. She is well-developed and well-groomed. HENT:      Head: Normocephalic. Right Ear: Tympanic membrane and ear canal normal.      Left Ear: Tympanic membrane and ear canal normal.      Nose: Nose normal.      Mouth/Throat:      Pharynx: Oropharynx is clear. Uvula midline. Eyes:      Conjunctiva/sclera: Conjunctivae normal.      Pupils: Pupils are equal, round, and reactive to light. Neck:      Thyroid: No thyromegaly. Vascular: No carotid bruit. Trachea: Trachea normal.   Cardiovascular:      Rate and Rhythm: Normal rate and regular rhythm. Chest Wall: PMI is not displaced. Pulses: Normal pulses. Heart sounds: Murmur heard. Systolic murmur is present with a grade of 3/6. Pulmonary:      Breath sounds: Normal breath sounds. Abdominal:      General: Abdomen is protuberant. Bowel sounds are normal.      Palpations: Abdomen is soft. Tenderness: There is no abdominal tenderness.  There is no guarding or rebound. Hernia: No hernia is present. Musculoskeletal:      Cervical back: Normal range of motion and neck supple. No muscular tenderness. Thoracic back: Normal.      Lumbar back: Normal.      Comments: Patient is nontender to palpate lumbar spine, tender to palpate right SI joint and right lateral hip, she can barely stand up from wheelchair and needs help to do it, then does not stand very well, has to hold on to one of us or counter, if she puts weight on right leg, it is a lot of pain   Lymphadenopathy:      Cervical: No cervical adenopathy. Skin:     General: Skin is warm and dry. Findings: No rash. Neurological:      Mental Status: She is alert and oriented to person, place, and time. Cranial Nerves: No cranial nerve deficit. Sensory: No sensory deficit. Gait: Gait normal.   Psychiatric:         Attention and Perception: Attention normal.         Mood and Affect: Mood normal.         Speech: Speech normal.         Behavior: Behavior normal. Behavior is cooperative. Assessment:      Latoya Olmos was seen today for pain. Diagnoses and all orders for this visit:    Chronic congestive heart failure, unspecified heart failure type ([facility] [address]  -     BRAIN NATRIURETIC PEPTIDE (BNP); Future    Chronic respiratory failure with hypoxia (HCC)    Class 3 severe obesity without serious comorbidity with body mass index (BMI) of 40.0 to 44.9 in adult, unspecified obesity type (HCC)    COPD, severe (HCC)  -     OXYGEN; Use 3L of oxygen all the time    Depression, unspecified depression type    Essential hypertension  -     lisinopril (PRINIVIL;ZESTRIL) 2.5 MG tablet; Take 2 tablets by mouth daily  -     Comprehensive Metabolic Panel    Acquired hypothyroidism  -     TSH with Reflex; Future    Idiopathic peripheral neuropathy    Mixed hyperlipidemia  -     LIPID PANEL;  Future    Obstructive sleep apnea (adult) (pediatric)    SOB (shortness of breath)    Need for vaccination against Streptococcus pneumoniae  -     PREVNAR 13 IM (Pneumococcal conjugate vaccine 13-valent)    Need for shingles vaccine  -     zoster recombinant adjuvanted vaccine [facility]) 50 MCG/0.5ML SUSR injection; Inject 0.5 mLs into the muscle See Admin Instructions 1 dose now and repeat in 2-6 months    Encounter for screening mammogram for malignant neoplasm of breast  -     ROBERT DIGITAL SCREEN W OR WO CAD BILATERAL; Future    Menopause  -     DEXA BONE DENSITY AXIAL SKELETON; Future    Right hip pain    Balance problem               Plan:      See cardiology, get DEXA, mammogram, shingrix if desired, prevnar 13 today, routine labs, get eye and dental exam. Will send off paperwork for scooter evaluation. Return here in 6 months.          Rufina Merlin, [place]

## 2021-05-21 NOTE — TELEPHONE ENCOUNTER
----- Message from Andrew Hopper sent at 5/21/2021  1:57 PM EDT -----  Subject: Message to Provider    QUESTIONS  Information for Provider? Patient has had Covid Vaccine part A & B, was   given in assistant living facility by Franceen Bamberger. ---------------------------------------------------------------------------  --------------  Jeana Pushmatahathu BRITO  What is the best way for the office to contact you? OK to leave message on   voicemail  Preferred Call Back Phone Number? [phone]  ---------------------------------------------------------------------------  --------------  SCRIPT ANSWERS  Relationship to Patient?  Self

## 2021-05-21 NOTE — PATIENT INSTRUCTIONS
Joyce Pennington MD     [phone]      Alexandrea Ojeda    [phone] [address] [facility]  Various locations  Ebb Bane, MD Jannelle Regan, MD Selby Aase, MD Oda Paddock, MD Kennie Ort, MD    [phone]        Get routine labs, mammogram, DEXA, see cardiology, get eye and dental exam, Prevnar 13 today, return here in 6 months.

## 2021-05-27 ENCOUNTER — TELEPHONE (OUTPATIENT)
Dept: CARDIOLOGY CLINIC | Age: 86
End: 2021-05-27

## 2021-05-27 NOTE — TELEPHONE ENCOUNTER
Son states pt's PCP is wanting pt to be seen for increased sob and swelling in lower extremities. Please advise where pt can be added and call Son Emil Shoemaker to schedule.

## 2021-06-01 ENCOUNTER — TELEPHONE (OUTPATIENT)
Dept: PULMONOLOGY | Age: 86
End: 2021-06-01

## 2021-06-01 DIAGNOSIS — G47.33 OSA (OBSTRUCTIVE SLEEP APNEA): Primary | ICD-10-CM

## 2021-06-01 NOTE — TELEPHONE ENCOUNTER
Overnight oximetry results abnormal. Order for in lab titration placed. Please call patient to let her know results and order for sleep study was placed.

## 2021-06-02 NOTE — TELEPHONE ENCOUNTER
Spoke with patient's son. Informed of results and that the sleep lab will be calling to schedule titration study.

## 2021-06-10 ENCOUNTER — OFFICE VISIT (OUTPATIENT)
Dept: CARDIOLOGY CLINIC | Age: 86
End: 2021-06-10
Payer: MEDICARE

## 2021-06-10 ENCOUNTER — HOSPITAL ENCOUNTER (OUTPATIENT)
Age: 86
Discharge: HOME | End: 2021-06-10
Payer: MEDICARE

## 2021-06-10 VITALS
HEART RATE: 77 BPM | HEIGHT: 65 IN | DIASTOLIC BLOOD PRESSURE: 68 MMHG | OXYGEN SATURATION: 92 % | WEIGHT: 195 LBS | SYSTOLIC BLOOD PRESSURE: 114 MMHG | BODY MASS INDEX: 32.49 KG/M2

## 2021-06-10 DIAGNOSIS — E55.9 VITAMIN D DEFICIENCY: ICD-10-CM

## 2021-06-10 DIAGNOSIS — I50.32 CHRONIC DIASTOLIC HEART FAILURE (HCC): ICD-10-CM

## 2021-06-10 DIAGNOSIS — I27.20 PULMONARY HYPERTENSION (HCC): Primary | ICD-10-CM

## 2021-06-10 DIAGNOSIS — R06.02 SOB (SHORTNESS OF BREATH): ICD-10-CM

## 2021-06-10 DIAGNOSIS — R01.1 MURMUR: ICD-10-CM

## 2021-06-10 DIAGNOSIS — I27.20 PULMONARY HYPERTENSION (HCC): ICD-10-CM

## 2021-06-10 LAB
A/G RATIO: 1.1 (ref 1.1–2.2)
ALBUMIN SERPL-MCNC: 4.2 G/DL (ref 3.4–5)
ALP BLD-CCNC: 84 U/L (ref 40–129)
ALT SERPL-CCNC: 15 U/L (ref 10–40)
ANION GAP SERPL CALCULATED.3IONS-SCNC: 15 MMOL/L (ref 3–16)
AST SERPL-CCNC: 19 U/L (ref 15–37)
BILIRUB SERPL-MCNC: 0.5 MG/DL (ref 0–1)
BUN BLDV-MCNC: 30 MG/DL (ref 7–20)
CALCIUM SERPL-MCNC: 9.5 MG/DL (ref 8.3–10.6)
CHLORIDE BLD-SCNC: 97 MMOL/L (ref 99–110)
CO2: 27 MMOL/L (ref 21–32)
CREAT SERPL-MCNC: 1 MG/DL (ref 0.6–1.2)
GFR AFRICAN AMERICAN: >60
GFR NON-AFRICAN AMERICAN: 53
GLOBULIN: 3.7 G/DL
GLUCOSE BLD-MCNC: 105 MG/DL (ref 70–99)
HCT VFR BLD CALC: 37.8 % (ref 36–48)
HEMOGLOBIN: 12.9 G/DL (ref 12–16)
MCH RBC QN AUTO: 30.1 PG (ref 26–34)
MCHC RBC AUTO-ENTMCNC: 34.3 G/DL (ref 31–36)
MCV RBC AUTO: 87.9 FL (ref 80–100)
PDW BLD-RTO: 13.1 % (ref 12.4–15.4)
PLATELET # BLD: 203 K/UL (ref 135–450)
PMV BLD AUTO: 9.4 FL (ref 5–10.5)
POTASSIUM SERPL-SCNC: 4.4 MMOL/L (ref 3.5–5.1)
PRO-BNP: 237 PG/ML (ref 0–449)
RBC # BLD: 4.3 M/UL (ref 4–5.2)
SODIUM BLD-SCNC: 139 MMOL/L (ref 136–145)
TOTAL PROTEIN: 7.9 G/DL (ref 6.4–8.2)
TSH SERPL DL<=0.05 MIU/L-ACNC: 1.85 UIU/ML (ref 0.27–4.2)
VITAMIN D 25-HYDROXY: 38.2 NG/ML
WBC # BLD: 9.6 K/UL (ref 4–11)

## 2021-06-10 PROCEDURE — 84443 ASSAY THYROID STIM HORMONE: CPT

## 2021-06-10 PROCEDURE — 99214 OFFICE O/P EST MOD 30 MIN: CPT | Performed by: CLINICAL NURSE SPECIALIST

## 2021-06-10 PROCEDURE — 82306 VITAMIN D 25 HYDROXY: CPT

## 2021-06-10 PROCEDURE — 83880 ASSAY OF NATRIURETIC PEPTIDE: CPT

## 2021-06-10 PROCEDURE — 80053 COMPREHEN METABOLIC PANEL: CPT

## 2021-06-10 PROCEDURE — 85027 COMPLETE CBC AUTOMATED: CPT

## 2021-06-10 PROCEDURE — 36415 COLL VENOUS BLD VENIPUNCTURE: CPT

## 2021-06-10 RX ORDER — CITALOPRAM 10 MG/1
5 TABLET ORAL DAILY
COMMUNITY
End: 2021-08-09

## 2021-06-10 NOTE — PROGRESS NOTES
[address]  Progress Note    Primary Care Doctor:  Gallo Mcfarland    Chief Complaint   Patient presents with    Follow-up     having trouble walking for the last yearRt side is very painful    Shortness of Breath     getting worse     Edema     ankles are swollen    Congestive Heart Failure        History of Present Illness:  82 y.o. female with history of dHF, severe COPD on 3 L of oxygen. I had the pleasure of seeing Man Corley in follow up for dHF. She has not been in the office since 2018. She is in a wheel chair with her son, Alvaro Abreu. She is on 3 L of oxygen. She lives a [facility]. She is wheel chair bound and does no walking. She felt she was doing fine but started having some bilateral ankle edema. Her weight 215->195. She is taking 40 mg lasix bid. She has not had any recent labs. She is always shortness of breath, denies any chest pain or palpitations. Last echo done 2018  RVSP 58 and mild aortic stenosis. She has a loud murmur today. Past Medical History:   has a past medical history of Allergic rhinitis, CHF (congestive heart failure) ([address] COPD (chronic obstructive pulmonary disease) ([address] Depression, Hypothyroidism, and MARIAA treated with BiPAP. Surgical History:   has a past surgical history that includes Hysterectomy, total abdominal; Carpal tunnel release; Cholecystectomy; eye surgery; Tonsillectomy; and Uvulectomy. Social History:   reports that she has never smoked. She has never used smokeless tobacco. She reports that she does not drink alcohol and does not use drugs. Family History:   Family History   Problem Relation Age of Onset   [de-identified] Cancer Mother         breast    Cancer Father         prostate    Cancer Sister         breast       Home Medications:  Prior to Admission medications    Medication Sig Start Date End Date Taking?  Authorizing Provider   citalopram (CELEXA) 10 MG tablet Take 5 mg by mouth daily   Yes Historical Provider, MD Aylin Kapoor MD        Allergies:  Patient has no known allergies. Review of Systems:   · Constitutional: there has been no unanticipated weight loss. There's been no change in energy level, sleep pattern, or activity level. · Eyes: No visual changes or diplopia. No scleral icterus. · ENT: No Headaches, hearing loss or vertigo. No mouth sores or sore throat. · Cardiovascular: Reviewed in HPI  · Respiratory: No cough or wheezing, no sputum production. No hematemesis. · Gastrointestinal: No abdominal pain, appetite loss, blood in stools. No change in bowel or bladder habits. · Genitourinary: No dysuria, trouble voiding, or hematuria. · Musculoskeletal:  No gait disturbance, weakness or joint complaints. · Integumentary: No rash or pruritis. · Neurological: No headache, diplopia, change in muscle strength, numbness or tingling. No change in gait, balance, coordination, mood, affect, memory, mentation, behavior. · Psychiatric: No anxiety, no depression. · Endocrine: No malaise, fatigue or temperature intolerance. No excessive thirst, fluid intake, or urination. No tremor. · Hematologic/Lymphatic: No abnormal bruising or bleeding, blood clots or swollen lymph nodes. · Allergic/Immunologic: No nasal congestion or hives.     Physical Examination:    Vitals:    06/10/21 0933   BP: 114/68   Site: Left Upper Arm   Position: Sitting   Cuff Size: Medium Adult   Pulse: 77   SpO2: 92%   Weight: 195 lb (88.5 kg)   Height: 5' 5\" (1.651 m)        Constitutional and General Appearance: Warm and dry, no apparent distress, normal coloration  HEENT:  Normocephalic, atraumatic  Respiratory:  · Normal excursion and expansion without use of accessory muscles  · Resp Auscultation: Normal breath sounds without dullness  Cardiovascular:  · The apical impulses not displaced  · Heart tones are crisp and +murmur  · JVP normal cm H2O  · Regular rate and rhythm  · Peripheral pulses are symmetrical and full  · There is no clubbing, cyanosis of the extremities. · Bilateral L>R ankle edema  · Pedal Pulses: 2+ and equal   Abdomen:obese  · No masses or tenderness  · Liver/Spleen: No Abnormalities Noted  Neurological/Psychiatric:  · Alert and oriented in all spheres  · Moves all extremities well  · Exhibits normal gait balance and coordination  · No abnormalities of mood, affect, memory, mentation, or behavior are noted    Lab Data:    CBC:   Lab Results   Component Value Date    WBC 12.0 07/13/2020    WBC 11.3 07/02/2018    WBC 10.6 07/01/2018    RBC 4.47 07/13/2020    RBC 3.95 07/02/2018    RBC 3.76 07/01/2018    HGB 12.9 07/13/2020    HGB 11.5 07/02/2018    HGB 11.3 07/01/2018    HCT 39.4 07/13/2020    HCT 35.2 07/02/2018    HCT 33.9 07/01/2018    MCV 88.3 07/13/2020    MCV 89.1 07/02/2018    MCV 90.1 07/01/2018    RDW 13.1 07/13/2020    RDW 13.4 07/02/2018    RDW 13.5 07/01/2018     07/13/2020     07/02/2018     07/01/2018     BMP:  Lab Results   Component Value Date     07/13/2020     07/02/2018     07/01/2018    K 4.0 07/13/2020    K 4.1 07/02/2018    K 4.1 07/01/2018    K 4.3 06/30/2018    CL 99 07/13/2020    CL 95 07/02/2018    CL 94 07/01/2018    CO2 28 07/13/2020    CO2 37 07/02/2018    CO2 35 07/01/2018    PHOS 3.1 07/02/2018    PHOS 2.8 07/01/2018    PHOS 2.9 06/30/2018    BUN 18 07/13/2020    BUN 32 07/02/2018    BUN 32 07/01/2018    CREATININE 0.8 07/13/2020    CREATININE 1.0 07/02/2018    CREATININE 1.1 07/01/2018     BNP:   Lab Results   Component Value Date    PROBNP 1,113 07/01/2018    PROBNP 1,099 06/29/2018    PROBNP 312 06/27/2018     Cardiac Imaging:     Echo 5/25/2018:  Summary   -Normal left ventricle size and systolic function with an estimated ejection fraction of 55-60%.    -There is mild concentric left ventricular hypertrophy.   -No regional wall motion abnormalities are seen.   -Normal diastolic function.   -Mild mitral regurgitation.   -Mild aortic stenosis.   -Mild tricuspid regurgitation with a RVSP of 58 mmHg.     Assessment:    1. Pulmonary hypertension ([address]    2. Vitamin D deficiency    3. SOB (shortness of breath)    4. Chronic diastolic heart failure ([address]    5. Murmur        Plan:   Patient Instructions   1. Check blood work today  2. Schedule echo  3. Continue all current medications  4.  <2000 mg of sodium and <64 ounces of fluid a day  5.   RTO in 1-2 months      NYHA III    I appreciate the opportunity of cooperating in the care of this individual.    JOSE ALFREDO Betancourt - CNS, CNS, 6/10/2021, 1:04 PM

## 2021-06-10 NOTE — PATIENT INSTRUCTIONS
1. Check blood work today  2. Schedule echo  3. Continue all current medications  4.  <2000 mg of sodium and <64 ounces of fluid a day  5.   RTO in 1-2 months

## 2021-06-21 DIAGNOSIS — M81.0 AGE-RELATED OSTEOPOROSIS WITHOUT CURRENT PATHOLOGICAL FRACTURE: Primary | ICD-10-CM

## 2021-06-21 NOTE — TELEPHONE ENCOUNTER
Monika - speech therapist from [facility] [phone]    Verbal agreement for modified barium swallow study     Please advise     Provider out of office

## 2021-06-22 ENCOUNTER — TELEPHONE (OUTPATIENT)
Dept: FAMILY MEDICINE CLINIC | Age: 86
End: 2021-06-22

## 2021-06-22 NOTE — TELEPHONE ENCOUNTER
Spoke to Janett veras states pt has been gagging reflux, choking while trying to eat and having hard time swallowing. Please advise.

## 2021-06-23 ENCOUNTER — TELEPHONE (OUTPATIENT)
Dept: FAMILY MEDICINE CLINIC | Age: 86
End: 2021-06-23

## 2021-06-23 DIAGNOSIS — R13.10 DYSPHAGIA, UNSPECIFIED TYPE: ICD-10-CM

## 2021-06-23 DIAGNOSIS — T17.308A CHOKING, INITIAL ENCOUNTER: Primary | ICD-10-CM

## 2021-06-23 NOTE — TELEPHONE ENCOUNTER
Monika from [place] [id number] is requesting the order for speech therapy for the PT please fax it too [phone]

## 2021-06-25 ENCOUNTER — HOSPITAL ENCOUNTER (OUTPATIENT)
Dept: GENERAL RADIOLOGY | Age: 86
Discharge: HOME | End: 2021-06-25
Payer: MEDICARE

## 2021-06-25 ENCOUNTER — HOSPITAL ENCOUNTER (OUTPATIENT)
Dept: WOMENS IMAGING | Age: 86
Discharge: HOME | End: 2021-06-25
Payer: MEDICARE

## 2021-06-25 ENCOUNTER — TELEPHONE (OUTPATIENT)
Dept: FAMILY MEDICINE CLINIC | Age: 86
End: 2021-06-25

## 2021-06-25 ENCOUNTER — HOSPITAL ENCOUNTER (OUTPATIENT)
Age: 86
Discharge: HOME | End: 2021-06-25
Payer: MEDICARE

## 2021-06-25 DIAGNOSIS — Z78.0 MENOPAUSE: ICD-10-CM

## 2021-06-25 DIAGNOSIS — R32 URINARY INCONTINENCE, UNSPECIFIED TYPE: Primary | ICD-10-CM

## 2021-06-25 DIAGNOSIS — R32 URINARY INCONTINENCE, UNSPECIFIED TYPE: ICD-10-CM

## 2021-06-25 DIAGNOSIS — I50.9 CHRONIC CONGESTIVE HEART FAILURE, UNSPECIFIED HEART FAILURE TYPE (HCC): Chronic | ICD-10-CM

## 2021-06-25 DIAGNOSIS — E03.9 ACQUIRED HYPOTHYROIDISM: Chronic | ICD-10-CM

## 2021-06-25 DIAGNOSIS — M81.0 AGE-RELATED OSTEOPOROSIS WITHOUT CURRENT PATHOLOGICAL FRACTURE: Primary | ICD-10-CM

## 2021-06-25 DIAGNOSIS — R35.0 URINARY FREQUENCY: Primary | ICD-10-CM

## 2021-06-25 DIAGNOSIS — E78.2 MIXED HYPERLIPIDEMIA: ICD-10-CM

## 2021-06-25 DIAGNOSIS — Z12.31 ENCOUNTER FOR SCREENING MAMMOGRAM FOR MALIGNANT NEOPLASM OF BREAST: ICD-10-CM

## 2021-06-25 LAB
A/G RATIO: 1.1 (ref 1.1–2.2)
ALBUMIN SERPL-MCNC: 4.4 G/DL (ref 3.4–5)
ALP BLD-CCNC: 86 U/L (ref 40–129)
ALT SERPL-CCNC: 15 U/L (ref 10–40)
ANION GAP SERPL CALCULATED.3IONS-SCNC: 12 MMOL/L (ref 3–16)
AST SERPL-CCNC: 24 U/L (ref 15–37)
BILIRUB SERPL-MCNC: 0.5 MG/DL (ref 0–1)
BILIRUBIN URINE: NEGATIVE
BLOOD, URINE: NEGATIVE
BUN BLDV-MCNC: 22 MG/DL (ref 7–20)
CALCIUM SERPL-MCNC: 9.8 MG/DL (ref 8.3–10.6)
CHLORIDE BLD-SCNC: 97 MMOL/L (ref 99–110)
CHOLESTEROL, TOTAL: 167 MG/DL (ref 0–199)
CLARITY: CLEAR
CO2: 30 MMOL/L (ref 21–32)
COLOR: YELLOW
CREAT SERPL-MCNC: 0.9 MG/DL (ref 0.6–1.2)
GFR AFRICAN AMERICAN: >60
GFR NON-AFRICAN AMERICAN: 60
GLOBULIN: 3.9 G/DL
GLUCOSE BLD-MCNC: 113 MG/DL (ref 70–99)
GLUCOSE URINE: NEGATIVE MG/DL
HDLC SERPL-MCNC: 54 MG/DL (ref 40–60)
KETONES, URINE: NEGATIVE MG/DL
LDL CHOLESTEROL CALCULATED: 88 MG/DL
LEUKOCYTE ESTERASE, URINE: NEGATIVE
MICROSCOPIC EXAMINATION: NORMAL
NITRITE, URINE: NEGATIVE
PH UA: 7 (ref 5–8)
POTASSIUM SERPL-SCNC: 4.1 MMOL/L (ref 3.5–5.1)
PRO-BNP: 272 PG/ML (ref 0–449)
PROTEIN UA: NEGATIVE MG/DL
SODIUM BLD-SCNC: 139 MMOL/L (ref 136–145)
SPECIFIC GRAVITY UA: 1.01 (ref 1–1.03)
TOTAL PROTEIN: 8.3 G/DL (ref 6.4–8.2)
TRIGL SERPL-MCNC: 125 MG/DL (ref 0–150)
TSH REFLEX: 2.11 UIU/ML (ref 0.27–4.2)
URINE TYPE: NORMAL
UROBILINOGEN, URINE: 1 E.U./DL
VLDLC SERPL CALC-MCNC: 25 MG/DL

## 2021-06-25 PROCEDURE — 87086 URINE CULTURE/COLONY COUNT: CPT

## 2021-06-25 PROCEDURE — 36415 COLL VENOUS BLD VENIPUNCTURE: CPT

## 2021-06-25 PROCEDURE — 77080 DXA BONE DENSITY AXIAL: CPT

## 2021-06-25 PROCEDURE — 80061 LIPID PANEL: CPT

## 2021-06-25 PROCEDURE — 84443 ASSAY THYROID STIM HORMONE: CPT

## 2021-06-25 PROCEDURE — 81003 URINALYSIS AUTO W/O SCOPE: CPT

## 2021-06-25 PROCEDURE — 77063 BREAST TOMOSYNTHESIS BI: CPT

## 2021-06-25 PROCEDURE — 83880 ASSAY OF NATRIURETIC PEPTIDE: CPT

## 2021-06-25 PROCEDURE — 80053 COMPREHEN METABOLIC PANEL: CPT

## 2021-06-25 RX ORDER — ZOLEDRONIC ACID 5 MG/100ML
5 INJECTION, SOLUTION INTRAVENOUS ONCE
Qty: 100 ML | Refills: 0 | Status: SHIPPED | OUTPATIENT
Start: 2021-06-25 | End: 2021-10-13

## 2021-06-25 RX ORDER — ZOLEDRONIC ACID 5 MG/100ML
5 INJECTION, SOLUTION INTRAVENOUS ONCE
Qty: 100 ML | Refills: 0 | Status: CANCELLED | OUTPATIENT
Start: 2021-06-25 | End: 2021-06-25

## 2021-06-25 NOTE — TELEPHONE ENCOUNTER
Pt's son would like to know if there is something she can get a medication to help with her incontinence.

## 2021-06-25 NOTE — TELEPHONE ENCOUNTER
Pt son was in the office advised him that referral to urology has been placed need to call to set up appointment.

## 2021-06-25 NOTE — TELEPHONE ENCOUNTER
Patients son called to request a urine screen for a possible UTI. Darrell Wolf  Patient is at [facility] right now

## 2021-06-26 LAB — URINE CULTURE, ROUTINE: NORMAL

## 2021-06-28 DIAGNOSIS — M81.0 SENILE OSTEOPOROSIS: ICD-10-CM

## 2021-06-28 RX ORDER — ZOLEDRONIC ACID 5 MG/100ML
5 INJECTION, SOLUTION INTRAVENOUS ONCE
Status: CANCELLED | OUTPATIENT
Start: 2021-06-28 | End: 2021-06-28

## 2021-06-28 RX ORDER — SODIUM CHLORIDE 9 MG/ML
INJECTION, SOLUTION INTRAVENOUS ONCE
Status: CANCELLED | OUTPATIENT
Start: 2021-06-28 | End: 2021-06-28

## 2021-06-28 RX ORDER — SODIUM CHLORIDE 0.9 % (FLUSH) 0.9 %
5-40 SYRINGE (ML) INJECTION PRN
Status: CANCELLED | OUTPATIENT
Start: 2021-06-28

## 2021-07-14 ENCOUNTER — HOSPITAL ENCOUNTER (OUTPATIENT)
Dept: ONCOLOGY | Age: 86
Setting detail: INFUSION SERIES
Discharge: HOME | DRG: 291 | End: 2021-07-14
Payer: MEDICARE

## 2021-07-14 VITALS
DIASTOLIC BLOOD PRESSURE: 60 MMHG | HEART RATE: 69 BPM | RESPIRATION RATE: 16 BRPM | TEMPERATURE: 97.4 F | SYSTOLIC BLOOD PRESSURE: 110 MMHG

## 2021-07-14 DIAGNOSIS — M81.0 SENILE OSTEOPOROSIS: Primary | ICD-10-CM

## 2021-07-14 PROCEDURE — 6360000002 HC RX W HCPCS: Performed by: PHYSICIAN ASSISTANT

## 2021-07-14 PROCEDURE — 96365 THER/PROPH/DIAG IV INF INIT: CPT

## 2021-07-14 PROCEDURE — 2580000003 HC RX 258: Performed by: PHYSICIAN ASSISTANT

## 2021-07-14 RX ORDER — SODIUM CHLORIDE 0.9 % (FLUSH) 0.9 %
5-40 SYRINGE (ML) INJECTION PRN
Status: DISCONTINUED | OUTPATIENT
Start: 2021-07-14 | End: 2021-07-14

## 2021-07-14 RX ORDER — ZOLEDRONIC ACID 5 MG/100ML
5 INJECTION, SOLUTION INTRAVENOUS ONCE
Status: COMPLETED | OUTPATIENT
Start: 2021-07-14 | End: 2021-07-14

## 2021-07-14 RX ORDER — ZOLEDRONIC ACID 5 MG/100ML
5 INJECTION, SOLUTION INTRAVENOUS ONCE
Status: CANCELLED | OUTPATIENT
Start: 2021-07-14 | End: 2021-07-14

## 2021-07-14 RX ORDER — SODIUM CHLORIDE 9 MG/ML
INJECTION, SOLUTION INTRAVENOUS ONCE
Status: CANCELLED | OUTPATIENT
Start: 2021-07-14 | End: 2021-07-14

## 2021-07-14 RX ORDER — SODIUM CHLORIDE 0.9 % (FLUSH) 0.9 %
5-40 SYRINGE (ML) INJECTION PRN
Status: CANCELLED | OUTPATIENT
Start: 2021-07-14

## 2021-07-14 RX ORDER — SODIUM CHLORIDE 9 MG/ML
INJECTION, SOLUTION INTRAVENOUS ONCE
Status: COMPLETED | OUTPATIENT
Start: 2021-07-14 | End: 2021-07-14

## 2021-07-14 RX ADMIN — SODIUM CHLORIDE: 9 INJECTION, SOLUTION INTRAVENOUS at 09:07

## 2021-07-14 RX ADMIN — Medication 10 ML: at 09:07

## 2021-07-14 RX ADMIN — ZOLEDRONIC ACID 5 MG: 5 INJECTION, SOLUTION INTRAVENOUS at 09:09

## 2021-07-14 NOTE — PROGRESS NOTES
Patient to department for yearly Reclast infusion. Tolerated infusion well. Given avs with information about possible side effects. Encouraged to drink extra water today. Patient is taking vitamin d and calcium at home.  Pt to follow up with MD.

## 2021-07-16 ENCOUNTER — APPOINTMENT (OUTPATIENT)
Dept: GENERAL RADIOLOGY | Age: 86
DRG: 291 | End: 2021-07-16
Payer: MEDICARE

## 2021-07-16 ENCOUNTER — TELEPHONE (OUTPATIENT)
Dept: FAMILY MEDICINE CLINIC | Age: 86
End: 2021-07-16

## 2021-07-16 ENCOUNTER — HOSPITAL ENCOUNTER (INPATIENT)
Age: 86
LOS: 6 days | Discharge: HOME | DRG: 291 | End: 2021-07-22
Attending: STUDENT IN AN ORGANIZED HEALTH CARE EDUCATION/TRAINING PROGRAM | Admitting: FAMILY MEDICINE
Payer: MEDICARE

## 2021-07-16 DIAGNOSIS — R09.02 HYPOXIA: Primary | ICD-10-CM

## 2021-07-16 DIAGNOSIS — J96.21 ACUTE ON CHRONIC RESPIRATORY FAILURE WITH HYPOXIA (HCC): ICD-10-CM

## 2021-07-16 DIAGNOSIS — J44.1 COPD EXACERBATION (HCC): ICD-10-CM

## 2021-07-16 PROBLEM — J96.00 ACUTE RESPIRATORY FAILURE (HCC): Status: ACTIVE | Noted: 2021-07-16

## 2021-07-16 LAB
A/G RATIO: 1 (ref 1.1–2.2)
ALBUMIN SERPL-MCNC: 4.2 G/DL (ref 3.4–5)
ALP BLD-CCNC: 84 U/L (ref 40–129)
ALT SERPL-CCNC: 14 U/L (ref 10–40)
ANION GAP SERPL CALCULATED.3IONS-SCNC: 12 MMOL/L (ref 3–16)
AST SERPL-CCNC: 22 U/L (ref 15–37)
ATYPICAL LYMPHOCYTE RELATIVE PERCENT: 2 % (ref 0–6)
BASE EXCESS VENOUS: 5.6 MMOL/L (ref -3–3)
BASOPHILS ABSOLUTE: 0.1 K/UL (ref 0–0.2)
BASOPHILS RELATIVE PERCENT: 1 %
BILIRUB SERPL-MCNC: 0.5 MG/DL (ref 0–1)
BILIRUBIN URINE: NEGATIVE
BLOOD, URINE: NEGATIVE
BUN BLDV-MCNC: 21 MG/DL (ref 7–20)
CALCIUM SERPL-MCNC: 9.1 MG/DL (ref 8.3–10.6)
CARBOXYHEMOGLOBIN: 2.7 % (ref 0–1.5)
CHLORIDE BLD-SCNC: 94 MMOL/L (ref 99–110)
CLARITY: CLEAR
CO2: 29 MMOL/L (ref 21–32)
COLOR: YELLOW
CREAT SERPL-MCNC: 0.9 MG/DL (ref 0.6–1.2)
EKG ATRIAL RATE: 92 BPM
EKG DIAGNOSIS: NORMAL
EKG P AXIS: 89 DEGREES
EKG P-R INTERVAL: 172 MS
EKG Q-T INTERVAL: 354 MS
EKG QRS DURATION: 84 MS
EKG QTC CALCULATION (BAZETT): 437 MS
EKG R AXIS: -26 DEGREES
EKG T AXIS: 48 DEGREES
EKG VENTRICULAR RATE: 92 BPM
EOSINOPHILS ABSOLUTE: 0 K/UL (ref 0–0.6)
EOSINOPHILS RELATIVE PERCENT: 0 %
GFR AFRICAN AMERICAN: >60
GFR NON-AFRICAN AMERICAN: 60
GLOBULIN: 4.3 G/DL
GLUCOSE BLD-MCNC: 107 MG/DL (ref 70–99)
GLUCOSE URINE: NEGATIVE MG/DL
HCO3 VENOUS: 31.9 MMOL/L (ref 23–29)
HCT VFR BLD CALC: 37.6 % (ref 36–48)
HEMATOLOGY PATH CONSULT: NO
HEMOGLOBIN, VEN, REDUCED: 6 %
HEMOGLOBIN: 12.5 G/DL (ref 12–16)
KETONES, URINE: NEGATIVE MG/DL
LEUKOCYTE ESTERASE, URINE: NEGATIVE
LYMPHOCYTES ABSOLUTE: 3.9 K/UL (ref 1–5.1)
LYMPHOCYTES RELATIVE PERCENT: 31 %
MCH RBC QN AUTO: 29.6 PG (ref 26–34)
MCHC RBC AUTO-ENTMCNC: 33.2 G/DL (ref 31–36)
MCV RBC AUTO: 89 FL (ref 80–100)
METHEMOGLOBIN VENOUS: 0.5 %
MICROSCOPIC EXAMINATION: NORMAL
MONOCYTES ABSOLUTE: 1.5 K/UL (ref 0–1.3)
MONOCYTES RELATIVE PERCENT: 13 %
NEUTROPHILS ABSOLUTE: 6.2 K/UL (ref 1.7–7.7)
NEUTROPHILS RELATIVE PERCENT: 53 %
NITRITE, URINE: NEGATIVE
O2 CONTENT, VEN: 16 VOL %
O2 SAT, VEN: 94 %
O2 THERAPY: ABNORMAL
PCO2, VEN: 53.3 MMHG (ref 40–50)
PDW BLD-RTO: 13 % (ref 12.4–15.4)
PH UA: 6 (ref 5–8)
PH VENOUS: 7.39 (ref 7.35–7.45)
PLATELET # BLD: 196 K/UL (ref 135–450)
PLATELET SLIDE REVIEW: ADEQUATE
PMV BLD AUTO: 8.2 FL (ref 5–10.5)
PO2, VEN: 65.5 MMHG (ref 25–40)
POTASSIUM REFLEX MAGNESIUM: 3.8 MMOL/L (ref 3.5–5.1)
PRO-BNP: 1024 PG/ML (ref 0–449)
PROCALCITONIN: 0.18 NG/ML (ref 0–0.15)
PROTEIN UA: NEGATIVE MG/DL
RBC # BLD: 4.23 M/UL (ref 4–5.2)
RBC # BLD: NORMAL 10*6/UL
SLIDE REVIEW: ABNORMAL
SODIUM BLD-SCNC: 135 MMOL/L (ref 136–145)
SPECIFIC GRAVITY UA: 1.01 (ref 1–1.03)
TCO2 CALC VENOUS: 75 MMOL/L
TOTAL PROTEIN: 8.5 G/DL (ref 6.4–8.2)
TROPONIN: 0.02 NG/ML
URINE REFLEX TO CULTURE: NORMAL
URINE TYPE: NORMAL
UROBILINOGEN, URINE: 0.2 E.U./DL
WBC # BLD: 11.7 K/UL (ref 4–11)

## 2021-07-16 PROCEDURE — 93005 ELECTROCARDIOGRAM TRACING: CPT | Performed by: STUDENT IN AN ORGANIZED HEALTH CARE EDUCATION/TRAINING PROGRAM

## 2021-07-16 PROCEDURE — 81003 URINALYSIS AUTO W/O SCOPE: CPT

## 2021-07-16 PROCEDURE — 6370000000 HC RX 637 (ALT 250 FOR IP): Performed by: FAMILY MEDICINE

## 2021-07-16 PROCEDURE — 94640 AIRWAY INHALATION TREATMENT: CPT

## 2021-07-16 PROCEDURE — 99284 EMERGENCY DEPT VISIT MOD MDM: CPT

## 2021-07-16 PROCEDURE — 85025 COMPLETE CBC W/AUTO DIFF WBC: CPT

## 2021-07-16 PROCEDURE — 82803 BLOOD GASES ANY COMBINATION: CPT

## 2021-07-16 PROCEDURE — 80053 COMPREHEN METABOLIC PANEL: CPT

## 2021-07-16 PROCEDURE — 6360000002 HC RX W HCPCS: Performed by: FAMILY MEDICINE

## 2021-07-16 PROCEDURE — 93010 ELECTROCARDIOGRAM REPORT: CPT | Performed by: INTERNAL MEDICINE

## 2021-07-16 PROCEDURE — 94761 N-INVAS EAR/PLS OXIMETRY MLT: CPT

## 2021-07-16 PROCEDURE — 2060000000 HC ICU INTERMEDIATE R&B

## 2021-07-16 PROCEDURE — 84484 ASSAY OF TROPONIN QUANT: CPT

## 2021-07-16 PROCEDURE — 96374 THER/PROPH/DIAG INJ IV PUSH: CPT

## 2021-07-16 PROCEDURE — 36415 COLL VENOUS BLD VENIPUNCTURE: CPT

## 2021-07-16 PROCEDURE — 6370000000 HC RX 637 (ALT 250 FOR IP): Performed by: STUDENT IN AN ORGANIZED HEALTH CARE EDUCATION/TRAINING PROGRAM

## 2021-07-16 PROCEDURE — 94660 CPAP INITIATION&MGMT: CPT

## 2021-07-16 PROCEDURE — 2700000000 HC OXYGEN THERAPY PER DAY

## 2021-07-16 PROCEDURE — 6360000002 HC RX W HCPCS: Performed by: STUDENT IN AN ORGANIZED HEALTH CARE EDUCATION/TRAINING PROGRAM

## 2021-07-16 PROCEDURE — 71045 X-RAY EXAM CHEST 1 VIEW: CPT

## 2021-07-16 PROCEDURE — 84145 PROCALCITONIN (PCT): CPT

## 2021-07-16 PROCEDURE — 2580000003 HC RX 258: Performed by: FAMILY MEDICINE

## 2021-07-16 PROCEDURE — 83880 ASSAY OF NATRIURETIC PEPTIDE: CPT

## 2021-07-16 RX ORDER — ACETAMINOPHEN 325 MG/1
650 TABLET ORAL EVERY 6 HOURS PRN
Status: DISCONTINUED | OUTPATIENT
Start: 2021-07-16 | End: 2021-07-22 | Stop reason: HOSPADM

## 2021-07-16 RX ORDER — FUROSEMIDE 10 MG/ML
40 INJECTION INTRAMUSCULAR; INTRAVENOUS DAILY
Status: DISCONTINUED | OUTPATIENT
Start: 2021-07-17 | End: 2021-07-17

## 2021-07-16 RX ORDER — POLYETHYLENE GLYCOL 3350 17 G/17G
17 POWDER, FOR SOLUTION ORAL DAILY PRN
Status: DISCONTINUED | OUTPATIENT
Start: 2021-07-16 | End: 2021-07-22 | Stop reason: HOSPADM

## 2021-07-16 RX ORDER — ACETAMINOPHEN 650 MG/1
650 SUPPOSITORY RECTAL EVERY 6 HOURS PRN
Status: DISCONTINUED | OUTPATIENT
Start: 2021-07-16 | End: 2021-07-22 | Stop reason: HOSPADM

## 2021-07-16 RX ORDER — IPRATROPIUM BROMIDE AND ALBUTEROL SULFATE 2.5; .5 MG/3ML; MG/3ML
1 SOLUTION RESPIRATORY (INHALATION)
Status: DISCONTINUED | OUTPATIENT
Start: 2021-07-16 | End: 2021-07-22 | Stop reason: HOSPADM

## 2021-07-16 RX ORDER — FUROSEMIDE 10 MG/ML
40 INJECTION INTRAMUSCULAR; INTRAVENOUS ONCE
Status: COMPLETED | OUTPATIENT
Start: 2021-07-16 | End: 2021-07-16

## 2021-07-16 RX ORDER — METHYLPREDNISOLONE SODIUM SUCCINATE 40 MG/ML
40 INJECTION, POWDER, LYOPHILIZED, FOR SOLUTION INTRAMUSCULAR; INTRAVENOUS EVERY 12 HOURS
Status: DISCONTINUED | OUTPATIENT
Start: 2021-07-17 | End: 2021-07-20

## 2021-07-16 RX ORDER — ATORVASTATIN CALCIUM 10 MG/1
10 TABLET, FILM COATED ORAL NIGHTLY
Status: DISCONTINUED | OUTPATIENT
Start: 2021-07-16 | End: 2021-07-22 | Stop reason: HOSPADM

## 2021-07-16 RX ORDER — IPRATROPIUM BROMIDE AND ALBUTEROL SULFATE 2.5; .5 MG/3ML; MG/3ML
3 SOLUTION RESPIRATORY (INHALATION) ONCE
Status: COMPLETED | OUTPATIENT
Start: 2021-07-16 | End: 2021-07-16

## 2021-07-16 RX ORDER — LISINOPRIL 5 MG/1
5 TABLET ORAL DAILY
Status: DISCONTINUED | OUTPATIENT
Start: 2021-07-17 | End: 2021-07-16

## 2021-07-16 RX ORDER — SODIUM CHLORIDE 0.9 % (FLUSH) 0.9 %
5-40 SYRINGE (ML) INJECTION EVERY 12 HOURS SCHEDULED
Status: DISCONTINUED | OUTPATIENT
Start: 2021-07-16 | End: 2021-07-22 | Stop reason: HOSPADM

## 2021-07-16 RX ORDER — ONDANSETRON 4 MG/1
4 TABLET, ORALLY DISINTEGRATING ORAL EVERY 8 HOURS PRN
Status: DISCONTINUED | OUTPATIENT
Start: 2021-07-16 | End: 2021-07-22 | Stop reason: HOSPADM

## 2021-07-16 RX ORDER — ONDANSETRON 2 MG/ML
4 INJECTION INTRAMUSCULAR; INTRAVENOUS EVERY 6 HOURS PRN
Status: DISCONTINUED | OUTPATIENT
Start: 2021-07-16 | End: 2021-07-22 | Stop reason: HOSPADM

## 2021-07-16 RX ORDER — DEXAMETHASONE SODIUM PHOSPHATE 10 MG/ML
10 INJECTION, SOLUTION INTRAMUSCULAR; INTRAVENOUS ONCE
Status: COMPLETED | OUTPATIENT
Start: 2021-07-16 | End: 2021-07-16

## 2021-07-16 RX ORDER — CITALOPRAM 20 MG/1
5 TABLET ORAL DAILY
Status: DISCONTINUED | OUTPATIENT
Start: 2021-07-16 | End: 2021-07-16

## 2021-07-16 RX ORDER — SODIUM CHLORIDE 0.9 % (FLUSH) 0.9 %
5-40 SYRINGE (ML) INJECTION PRN
Status: DISCONTINUED | OUTPATIENT
Start: 2021-07-16 | End: 2021-07-22 | Stop reason: HOSPADM

## 2021-07-16 RX ORDER — LEVOTHYROXINE SODIUM 0.07 MG/1
75 TABLET ORAL DAILY
Status: DISCONTINUED | OUTPATIENT
Start: 2021-07-17 | End: 2021-07-22 | Stop reason: HOSPADM

## 2021-07-16 RX ORDER — SODIUM CHLORIDE 9 MG/ML
25 INJECTION, SOLUTION INTRAVENOUS PRN
Status: DISCONTINUED | OUTPATIENT
Start: 2021-07-16 | End: 2021-07-22 | Stop reason: HOSPADM

## 2021-07-16 RX ORDER — METHOCARBAMOL 500 MG/1
500 TABLET, FILM COATED ORAL 4 TIMES DAILY
Status: DISCONTINUED | OUTPATIENT
Start: 2021-07-16 | End: 2021-07-16

## 2021-07-16 RX ORDER — BUDESONIDE AND FORMOTEROL FUMARATE DIHYDRATE 160; 4.5 UG/1; UG/1
2 AEROSOL RESPIRATORY (INHALATION) 2 TIMES DAILY
Status: DISCONTINUED | OUTPATIENT
Start: 2021-07-16 | End: 2021-07-17

## 2021-07-16 RX ORDER — ACETAMINOPHEN 500 MG
500 TABLET ORAL EVERY 6 HOURS PRN
Status: DISCONTINUED | OUTPATIENT
Start: 2021-07-16 | End: 2021-07-16 | Stop reason: ALTCHOICE

## 2021-07-16 RX ADMIN — ATORVASTATIN CALCIUM 10 MG: 10 TABLET, FILM COATED ORAL at 21:12

## 2021-07-16 RX ADMIN — IPRATROPIUM BROMIDE AND ALBUTEROL SULFATE 3 AMPULE: .5; 3 SOLUTION RESPIRATORY (INHALATION) at 14:27

## 2021-07-16 RX ADMIN — IPRATROPIUM BROMIDE AND ALBUTEROL SULFATE 1 AMPULE: .5; 3 SOLUTION RESPIRATORY (INHALATION) at 21:04

## 2021-07-16 RX ADMIN — Medication 2 PUFF: at 21:04

## 2021-07-16 RX ADMIN — DEXAMETHASONE SODIUM PHOSPHATE 10 MG: 10 INJECTION, SOLUTION INTRAMUSCULAR; INTRAVENOUS at 14:30

## 2021-07-16 RX ADMIN — ENOXAPARIN SODIUM 40 MG: 40 INJECTION SUBCUTANEOUS at 21:13

## 2021-07-16 RX ADMIN — Medication 10 ML: at 21:13

## 2021-07-16 RX ADMIN — FUROSEMIDE 40 MG: 10 INJECTION, SOLUTION INTRAMUSCULAR; INTRAVENOUS at 16:14

## 2021-07-16 ASSESSMENT — PAIN SCALES - GENERAL: PAINLEVEL_OUTOF10: 0

## 2021-07-16 NOTE — ED NOTES
PT states she is breathing a little better since her breathing Tx but is still wheezing and RR is elevated. PT states she \"feels very tired\". Dr. Brijesh Duvall notified, gave verbal order to call respiratory to begin bipap.      Chevy Quintero RN  07/16/21 5006

## 2021-07-16 NOTE — PROGRESS NOTES
Pt brought up to unit at approximately 1700 via stretcher along with ED nurse. Pt transferred into bed x3 assist. Pt has on BiPap O2 sats 100%. Redness noted to bridge of nose applied small piece of foam border dressing. Patient has moisture throughout skin. Redness noted under right breast, groin and buttocks. Interdry applied under both breasts, abdominal folds and groin. +3 pitting edema to BLE, both legs elevated with pillow. Pt given turkey sandwich and apple juice. Donned nasal cannula @ 4L so pt can eat, O2 96%. No c/o pain/discomfort. Call light in reach.

## 2021-07-16 NOTE — TELEPHONE ENCOUNTER
Son returned call, advised him per Chana Jenkins, patient needs to report to ED for further evaluation. He understood and states she will go.

## 2021-07-16 NOTE — H&P
HOSPITALISTS HISTORY AND PHYSICAL    7/16/2021 7:47 PM    Patient Information:  Jill Brooks is a 82 y.o. female [id number]  PCP:  Gallo Blanc (Tel: [phone] )    Chief complaint:    Chief Complaint   Patient presents with    Shortness of Breath     From [facility] via EMS. SOB and dyspnea since last night, weakness. Expiratory wheezing. Given duoneb around 3-4 AM. O2 as needed at baseline. Hx of COPD, CHF. History of Present Illness:  Matthieu Joiner is a 82 y.o. female with h/o COPD , dCHF , HTN , MARIAA , chronic respiratory failure presents with c.o wheezing and dyspnea on going for one day. She received a dose of breathing treatment en- route to ED with minimal relief. She was placed on bipap in the ED.   lasbs revealed elevated pro bnp . Chest xray is neg for pneumonia. So she was given a 40 mg dose of IV lasix . She also received breathing treatments and decadron . REVIEW OF SYSTEMS:   Constitutional: Negative for fever,chills or night sweats  ENT: Negative for rhinorrhea, epistaxis, hoarseness, sore throat. Respiratory: +Ve  for shortness of breath,wheezing  Cardiovascular: Negative for chest pain, palpitations   Gastrointestinal: Negative for nausea, vomiting, diarrhea  Genitourinary: Negative for polyuria, dysuria   Hematologic/Lymphatic: Negative for bleeding tendency, easy bruising  Musculoskeletal: Negative for myalgias and arthralgias  Neurologic: Negative for confusion,dysarthria. Skin: Negative for itching,rash  Psychiatric: Negative for depression,anxiety, agitation. Endocrine: Negative for polydipsia,polyuria,heat /cold intolerance. Past Medical History:   has a past medical history of Allergic rhinitis, CHF (congestive heart failure) ([address] COPD (chronic obstructive pulmonary disease) ([facility] Depression, Hypothyroidism, and MARIAA treated with BiPAP.      Past Surgical History:   has a past surgical history that includes Hysterectomy, total abdominal; Carpal tunnel release; Cholecystectomy; eye surgery; Tonsillectomy; and Uvulectomy. Medications:  No current facility-administered medications on file prior to encounter.      Current Outpatient Medications on File Prior to Encounter   Medication Sig Dispense Refill    acetaminophen (TYLENOL) 500 MG tablet Take 500 mg by mouth every 6 hours as needed for Pain      lisinopril (PRINIVIL;ZESTRIL) 2.5 MG tablet Take 2 tablets by mouth daily 30 tablet 0    OXYGEN Use 3L of oxygen all the time 3 L 3    levocetirizine (XYZAL) 5 MG tablet Take 1 tablet by mouth nightly 30 tablet 11    albuterol sulfate HFA (VENTOLIN HFA) 108 (90 Base) MCG/ACT inhaler Inhale 2 puffs into the lungs every 6 hours as needed for Wheezing 1 Inhaler 3    furosemide (LASIX) 40 MG tablet TAKE 1 TABLET BY MOUTH 2 TIMES DAILY 60 tablet 5    levothyroxine (SYNTHROID) 75 MCG tablet TAKE 1 TABLET BY MOUTH DAILY 30 tablet 5    zoledronic acid (RECLAST) 5 MG/100ML SOLN Infuse 100 mLs intravenously once for 1 dose 100 mL 0    citalopram (CELEXA) 10 MG tablet Take 5 mg by mouth daily      methocarbamol (ROBAXIN) 500 MG tablet Take 500 mg by mouth 4 times daily      zoster recombinant adjuvanted vaccine (SHINGRIX) 50 MCG/0.5ML SUSR injection Inject 0.5 mLs into the muscle See Admin Instructions 1 dose now and repeat in 2-6 months 0.5 mL 0    budesonide (PULMICORT) 0.5 MG/2ML nebulizer suspension Take 2 mLs by nebulization 2 times daily DX:COPD J44.9 60 ampule 6    formoterol (PERFOROMIST) 20 MCG/2ML nebulizer solution Take 2 mLs by nebulization every 12 hours DX:COPD J44.9 120 mL 6    ondansetron (ZOFRAN) 4 MG tablet Take 1 tablet by mouth daily as needed for Nausea or Vomiting (Patient not taking: Reported on 5/21/2021) 30 tablet 0    oxybutynin (DITROPAN) 5 MG tablet TAKE ONE TABLET BY MOUTH THREE TIMES A DAY 90 tablet 1    atorvastatin (LIPITOR) 10 MG tablet TAKE 1 TABLET BY MOUTH DAILY AT BEDTIME. 30 tablet 5    ipratropium-albuterol (DUONEB) 0.5-2.5 (3) MG/3ML SOLN nebulizer solution Inhale 3 mLs into the lungs every 4 hours 360 mL 0     Current Facility-Administered Medications   Medication Dose Route Frequency Provider Last Rate Last Admin    ipratropium-albuterol (DUONEB) nebulizer solution 1 ampule  1 ampule Inhalation Q4H WA Teri Adams MD        [START ON 7/17/2021] methylPREDNISolone sodium (SOLU-MEDROL) injection 40 mg  40 mg Intravenous Q12H Teri Adams MD       [facility] [START ON 7/17/2021] furosemide (LASIX) injection 40 mg  40 mg Intravenous Daily Teri Adams MD        atorvastatin (LIPITOR) tablet 10 mg  10 mg Oral Nightly Teri Adams MD       [facility] ON 7/17/2021] levothyroxine (SYNTHROID) tablet 75 mcg  75 mcg Oral Daily Teri Adams MD       [facility] ON 7/17/2021] lisinopril (PRINIVIL;ZESTRIL) tablet 5 mg  5 mg Oral Daily Teri Adams MD        sodium chloride flush 0.9 % injection 5-40 mL  5-40 mL Intravenous 2 times per day Teri Adams MD        sodium chloride flush 0.9 % injection 5-40 mL  5-40 mL Intravenous PRN Teri Adams MD        0.9 % sodium chloride infusion  25 mL Intravenous PRN Teri Adams MD        enoxaparin (LOVENOX) injection 40 mg  40 mg Subcutaneous Nightly Teri Adams MD        ondansetron (ZOFRAN-ODT) disintegrating tablet 4 mg  4 mg Oral Q8H PRN Teri Adams MD        Or    ondansetron (ZOFRAN) injection 4 mg  4 mg Intravenous Q6H PRN Teri Adams MD        polyethylene glycol (GLYCOLAX) packet 17 g  17 g Oral Daily PRN Teri Adams MD        acetaminophen (TYLENOL) tablet 650 mg  650 mg Oral Q6H PRN Teri Adams MD        Or   [facility] acetaminophen (TYLENOL) suppository 650 mg  650 mg Rectal Q6H PRN Teri Adams MD        budesonide-formoterol (SYMBICORT) 160-4.5 MCG/ACT inhaler 2 puff  2 puff Inhalation BID Teri Adams MD         Allergies:  No Known Allergies     Social History:   reports that she has never smoked.  She has never used smokeless tobacco. She reports that she does not drink alcohol and does not use drugs. Family History:  family history includes Breast Cancer in her daughter, maternal grandmother, and mother; Cancer in her father, mother, and sister. ,     Physical Exam:  BP (!) 97/52   Pulse 103   Temp 98.1 °F (36.7 °C) (Axillary)   Resp 27   Ht 5' 5\" (1.651 m)   Wt 195 lb (88.5 kg)   SpO2 99%   BMI 32.45 kg/m²     General appearance:  Appears comfortable. Well nourished  Eyes: Sclera clear, pupils equal  ENT: Moist mucus membranes, no thrush. Trachea midline. Cardiovascular: Regular rhythm, normal S1, S2. No murmur, gallop, rub. ++ edema in lower extremities  Respiratory: Clear to auscultation bilaterally, no wheeze, good inspiratory effort  Gastrointestinal: Abdomen soft, non-tender, not distended, normal bowel sounds  Musculoskeletal: No cyanosis in digits, neck supple  Neurology: Cranial nerves grossly intact. Alert and oriented in time, place and person. No speech or motor deficits  Psychiatry: Appropriate affect. Not agitated  Skin: Warm, dry, normal turgor, no rash    Labs:  CBC:   Lab Results   Component Value Date    WBC 11.7 07/16/2021    RBC 4.23 07/16/2021    HGB 12.5 07/16/2021    HCT 37.6 07/16/2021    MCV 89.0 07/16/2021    MCH 29.6 07/16/2021    MCHC 33.2 07/16/2021    RDW 13.0 07/16/2021     07/16/2021    MPV 8.2 07/16/2021     BMP:    Lab Results   Component Value Date     07/16/2021    K 3.8 07/16/2021    CL 94 07/16/2021    CO2 29 07/16/2021    BUN 21 07/16/2021    CREATININE 0.9 07/16/2021    CALCIUM 9.1 07/16/2021    GFRAA >60 07/16/2021    LABGLOM 60 07/16/2021    GLUCOSE 107 07/16/2021       Chest Xray:   EKG:        Problem List  Active Problems:    Acute respiratory failure (HCC)  Resolved Problems:    * No resolved hospital problems. *        Assessment/Plan:         1.  Acute on chronic respiratory failure with hypoxia  On Bipap    Acute on chronic heart failure  Pt has leg and ankle edema  Pro bnp is elevated to 1024. ECHO ordered  Cont IV lasix  Low sodium diet  Strict ins and outs and daily weight checks    Acute exacerbation of COPD   On Duoneb , Dulera and solumedrol     Admit as inpatien. I anticipate hospitalization spanning more than two midnights for investigation and treatment of the above medically necessary diagnoses.       Juan Manuel Galan MD    7/16/2021 7:47 PM

## 2021-07-16 NOTE — ED NOTES
PT's son, Suzanne Hansen, called and updated at this time with PT permission. He stated that he was concerned that her symptoms are due to an infusion she received on Wednesday for her osteoporosis.        Khoi Grace RN  07/16/21 0451

## 2021-07-16 NOTE — PROGRESS NOTES
Rt called to placed pt on Bipap due to increased WOB. RR 37. Bipap settings 10/5,60%. Pt tolerating well. Will continue to monitor.

## 2021-07-16 NOTE — TELEPHONE ENCOUNTER
Ravinder Foss with [facility] [phone] is calling to state that the patient is having a hard time breathing. She has a light cough she weak she refused to go to the hospital.      Her Oxygen 3 liter 94% she has dropped to 82% without the the Oxygen. Ravinder Foss is wanting to know if they should get orders to do a chest xray to make sure she doesn't have pneumonia. Also if they should do any labs or to check if she has a UTI.

## 2021-07-16 NOTE — ED NOTES
PT report given to Teresa Jason RN at this time, she states no further questions or concerns. PT transported to floor via bed with NRB and telemetry by Teresa Jason and this RN at this time.      Katty Gomez RN  07/16/21 5714

## 2021-07-16 NOTE — TELEPHONE ENCOUNTER
Spoke with Brown Kapoor and left message for patient to return call, recommend go to ED for evaluation.

## 2021-07-17 PROBLEM — I25.10 CORONARY ARTERY DISEASE DUE TO LIPID RICH PLAQUE: Status: ACTIVE | Noted: 2021-07-17

## 2021-07-17 PROBLEM — I50.33 ACUTE ON CHRONIC DIASTOLIC HEART FAILURE (HCC): Status: ACTIVE | Noted: 2018-06-21

## 2021-07-17 PROBLEM — I25.83 CORONARY ARTERY DISEASE DUE TO LIPID RICH PLAQUE: Status: ACTIVE | Noted: 2021-07-17

## 2021-07-17 LAB
ANION GAP SERPL CALCULATED.3IONS-SCNC: 11 MMOL/L (ref 3–16)
BUN BLDV-MCNC: 24 MG/DL (ref 7–20)
CALCIUM SERPL-MCNC: 8.4 MG/DL (ref 8.3–10.6)
CHLORIDE BLD-SCNC: 97 MMOL/L (ref 99–110)
CO2: 28 MMOL/L (ref 21–32)
CREAT SERPL-MCNC: 1 MG/DL (ref 0.6–1.2)
GFR AFRICAN AMERICAN: >60
GFR NON-AFRICAN AMERICAN: 53
GLUCOSE BLD-MCNC: 176 MG/DL (ref 70–99)
HCT VFR BLD CALC: 35.9 % (ref 36–48)
HEMOGLOBIN: 11.9 G/DL (ref 12–16)
MCH RBC QN AUTO: 29.6 PG (ref 26–34)
MCHC RBC AUTO-ENTMCNC: 33.2 G/DL (ref 31–36)
MCV RBC AUTO: 89.1 FL (ref 80–100)
PDW BLD-RTO: 13.4 % (ref 12.4–15.4)
PLATELET # BLD: 176 K/UL (ref 135–450)
PMV BLD AUTO: 8.6 FL (ref 5–10.5)
POTASSIUM SERPL-SCNC: 4.1 MMOL/L (ref 3.5–5.1)
RBC # BLD: 4.03 M/UL (ref 4–5.2)
SODIUM BLD-SCNC: 136 MMOL/L (ref 136–145)
WBC # BLD: 5.9 K/UL (ref 4–11)

## 2021-07-17 PROCEDURE — 2700000000 HC OXYGEN THERAPY PER DAY

## 2021-07-17 PROCEDURE — 2580000003 HC RX 258: Performed by: FAMILY MEDICINE

## 2021-07-17 PROCEDURE — 6360000002 HC RX W HCPCS: Performed by: FAMILY MEDICINE

## 2021-07-17 PROCEDURE — 85027 COMPLETE CBC AUTOMATED: CPT

## 2021-07-17 PROCEDURE — 2060000000 HC ICU INTERMEDIATE R&B

## 2021-07-17 PROCEDURE — 94660 CPAP INITIATION&MGMT: CPT

## 2021-07-17 PROCEDURE — 99222 1ST HOSP IP/OBS MODERATE 55: CPT | Performed by: INTERNAL MEDICINE

## 2021-07-17 PROCEDURE — 6360000002 HC RX W HCPCS: Performed by: NURSE PRACTITIONER

## 2021-07-17 PROCEDURE — 80048 BASIC METABOLIC PNL TOTAL CA: CPT

## 2021-07-17 PROCEDURE — 36415 COLL VENOUS BLD VENIPUNCTURE: CPT

## 2021-07-17 PROCEDURE — 6360000002 HC RX W HCPCS: Performed by: INTERNAL MEDICINE

## 2021-07-17 PROCEDURE — 6370000000 HC RX 637 (ALT 250 FOR IP): Performed by: FAMILY MEDICINE

## 2021-07-17 PROCEDURE — 99291 CRITICAL CARE FIRST HOUR: CPT | Performed by: INTERNAL MEDICINE

## 2021-07-17 PROCEDURE — 94640 AIRWAY INHALATION TREATMENT: CPT

## 2021-07-17 RX ORDER — FUROSEMIDE 10 MG/ML
40 INJECTION INTRAMUSCULAR; INTRAVENOUS 2 TIMES DAILY
Status: DISCONTINUED | OUTPATIENT
Start: 2021-07-17 | End: 2021-07-21

## 2021-07-17 RX ORDER — ARFORMOTEROL TARTRATE 15 UG/2ML
15 SOLUTION RESPIRATORY (INHALATION) 2 TIMES DAILY
Status: DISCONTINUED | OUTPATIENT
Start: 2021-07-17 | End: 2021-07-22 | Stop reason: HOSPADM

## 2021-07-17 RX ORDER — BUDESONIDE 0.5 MG/2ML
0.5 INHALANT ORAL 2 TIMES DAILY
Status: DISCONTINUED | OUTPATIENT
Start: 2021-07-17 | End: 2021-07-22 | Stop reason: HOSPADM

## 2021-07-17 RX ADMIN — LEVOTHYROXINE SODIUM 75 MCG: 0.07 TABLET ORAL at 05:22

## 2021-07-17 RX ADMIN — Medication 10 ML: at 18:35

## 2021-07-17 RX ADMIN — FUROSEMIDE 40 MG: 10 INJECTION, SOLUTION INTRAMUSCULAR; INTRAVENOUS at 18:34

## 2021-07-17 RX ADMIN — IPRATROPIUM BROMIDE AND ALBUTEROL SULFATE 1 AMPULE: .5; 3 SOLUTION RESPIRATORY (INHALATION) at 15:43

## 2021-07-17 RX ADMIN — IPRATROPIUM BROMIDE AND ALBUTEROL SULFATE 1 AMPULE: .5; 3 SOLUTION RESPIRATORY (INHALATION) at 19:34

## 2021-07-17 RX ADMIN — Medication 10 ML: at 12:15

## 2021-07-17 RX ADMIN — Medication 10 ML: at 20:34

## 2021-07-17 RX ADMIN — BUDESONIDE 500 MCG: 0.5 SUSPENSION RESPIRATORY (INHALATION) at 19:34

## 2021-07-17 RX ADMIN — Medication 10 ML: at 10:05

## 2021-07-17 RX ADMIN — IPRATROPIUM BROMIDE AND ALBUTEROL SULFATE 1 AMPULE: .5; 3 SOLUTION RESPIRATORY (INHALATION) at 11:41

## 2021-07-17 RX ADMIN — METHYLPREDNISOLONE SODIUM SUCCINATE 40 MG: 40 INJECTION, POWDER, FOR SOLUTION INTRAMUSCULAR; INTRAVENOUS at 12:15

## 2021-07-17 RX ADMIN — IPRATROPIUM BROMIDE AND ALBUTEROL SULFATE 1 AMPULE: .5; 3 SOLUTION RESPIRATORY (INHALATION) at 08:39

## 2021-07-17 RX ADMIN — METHYLPREDNISOLONE SODIUM SUCCINATE 40 MG: 40 INJECTION, POWDER, FOR SOLUTION INTRAMUSCULAR; INTRAVENOUS at 00:55

## 2021-07-17 RX ADMIN — FUROSEMIDE 40 MG: 10 INJECTION, SOLUTION INTRAVENOUS at 10:05

## 2021-07-17 RX ADMIN — Medication 2 PUFF: at 08:40

## 2021-07-17 RX ADMIN — ATORVASTATIN CALCIUM 10 MG: 10 TABLET, FILM COATED ORAL at 20:34

## 2021-07-17 RX ADMIN — ARFORMOTEROL TARTRATE 15 MCG: 15 SOLUTION RESPIRATORY (INHALATION) at 19:35

## 2021-07-17 RX ADMIN — ENOXAPARIN SODIUM 40 MG: 40 INJECTION SUBCUTANEOUS at 20:34

## 2021-07-17 NOTE — CONSULTS
[address] [facility]  [phone]        Reason for Consultation/Chief Complaint: \" Shortness of breath. \"    History of Present Illness:  Anni Sood is a 82 y.o. patient who presented to the hospital with complaints of shortness of breath. She has a history of chronic shortness of breath but it became worse along with progressive leg swelling. She has a past medical history of COPD, CHF and CAD. She is a non-smoker but states that she had secondhand exposure for many years. She denies chest discomfort. Past Medical History:   has a past medical history of Allergic rhinitis, CHF (congestive heart failure) ([facility] [address] COPD (chronic obstructive pulmonary disease) ([facility] [address] Depression, Hypothyroidism, and MARIAA treated with BiPAP. Surgical History:   has a past surgical history that includes Hysterectomy, total abdominal; Carpal tunnel release; Cholecystectomy; eye surgery; Tonsillectomy; and Uvulectomy. Social History:   reports that she has never smoked. She has never used smokeless tobacco. She reports that she does not drink alcohol and does not use drugs. Family History:  family history includes Breast Cancer in her daughter, maternal grandmother, and mother; Cancer in her father, mother, and sister. Home Medications:  Were reviewed and are listed in nursing record. and/or listed below  Prior to Admission medications    Medication Sig Start Date End Date Taking?  Authorizing Provider   acetaminophen (TYLENOL) 500 MG tablet Take 500 mg by mouth every 6 hours as needed for Pain   Yes Historical Provider, MD   lisinopril (PRINIVIL;ZESTRIL) 2.5 MG tablet Take 2 tablets by mouth daily 5/21/21  Yes ELISE Collado   OXYGEN Use 3L of oxygen all the time 5/21/21  Yes ELISE Collado   levocetirizine (XYZAL) 5 MG tablet Take 1 tablet by mouth nightly 2/23/21  Yes ELISE Collado   albuterol sulfate HFA (VENTOLIN HFA) 108 (90 Base) MCG/ACT inhaler Inhale 2 puffs into the lungs every 6 hours as needed for Wheezing 7/9/19  Yes Sona Coreas MD   furosemide (LASIX) 40 MG tablet TAKE 1 TABLET BY MOUTH 2 TIMES DAILY 6/11/19  Yes Lexie Stephens MD   levothyroxine (SYNTHROID) 75 MCG tablet TAKE 1 TABLET BY MOUTH DAILY 6/11/19  Yes Lexie Stephens MD   zoledronic acid (RECLAST) 5 MG/100ML SOLN Infuse 100 mLs intravenously once for 1 dose 6/25/21 6/25/21  ELISE Oliva   citalopram (CELEXA) 10 MG tablet Take 5 mg by mouth daily    Historical Provider, MD   methocarbamol (ROBAXIN) 500 MG tablet Take 500 mg by mouth 4 times daily    Historical Provider, MD   zoster recombinant adjuvanted vaccine [facility]) 50 MCG/0.5ML SUSR injection Inject 0.5 mLs into the muscle See Admin Instructions 1 dose now and repeat in 2-6 months 5/21/21 11/17/21  ELISE Oliva   budesonide (PULMICORT) 0.5 MG/2ML nebulizer suspension Take 2 mLs by nebulization 2 times daily DX:COPD J44.9 7/28/20 7/28/21  Sona Coreas MD   formoterol (PERFOROMIST) 20 MCG/2ML nebulizer solution Take 2 mLs by nebulization every 12 hours DX:COPD J44.9 7/28/20 6/10/21  Sona Coreas MD   ondansetron (ZOFRAN) 4 MG tablet Take 1 tablet by mouth daily as needed for Nausea or Vomiting  Patient not taking: Reported on 5/21/2021 6[birth year]   Skye Martin MD   oxybutynin (DITROPAN) 5 MG tablet TAKE ONE TABLET BY MOUTH THREE TIMES A DAY 8/20/19   Lexie Stephens MD   atorvastatin (LIPITOR) 10 MG tablet TAKE 1 TABLET BY MOUTH DAILY AT BEDTIME. 7/12/19   Jill Kennedy MD   ipratropium-albuterol (DUONEB) 0.5-2.5 (3) MG/3ML SOLN nebulizer solution Inhale 3 mLs into the lungs every 4 hours 3/13/18   Lexie Stephens MD        Allergies:  Patient has no known allergies. Review of Systems:   A complete review of systems has been reviewed and updated today and is negative except as noted in the history of present illness.       Physical Examination:    Vitals:    07/17/21 1207   BP: 123/74   Pulse: 104   Resp: 28 ECG    Imaging/Procedures:   Echo 5/25/2018:   Summary   -Normal left ventricle size and systolic function with an estimated ejection fraction of 55-60%. -There is mild concentric left ventricular hypertrophy.   -No regional wall motion abnormalities are seen.   -Normal diastolic function. -Mild mitral regurgitation.   -Mild aortic stenosis. -Mild tricuspid regurgitation with a RVSP of 58 mmHg. Nuclear stress test 7/27/2017:  Impression    :   1.  80% ejection fraction. 2.  Resting wall motion abnormality in the inferior wall compatible with   an anterior wall myocardial infarction. 3.  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 4.  No photopenia with Lexiscan. FINAL IMPRESSION:   Low-risk Lexiscan nuclear stress test.       CT coronary calcium score 7/26/2017:  IMPRESSION:   1. Findings of chronic granulomatous disease with several small   noncalcified nodules. Recommend followup in one year as these nodules   have remained stable dating back to 2016.   2. Total calcium score is 107. Assessment/Plan:  Principal Problem:    Acute respiratory failure (HCC)  Plan: Multifactorial.  Improving with diuresis. Active Problems:    Acute on chronic diastolic heart failure (HCC)  Plan: Decompensated. NYHA class III-IV. Responding well to diuresis. Recommend 2D echo with Doppler. COPD exacerbation ([address]  Plan: Chronic. Improving. Coronary artery disease due to lipid rich plaque  Plan: Noted on calcium score CT in 2017 but stress test which was negative for ischemia at that time. 2D echo with Doppler. Continue IV diuresis. Thank you for allowing us to participate in the care of Valentina Rees. Further evaluation will be based upon the patient's clinical course and testing results. All questions and concerns were addressed to the patient/family. Alternatives to my treatment were discussed. The note was completed using EMR.  Every effort was made to ensure accuracy; however, inadvertent computerized transcription errors may be present.     Skip Francis M.D.

## 2021-07-17 NOTE — PROGRESS NOTES
Pt insistent on getting up to the bsc against advise. Pt did not tolerate well at all. Took three people. Respirations up to 50 per minute. Pulmonary aware. Pt remains on bipap.   Etelvina Rabago RN

## 2021-07-17 NOTE — PROGRESS NOTES
Pts respirations up to 28-30 per minute on oxygen, not bipap. Pt switched back to bipap at this time. Tolerating well.

## 2021-07-17 NOTE — CONSULTS
PULMONARY AND CRITICAL CARE CONSULTATION NOTE    CONSULTING PHYSICIAN:      REASON FOR CONSULT:   Chief Complaint   Patient presents with    Shortness of Breath     From [facility] via EMS. SOB and dyspnea since last night, weakness. Expiratory wheezing. Given duoneb around 3-4 AM. O2 as needed at baseline. Hx of COPD, CHF. DATE OF CONSULT: 7/17/2021    HISTORY OF PRESENT ILLNESS: 82y.o. year old female with past medical history of heart failure with preserved ejection fraction, COPD with FEV1 56% and DLCO 39%, hypertension, obstructive sleep apnea on auto BiPAP who presented to hospital with complaints of shortness of breath and wheezing going on for almost a day. She arrived in the ER and received breathing treatment but did not have much relief and was placed on BiPAP for respiratory distress. Also received Lasix. She wore BiPAP overnight and was taken off BiPAP this morning but was placed back on BiPAP because of tachypnea. Denies any worsening cough. No wheezing now. No chest pain. Noted to have bilateral lower extremity edema. REVIEW OF SYSTEMS:   CONSTITUTIONAL SYMPTOMS: The patient denies fever, fatigue, night sweats, weight loss or weight gain. HEENT: No vision changes. No tinnitus, Denies sinus pain. No hoarseness, or dysphagia. NECK: Patient denies swelling in the neck. CARDIOVASCULAR: Denies chest pain, palpitation, syncope. RESPIRATORY: See above. GASTROINTESTINAL: Denies nausea, abdominal pain or change in bowel function. GENITOURINARY: Denies obstructive symptoms. No history of incontinence. BREASTS: No masses or lumps in the breasts. SKIN: No rashes or itching. MUSCULOSKELETAL: Denies weakness or bone pain. NEUROLOGICAL: No headaches or seizures. PSYCHIATRIC: Denies mood swings or depression. ENDOCRINE: Denies heat or cold intolerance or excessive thirst.  HEMATOLOGIC/LYMPHATIC: Denies easy bruising or lymph node swelling.   ALLERGIC/IMMUNOLOGIC: No environmental allergies. PAST MEDICAL HISTORY:   Past Medical History:   Diagnosis Date    Allergic rhinitis     CHF (congestive heart failure) (HCC)     COPD (chronic obstructive pulmonary disease) (HCC)     Depression     Hypothyroidism     MARIAA treated with BiPAP 2/14/2018       PAST SURGICAL HISTORY:   Past Surgical History:   Procedure Laterality Date    CARPAL TUNNEL RELEASE      bilateral    CHOLECYSTECTOMY      EYE SURGERY      bilateral cataracts    HYSTERECTOMY, TOTAL ABDOMINAL      TONSILLECTOMY      UVULECTOMY         SOCIAL HISTORY:   Social History     Tobacco Use    Smoking status: Never Smoker    Smokeless tobacco: Never Used   Vaping Use    Vaping Use: Never used   Substance Use Topics    Alcohol use: No    Drug use: No       FAMILY HISTORY:   Family History   Problem Relation Age of Onset    Cancer Mother         breast    Breast Cancer Mother     Cancer Father         prostate    Cancer Sister         breast    Breast Cancer Daughter     Breast Cancer Maternal Grandmother        MEDICATIONS:     No current facility-administered medications on file prior to encounter.      Current Outpatient Medications on File Prior to Encounter   Medication Sig Dispense Refill    acetaminophen (TYLENOL) 500 MG tablet Take 500 mg by mouth every 6 hours as needed for Pain      lisinopril (PRINIVIL;ZESTRIL) 2.5 MG tablet Take 2 tablets by mouth daily 30 tablet 0    OXYGEN Use 3L of oxygen all the time 3 L 3    levocetirizine (XYZAL) 5 MG tablet Take 1 tablet by mouth nightly 30 tablet 11    albuterol sulfate HFA (VENTOLIN HFA) 108 (90 Base) MCG/ACT inhaler Inhale 2 puffs into the lungs every 6 hours as needed for Wheezing 1 Inhaler 3    furosemide (LASIX) 40 MG tablet TAKE 1 TABLET BY MOUTH 2 TIMES DAILY 60 tablet 5    levothyroxine (SYNTHROID) 75 MCG tablet TAKE 1 TABLET BY MOUTH DAILY 30 tablet 5    zoledronic acid (RECLAST) 5 MG/100ML SOLN Infuse 100 mLs intravenously once for 1 dose 100 mL 0    citalopram (CELEXA) 10 MG tablet Take 5 mg by mouth daily      methocarbamol (ROBAXIN) 500 MG tablet Take 500 mg by mouth 4 times daily      zoster recombinant adjuvanted vaccine (SHINGRIX) 50 MCG/0.5ML SUSR injection Inject 0.5 mLs into the muscle See Admin Instructions 1 dose now and repeat in 2-6 months 0.5 mL 0    budesonide (PULMICORT) 0.5 MG/2ML nebulizer suspension Take 2 mLs by nebulization 2 times daily DX:COPD J44.9 60 ampule 6    formoterol (PERFOROMIST) 20 MCG/2ML nebulizer solution Take 2 mLs by nebulization every 12 hours DX:COPD J44.9 120 mL 6    ondansetron (ZOFRAN) 4 MG tablet Take 1 tablet by mouth daily as needed for Nausea or Vomiting (Patient not taking: Reported on 5/21/2021) 30 tablet 0    oxybutynin (DITROPAN) 5 MG tablet TAKE ONE TABLET BY MOUTH THREE TIMES A DAY 90 tablet 1    atorvastatin (LIPITOR) 10 MG tablet TAKE 1 TABLET BY MOUTH DAILY AT BEDTIME. 30 tablet 5    ipratropium-albuterol (DUONEB) 0.5-2.5 (3) MG/3ML SOLN nebulizer solution Inhale 3 mLs into the lungs every 4 hours 360 mL 0        furosemide  40 mg Intravenous BID    ipratropium-albuterol  1 ampule Inhalation Q4H WA    methylPREDNISolone  40 mg Intravenous Q12H    atorvastatin  10 mg Oral Nightly    levothyroxine  75 mcg Oral Daily    sodium chloride flush  5-40 mL Intravenous 2 times per day    enoxaparin  40 mg Subcutaneous Nightly    budesonide-formoterol  2 puff Inhalation BID      sodium chloride       sodium chloride flush, sodium chloride, ondansetron **OR** ondansetron, polyethylene glycol, acetaminophen **OR** acetaminophen    ALLERGIES:   Allergies as of 07/16/2021    (No Known Allergies)      OBJECTIVE:   height is 5' 5\" (1.651 m) and weight is 197 lb (89.4 kg). Her oral temperature is 97.7 °F (36.5 °C). Her blood pressure is 106/62 and her pulse is 109. Her respiration is 24 and oxygen saturation is 96%. I/O this shift:  In: 130 [P.O.:120;  I.V.:10]  Out: -      PHYSICAL EXAM:  CONSTITUTIONAL: She is a 82y.o.-year-old who appears her stated age. She is alert and oriented x 3 and in no acute distress. HEENT: PERRL. No scleral icterus. No thrush, atraumatic, normocephalic. NECK: Supple, without cervical or supraclavicular lymphadenopathy:  CARDIOVASCULAR: S1 S2 RRR. Without murmer  RESPIRATORY & CHEST: Lungs are clear to auscultation and percussion. No wheezing, no crackles. Good air movement  GASTROINTESTINAL & ABDOMEN: Soft, nontender, positive bowel sounds in all quadrants, non-distended, without hepatosplenomegaly. GENITOURINARY: Deferred. MUSCULOSKELETAL: No tenderness to palpation of the axial skeleton. There is no clubbing. No cyanosis. Bilateral edema of the lower extremities. SKIN OF BODY: No rash or jaundice. PSYCHIATRIC EVALUATION: Normal affect. Patient answers questions appropriately. HEMATOLOGIC/LYMPHATIC/ IMMUNOLOGIC: No palpable lymphadenopathy. NEUROLOGIC: Alert and oriented x 3. Groslly non-focal. Motor strength is 5+/5 in all muscle groups. The patient has a normal sensorium globally. LABS:  Lab Results   Component Value Date    WBC 5.9 07/17/2021    HGB 11.9 (L) 07/17/2021    HCT 35.9 (L) 07/17/2021     07/17/2021    CHOL 167 06/25/2021    TRIG 125 06/25/2021    HDL 54 06/25/2021    ALT 14 07/16/2021    AST 22 07/16/2021     07/17/2021    K 4.1 07/17/2021    CL 97 (L) 07/17/2021    CREATININE 1.0 07/17/2021    BUN 24 (H) 07/17/2021    CO2 28 07/17/2021    TSH 1.85 06/10/2021    INR 1.05 06/27/2018       Lab Results   Component Value Date    GLUCOSE 176 (H) 07/17/2021    CALCIUM 8.4 07/17/2021     07/17/2021    K 4.1 07/17/2021    CO2 28 07/17/2021    CL 97 (L) 07/17/2021    BUN 24 (H) 07/17/2021    CREATININE 1.0 07/17/2021         IMAGING:  I reviewed the chest x-ray and my interpretation is as follows. No infiltrates noted. Hyperinflation noted.       IMPRESSION:   Acute hypercapnic/hypoxic respiratory failure  Acute exacerbation of COPD  Acute exacerbation of diastolic heart failure  COPD with FEV1 56% and DLCO 39%  Obstructive sleep apnea, on home BiPAP  Hypertension      RECOMMENDATION:   Patient presents with combination of acute exacerbation of COPD as well as acute exacerbation of diastolic heart failure. Elevated proBNP of 1024 with bilateral lower extremity edema. Continue duo nebs every 4 hours. Continue Solu-Medrol 40 mg IV every 12 for now. Discontinue Symbicort and start Pulmicort and Brovana nebs twice a day. Agree with increasing dose of Lasix to 40 mg IV every 12 for now to achieve negative balance. Continue BiPAP intermittently during the day and continuous at night. I have adjusted the BiPAP settings. Patient wear BiPAP at home for obstructive sleep apnea. No evidence of pneumonia on chest x-ray. Thank you for your consultation. We will continue to follow the patient. I spent 35 minutes providing critical care. Patient is currently ill and has the immediate potential for life-threatening deterioration due to respiratory failure. This time is excluding time spent performing procedures. Eunice Crowley MD  Pulmonary Critical Care and Sleep Medicine  7/17/2021, 11:57 AM    This note was completed using dragon medical speech recognition software. Grammatical errors, random word insertions, pronoun errors and incomplete sentences are occasional consequences of this technology due to software limitations. If there are questions or concerns about the content of this note of information contained within the body of this dictation they should be addressed with the provider for clarification.

## 2021-07-17 NOTE — PROGRESS NOTES
[facility] HOSPITALISTS PROGRESS NOTE    7/17/2021 10:45 AM        Name: Stephani Chiu . Admitted: 7/16/2021  Primary Care Provider: Gallo Garcia (Tel: [phone])      Chief Complaint   Patient presents with    Shortness of Breath     From [facility] via EMS. SOB and dyspnea since last night, weakness. Expiratory wheezing. Given duoneb around 3-4 AM. O2 as needed at baseline. Hx of COPD, CHF. Brief History: Patient is an 81 yo female with hx CHF, COPD, hypothyroidism, MARIAA. She presented to hospital from [facility] with worsening shortness of breath, wheezes, and weakness. She received nebulizer treatment en route to ER and repeated in ER with minimal relief. Placed on bipap. No acute findings on CXR, labs notable for elevated BNP and she was given IV Lasix. Admitted with acute exacerbations CHF and COPD. Subjective:  Presently resting in bed on bipap. Reports slight improvement in shortness of breath compared to admission. 800 ml urine in external gauthier cath. No wheezes on exam. Denies chest pain, palpitations, abdominal pain.      Reviewed interval ancillary notes    Current Medications  ipratropium-albuterol (DUONEB) nebulizer solution 1 ampule, Q4H WA  methylPREDNISolone sodium (SOLU-MEDROL) injection 40 mg, Q12H  furosemide (LASIX) injection 40 mg, Daily  atorvastatin (LIPITOR) tablet 10 mg, Nightly  levothyroxine (SYNTHROID) tablet 75 mcg, Daily  sodium chloride flush 0.9 % injection 5-40 mL, 2 times per day  sodium chloride flush 0.9 % injection 5-40 mL, PRN  0.9 % sodium chloride infusion, PRN  enoxaparin (LOVENOX) injection 40 mg, Nightly  ondansetron (ZOFRAN-ODT) disintegrating tablet 4 mg, Q8H PRN   Or  ondansetron (ZOFRAN) injection 4 mg, Q6H PRN  polyethylene glycol (GLYCOLAX) packet 17 g, Daily PRN  acetaminophen (TYLENOL) tablet 650 mg, Q6H PRN   Or  acetaminophen (TYLENOL) suppository 650 mg, Q6H PRN  budesonide-formoterol (SYMBICORT) 160-4.5 MCG/ACT inhaler 2 puff, BID        Objective:  /62   Pulse 109   Temp 97.7 °F (36.5 °C) (Oral)   Resp 24   Ht 5' 5\" (1.651 m)   Wt 197 lb (89.4 kg)   SpO2 95%   BMI 32.78 kg/m²     Intake/Output Summary (Last 24 hours) at 7/17/2021 1045  Last data filed at 7/17/2021 1008  Gross per 24 hour   Intake 130 ml   Output 720 ml   Net -590 ml      Wt Readings from Last 3 Encounters:   07/17/21 197 lb (89.4 kg)   06/10/21 195 lb (88.5 kg)   03/30/21 210 lb (95.3 kg)       General appearance:  Appears in mild respiratory distress  Eyes: No icterus. Neck: Supple, + JVD, no lymphadenopathy  Cardiovascular: Regular rhythm, normal S1, S2. No murmur. Trace edema in lower extremities  Respiratory: On bipap. Diminished, no wheeze, + bibasilar crackles. GI: Abdomen soft, no tenderness, not distended, normal bowel sounds  Musculoskeletal: No cyanosis in digits, neck supple  Neurology: Moves all extremities, no speech deficit  Psych: Normal affect. Skin: Warm, dry, normal turgor    Labs and Tests:  CBC:   Recent Labs     07/16/21  1420 07/17/21  0443   WBC 11.7* 5.9   HGB 12.5 11.9*    176     BMP:    Recent Labs     07/16/21  1420 07/17/21  0443   * 136   K 3.8 4.1   CL 94* 97*   CO2 29 28   BUN 21* 24*   CREATININE 0.9 1.0   GLUCOSE 107* 176*     Hepatic:   Recent Labs     07/16/21  1420   AST 22   ALT 14   BILITOT 0.5   ALKPHOS 84       CXR 7/17/2021:  No acute cardiopulmonary disease. Echo 5/25/2018:  Summary   -Normal left ventricle size and systolic function with an estimated ejection   fraction of 55-60%. -There is mild concentric left ventricular hypertrophy.   -No regional wall motion abnormalities are seen.   -Normal diastolic function. -Mild mitral regurgitation.   -Mild aortic stenosis. -Mild tricuspid regurgitation with a RVSP of 58 mmHg.       Problem List  Active Problems:    Acute respiratory failure [facility])  Resolved Problems:    * No resolved hospital problems. *       Assessment & Plan:   1. Acute on chronic dCHF. proBNP 1024 compared to 272 three weeks ago. Given IV Lasix in ER and continued on admission, 800 ml urine out. Increase IV Lasix to BID. Echo pending. Consult cardiology. 2. Acute exacerbation COPD. No infiltrate on CXR, afebrile, no leukocytosis. Started on IV steroids. Pulmonary on board, bronchodilators adjusted. 3. Acute on chronic hypoxic respiratory failure. Presented to ER with increased work of breathing placed on bipap. Secondary to CHF and COPD exacerbations. Management as above. Presently on 4 liters O2 for sat mid 50s, baseline 2 liters. 4. HTN. Controlled. Lisinopril held on admission for diuresis. Continue to follow. 5. Hypothyroidism. TSH 2.11 (6/25). Continue levothyroxine. 6. MARIAA. On bipap at home. Diet: ADULT DIET; Regular;  Low Sodium (2 gm)  Code:Full Code  DVT PPX: enoxaparin      JOSE ALFREDO Cagle CNP   7/17/2021 10:45 AM

## 2021-07-18 ENCOUNTER — APPOINTMENT (OUTPATIENT)
Dept: GENERAL RADIOLOGY | Age: 86
DRG: 291 | End: 2021-07-18
Payer: MEDICARE

## 2021-07-18 LAB
ANION GAP SERPL CALCULATED.3IONS-SCNC: 8 MMOL/L (ref 3–16)
BUN BLDV-MCNC: 32 MG/DL (ref 7–20)
CALCIUM SERPL-MCNC: 8.4 MG/DL (ref 8.3–10.6)
CHLORIDE BLD-SCNC: 97 MMOL/L (ref 99–110)
CO2: 32 MMOL/L (ref 21–32)
CREAT SERPL-MCNC: 0.9 MG/DL (ref 0.6–1.2)
GFR AFRICAN AMERICAN: >60
GFR NON-AFRICAN AMERICAN: 60
GLUCOSE BLD-MCNC: 197 MG/DL (ref 70–99)
HCT VFR BLD CALC: 33.3 % (ref 36–48)
HEMOGLOBIN: 11.1 G/DL (ref 12–16)
MCH RBC QN AUTO: 29.7 PG (ref 26–34)
MCHC RBC AUTO-ENTMCNC: 33.2 G/DL (ref 31–36)
MCV RBC AUTO: 89.4 FL (ref 80–100)
PDW BLD-RTO: 13.1 % (ref 12.4–15.4)
PLATELET # BLD: 191 K/UL (ref 135–450)
PMV BLD AUTO: 8.8 FL (ref 5–10.5)
POTASSIUM SERPL-SCNC: 4.3 MMOL/L (ref 3.5–5.1)
PRO-BNP: 1900 PG/ML (ref 0–449)
PROCALCITONIN: 0.2 NG/ML (ref 0–0.15)
RBC # BLD: 3.72 M/UL (ref 4–5.2)
SODIUM BLD-SCNC: 137 MMOL/L (ref 136–145)
WBC # BLD: 16.2 K/UL (ref 4–11)

## 2021-07-18 PROCEDURE — 2580000003 HC RX 258: Performed by: FAMILY MEDICINE

## 2021-07-18 PROCEDURE — 94761 N-INVAS EAR/PLS OXIMETRY MLT: CPT

## 2021-07-18 PROCEDURE — 85027 COMPLETE CBC AUTOMATED: CPT

## 2021-07-18 PROCEDURE — 84145 PROCALCITONIN (PCT): CPT

## 2021-07-18 PROCEDURE — 6370000000 HC RX 637 (ALT 250 FOR IP): Performed by: NURSE PRACTITIONER

## 2021-07-18 PROCEDURE — 2060000000 HC ICU INTERMEDIATE R&B

## 2021-07-18 PROCEDURE — 6360000002 HC RX W HCPCS: Performed by: NURSE PRACTITIONER

## 2021-07-18 PROCEDURE — 6360000002 HC RX W HCPCS: Performed by: INTERNAL MEDICINE

## 2021-07-18 PROCEDURE — 99233 SBSQ HOSP IP/OBS HIGH 50: CPT | Performed by: INTERNAL MEDICINE

## 2021-07-18 PROCEDURE — 80048 BASIC METABOLIC PNL TOTAL CA: CPT

## 2021-07-18 PROCEDURE — 36415 COLL VENOUS BLD VENIPUNCTURE: CPT

## 2021-07-18 PROCEDURE — 83880 ASSAY OF NATRIURETIC PEPTIDE: CPT

## 2021-07-18 PROCEDURE — 94640 AIRWAY INHALATION TREATMENT: CPT

## 2021-07-18 PROCEDURE — 6360000002 HC RX W HCPCS: Performed by: FAMILY MEDICINE

## 2021-07-18 PROCEDURE — 2700000000 HC OXYGEN THERAPY PER DAY

## 2021-07-18 PROCEDURE — 6370000000 HC RX 637 (ALT 250 FOR IP): Performed by: FAMILY MEDICINE

## 2021-07-18 PROCEDURE — 99233 SBSQ HOSP IP/OBS HIGH 50: CPT | Performed by: NURSE PRACTITIONER

## 2021-07-18 PROCEDURE — 71045 X-RAY EXAM CHEST 1 VIEW: CPT

## 2021-07-18 RX ORDER — BENZONATATE 100 MG/1
100 CAPSULE ORAL 3 TIMES DAILY PRN
Status: DISCONTINUED | OUTPATIENT
Start: 2021-07-18 | End: 2021-07-22 | Stop reason: HOSPADM

## 2021-07-18 RX ORDER — GUAIFENESIN 600 MG/1
600 TABLET, EXTENDED RELEASE ORAL 2 TIMES DAILY
Status: DISCONTINUED | OUTPATIENT
Start: 2021-07-18 | End: 2021-07-22 | Stop reason: HOSPADM

## 2021-07-18 RX ADMIN — METHYLPREDNISOLONE SODIUM SUCCINATE 40 MG: 40 INJECTION, POWDER, FOR SOLUTION INTRAMUSCULAR; INTRAVENOUS at 00:14

## 2021-07-18 RX ADMIN — ACETAMINOPHEN 650 MG: 325 TABLET ORAL at 21:29

## 2021-07-18 RX ADMIN — ENOXAPARIN SODIUM 40 MG: 40 INJECTION SUBCUTANEOUS at 21:30

## 2021-07-18 RX ADMIN — BUDESONIDE 500 MCG: 0.5 SUSPENSION RESPIRATORY (INHALATION) at 20:27

## 2021-07-18 RX ADMIN — ARFORMOTEROL TARTRATE 15 MCG: 15 SOLUTION RESPIRATORY (INHALATION) at 20:27

## 2021-07-18 RX ADMIN — FUROSEMIDE 40 MG: 10 INJECTION, SOLUTION INTRAMUSCULAR; INTRAVENOUS at 18:08

## 2021-07-18 RX ADMIN — BENZONATATE 100 MG: 100 CAPSULE ORAL at 11:26

## 2021-07-18 RX ADMIN — IPRATROPIUM BROMIDE AND ALBUTEROL SULFATE 1 AMPULE: .5; 3 SOLUTION RESPIRATORY (INHALATION) at 11:51

## 2021-07-18 RX ADMIN — BUDESONIDE 500 MCG: 0.5 SUSPENSION RESPIRATORY (INHALATION) at 08:12

## 2021-07-18 RX ADMIN — FUROSEMIDE 40 MG: 10 INJECTION, SOLUTION INTRAMUSCULAR; INTRAVENOUS at 08:48

## 2021-07-18 RX ADMIN — IPRATROPIUM BROMIDE AND ALBUTEROL SULFATE 1 AMPULE: .5; 3 SOLUTION RESPIRATORY (INHALATION) at 16:32

## 2021-07-18 RX ADMIN — Medication 10 ML: at 11:27

## 2021-07-18 RX ADMIN — GUAIFENESIN 600 MG: 600 TABLET, EXTENDED RELEASE ORAL at 11:26

## 2021-07-18 RX ADMIN — Medication 10 ML: at 21:30

## 2021-07-18 RX ADMIN — Medication 10 ML: at 08:48

## 2021-07-18 RX ADMIN — METHYLPREDNISOLONE SODIUM SUCCINATE 40 MG: 40 INJECTION, POWDER, FOR SOLUTION INTRAMUSCULAR; INTRAVENOUS at 11:26

## 2021-07-18 RX ADMIN — IPRATROPIUM BROMIDE AND ALBUTEROL SULFATE 1 AMPULE: .5; 3 SOLUTION RESPIRATORY (INHALATION) at 08:12

## 2021-07-18 RX ADMIN — ATORVASTATIN CALCIUM 10 MG: 10 TABLET, FILM COATED ORAL at 21:29

## 2021-07-18 RX ADMIN — ARFORMOTEROL TARTRATE 15 MCG: 15 SOLUTION RESPIRATORY (INHALATION) at 08:12

## 2021-07-18 RX ADMIN — LEVOTHYROXINE SODIUM 75 MCG: 0.07 TABLET ORAL at 05:34

## 2021-07-18 RX ADMIN — IPRATROPIUM BROMIDE AND ALBUTEROL SULFATE 1 AMPULE: .5; 3 SOLUTION RESPIRATORY (INHALATION) at 20:27

## 2021-07-18 RX ADMIN — GUAIFENESIN 600 MG: 600 TABLET, EXTENDED RELEASE ORAL at 21:29

## 2021-07-18 ASSESSMENT — PAIN SCALES - GENERAL
PAINLEVEL_OUTOF10: 5
PAINLEVEL_OUTOF10: 5

## 2021-07-18 NOTE — PROGRESS NOTES
PULMONARY AND CRITICAL CARE MEDICINE PROGRESS NOTE      SUBJECTIVE: Patient is seen at bedside. She has been weaned down to 3 L nasal cannula. Still continues to have some shortness of breath and wet cough.  -2.2 L in the last 24 hours. Wore BiPAP last night. REVIEW OF SYSTEMS:   CONSTITUTIONAL SYMPTOMS: The patient denies fever, fatigue, night sweats, weight loss or weight gain. HEENT: No vision changes. No tinnitus, Denies sinus pain. No hoarseness, or dysphagia. CARDIOVASCULAR: Denies chest pain, palpitation, syncope. RESPIRATORY: See above. GASTROINTESTINAL: Denies nausea, abdominal pain or change in bowel function. SKIN: No rashes or itching. MUSCULOSKELETAL: Denies weakness or bone pain. NEUROLOGICAL: No headaches or seizures. MEDICATIONS:      guaiFENesin  600 mg Oral BID    furosemide  40 mg Intravenous BID    budesonide  0.5 mg Nebulization BID    Arformoterol Tartrate  15 mcg Nebulization BID    ipratropium-albuterol  1 ampule Inhalation Q4H WA    methylPREDNISolone  40 mg Intravenous Q12H    atorvastatin  10 mg Oral Nightly    levothyroxine  75 mcg Oral Daily    sodium chloride flush  5-40 mL Intravenous 2 times per day    enoxaparin  40 mg Subcutaneous Nightly      sodium chloride       benzonatate, sodium chloride flush, sodium chloride, ondansetron **OR** ondansetron, polyethylene glycol, acetaminophen **OR** acetaminophen     ALLERGIES:   Allergies as of 07/16/2021    (No Known Allergies)        OBJECTIVE:   height is 5' 5\" (1.651 m) and weight is 198 lb 8 oz (90 kg). Her oral temperature is 98 °F (36.7 °C). Her blood pressure is 100/52 (abnormal) and her pulse is 91. Her respiration is 18 and oxygen saturation is 94%. I/O this shift: In: 740 [P.O.:720; I.V.:20]  Out: 1500 [Urine:1500]     PHYSICAL EXAM:  CONSTITUTIONAL: She is a 82y.o.-year-old who appears her stated age. She is alert and oriented x 3 and in no acute distress. CARDIOVASCULAR: S1 S2 RRR. Without murmer  RESPIRATORY & CHEST: Lungs are clear to auscultation and percussion. No wheezing, no crackles. Good air movement  GASTROINTESTINAL & ABDOMEN: Soft, nontender, positive bowel sounds in all quadrants, non-distended, without hepatosplenomegaly. GENITOURINARY: Deferred. MUSCULOSKELETAL: No tenderness to palpation of the axial skeleton. There is no clubbing. No cyanosis. No edema of the lower extremities. SKIN OF BODY: No rash or jaundice. PSYCHIATRIC EVALUATION: Normal affect. Patient answers questions appropriately. HEMATOLOGIC/LYMPHATIC/ IMMUNOLOGIC: No palpable lymphadenopathy. NEUROLOGIC: Alert and oriented x 3. Groslly non-focal. Motor strength is 5+/5 in all muscle groups. The patient has a normal sensorium globally. LABS:   Lab Results   Component Value Date    WBC 16.2 (H) 07/18/2021    HGB 11.1 (L) 07/18/2021    HCT 33.3 (L) 07/18/2021     07/18/2021    CHOL 167 06/25/2021    TRIG 125 06/25/2021    HDL 54 06/25/2021    ALT 14 07/16/2021    AST 22 07/16/2021     07/18/2021    K 4.3 07/18/2021    CL 97 (L) 07/18/2021    CREATININE 0.9 07/18/2021    BUN 32 (H) 07/18/2021    CO2 32 07/18/2021    TSH 1.85 06/10/2021    INR 1.05 06/27/2018       Lab Results   Component Value Date    GLUCOSE 197 (H) 07/18/2021    CALCIUM 8.4 07/18/2021     07/18/2021    K 4.3 07/18/2021    CO2 32 07/18/2021    CL 97 (L) 07/18/2021    BUN 32 (H) 07/18/2021    CREATININE 0.9 07/18/2021       Lab Results   Component Value Date    GLUCOSE 197 (H) 07/18/2021    CALCIUM 8.4 07/18/2021     07/18/2021    K 4.3 07/18/2021    CO2 32 07/18/2021    CL 97 (L) 07/18/2021    BUN 32 (H) 07/18/2021    CREATININE 0.9 07/18/2021       IMAGING:  I reviewed the chest x-ray and my interpretation is as follows. No infiltrates noted.   Hyperinflation noted.        IMPRESSION:   Acute hypercapnic/hypoxic respiratory failure  Acute exacerbation of COPD  Acute exacerbation of diastolic heart failure  COPD

## 2021-07-18 NOTE — PROGRESS NOTES
[facility] HOSPITALISTS PROGRESS NOTE    7/18/2021 11:21 AM        Name: Leola Whitehead . Admitted: 7/16/2021  Primary Care Provider: Gallo Dennis (Tel: [phone])      Chief Complaint   Patient presents with    Shortness of Breath     From [facility] via EMS. SOB and dyspnea since last night, weakness. Expiratory wheezing. Given duoneb around 3-4 AM. O2 as needed at baseline. Hx of COPD, CHF. Brief History: Patient is an 83 yo female with hx CHF, COPD, hypothyroidism, MARIAA. She presented to hospital from [facility] with worsening shortness of breath, wheezes, and weakness. She received nebulizer treatment en route to ER and repeated in ER with minimal relief. Placed on bipap. No acute findings on CXR, labs notable for elevated BNP and she was given IV Lasix. Admitted with acute exacerbations CHF and COPD. Subjective:  Presently resting in bed. Reports some improvement in shortness of breath today, O2 sats high 90s on 3 liters which is baseline. Notes worsening cough, productive clear phlegm. Good urine output, net output 2 liters, but weight up 1 lb. Remains afebrile. Denies chest pain, palpitations, abdominal pain, nausea.      Reviewed interval ancillary notes    Current Medications  guaiFENesin (MUCINEX) extended release tablet 600 mg, BID  benzonatate (TESSALON) capsule 100 mg, TID PRN  furosemide (LASIX) injection 40 mg, BID  budesonide (PULMICORT) nebulizer suspension 500 mcg, BID  Arformoterol Tartrate (BROVANA) nebulizer solution 15 mcg, BID  ipratropium-albuterol (DUONEB) nebulizer solution 1 ampule, Q4H WA  methylPREDNISolone sodium (SOLU-MEDROL) injection 40 mg, Q12H  atorvastatin (LIPITOR) tablet 10 mg, Nightly  levothyroxine (SYNTHROID) tablet 75 mcg, Daily  sodium chloride flush 0.9 % injection 5-40 mL, 2 times per day  sodium chloride flush 0.9 % injection 5-40 mL, PRN  0.9 % sodium chloride infusion, PRN  enoxaparin (LOVENOX) injection 40 mg, Nightly  ondansetron (ZOFRAN-ODT) disintegrating tablet 4 mg, Q8H PRN   Or  ondansetron (ZOFRAN) injection 4 mg, Q6H PRN  polyethylene glycol (GLYCOLAX) packet 17 g, Daily PRN  acetaminophen (TYLENOL) tablet 650 mg, Q6H PRN   Or  acetaminophen (TYLENOL) suppository 650 mg, Q6H PRN        Objective:  /67   Pulse 93   Temp 98.2 °F (36.8 °C) (Oral)   Resp 18   Ht 5' 5\" (1.651 m)   Wt 198 lb 8 oz (90 kg)   SpO2 97%   BMI 33.03 kg/m²     Intake/Output Summary (Last 24 hours) at 7/18/2021 1121  Last data filed at 7/18/2021 0854  Gross per 24 hour   Intake 990 ml   Output 2400 ml   Net -1410 ml      Wt Readings from Last 3 Encounters:   07/18/21 198 lb 8 oz (90 kg)   06/10/21 195 lb (88.5 kg)   03/30/21 210 lb (95.3 kg)     General:  Awake, alert, oriented in NAD  Skin:  Warm and dry. No unusual bruising or rash  Neck:  Supple. + JVD appreciated  Chest:  Normal effort. Diminished, bibasilar crackles, wheezes throughiout  Cardiovascular:  RRR, normal S1/S2, no murmur/gallop/rub  Abdomen:  Soft, nontender, +bowel sounds  Extremities:  Trace BLE edema  Neurological: No focal deficits  Psychological: Normal mood and affect    Labs and Tests:  CBC:   Recent Labs     07/16/21  1420 07/17/21  0443 07/18/21  0518   WBC 11.7* 5.9 16.2*   HGB 12.5 11.9* 11.1*    176 191     BMP:    Recent Labs     07/16/21  1420 07/17/21  0443 07/18/21  0518   * 136 137   K 3.8 4.1 4.3   CL 94* 97* 97*   CO2 29 28 32   BUN 21* 24* 32*   CREATININE 0.9 1.0 0.9   GLUCOSE 107* 176* 197*     Hepatic:   Recent Labs     07/16/21  1420   AST 22   ALT 14   BILITOT 0.5   ALKPHOS 84       CXR 7/17/2021:  No acute cardiopulmonary disease. Echo 5/25/2018:  Summary   -Normal left ventricle size and systolic function with an estimated ejection   fraction of 55-60%.    -There is mild concentric left ventricular hypertrophy.   -No regional wall motion abnormalities are seen.   -Normal diastolic function. -Mild mitral regurgitation.   -Mild aortic stenosis. -Mild tricuspid regurgitation with a RVSP of 58 mmHg. Problem List  Principal Problem:    Acute respiratory failure (HCC)  Active Problems:    Coronary artery disease due to lipid rich plaque    Acute on chronic diastolic heart failure (HCC)    COPD exacerbation (HCC)  Resolved Problems:    * No resolved hospital problems. *       Assessment & Plan:   1. Acute on chronic dCHF. proBNP 1024->1900. Started on IV Lasix. Good diuresis, continue IV Lasix 40 mg BID. Monitor kidney function closely, daily BMP. Cardiology on board, Echo pending. 2. Acute exacerbation COPD. No infiltrate on admission CXR. Worsening cough, productive clear phlegm. Wheezes throughout, continue IV solumedrol, Brovana, Pulmicort, and duonebs. Worsening cough today, productive clear phlegm. Bump in WBC 11.7->5.9->16.2 possibly secondary to steroids. Procalcitonin 0.18->0.20, still considered low risk for bacterial infection. Recheck CXR. Pulmonary on board. 3. Acute on chronic hypoxic respiratory failure. Initially required bipap, now on 3 liters with O2 sat 97% (baseline 3 liters). Secondary to CHF and COPD exacerbations. Continue management as above. 4. HTN. Controlled. Lisinopril held on admission for diuresis, BP on soft side. Continue to follow. 5. Hypothyroidism. TSH 2.11 (6/25). Continue levothyroxine. 6. MARIAA. On bipap at home. Diet: ADULT DIET; Regular;  Low Sodium (2 gm)  Code:Full Code  DVT PPX: enoxaparin      Iris [place], APRN - CNP   7/18/2021 11:21 AM

## 2021-07-18 NOTE — PROGRESS NOTES
[address]   Cardiology Progress Note     Date: 7/18/2021  Admit Date: 7/16/2021     Reason for consultation: SOB    Chief Complaint:   Chief Complaint   Patient presents with    Shortness of Breath     From [facility] via EMS. SOB and dyspnea since last night, weakness. Expiratory wheezing. Given duoneb around 3-4 AM. O2 as needed at baseline. Hx of COPD, CHF. History of Present Illness: History obtained from patient and medical record. Silvana Maki is a 82 y.o. female with a past medical history of COPD, CHF, CAD, hypothyroidism, MARIAA, and obesity. Pt presented to hospital due to worsening SOB and progressive leg swelling. She was noted to have elevated BNP of 1,024 and was started on IV lasix. Interval Hx: Today, she is being seen for follow up. She is doing ok, but is having some difficulty with coughing and wheezing this morning. She is stable on 3L of oxygen. Pt is diuresing well with IV diuretics (diuresed 2.4L yesterday), however, her weight and BNP are up. She remains in sinus rhythm/sinus tach. Her BP is stable. Plans for CXR per hospitalist.    Patient seen and examined. Clinical notes reviewed. Telemetry reviewed. No new complaints today. No major events overnight. Denies having chest pain, palpitations, or dizziness at the time of this visit. Allergies:  No Known Allergies    Home Meds:  Prior to Visit Medications    Medication Sig Taking?  Authorizing Provider   acetaminophen (TYLENOL) 500 MG tablet Take 500 mg by mouth every 6 hours as needed for Pain Yes Historical Provider, MD   lisinopril (PRINIVIL;ZESTRIL) 2.5 MG tablet Take 2 tablets by mouth daily Yes ELISE Oliva   OXYGEN Use 3L of oxygen all the time Yes ELISE Oliva   levocetirizine (XYZAL) 5 MG tablet Take 1 tablet by mouth nightly Yes ELISE Oliva   albuterol sulfate HFA (VENTOLIN HFA) 108 (90 Base) MCG/ACT inhaler Inhale 2 puffs into the lungs every 6 hours as needed for Wheezing Yes Paula Garcia MD   furosemide (LASIX) 40 MG tablet TAKE 1 TABLET BY MOUTH 2 TIMES DAILY Yes Inder Crowder MD   levothyroxine (SYNTHROID) 75 MCG tablet TAKE 1 TABLET BY MOUTH DAILY Yes Inder Crowder MD   zoledronic acid (RECLAST) 5 MG/100ML SOLN Infuse 100 mLs intravenously once for 1 dose  ELISE Suarez   citalopram (CELEXA) 10 MG tablet Take 5 mg by mouth daily  Historical Provider, MD   methocarbamol (ROBAXIN) 500 MG tablet Take 500 mg by mouth 4 times daily  Historical Provider, MD   zoster recombinant adjuvanted vaccine [facility]) 50 MCG/0.5ML SUSR injection Inject 0.5 mLs into the muscle See Admin Instructions 1 dose now and repeat in 2-6 months  ELISE Suarez   budesonide (PULMICORT) 0.5 MG/2ML nebulizer suspension Take 2 mLs by nebulization 2 times daily DX:COPD J44.9  Paula Garcia MD   formoterol (PERFOROMIST) 20 MCG/2ML nebulizer solution Take 2 mLs by nebulization every 12 hours DX:COPD J44.9  Paula Garcia MD   ondansetron (ZOFRAN) 4 MG tablet Take 1 tablet by mouth daily as needed for Nausea or Vomiting  Patient not taking: Reported on 5/65/6138  Rd Sy MD   oxybutynin (DITROPAN) 5 MG tablet TAKE ONE TABLET BY MOUTH THREE TIMES A DAY  Inder Crowder MD   atorvastatin (LIPITOR) 10 MG tablet TAKE 1 TABLET BY MOUTH DAILY AT BEDTIME.   Arvilla Curling, MD   ipratropium-albuterol (DUONEB) 0.5-2.5 (3) MG/3ML SOLN nebulizer solution Inhale 3 mLs into the lungs every 4 hours  Inder Crowder MD      Scheduled Meds:   guaiFENesin  600 mg Oral BID    furosemide  40 mg Intravenous BID    budesonide  0.5 mg Nebulization BID    Arformoterol Tartrate  15 mcg Nebulization BID    ipratropium-albuterol  1 ampule Inhalation Q4H WA    methylPREDNISolone  40 mg Intravenous Q12H    atorvastatin  10 mg Oral Nightly    levothyroxine  75 mcg Oral Daily    sodium chloride flush  5-40 mL Intravenous 2 times per day    enoxaparin  40 mg Subcutaneous Nightly     Continuous Infusions:   sodium chloride       PRN Meds:benzonatate, sodium chloride flush, sodium chloride, ondansetron **OR** ondansetron, polyethylene glycol, acetaminophen **OR** acetaminophen     Past Medical History:  Past Medical History:   Diagnosis Date    Allergic rhinitis     [facility]     [facility]     Depression     Hypothyroidism     MARIAA treated with BiPAP 2/14/2018      Past Surgical History:    has a past surgical history that includes Hysterectomy, total abdominal; Carpal tunnel release; Cholecystectomy; eye surgery; Tonsillectomy; and Uvulectomy. Social History:  Reviewed. reports that she has never smoked. She has never used smokeless tobacco. She reports that she does not drink alcohol and does not use drugs. Family History:  Reviewed. family history includes Breast Cancer in her daughter, maternal grandmother, and mother; Cancer in her father, mother, and sister. Review of Systems:  · Constitutional: Negative for fever, night sweats, chills, weight changes, or weakness  · Skin: Negative for rash, dry skin, pruritus, bruising, bleeding, blood clots, or changes in skin pigment  · HEENT: Negative for vision changes, ringing in the ears, sore throat, dysphagia, or swollen lymph nodes  · Respiratory: Positive for SOB/BOSE  · Cardiovascular: Reviewed in HPI  · Gastrointestinal: Negative for abdominal pain, N/V/D, constipation, or black/tarry stools  · Genito-Urinary: Negative for dysuria, incontinence, urgency, or hematuria  · Musculoskeletal: Negative for joint swelling, muscle pain, or injuries  · Neurological/Psych: Negative for confusion, seizures, headaches, balance issues or TIA-like symptoms. No anxiety, depression, or insomnia    Physical Examination:  Vitals:    07/18/21 0835   BP: 124/67   Pulse: 93   Resp: 18   Temp: 98.2 °F (36.8 °C)   SpO2: 97%      In: 740 [P.O.:720;  I.V.:20]  Out: 1300 Wt Readings from Last 3 Encounters:   07/18/21 198 lb 8 oz (90 kg)   06/10/21 195 lb (88.5 kg)   03/30/21 210 lb (95.3 kg)       Intake/Output Summary (Last 24 hours) at 7/18/2021 1123  Last data filed at 7/18/2021 0854  Gross per 24 hour   Intake 990 ml   Output 2400 ml   Net -1410 ml       Telemetry: Personally Reviewed  - Sinus rhythm/sinus tach  · Constitutional: Cooperative and in no apparent distress, and appears stated age  · Skin: Warm and pink; no pallor, cyanosis, bruising, or clubbing  · HEENT: Symmetric and normocephalic. PERRL, EOM intact. Conjunctiva pink with clear sclera. Mucus membranes pink and moist. Teeth intact. Thyroid smooth without nodules or goiter. · Cardiovascular: Regular rate and rhythm. S1/S2 present without rubs, or gallops. + Murmur. Peripheral pulses 2+, capillary refill < 3 seconds. + JVD and trace peripheral edema  · Respiratory: Respirations symmetric and unlabored. Lungs diminished with expiratory wheezing to auscultation bilaterally, no crackles, or rhonchi. On 3L of oxygen  · Gastrointestinal: Abdomen soft and round. Bowel sounds normoactive in all quadrants without tenderness or masses. · Musculoskeletal: Bilateral upper and lower extremity strength 5/5 with full ROM  · Neurologic/Psych: Awake and orientated to person, place and time. Calm affect, appropriate mood    Pertinent labs, diagnostic, device, and imaging results reviewed as a part of this visit    Labs:    BMP:   Recent Labs     07/16/21  1420 07/17/21 0443 07/18/21  0518   * 136 137   K 3.8 4.1 4.3   CL 94* 97* 97*   CO2 29 28 32   BUN 21* 24* 32*   CREATININE 0.9 1.0 0.9     Estimated Creatinine Clearance: 52 mL/min (based on SCr of 0.9 mg/dL).    CBC:   Recent Labs     07/16/21  1420 07/17/21 0443 07/18/21  0518   WBC 11.7* 5.9 16.2*   HGB 12.5 11.9* 11.1*   HCT 37.6 35.9* 33.3*   MCV 89.0 89.1 89.4    176 191     Thyroid:   Lab Results   Component Value Date    TSH 1.85 06/10/2021     Lipids: Lab Results   Component Value Date    CHOL 167 2021    HDL 54 2021    TRIG 125 2021     LFTS:   Lab Results   Component Value Date    ALT 14 2021    AST 22 2021    ALKPHOS 84 2021    PROT 8.5 2021    AGRATIO 1.0 2021    BILITOT 0.5 2021     Cardiac Enzymes:   Lab Results   Component Value Date    TROPONINI 0.02 2021    TROPONINI <0.01 2018     Coags:   Lab Results   Component Value Date    PROTIME 12.0 2018    INR 1.05 2018       EC21  NSR    ECHO:    -Normal left ventricle size and systolic function with an estimated ejection fraction of 55-60%. -There is mild concentric left ventricular hypertrophy. No regional wall motion abnormalities are seen.   -Normal diastolic function. Mild mitral regurgitation.   -Mild aortic stenosis. Mild tricuspid regurgitation with a RVSP of 58 mmHg. GXT:   Low risk. EF 80%    CT Chest:   Scattered noncalcified pulmonary nodules throughout the lungs, more numerous   on the right compared to the left, similar to prior. CXR: 21  No acute cardiopulmonary disease.     Problem List:   Patient Active Problem List    Diagnosis Date Noted    Acute pulmonary edema ([address]     Coronary artery disease due to lipid rich plaque 2021    Acute respiratory failure ([address] 2021    Senile osteoporosis 2021    Bilateral sensorineural hearing loss 2019    Bilateral hearing loss 2019    Acute suppurative otitis media of right ear with spontaneous rupture of tympanic membrane 2019    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 2019    Pulmonary nodule 2019    Class 3 severe obesity without serious comorbidity with body mass index (BMI) of 40.0 to 44.9 in adult ([address] 01/15/2019    Chronic bronchitis (HCC)     COPD exacerbation ([address]     Pitting edema 2018    BASIA (acute kidney injury) ([address] 2018    [facility] with [facility] 06/28/2018    Essential hypertension 06/28/2018    Mixed hyperlipidemia 06/28/2018    Acute on chronic diastolic heart failure ([facility] 06/21/2018    Multifocal pneumonia 04/04/2018    Centrilobular emphysema ([facility] 04/04/2018    Bilateral lower extremity edema 03/09/2018    Obstructive sleep apnea (adult) (pediatric) 02/14/2018    SOB (shortness of breath) 02/02/2018    Chronic seasonal allergic rhinitis 01/12/2018    COPD, severe ([facility] 01/12/2018    Idiopathic peripheral neuropathy 01/12/2018    Hypothyroidism 01/12/2018    Depression 01/12/2018    Chronic congestive heart failure ([facility] 01/12/2018        Assessment and Plan:     1. SOB   - Likely multifactorial (CHF, COPD)   - Echo ordered to assess EF and valve function (Pending completion)   - Continue IV diuresis   - Plan for CXR per hospitalist    2. Acute on Chronic diastolic heart failure (NYHA Class III)  - Appears decompensated   ~ EF 55-60% per echo (2018); repeat echo ordered  - Continue diuresis with IV lasix (Agree with increase to BID)   ~ Will benefit from adding ACE/BB once more stable  - Monitor I&Os, daily weights     - Repeat BMP in AM    3. Murmur, Aortic Stenosis   - Mild in past   - Repeat echo pending to assess valve fucntion    4. CAD  - Elevated calcium score on CT (2017)   ~ GXT negative for ischemia (2017)  - No complaints of angina    5. COPD  - Unstable   - Continue medical therapy and inhalers   - Pulmonology following    6. MARIAA  - Stable: Uses CPAP  - Encourage to use CPAP to prevent long term effects of untreated MARIAA    Multiple medical conditions with risk of decompensation. All pertinent information and plan of care discussed with the rounding physician. All questions and concerns were addressed to the patient. Alternatives to my treatment were discussed. I have discussed the above stated plan with patient and the nurse.  The patient verbalized understanding and agreed with the plan.    Thank you for allowing to us to participate in the care of Willa Zee     The patient was seen for >35 minutes. I reviewed interval history, physical exam, review of data including labs, imaging, development and implementation of treatment plan and coordination of complex care.     Tina Murry, JOSE ALFREDO-CNP  [facility]   Office: [phone]

## 2021-07-18 NOTE — PLAN OF CARE
Problem: Falls - Risk of:  Goal: Will remain free from falls  Description: Will remain free from falls  Outcome: Ongoing     Problem: Skin Integrity:  Goal: Will show no infection signs and symptoms  Description: Will show no infection signs and symptoms  Outcome: Ongoing     Problem: OXYGENATION/RESPIRATORY FUNCTION  Goal: Patient will maintain patent airway  Outcome: Ongoing     Problem: HEMODYNAMIC STATUS  Goal: Patient has stable vital signs and fluid balance  Outcome: Ongoing     Problem: FLUID AND ELECTROLYTE IMBALANCE  Goal: Fluid and electrolyte balance are achieved/maintained  Outcome: Ongoing     Problem: ACTIVITY INTOLERANCE/IMPAIRED MOBILITY  Goal: Mobility/activity is maintained at optimum level for patient  Outcome: Ongoing

## 2021-07-18 NOTE — PROGRESS NOTES
Pt is really worked up from coughing. Tachy on the monitor, sob from the exertion and flushed red. Message sent to Mary Sifuentes CNP to review for medication.   Preeti Martin RN

## 2021-07-18 NOTE — ACP (ADVANCE CARE PLANNING)
Advanced Care Planning Note. Purpose of Encounter: Advanced care planning in light of hospitalization for exacerbations CHF and COPD  Parties In Attendance: Patient, myself, Jessika Eduardo (RN)  Decisional Capacity: Yes  Subjective: Patient presented to hospital with worsening shortness of breath. Reports mild improvement, continues to experience dyspnea and worsening cough. Objective: Known history chronic diastolic CHF, COPD, chronic hypoxic respiratory failure on 3 liter baseline. Goals of Care Determination: Patient states she has living will and sons Carlo Quarles and Byron merle) are POA. In event of worsening condition or cardiac arrest she does not want intubation, chest compressions, defibrillation, and medications acceptable. She emphasizes no intubation. .  Plan:  Change code status to limited code, no intubation. .  Code Status: Limited, no intubation   Time spent on Advanced care Plannin minutes  Advanced Care Planning Documents: Completed advanced directives are not on chart.     JOSE ALFREDO Sorto CNP  2021 12:03 PM

## 2021-07-19 LAB
ANION GAP SERPL CALCULATED.3IONS-SCNC: 7 MMOL/L (ref 3–16)
BUN BLDV-MCNC: 32 MG/DL (ref 7–20)
CALCIUM SERPL-MCNC: 8.3 MG/DL (ref 8.3–10.6)
CHLORIDE BLD-SCNC: 96 MMOL/L (ref 99–110)
CO2: 34 MMOL/L (ref 21–32)
CREAT SERPL-MCNC: 0.9 MG/DL (ref 0.6–1.2)
GFR AFRICAN AMERICAN: >60
GFR NON-AFRICAN AMERICAN: 60
GLUCOSE BLD-MCNC: 175 MG/DL (ref 70–99)
HCT VFR BLD CALC: 34.8 % (ref 36–48)
HEMOGLOBIN: 11.6 G/DL (ref 12–16)
LV EF: 58 %
LVEF MODALITY: NORMAL
MCH RBC QN AUTO: 29.8 PG (ref 26–34)
MCHC RBC AUTO-ENTMCNC: 33.3 G/DL (ref 31–36)
MCV RBC AUTO: 89.5 FL (ref 80–100)
PDW BLD-RTO: 13.3 % (ref 12.4–15.4)
PLATELET # BLD: 197 K/UL (ref 135–450)
PMV BLD AUTO: 8.8 FL (ref 5–10.5)
POTASSIUM SERPL-SCNC: 4.2 MMOL/L (ref 3.5–5.1)
RBC # BLD: 3.89 M/UL (ref 4–5.2)
SODIUM BLD-SCNC: 137 MMOL/L (ref 136–145)
WBC # BLD: 13.5 K/UL (ref 4–11)

## 2021-07-19 PROCEDURE — 97162 PT EVAL MOD COMPLEX 30 MIN: CPT

## 2021-07-19 PROCEDURE — 99233 SBSQ HOSP IP/OBS HIGH 50: CPT | Performed by: INTERNAL MEDICINE

## 2021-07-19 PROCEDURE — 2700000000 HC OXYGEN THERAPY PER DAY

## 2021-07-19 PROCEDURE — 2060000000 HC ICU INTERMEDIATE R&B

## 2021-07-19 PROCEDURE — 92526 ORAL FUNCTION THERAPY: CPT

## 2021-07-19 PROCEDURE — 85027 COMPLETE CBC AUTOMATED: CPT

## 2021-07-19 PROCEDURE — 99233 SBSQ HOSP IP/OBS HIGH 50: CPT | Performed by: NURSE PRACTITIONER

## 2021-07-19 PROCEDURE — 36415 COLL VENOUS BLD VENIPUNCTURE: CPT

## 2021-07-19 PROCEDURE — 94660 CPAP INITIATION&MGMT: CPT

## 2021-07-19 PROCEDURE — 94761 N-INVAS EAR/PLS OXIMETRY MLT: CPT

## 2021-07-19 PROCEDURE — 6360000002 HC RX W HCPCS: Performed by: NURSE PRACTITIONER

## 2021-07-19 PROCEDURE — 6370000000 HC RX 637 (ALT 250 FOR IP): Performed by: FAMILY MEDICINE

## 2021-07-19 PROCEDURE — 6360000002 HC RX W HCPCS: Performed by: INTERNAL MEDICINE

## 2021-07-19 PROCEDURE — 80048 BASIC METABOLIC PNL TOTAL CA: CPT

## 2021-07-19 PROCEDURE — 2580000003 HC RX 258: Performed by: FAMILY MEDICINE

## 2021-07-19 PROCEDURE — 97530 THERAPEUTIC ACTIVITIES: CPT

## 2021-07-19 PROCEDURE — 97535 SELF CARE MNGMENT TRAINING: CPT

## 2021-07-19 PROCEDURE — C8929 TTE W OR WO FOL WCON,DOPPLER: HCPCS

## 2021-07-19 PROCEDURE — 97116 GAIT TRAINING THERAPY: CPT

## 2021-07-19 PROCEDURE — 92610 EVALUATE SWALLOWING FUNCTION: CPT

## 2021-07-19 PROCEDURE — 6370000000 HC RX 637 (ALT 250 FOR IP): Performed by: NURSE PRACTITIONER

## 2021-07-19 PROCEDURE — 6360000002 HC RX W HCPCS: Performed by: FAMILY MEDICINE

## 2021-07-19 PROCEDURE — 94640 AIRWAY INHALATION TREATMENT: CPT

## 2021-07-19 PROCEDURE — 6360000004 HC RX CONTRAST MEDICATION: Performed by: INTERNAL MEDICINE

## 2021-07-19 PROCEDURE — 97166 OT EVAL MOD COMPLEX 45 MIN: CPT

## 2021-07-19 RX ADMIN — LEVOTHYROXINE SODIUM 75 MCG: 0.07 TABLET ORAL at 06:08

## 2021-07-19 RX ADMIN — GUAIFENESIN 600 MG: 600 TABLET, EXTENDED RELEASE ORAL at 10:31

## 2021-07-19 RX ADMIN — FUROSEMIDE 40 MG: 10 INJECTION, SOLUTION INTRAMUSCULAR; INTRAVENOUS at 17:10

## 2021-07-19 RX ADMIN — Medication 10 ML: at 13:57

## 2021-07-19 RX ADMIN — BENZONATATE 100 MG: 100 CAPSULE ORAL at 06:12

## 2021-07-19 RX ADMIN — Medication 10 ML: at 00:14

## 2021-07-19 RX ADMIN — PERFLUTREN 1.65 MG: 6.52 INJECTION, SUSPENSION INTRAVENOUS at 08:30

## 2021-07-19 RX ADMIN — Medication 10 ML: at 21:16

## 2021-07-19 RX ADMIN — FUROSEMIDE 40 MG: 10 INJECTION, SOLUTION INTRAMUSCULAR; INTRAVENOUS at 10:30

## 2021-07-19 RX ADMIN — IPRATROPIUM BROMIDE AND ALBUTEROL SULFATE 1 AMPULE: .5; 3 SOLUTION RESPIRATORY (INHALATION) at 17:02

## 2021-07-19 RX ADMIN — ARFORMOTEROL TARTRATE 15 MCG: 15 SOLUTION RESPIRATORY (INHALATION) at 19:22

## 2021-07-19 RX ADMIN — BUDESONIDE 500 MCG: 0.5 SUSPENSION RESPIRATORY (INHALATION) at 12:30

## 2021-07-19 RX ADMIN — METHYLPREDNISOLONE SODIUM SUCCINATE 40 MG: 40 INJECTION, POWDER, FOR SOLUTION INTRAMUSCULAR; INTRAVENOUS at 00:13

## 2021-07-19 RX ADMIN — METHYLPREDNISOLONE SODIUM SUCCINATE 40 MG: 40 INJECTION, POWDER, FOR SOLUTION INTRAMUSCULAR; INTRAVENOUS at 13:57

## 2021-07-19 RX ADMIN — ARFORMOTEROL TARTRATE 15 MCG: 15 SOLUTION RESPIRATORY (INHALATION) at 12:30

## 2021-07-19 RX ADMIN — IPRATROPIUM BROMIDE AND ALBUTEROL SULFATE 1 AMPULE: .5; 3 SOLUTION RESPIRATORY (INHALATION) at 12:29

## 2021-07-19 RX ADMIN — BUDESONIDE 500 MCG: 0.5 SUSPENSION RESPIRATORY (INHALATION) at 19:22

## 2021-07-19 RX ADMIN — Medication 10 ML: at 10:31

## 2021-07-19 RX ADMIN — IPRATROPIUM BROMIDE AND ALBUTEROL SULFATE 1 AMPULE: .5; 3 SOLUTION RESPIRATORY (INHALATION) at 19:22

## 2021-07-19 RX ADMIN — ATORVASTATIN CALCIUM 10 MG: 10 TABLET, FILM COATED ORAL at 21:09

## 2021-07-19 RX ADMIN — GUAIFENESIN 600 MG: 600 TABLET, EXTENDED RELEASE ORAL at 21:09

## 2021-07-19 RX ADMIN — ENOXAPARIN SODIUM 40 MG: 40 INJECTION SUBCUTANEOUS at 21:09

## 2021-07-19 ASSESSMENT — PAIN SCALES - GENERAL
PAINLEVEL_OUTOF10: 0

## 2021-07-19 NOTE — PROGRESS NOTES
Physical Therapy    Facility/Department: [address]  Initial Assessment    NAME: Jerson Vyas  : 1935  MRN: [id number]    Date of Service: 2021    Discharge Recommendations:  PT Equipment Recommendations  Equipment Needed: No  Other: Defer to next level of care     Jerson Vyas scored a 16/24 on the AM-PAC short mobility form. Current research shows that an AM-PAC score of 17 or less is typically not associated with a discharge to the patient's home setting. Based on the patient's AM-PAC score and their current functional mobility deficits, it is recommended that the patient have 3-5 sessions per week of Physical Therapy at d/c to increase the patient's independence. Please see assessment section for further patient specific details. If patient discharges prior to next session this note will serve as a discharge summary. Please see below for the latest assessment towards goals. Assessment   Body structures, Functions, Activity limitations: Decreased functional mobility ; Decreased strength;Decreased endurance;Decreased balance; Increased pain  Assessment: Pt is an 81 yo female admitted to [facility] for acute respiratory failure and found to have increased (R) knee pain 2/2 arthritis with weight bearing. The patient usually ambulates household distances independently with use of 4WW and is indep with ADLs. At this time the patient requires CGA for transfer with use of RW and can only ambulate 8 ft total and requires Min A. The patient would benefit from additional skilled PT to safely progress tolerance to activity and independence with functional mobility before return home as pt will not have 24/7 assist at home.   Treatment Diagnosis: Generalized weakness  Prognosis: Good  Decision Making: Medium Complexity  History: See below  Exam: MMT, balance, transfers, ambulation  Clinical Presentation: Evolving  PT Education: Goals;PT Role;Plan of Care;General Safety;Transfer Training;Gait Training  Patient Education: D/c recommendations - pt verbalized understanding. Barriers to Learning: None  REQUIRES PT FOLLOW UP: Yes  Activity Tolerance  Activity Tolerance: Patient Tolerated treatment well;Patient limited by fatigue  Activity Tolerance: Moderate BOSE noted with all mobility, resolved with seated rest. Mild dizziness sitting EOB, resolved with rest. Sitting EOB /77, SpO2 92% on 2L,  bpm.       Patient Diagnosis(es): The primary encounter diagnosis was Hypoxia. Diagnoses of Acute on chronic respiratory failure with hypoxia (HCC) and COPD exacerbation ([address] were also pertinent to this visit. has a past medical history of Allergic rhinitis, CHF (congestive heart failure) ([address] COPD (chronic obstructive pulmonary disease) ([address] Depression, Hypothyroidism, and MARIAA treated with BiPAP. has a past surgical history that includes Hysterectomy, total abdominal; Carpal tunnel release; Cholecystectomy; eye surgery; Tonsillectomy; and Uvulectomy. Restrictions  Restrictions/Precautions  Restrictions/Precautions: Fall Risk, Swallowing - Thickened Liquids, Modified Diet (high fall risk; Dysphagia - soft and bite sized; nectar thick liquids, no straws)  Position Activity Restriction  Other position/activity restrictions: 81 yo female with hx CHF, COPD, hypothyroidism, MARIAA. She presented to hospital from [facility] with worsening shortness of breath, wheezes, and weakness. She received nebulizer treatment en route to ER and repeated in ER with minimal relief. Placed on bipap. No acute findings on CXR, labs notable for elevated BNP and she was given IV Lasix. Admitted with acute exacerbations CHF and COPD.      Vision/Hearing  Vision: Impaired  Vision Exceptions: Wears glasses for reading  Hearing: Exceptions to [facility]  Hearing Exceptions: Bilateral hearing aid       Subjective  General  Chart Reviewed: Yes  Patient assessed for rehabilitation services?: Yes  Family / Caregiver Present: No  Diagnosis: Acute respiratory failure  Follows Commands: Within Functional Limits  General Comment  Comments: Pt semi-reclined in bed upon therapist arrival.  Subjective  Subjective: Agreeable to PT/OT eval. Denies pain. Pain Screening  Patient Currently in Pain: Denies  Vital Signs  Patient Currently in Pain: Denies       Orientation  Orientation  Overall Orientation Status: Within Normal Limits     Social/Functional History  Social/Functional History  Type of Home: Assisted living  Home Layout: One level (first floor apartment, laundry room is down the dubois)  Bathroom Shower/Tub: Walk-in shower  Bathroom Equipment: Grab bars in shower, Shower chair, Hand-held shower, Toilet raiser ([facility] frame over toilet)  Home Equipment: 4 wheeled walker, Wheelchair-electric, Oxygen, Sock aid, Reacher (Standing 4WW, 3L O2 continuous, Cpap at night)  ADL Assistance: Independent (with reacher and sock-aid for dressing)  Homemaking Assistance: Needs assistance (Pt does her laundry, goes to dining room for meals, housekeeping 1x/wk)  Ambulation Assistance: Independent (with Rollator in apartment, uses motorized w/c for community distances)  Transfer Assistance: Independent (with Rollator)  Active : No  Additional Comments: Takes power w/c to laundry room and dining room. Pt sleeps in adjustable bed without rails. 3-4 falls in the last 6 months.      Cognition   See OT note    Objective  AROM RLE (degrees)  RLE AROM: WNL  AROM LLE (degrees)  LLE AROM : WNL  Strength RLE  Strength RLE: Exception  Comment: Ankle DF 4+/5, knee ext 3/5, knee flex 3+/5, hip flex 2/5 (hip and knee MMTs limited by arthitic pain)  Strength LLE  Strength LLE: WFL  Comment: Ankle DF 4+/5, knee ext 3/5, knee flex 3+/5, hip flex 4-/5    Sensation  Overall Sensation Status: WNL     Bed mobility  Supine to Sit: Stand by assistance (with HOB elevated, use of rail, and increased time)  Sit to Supine: Unable to assess (Pt sitting up in chair at end of session)  Scooting: Minimal assistance (sitting EOB)     Transfers  Sit to Stand: Contact guard assistance (to Melanie Khadar from bed and chair; to RW from chair)  Stand to sit: Contact guard assistance (all trials)  Bed to Chair: Dependent/Total (with use of Melanie Khadar)  Comment: Hammad Bradley to transfer to chair, then trialed RW from chair with armrests. Pt able to stand from standard chair to both [place] and RW. Ambulation  Ambulation?: Yes  Ambulation 1  Surface: level tile  Device: Rolling Walker  Other Apparatus: Wheelchair follow  Assistance: Minimal assistance  Quality of Gait: Decreased stance time on (R) LE 2/2 pain, decreased step length on (L), forward flexed posture  Distance: 4 ft fwd + 4 ft bkwd  Comments: Pt with moderate BOSE during ambulation    Balance  Posture: Fair (Forward headl; forward flexed posture in standing, pt unable to reach full upright posture)  Sitting - Static: Good  Sitting - Dynamic: Good;-  Standing - Static: Fair;+  Standing - Dynamic: Fair;+  Comments: Indep static sitting balance, SBA dynamic sitting balance. CGA for static and dynamic sitting balance x2 trials with (B) UE support on St[place]/RW respectively. Plan   Plan  Times per week: 3-5x  Current Treatment Recommendations: Strengthening, Balance Training, Transfer Training, Endurance Training, Gait Training, Functional Mobility Training, Neuromuscular Re-education, Pain Management, Manual Therapy - Soft Tissue Mobilization, Manual Therapy - Joint Manipulation, Home Exercise Program, Safety Education & Training, Equipment Evaluation, Education, & procurement, Patient/Caregiver Education & Training  Safety Devices  Type of devices:  All fall risk precautions in place, Call light within reach, Chair alarm in place, Left in chair, Nurse notified, Patient at risk for falls, Gait belt    AM-PAC Score  AM-PAC Inpatient Mobility Raw Score : 16 (07/19/21 1624)  AM-PAC Inpatient T-Scale Score : 40.78 (07/19/21 1624)  Mobility Inpatient CMS 0-100% Score: 54.16 (07/19/21 1624)  Mobility Inpatient CMS G-Code Modifier : CK (07/19/21 1624)          Goals  Short term goals  Time Frame for Short term goals: Before discharge  Short term goal 1: Pt will complete bed mobility mod I with HOB elevated, without rail  Short term goal 2: Pt will complete sit<>stand from multiple surfaces Mod I  Short term goal 3: Pt will transfer bed<>chair with RW and Mod I  Short term goal 4: Pt will ambulate 50 ft with RW and Mod I  Patient Goals   Patient goals : Go home       Therapy Time   Individual Concurrent Group Co-treatment   Time In 2906         Time Out 1458         Minutes 70         Timed Code Treatment Minutes: Moe Barros PT, DPT #[id number]

## 2021-07-19 NOTE — PLAN OF CARE
Nutrition Problem #1: Unintended weight loss  Intervention: Food and/or Nutrient Delivery: Continue Current Diet, Start Oral Nutrition Supplement  Nutritional Goals: PO intake greater than 50%

## 2021-07-19 NOTE — PLAN OF CARE
Problem: Falls - Risk of:  Goal: Will remain free from falls  Description: Will remain free from falls  Note: Call light within reach, bed in lowest position, bed alarm on, and pt educated on the importance of using call light for assistance. Problem: OXYGENATION/RESPIRATORY FUNCTION  Goal: Patient will achieve/maintain normal respiratory rate/effort  Description: Respiratory rate and effort will be within normal limits for the patient  Note: Pt's respirations are even and unlabored at this time. Problem: Pain:  Goal: Pain level will decrease  Description: Pain level will decrease  Note: Pt given PRN tylenol for 5/10 headache and pt now resting with eyes closed.

## 2021-07-19 NOTE — PROGRESS NOTES
Speech Language Pathology  Facility/Department: [address]  Initial Assessment  DYSPHAGIA BEDSIDE SWALLOW EVALUATION     Patient: Lou Sandoval   : 1935   MRN: [id number]      Evaluation Date: 2021   Admitting Diagnosis: Acute respiratory failure (HCC) [J96.00]  Pain: Pt denies pain                         H&P: Leticia Lopes is a 82 y.o. female with h/o COPD , dCHF , HTN , MARIAA , chronic respiratory failure presents with c.o wheezing and dyspnea on going for one day. She received a dose of breathing treatment en- route to ED with minimal relief. She was placed on bipap in the ED.   lasbs revealed elevated pro bnp . Chest xray is neg for pneumonia. So she was given a 40 mg dose of IV lasix . She also received breathing treatments and decadron . \"    Recent Chest xray: 2021  Impression   Slight pulmonary vascular congestion.  No acute airspace disease or other   abnormality. History/Prior Level of Function:   Living Status: ECF    Prior Dysphagia History: Pt was previously seen at this facility 2018 and a Dysphagia III Soft and Bite-Sized with thin liquids, no straws was recommended. Per RN, Pt coughing with breakfast this morning. Dysphagia Impressions/Diagnosis: Oropharyngeal Dysphagia   Pt seen sitting upright in bed on 3L of O2 via nasal cannula. Pt noted with increased WOB at rest. No wheezing noted prior to po trials. No overt facial asymmetry noted. ROM/coordination were judged to be mildly reduced via OME. Pt agreeable to trials of thin liquids, nectar thick liquids, puree, and solids. With thin liquids, Pt demonstrated reduced bolus control with suspected premature bolus loss to pharynx, delayed swallow initiation, reduced laryngeal excursion, and suspected delayed pharyngeal clearing. Symptoms of SILENT aspiration noted following thin liquids such as delayed coughing and progressive audible wheezing noted following presentations of thin liquids.  No overt clinical s/s of aspiration noted following other textures. However intermittent audible wheezing noted over successive po trials with variable textures. With nectar thick liquids, Pt demonstrated reduced bolus control with suspected premature bolus loss, improved but delayed swallow initiation, and reduced laryngeal excursion. With puree and solids, Pt demonstrated adequate mastication with effective oral clearing, delayed swallow initiation, and suspected delayed pharyngeal clearing. Due to increased WOB and impaired breathe/swallow coordination, Pt is at increased risk for aspiration with thin liquids and with variable textures. Recommend strict aspiration precautions and diet tolerance monitoring. MBS is also indicated to further assess coordinated airway protection during the swallow with all textures    Recommended Diet and Intervention 7/19/2021:  Diet Solids Recommendation:  Dysphagia III Soft and Bite-Sized Liquid Consistency Recommendation:  Nectar (mildly) thick liquids, no straws Recommended form of Meds:   Meds with applesauce     Compensatory Swallowing Strategies:  Effortful Swallows , Upright as possible with all PO intake , No straws , Small bites/sips , Eat/feed slowly    SHORT TERM DYSPHAGIA GOALS/PLAN OF CARE: Speech therapy for dysphagia tx 3-5 times per week during acute care stay.     Pt will functionally tolerate recommended diet with no overt clinical s/s of aspiration  Pt will demonstrate understanding of aspiration risk and precautions via education/demonstration with occasional prompting  Pt will advance to least restrictive diet as indicated   If clinical s/s of aspiration/penetration continue to be noted, Pt will participate in Modified Barium Swallow Study       Dysphagia Therapeutic Intervention:  Oral Care, Diet Tolerance Monitoring , Patient/Family Education     Discharge Recommendations: Recommend ongoing speech therapy for dysphagia therapy upon discharge from hospital    Patient Positioning: Upright in bed    Current Diet Level (prior to evaluation): Regular texture diet    Respiratory Status:   []Room Air   [x]O2 via nasal cannula: 3L   []Other:    Dentition:  [x]Adequate  []Dentures   []Missing Many Teeth  []Edentulous  []Other:    Baseline Vocal Quality:  []Normal  [x]Dysphonic   []Aphonic   [x]Hoarse  []Wet  [x]Weak  []Other:    Volitional Cough:  []Strong  [x]Weak  []Wet  []Absent  [x]Congested  []Other:    Volitional Swallow:   []Absent   [x]Delayed     []Adequate     []Required use of drink     Oral Mechanism Exam:  []WFL [x]Mild   [] Moderate  []Severe  []To be assessed  Impaired:   []Left side      []Right side    [x]Labial ROM/Coordination    []Labial Symmetry   [x]Lingual ROM/Coordination   []Lingual Symmetry  []Gag  []Other:     Oral Phase: []WFL [x]Mild   [] Moderate  []Severe  []To be assessed   []Impaired/Prolonged Mastication:   []Spillage Left:   []Spillage Right:  []Pocketing Left:   []Pocketing Right:   [x]Decreased Anterior to Posterior Transit:   [x]Suspected Premature Bolus Loss:   []Lingual/Palatal Residue:   []Other:     Pharyngeal Phase: []WFL []Mild   [x] Moderate  []Severe  []To be assessed   [x]Delayed Swallow:   [x]Suspected Pharyngeal Pooling:   [x]Decreased Laryngeal Elevation:   []Absent Swallow:  []Wet Vocal Quality:   []Throat Clearing-Immediate:   [x]Throat Clearing-Delayed:   [x]Cough-Immediate:   [x]Cough-Delayed:  []Change in Vital Signs: ? Increased BPM as WOB increased  [x]Suspected Delayed Pharyngeal Clearing:  []Other:     Eating Assistance:  []Independent  [x]Setup or clean-up assistance   [] Supervision or touching assistance   [] Partial or moderate assistance   [] Substantial or maximal assistance  [] Dependent       EDUCATION:   Provided education regarding role of SLP, results of assessment, recommendations and general speech pathology plan of care.    [x] Pt verbalized understanding and agreement   [] Pt requires ongoing learning   [] No evidence of comprehension     If patient discharges prior to next visit, this note will serve as discharge. Timed Code Minutes: 0 min  Total Treatment Minutes: 30 min    Electronically signed by: LYN De La Torre  Speech-Language Pathology Student    The speech-language pathologist was present, directed the patient's care, made skilled judgment and was responsible for assessment and treatment.     Ronaldo [facility] [id number]

## 2021-07-19 NOTE — CONSULTS
Comprehensive Nutrition Assessment    Type and Reason for Visit:  Initial, Consult    Nutrition Recommendations/Plan:   - Continue current diet per SLP recc's  - Begin Magic cup BID    Nutrition Assessment:  RD consult for poor appetite / intake. Pt requesting Ensure supplement. Pt takes protein supplement at home. Currently on dysphagia soft and bite sized, nectar thick diet. Unable to safely provide pt with Ensure on a thickened diet. Spoke with RN. Will trial magic cup supplement. Pt has lost 16lbs (7.6%) over the past three months. Will continue to monitor. Malnutrition Assessment:  Malnutrition Status: At risk for malnutrition (Comment)    Context:  Chronic Illness       Estimated Daily Nutrient Needs:  Energy (kcal):  8704-7705 (15-18kcal/88kg); Weight Used for Energy Requirements:  Current     Protein (g):  68-80 (1.2-1.4g/57kg); Weight Used for Protein Requirements:  Ideal        Fluid (ml/day):  1 ml/kcal      Nutrition Related Findings:  -5.6 liters. Active BS. Trace RLE edema. +1 nonpitting LLE edema. Wounds:  None       Current Nutrition Therapies:    ADULT DIET; Dysphagia - Soft and Bite Sized; Mildly Thick (Nectar); No Drinking Straws    Anthropometric Measures:  · Height: 5' 5\" (165.1 cm)  · Current Body Weight: 194 lb (88 kg)   · Admission Body Weight: 199 lb (90.3 kg)    · Ideal Body Weight: 125 lbs; % Ideal Body Weight 155.2 %   · BMI: 32.3  · BMI Categories: Obese Class 1 (BMI 30.0-34. 9)       Nutrition Diagnosis:   · Unintended weight loss related to inadequate protein-energy intake as evidenced by weight loss    Nutrition Interventions:   Food and/or Nutrient Delivery:  Continue Current Diet, Start Oral Nutrition Supplement  Nutrition Education/Counseling:  Education not indicated   Coordination of Nutrition Care:  No recommendation at this time    Goals:  PO intake greater than 50%       Nutrition Monitoring and Evaluation:   Behavioral-Environmental Outcomes:  None Identified

## 2021-07-19 NOTE — DISCHARGE INSTR - COC
Continuity of Care Form  CHF Pathway  _x_ Cardiovascular Assessment.  Titrate O2 to keep SaO2 greater than 90%    _x_ Daily Weights- Baseline Wt:194 lb   Call MD if:   3 pound weight gain or loss in one day OR 5 pound weight gain in one week       _x_No added salt diet (3-4 G sodium) and 64 oz fluid restriction      _x_ Labs:   BMP,BNP    Please start on    Frequency:  Weekly x 4              Fax results to: CHF Clinic: [phone]        _x_ Med List attached:   Hold Coreg/Metoprolol if HR less than 45 or patient symptomatic*   Hold ACE/ARB if SBP less than 85 or patient symptomatic*   Do not hold Spironolactone (aldactone) for hypotension/bradycardia   Call MD for questions: CHF Clinic: [phone]    _x_Follow up appointment with cardiology: date/time:  with Elio Smith NP          Patient Name: Chilo Dear   :  1935  MRN:  [id number]    Admit date:  2021  Discharge date:  2021    Code Status Order: Limited   Advance Directives:      Admitting Physician:  Giovanni Culver MD  PCP: ELISE Colin    Discharging Nurse: Cody Roy Unit/Room#: 0NG-4985/6387-20  Discharging Unit Phone Number: [phone]    Emergency Contact:   Extended Emergency Contact Information  Primary Emergency Contact: Cyndy Bang Phone: [phone]  Relation: Child  Secondary Emergency Contact: marii novoa  Clarita Phone: [phone]  Relation: Child    Past Surgical History:  Past Surgical History:   Procedure Laterality Date    CARPAL TUNNEL RELEASE      bilateral    CHOLECYSTECTOMY      EYE SURGERY      bilateral cataracts    HYSTERECTOMY, TOTAL ABDOMINAL      TONSILLECTOMY      UVULECTOMY         Immunization History:   Immunization History   Administered Date(s) Administered    COVID-19, Holloway Peter, PF, 30mcg/0.3mL 2021, 2021    Influenza, High Dose (Fluzone 65 yrs and older) 10/11/2018    Pneumococcal Conjugate 13-valent (Rebecca So) 10/01/2015, 2021    Pneumococcal Polysaccharide ([id number]) 11/01/2016    Tdap (Boostrix, Adacel) 10/01/2012    Zoster Live (Zostavax) 11/02/2014       Active Problems:  Patient Active Problem List   Diagnosis Code    Chronic seasonal allergic rhinitis J30.2    COPD, severe ([facility]) J44.9    Idiopathic peripheral neuropathy G60.9    Hypothyroidism E03.9    Depression F32.9    Chronic congestive heart failure ([facility]) I50.9    SOB (shortness of breath) R06.02    Obstructive sleep apnea (adult) (pediatric) G47.33    Bilateral lower extremity edema R60.0    Multifocal pneumonia J18.9    Centrilobular emphysema ([facility]) J43.2    Acute on chronic diastolic heart failure ([facility]) I50.33    Pitting edema R60.9    BASIA (acute kidney injury) ([facility]) N17.9    Chronic respiratory failure with hypoxia ([facility]) J96.11    Essential hypertension I10    Mixed hyperlipidemia E78.2    Acute pulmonary edema ([facility]) J81.0    COPD exacerbation ([facility]) J44.1    Chronic bronchitis ([facility]) J42    Class 3 severe obesity without serious comorbidity with body mass index (BMI) of 40.0 to 44.9 in adult ([facility]) E66.01, Z68.41    Pulmonary nodule R91.1    Acute suppurative otitis media of right ear with spontaneous rupture of tympanic membrane H66.011    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 H72.01    Bilateral hearing loss H91.93    Bilateral sensorineural hearing loss H90.3    Senile osteoporosis M81.0    Acute respiratory failure ([facility]) J96.00    Coronary artery disease due to lipid rich plaque I25.10, I25.83    Nonrheumatic aortic valve stenosis I35.0       Isolation/Infection:   Isolation            No Isolation          Patient Infection Status       None to display            Nurse Assessment:  Last Vital Signs: /69   Pulse 91   Temp 98.2 °F (36.8 °C) (Oral)   Resp 16   Ht 5' 5\" (1.651 m)   Wt 194 lb 6.4 oz (88.2 kg)   SpO2 95%   BMI 32.35 kg/m²     Last documented pain score (0-10 scale): Pain Level: 0  Last Weight:    Wt Readings from Last 1 Encounters:   07/19/21 194 lb 6.4 oz (88.2 kg)     Mental Status:  oriented, alert, coherent and logical    IV Access:  - None    Nursing Mobility/ADLs:  Walking   Assisted  Transfer  Assisted  Bathing  Assisted  Dressing  Assisted  Toileting  Assisted  Feeding  Assisted  Med Admin  Assisted  Med Delivery   whole and applesauce    Wound Care Documentation and Therapy:        Elimination:  Continence:   · Bowel: Yes  · Bladder: No  Urinary Catheter: None   Colostomy/Ileostomy/Ileal Conduit: No       Date of Last BM: 7/21/2021    Intake/Output Summary (Last 24 hours) at 7/19/2021 1612  Last data filed at 7/19/2021 1545  Gross per 24 hour   Intake 10 ml   Output 3400 ml   Net -3390 ml     I/O last 3 completed shifts: In: 10 [I.V.:10]  Out: 2900 [Urine:2900]    Safety Concerns: At Risk for Falls and Aspiration Risk    Impairments/Disabilities:      Hearing (hearing aids)    Nutrition Therapy:  Current Nutrition Therapy:   - Oral Diet:  Dysphagia 3 advanced    Routes of Feeding: Oral  Liquids: Thin liquids no straw  Daily Fluid Restriction: no  Last Modified Barium Swallow with Video (Video Swallowing Test): done on 7/21/2021/***    Treatments at the Time of Hospital Discharge:   Respiratory Treatments: Oxygen, CPAP @ night, inhalers listed in med details  Oxygen Therapy:  is on oxygen at 2-3 L/min per nasal cannula.   Ventilator:    - No ventilator support   -CPAP @ night    Rehab Therapies: Physical Therapy, Occupational Therapy and Speech/Language Therapy  Weight Bearing Status/Restrictions: No weight bearing restirctions  Other Medical Equipment (for information only, NOT a DME order):  walker and bedside commode  Other Treatments: n/a    Patient's personal belongings (please select all that are sent with patient):  with patient    RN SIGNATURE:  Electronically signed by Felicia Wilson RN on 7/21/21 at 2:47 PM EDT    CASE MANAGEMENT/SOCIAL WORK SECTION    Inpatient Status Date:

## 2021-07-19 NOTE — CARE COORDINATION
Discharge Planning Assessment    RN/SW discharge planner met with patient/ (and family member) to discuss reason for admission, current living situation, and potential needs at the time of discharge    Demographics/Insurance verified Yes    Current type of dwelling[facility]    Living arrangements: assisted living    Patient POA/contact:Ned Medrano [phone]    Level of function/Support: some assistance    PCP: ELISE Levi    DME: Kristin cartagena 09    Active with any community resources/agencies/skilled home care: [facility] RN,PT,OT,ST    Medication compliance issues: no    Pharmacy/Financial issues that could impact healthcare: no    Transportation at the time of discharge: son  Patient hopes to return to her AL apartment at [facility] pending PT/OT.

## 2021-07-19 NOTE — PROGRESS NOTES
RT witnessed patient coughing with breakfast. Pt told her that she kept choking on the eggs. Pt was then help NPO until ST seen patient. With med pass pt had clear breath sounds prior and after she began to audibly wheeze. RN informed CNP of speech therapy findings and breathing status discussed. Patient may need modified barium swallow tomorrow per ST. IF patients breathing worsens RN is to consider holding NPO completely.

## 2021-07-19 NOTE — PROGRESS NOTES
[facility]  HEART FAILURE  Progress Note      Admit Date 7/16/2021     Reason for Consult:      Reason for Consultation/Chief Complaint: SOB    HPI:    Frank Ford is a 82 y.o. female with PMH COPD, CAD, HFpEF, MARIAA - treated admitted with increased SOB and LE edema. Pt has diuresed about 4L on IV Lasix and is just back from echo. BNP unchanged      Subjective:  Patient is being seen for SOB. There were no acute overnight cardiac events. Today Ms. Juan J De La Paz denies chest pain or palpitations, c/o continued SOB and wheezing.        Baseline Weight:    Wt Readings from Last 3 Encounters:   07/19/21 194 lb 6.4 oz (88.2 kg)   06/10/21 195 lb (88.5 kg)   03/30/21 210 lb (95.3 kg)          NYHA Class III  Objective:   /78   Pulse 90   Temp 97.5 °F (36.4 °C) (Oral)   Resp 16   Ht 5' 5\" (1.651 m)   Wt 194 lb 6.4 oz (88.2 kg)   SpO2 92%   BMI 32.35 kg/m²       Intake/Output Summary (Last 24 hours) at 7/19/2021 0857  Last data filed at 7/18/2021 2129  Gross per 24 hour   Intake 500 ml   Output 3000 ml   Net -2500 ml      Wt Readings from Last 3 Encounters:   07/19/21 194 lb 6.4 oz (88.2 kg)   06/10/21 195 lb (88.5 kg)   03/30/21 210 lb (95.3 kg)      In: 10 [I.V.:10]  Out: 1500     Physical Exam:  General Appearance:  Non-obese/Well Nourished  Respiratory:  · Resp Auscultation: bibasilar crackles  Cardiovascular:  · Auscultation: Regular rate and rhythm, normal S1S2, +murmur  · Palpation: Normal    · Pedal Pulses: 2+ and equal   Abdomen:  · Soft, NT, ND, + bs  Extremities:  · No Cyanosis or Clubbing  · Extremities: Trace edema  Neurological/Psychiatric:  · Oriented to time, place, and person  · Non-anxious    MEDICATIONS:   Scheduled Meds:   Scheduled Meds:   guaiFENesin  600 mg Oral BID    furosemide  40 mg Intravenous BID    budesonide  0.5 mg Nebulization BID    Arformoterol Tartrate  15 mcg Nebulization BID    ipratropium-albuterol  1 ampule Inhalation Q4H WA    methylPREDNISolone  40 mg Intravenous Q12H    atorvastatin  10 mg Oral Nightly    levothyroxine  75 mcg Oral Daily    sodium chloride flush  5-40 mL Intravenous 2 times per day    enoxaparin  40 mg Subcutaneous Nightly     Continuous Infusions:   sodium chloride       PRN Meds:.benzonatate, sodium chloride flush, sodium chloride, ondansetron **OR** ondansetron, polyethylene glycol, acetaminophen **OR** acetaminophen  Continuous Infusions:   sodium chloride         Intake/Output Summary (Last 24 hours) at 7/19/2021 0857  Last data filed at 7/18/2021 2129  Gross per 24 hour   Intake 500 ml   Output 3000 ml   Net -2500 ml       Lab Data:  CBC:   Lab Results   Component Value Date    WBC 13.5 07/19/2021    HGB 11.6 07/19/2021     07/19/2021     BMP:  Lab Results   Component Value Date     07/19/2021    K 4.2 07/19/2021    K 3.8 07/16/2021    CL 96 07/19/2021    CO2 34 07/19/2021    BUN 32 07/19/2021    CREATININE 0.9 07/19/2021    GLUCOSE 175 07/19/2021     INR:   Lab Results   Component Value Date    INR 1.05 06/27/2018        CARDIAC LABS  ENZYMES:  Recent Labs     07/16/21  1420   TROPONINI 0.02*     FASTING LIPID PANEL:  Lab Results   Component Value Date    HDL 54 06/25/2021    LDLCALC 88 06/25/2021    TRIG 125 06/25/2021    TSH 1.85 06/10/2021     LIVER PROFILE:  Lab Results   Component Value Date    AST 22 07/16/2021    AST 24 06/25/2021    ALT 14 07/16/2021    ALT 15 06/25/2021     BNP:   Lab Results   Component Value Date    PROBNP 1,900 07/18/2021    PROBNP 1,024 07/16/2021    PROBNP 272 06/25/2021     Iron Studies:  No results found for: FERRITIN  No results found for: IRON, TIBC, FERRITIN         1. WEIGHT: Admit Weight: 195 lb (88.5 kg)      Today  Weight: 194 lb 6.4 oz (88.2 kg)   2.  I/O     Intake/Output Summary (Last 24 hours) at 7/19/2021 0857  Last data filed at 7/18/2021 2129  Gross per 24 hour   Intake 500 ml   Output 3000 ml   Net -2500 ml       Cardiac Testing:   Echo 5/25/2018:   Summary   -Normal left ventricle size and systolic function with an estimated ejection fraction of 55-60%.  -There is mild concentric left ventricular hypertrophy.   -No regional wall motion abnormalities are seen.   -Normal diastolic function.   -Mild mitral regurgitation.   -Mild aortic stenosis.   -Mild tricuspid regurgitation with a RVSP of 58 mmHg.        Nuclear stress test 7/27/2017:  Impression     :   1.  80% ejection fraction. 2.  Resting wall motion abnormality in the inferior wall compatible with   an anterior wall myocardial infarction. 3.  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 4.  No photopenia with Lexiscan. FINAL IMPRESSION:   Low-risk Lexiscan nuclear stress test.         CT coronary calcium score 7/26/2017:  IMPRESSION:   1. Findings of chronic granulomatous disease with several small   noncalcified nodules. Recommend followup in one year as these nodules   have remained stable dating back to 2016.   2. Total calcium score is 107. Assessment/Plan:     1. AHF- echo pending, 4L out with IV lasix, add ACE/ARB after diuresis, echo pending  2. CAD- no chest pain, continue statin  3.  AS - echo pending          I appreciate the opportunity of cooperating in the care of this individual.    Tomas Suazo, APRN - CNP, ACNP, [address] 7/19/2021, 8:57 AM  Heart Failure  The [address], [address]  Ph: [phone]      Core Measures:   · Discharge instructions:   · LVEF documented:   · ACEI for LV dysfunction:   · Smoking Cessation:

## 2021-07-19 NOTE — PROGRESS NOTES
[facility] Pulmonary and Critical Care    Follow Up Note    Subjective:   CHIEF COMPLAINT / HPI:   Chief Complaint   Patient presents with    Shortness of Breath     From [facility] via EMS. SOB and dyspnea since last night, weakness. Expiratory wheezing. Given duoneb around 3-4 AM. O2 as needed at baseline. Hx of COPD, CHF. Interval history: Patient admitted with increasingly severe shortness of breath. Found to have decompensated chronic diastolic heart failure. Also known to have a history of severe COPD. She has chronic hypoxemic respiratory failure requiring supplemental oxygen as well. Patient also has a history of obstructive sleep apnea and uses BiPAP regularly. Past Medical History:    Reviewed; no changes    Social History:    Reviewed; no changes    REVIEW OF SYSTEMS:    CONSTITUTIONAL:  negative for fevers and chills  RESPIRATORY:  See HPI  CARDIOVASCULAR:  negative for chest pain, palpitations, edema  GASTROINTESTINAL:  negative for nausea, vomiting, diarrhea, constipation and abdominal pain    Objective:   PHYSICAL EXAM:        VITALS:  /71   Pulse 84   Temp 97.5 °F (36.4 °C) (Oral)   Resp 16   Ht 5' 5\" (1.651 m)   Wt 194 lb 6.4 oz (88.2 kg)   SpO2 97%   BMI 32.35 kg/m²  on 4.5L NC    24HR INTAKE/OUTPUT:      Intake/Output Summary (Last 24 hours) at 7/19/2021 1056  Last data filed at 7/19/2021 1027  Gross per 24 hour   Intake 500 ml   Output 3525 ml   Net -3025 ml       CONSTITUTIONAL:  awake, alert,  no apparent distress, and appears stated age  LUNGS:  No increased work of breathing and basilar crackles to auscultation  CARDIOVASCULAR: S1 and S2 and no JVD  ABDOMEN:  normal bowel sounds, non-distended and non-tender to palpation  EXT: No edema, no calf tenderness. Pulses are present bilaterally. NEUROLOGIC:  Mental Status Exam:  Level of Alertness:   awake  Orientation:   person, place, time.  Non focal  SKIN:  normal skin color, texture, turgor, no redness, warmth, or swelling at IV sites    DATA:    CBC:  Recent Labs     07/17/21  0443 07/18/21  0518 07/19/21  0449   WBC 5.9 16.2* 13.5*   RBC 4.03 3.72* 3.89*   HGB 11.9* 11.1* 11.6*   HCT 35.9* 33.3* 34.8*    191 197   MCV 89.1 89.4 89.5   MCH 29.6 29.7 29.8   MCHC 33.2 33.2 33.3   RDW 13.4 13.1 13.3      BMP:  Recent Labs     07/17/21  0443 07/18/21  0518 07/19/21 0449    137 137   K 4.1 4.3 4.2   CL 97* 97* 96*   CO2 28 32 34*   BUN 24* 32* 32*   CREATININE 1.0 0.9 0.9   CALCIUM 8.4 8.4 8.3   GLUCOSE 176* 197* 175*      ABG:  No results for input(s): PHART, VIK6CHC, PO2ART, IFB8ITE, L2UHJFUQ, BEART in the last 72 hours. Procalcitonin  Recent Labs     07/16/21  1420 07/18/21  0515   PROCAL 0.18* 0.20*           Radiology Review:  Pertinent images / reports were reviewed as a part of this visit. Assessment:     1. Acute hypercapnic and hypoxemic respiratory failure  2. Exacerbation COPD  3. Decompensated diastolic heart failure  4. Obstructive sleep apnea    Plan:     1. I have reviewed laboratories, medical records and images for this visit  2. I reviewed chest imaging from yesterday. This revealed evidence of mild vascular congestion. 3. She does have mildly elevated procalcitonin and a mild leukocytosis. 4. Not on antibiotics. Clinically no suggestion of pneumonia. 5. Responding slowly to diuresis  6. Continue Lasix. 7. Does have severe COPD with FEV1 about 56% predicted and severely reduced diffusion capacity at 39% predicted. 8. Continue Pulmicort and Brovana twice daily  9. Continue Solu-Medrol  10. Continue DuoNeb  11. Continue BiPAP and supplemental oxygen  12. Normally wears 3 L/min oxygen supplement at home.

## 2021-07-19 NOTE — PROGRESS NOTES
[facility] HOSPITALISTS PROGRESS NOTE    7/19/2021 2:01 PM        Name: Jose Coulter . Admitted: 7/16/2021  Primary Care Provider: Deepa Peter, [address] (Tel: [phone])      Chief Complaint   Patient presents with    Shortness of Breath     From [facility] via EMS. SOB and dyspnea since last night, weakness. Expiratory wheezing. Given duoneb around 3-4 AM. O2 as needed at baseline. Hx of COPD, CHF. Brief History: Patient is an 81 yo female with hx CHF, COPD, hypothyroidism, MARIAA. She presented to hospital from [facility] with worsening shortness of breath, wheezes, and weakness. She received nebulizer treatment en route to ER and repeated in ER with minimal relief. Placed on bipap. No acute findings on CXR, labs notable for elevated BNP and she was given IV Lasix. Admitted with acute exacerbations CHF and COPD. Subjective:  Presently resting in bed. Reports improvement in shortness of breath, still with congested cough but some improvement with Tessalon, productive clear phlegm. Remains afebrile, WBC trending down. Denies chest pain, palpitations, nausea, abdominal pain, constipation. Good diuresis with Lasix, weight is down 4 lbs.       Reviewed interval ancillary notes    Current Medications  guaiFENesin (MUCINEX) extended release tablet 600 mg, BID  benzonatate (TESSALON) capsule 100 mg, TID PRN  furosemide (LASIX) injection 40 mg, BID  budesonide (PULMICORT) nebulizer suspension 500 mcg, BID  Arformoterol Tartrate (BROVANA) nebulizer solution 15 mcg, BID  ipratropium-albuterol (DUONEB) nebulizer solution 1 ampule, Q4H WA  methylPREDNISolone sodium (SOLU-MEDROL) injection 40 mg, Q12H  atorvastatin (LIPITOR) tablet 10 mg, Nightly  levothyroxine (SYNTHROID) tablet 75 mcg, Daily  sodium chloride flush 0.9 % injection 5-40 mL, 2 times per day  sodium chloride flush 0.9 % injection 5-40 mL, PRN  0.9 % sodium chloride infusion, PRN  enoxaparin (LOVENOX) injection 40 mg, Nightly  ondansetron (ZOFRAN-ODT) disintegrating tablet 4 mg, Q8H PRN   Or  ondansetron (ZOFRAN) injection 4 mg, Q6H PRN  polyethylene glycol (GLYCOLAX) packet 17 g, Daily PRN  acetaminophen (TYLENOL) tablet 650 mg, Q6H PRN   Or  acetaminophen (TYLENOL) suppository 650 mg, Q6H PRN        Objective:  /77   Pulse 81   Temp 97.9 °F (36.6 °C) (Oral)   Resp 18   Ht 5' 5\" (1.651 m)   Wt 194 lb 6.4 oz (88.2 kg)   SpO2 96%   BMI 32.35 kg/m²     Intake/Output Summary (Last 24 hours) at 7/19/2021 1401  Last data filed at 7/19/2021 1230  Gross per 24 hour   Intake 250 ml   Output 2900 ml   Net -2650 ml      Wt Readings from Last 3 Encounters:   07/19/21 194 lb 6.4 oz (88.2 kg)   06/10/21 195 lb (88.5 kg)   03/30/21 210 lb (95.3 kg)     General:  Awake, alert, oriented in NAD  Skin:  Warm and dry. No unusual bruising or rash  Neck:  Supple. + JVD   Chest:  Normal effort. Diminished, bibasilar crackles, scattered wheezes  Cardiovascular:  RRR, normal S1/S2, no murmur/gallop/rub  Abdomen:  Soft, nontender, +bowel sounds  Extremities:  Trace edema  Neurological: No focal deficits  Psychological: Normal mood and affect    Labs and Tests:  CBC:   Recent Labs     07/17/21 0443 07/18/21 0518 07/19/21 0449   WBC 5.9 16.2* 13.5*   HGB 11.9* 11.1* 11.6*    191 197     BMP:    Recent Labs     07/17/21 0443 07/18/21 0518 07/19/21 0449    137 137   K 4.1 4.3 4.2   CL 97* 97* 96*   CO2 28 32 34*   BUN 24* 32* 32*   CREATININE 1.0 0.9 0.9   GLUCOSE 176* 197* 175*     Hepatic:   Recent Labs     07/16/21  1420   AST 22   ALT 14   BILITOT 0.5   ALKPHOS 84       CXR 7/17/2021:  No acute cardiopulmonary disease. CXR 7/18/2021:  Slight pulmonary vascular congestion.  No acute airspace disease or other   abnormality.          Echo 5/25/2018:  Summary   -Normal left ventricle size and systolic function with an estimated ejection   fraction of 55-60%. -There is mild concentric left ventricular hypertrophy.   -No regional wall motion abnormalities are seen.   -Normal diastolic function. -Mild mitral regurgitation.   -Mild aortic stenosis. -Mild tricuspid regurgitation with a RVSP of 58 mmHg. Problem List  Principal Problem:    Acute respiratory failure (HCC)  Active Problems:    Coronary artery disease due to lipid rich plaque    Acute on chronic diastolic heart failure (HCC)    COPD exacerbation (HCC)    Nonrheumatic aortic valve stenosis  Resolved Problems:    * No resolved hospital problems. *       Assessment & Plan:   1. Acute on chronic dCHF. proBNP 1024->1900. Started on IV Lasix with subjective improvement. Monitor kidney function closely, daily BMP. Appreciate cardio recs, continue IV Lasix. Echo results pending. 2. Acute exacerbation COPD. No infiltrate on admission or CXR 7/18. Has congestive cough, productive clear phlegm. Continue IV solumedrol, Brovana, Pulmicort, and duonebs. WBC 11.7->5.9->16.2->13.5, likely secondary to steroids. Procalcitonin 0.18->0.20, considered low risk for bacterial infection. Appreciate pulmonary recs, clinically no suggestion of pneumonia. 3. Acute on chronic hypoxic respiratory failure. Initially required bipap, now on 3 liters with O2 sat 96% (baseline 3 liters). Secondary to CHF and COPD exacerbations. Continue management as above. 4. Oropharyngeal dysphagia. Noted on speech evaluation. Nursing also reports some coughing with meals and fluids. Will order MBS. On dysphagia diet, soft and bite sized with mildly thick liquids. 5. HTN. Controlled. Lisinopril held on admission for diuresis, BP was on soft side, improved today. Continue to follow, plan to resume ACE after diuresis. 6. Hypothyroidism. TSH 2.11 (6/25). Continue levothyroxine. 7. MARIAA. On bipap at home. Diet: ADULT DIET; Dysphagia - Soft and Bite Sized; Mildly Thick (Nectar);  No Drinking Straws  Adult Oral Nutrition Supplement; Frozen Oral Supplement  Code:Limited  DVT PPX: enoxaparin      Lora Wood, APRN - CNP   7/19/2021 2:01 PM

## 2021-07-19 NOTE — PROGRESS NOTES
Patient has been taken down to Echo at this time. On 3L per NC-O2 Sat 92-93%. Vitals stable. Slight audible wheezing, denies any SOB or complaints prior to leaving the floor. Student RN at bedside.

## 2021-07-19 NOTE — PROGRESS NOTES
MARGARET Jordan 55 1935    History:  Past Medical History:   Diagnosis Date    Allergic rhinitis     [facility]     COPD [facility]ronic obstructive pulmonary disease[facility]     Depression     Hypothyroidism     MARIAA treated with BiPAP 2/14/2018       ECHO:  Summary   -Normal left ventricle size, wall thickness and systolic function with an   estimated ejection fraction of 55-60%. -No regional wall motion abnormalities are noted. -Grade I diastolic dysfunction with normal LV filling pressures. E/e' =   18.6.   -Severe aortic stenosis with a peak velocity of 4.1 m/s and a mean pressure   gradient of 39mmHg. HOLDEN (VTI) is 2.47 cm2. (gradients showed with definity). -No evidence of aortic valve regurgitation.   -There is mild mitral stenosis with a mean gradient of 3 mmHG, P1/2 is 89   ms., Vmax is 1.4 cm/s, HOLDEN (P1/2) is 2.47 cm2.   -Mild mitral regurgitation.   -Mild tricuspid regurgitation. -IVC size is dilated (>2.1 cm) but collapses > 50% with respiration   consistent with elevated RA pressure (8 mmHg). -Estimated pulmonary artery systolic pressure is elevated at 41 mmHg   assuming a right atrial pressure of 8 mmHg. History of sleep apnea: yes  Type: mariaa   Equipment used at home: bipap      DM History: no    Last Hospital Admission:6/28/18 with COPD  Code Status:  Full code  Discharge plans: discharge back to AL    Family Present: no    Luly Trilla was admitted to the hospital with increased shortness of breath. Patient had previously been seen by HF RN for education. She had been lost to HF f/u but has since reestablished with HF NP. Patient was seen in June to re-establish. Patient is in assisted living at [facility]. Patient states she is only weighed once a week. She is unsure of baseline but on 6/10 was 195 lb. On admission 199 lb. Today 194 lb.  Patient eats in cafeteria and does not think they follow a low sodium diet. She does not add salt to flavor her foods. She feels she may drink over 64 oz per day. Medications are administered by nursing at the facility. She tries to stay active- she uses a walker in her apartment. Her son brings her to appointments. Patient provided with both written and verbal education on CHF signs/ symptoms, causes, discharge medications, daily weights, low sodium diet, activity, and follow-up. Pt to call if gains 3 pounds in one day or 5 pounds in one week. Mutually agreed upon goals were discussed such as calling the MD as soon as they recognize symptoms and weight gain, maintaining proper diet, taking medications as prescribed, joining cardiac rehab when able. Also reviewed importance of risk factor reduction. Patient provided with CHF Zone Management tool and CHF symptoms magnet. Discussed importance of lifestyle changes: patient needs daily weights    PATIENT/CAREGIVER TEACHING:    Level of patient/caregiver understanding able to:   [x ] Verbalize understanding [ ] Demonstrate understanding [ ] Teach back   [ ] Needs reinforcement [ ] Other:       Time spent teachin mins    1. WEIGHT: Admit Weight: 195 lb (88.5 kg)      Today  Weight: 194 lb 6.4 oz (88.2 kg)   2. I/O     Intake/Output Summary (Last 24 hours) at 2021 1546  Last data filed at 2021 1230  Gross per 24 hour   Intake 10 ml   Output 2900 ml   Net -2890 ml       Recommendations:   1. Patient needs one week hospital follow up at discharge  2. Educate further on fluid restriction 48 oz- 64 oz during inpatient stay so she can understand how to measure intake at home. 3. Continue to educate on S/S.   4. Emphasize daily weights, diet, and knowing when and who to call  5. Provided patient with CHF Resource Line for questions and concerns. 6. CHF pathway on pamela for discharge.     ALLISON MELVIN RN 2021 3:46 PM

## 2021-07-19 NOTE — PROGRESS NOTES
Occupational Therapy   Occupational Therapy Initial Assessment/Treatment   Date: 2021   Patient Name: Lachelle Dubois  MRN: [id number]     : 1935    Date of Service: 2021    Discharge Recommendations:  3-5 sessions per week  OT Equipment Recommendations  Other: defer to next level of care     Lachelle Dubois scored a 15/24 on the AM-PAC ADL Inpatient form. Current research shows that an AM-PAC score of 17 or less is typically not associated with a discharge to the patient's home setting. Based on the patient's AM-PAC score and their current ADL deficits, it is recommended that the patient have 3-5 sessions per week of Occupational Therapy at d/c to increase the patient's independence. Please see assessment section for further patient specific details. If patient discharges prior to next session this note will serve as a discharge summary. Please see below for the latest assessment towards goals. Assessment   Performance deficits / Impairments: Decreased functional mobility ; Decreased balance;Decreased cognition;Decreased ADL status; Decreased endurance;Decreased strength;Decreased high-level IADLs  Assessment: Pt is a 82yr old female functioning below baseline due to the above mentioned deficits. Pt would benefit from ongoing OT in order to increase functional status. Prognosis: Fair  Decision Making: Medium Complexity  OT Education: OT Role;Energy Conservation;Plan of Care;Precautions; ADL Adaptive Strategies;Transfer Training  Patient Education: d/c planning and recommendation, safety with AD  REQUIRES OT FOLLOW UP: Yes  Activity Tolerance  Activity Tolerance: Patient Tolerated treatment well;Patient limited by fatigue  Activity Tolerance: Pt on baseline 3L of O2 during evaluation; Pt with increased pain in RLE due to arthritis. Pt with decreased tolerance and stability with WB when ambulating  Safety Devices  Safety Devices in place: Yes  Type of devices:  All fall risk precautions in place; Patient at risk for falls;Call light within reach; Left in chair;Nurse notified; Chair alarm in place           Patient Diagnosis(es): The primary encounter diagnosis was Hypoxia. Diagnoses of Acute on chronic respiratory failure with hypoxia (HCC) and COPD exacerbation ([address] were also pertinent to this visit. has a past medical history of Allergic rhinitis, CHF (congestive heart failure) ([address] COPD (chronic obstructive pulmonary disease) ([address] Depression, Hypothyroidism, and MARIAA treated with BiPAP. has a past surgical history that includes Hysterectomy, total abdominal; Carpal tunnel release; Cholecystectomy; eye surgery; Tonsillectomy; and Uvulectomy. Restrictions  Restrictions/Precautions  Restrictions/Precautions: Fall Risk, Swallowing - Thickened Liquids, Modified Diet (high fall risk; Dysphagia - soft and bite sized; nectar thick liquids, no straws)  Position Activity Restriction  Other position/activity restrictions: 83 yo female with hx CHF, COPD, hypothyroidism, MARIAA. She presented to hospital from [facility] with worsening shortness of breath, wheezes, and weakness. She received nebulizer treatment en route to ER and repeated in ER with minimal relief. Placed on bipap. No acute findings on CXR, labs notable for elevated BNP and she was given IV Lasix. Admitted with acute exacerbations CHF and COPD. Subjective   General  Chart Reviewed: Yes  Patient assessed for rehabilitation services?: Yes  Additional Pertinent Hx: CHF, COPD  Family / Caregiver Present: No  Referring Practitioner: Keturah Ortega MD  Diagnosis: CHF, SOB, Edema in BLEs  Subjective  Subjective: Pt pleasant and agreeable to OT evaluation.   Patient Currently in Pain: Denies  Pain Assessment  Pain Assessment: 0-10  Pain Level: 0  Vital Signs  Temp: 98.2 °F (36.8 °C)  Temp Source: Oral  Pulse: 91  Heart Rate Source: Monitor  Resp: 16  BP: 115/69  BP Location: Right upper arm  MAP (mmHg): 84  Patient Position: Sitting  Level of Consciousness: Alert (0)  MEWS Score: 1  Patient Currently in Pain: Denies  Oxygen Therapy  SpO2: 95 %  Pulse Oximeter Device Mode: Continuous  Pulse Oximeter Device Location: Finger  O2 Device: Nasal cannula  Social/Functional History  Social/Functional History  Type of Home: Assisted living  Home Layout: One level (first floor apartment, laundry room is down the dubois)  [place] Shower/Tub: Walk-in shower  Bathroom Equipment: Grab bars in shower, Shower chair, Hand-held shower, Toilet raiser (BSC frame over toilet)  Home Equipment: 4 wheeled walker, Wheelchair-electric, Oxygen, Sock aid, Reacher (Standing 4WW, 3L O2 continuous, Cpap at night)  ADL Assistance: Independent (with reacher and sock-aid for dressing)  Homemaking Assistance: Needs assistance (Pt does her laundry, goes to dining room for meals, housekeeping 1x/wk)  Ambulation Assistance: Independent (with Rollator in apartment, uses motorized w/c for community distances)  Transfer Assistance: Independent (with Rollator)  Active : No  Additional Comments: Takes power w/c to laundry room and dining room. Pt sleeps in adjustable bed without rails. 3-4 falls in the last 6 months. Objective        Orientation  Overall Orientation Status: Within Functional Limits     Balance  Sitting Balance: Stand by assistance  Standing Balance: Minimal assistance (up in RW)  Standing Balance  Time: ~1 min  Activity: LB dressing, bathing  Comment: flexed posture over RW  Functional Mobility  Functional - Mobility Device: Rolling Walker  Activity: Other (~4ft)  Assist Level: Minimal assistance  Functional Mobility Comments: Min A with RW; on 1st attempt (bed to chair) sandor gill was used  Toilet Transfers  Toilet Transfers Comments: declined the need; pt using purewick  ADL  Feeding: Modified independent ; Thickened liquids  UE Bathing: Minimal assistance  LE Bathing: Maximum assistance  LE Dressing:  Moderate assistance  Toileting: Dependent/Total (lata)  Coordination  Movements Are Fluid And Coordinated: No  Coordination and Movement description: Gross motor impairments;Right UE (due to arthritis)     Bed mobility  Supine to Sit: Stand by assistance (increased time and use of bedrail)  Sit to Supine: Unable to assess  Scooting: Minimal assistance (scooting towards EOB)  Transfers  Sit to stand: Minimal assistance;Contact guard assistance  Stand to sit: Minimal assistance;Contact guard assistance  Transfer Comments: Up to RW; 1st attempt up to [address]  Overall Cognitive Status: Exceptions  Arousal/Alertness: Appropriate responses to stimuli  Following Commands:  Follows one step commands with increased time  Attention Span: Appears intact  Memory: Decreased short term memory  Safety Judgement: Decreased awareness of need for assistance;Decreased awareness of need for safety  Problem Solving: Assistance required to generate solutions;Assistance required to implement solutions;Decreased awareness of errors  Insights: Decreased awareness of deficits  Initiation: Requires cues for some  Sequencing: Does not require cues        Sensation  Overall Sensation Status: WNL        LUE AROM (degrees)  LUE AROM : WFL  Left Hand AROM (degrees)  Left Hand AROM: WFL  RUE AROM (degrees)  RUE General AROM: Limited due to arthritis in R shoulder  Right Hand AROM (degrees)  Right Hand AROM: WFL                      Plan   Plan  Times per week: 3-5  Current Treatment Recommendations: Strengthening, Balance Training, Functional Mobility Training, Endurance Training, Self-Care / ADL, Patient/Caregiver Education & Training, Safety Education & Training      AM-PAC Score        -[facility] Inpatient Daily Activity Raw Score: 15 (07/19/21 1632)  AM-PAC Inpatient ADL T-Scale Score : 34.69 (07/19/21 1632)  ADL Inpatient CMS 0-100% Score: 56.46 (07/19/21 1632)  ADL Inpatient CMS G-Code Modifier : CK (07/19/21 1632)    Goals  Short term goals  Time Frame for Short term goals: by d/c  Short term goal 1: Pt will complete Lb dressing with min A using AE as needed  Short term goal 2: Pt will complete toileting with SBA  Short term goal 3: Pt will complete functional transfers with CGA using RW       Therapy Time   Individual Concurrent Group Co-treatment   Time In 1347         Time Out 1457         Minutes 70         Timed Code Treatment Minutes: [address], OTR/L

## 2021-07-20 LAB
ANION GAP SERPL CALCULATED.3IONS-SCNC: 5 MMOL/L (ref 3–16)
ANION GAP SERPL CALCULATED.3IONS-SCNC: 6 MMOL/L (ref 3–16)
BUN BLDV-MCNC: 31 MG/DL (ref 7–20)
BUN BLDV-MCNC: 34 MG/DL (ref 7–20)
CALCIUM SERPL-MCNC: 8.5 MG/DL (ref 8.3–10.6)
CALCIUM SERPL-MCNC: 8.6 MG/DL (ref 8.3–10.6)
CHLORIDE BLD-SCNC: 95 MMOL/L (ref 99–110)
CHLORIDE BLD-SCNC: 95 MMOL/L (ref 99–110)
CO2: 36 MMOL/L (ref 21–32)
CO2: 37 MMOL/L (ref 21–32)
CREAT SERPL-MCNC: 0.9 MG/DL (ref 0.6–1.2)
CREAT SERPL-MCNC: 1 MG/DL (ref 0.6–1.2)
GFR AFRICAN AMERICAN: >60
GFR AFRICAN AMERICAN: >60
GFR NON-AFRICAN AMERICAN: 53
GFR NON-AFRICAN AMERICAN: 60
GLUCOSE BLD-MCNC: 156 MG/DL (ref 70–99)
GLUCOSE BLD-MCNC: 172 MG/DL (ref 70–99)
GLUCOSE BLD-MCNC: 201 MG/DL (ref 70–99)
GLUCOSE BLD-MCNC: 206 MG/DL (ref 70–99)
GLUCOSE BLD-MCNC: 233 MG/DL (ref 70–99)
HCT VFR BLD CALC: 36.4 % (ref 36–48)
HEMOGLOBIN: 12.1 G/DL (ref 12–16)
MCH RBC QN AUTO: 29.7 PG (ref 26–34)
MCHC RBC AUTO-ENTMCNC: 33.2 G/DL (ref 31–36)
MCV RBC AUTO: 89.3 FL (ref 80–100)
PDW BLD-RTO: 13.1 % (ref 12.4–15.4)
PERFORMED ON: ABNORMAL
PLATELET # BLD: 216 K/UL (ref 135–450)
PMV BLD AUTO: 8.5 FL (ref 5–10.5)
POTASSIUM REFLEX MAGNESIUM: 4.8 MMOL/L (ref 3.5–5.1)
POTASSIUM SERPL-SCNC: 4.6 MMOL/L (ref 3.5–5.1)
PRO-BNP: 2169 PG/ML (ref 0–449)
RBC # BLD: 4.07 M/UL (ref 4–5.2)
SODIUM BLD-SCNC: 137 MMOL/L (ref 136–145)
SODIUM BLD-SCNC: 137 MMOL/L (ref 136–145)
WBC # BLD: 11.5 K/UL (ref 4–11)

## 2021-07-20 PROCEDURE — 99232 SBSQ HOSP IP/OBS MODERATE 35: CPT | Performed by: NURSE PRACTITIONER

## 2021-07-20 PROCEDURE — 92526 ORAL FUNCTION THERAPY: CPT

## 2021-07-20 PROCEDURE — 2700000000 HC OXYGEN THERAPY PER DAY

## 2021-07-20 PROCEDURE — 6370000000 HC RX 637 (ALT 250 FOR IP): Performed by: NURSE PRACTITIONER

## 2021-07-20 PROCEDURE — 94640 AIRWAY INHALATION TREATMENT: CPT

## 2021-07-20 PROCEDURE — 6370000000 HC RX 637 (ALT 250 FOR IP): Performed by: FAMILY MEDICINE

## 2021-07-20 PROCEDURE — 97535 SELF CARE MNGMENT TRAINING: CPT

## 2021-07-20 PROCEDURE — 6360000002 HC RX W HCPCS: Performed by: FAMILY MEDICINE

## 2021-07-20 PROCEDURE — 36415 COLL VENOUS BLD VENIPUNCTURE: CPT

## 2021-07-20 PROCEDURE — 80048 BASIC METABOLIC PNL TOTAL CA: CPT

## 2021-07-20 PROCEDURE — 83880 ASSAY OF NATRIURETIC PEPTIDE: CPT

## 2021-07-20 PROCEDURE — 94660 CPAP INITIATION&MGMT: CPT

## 2021-07-20 PROCEDURE — 6360000002 HC RX W HCPCS: Performed by: NURSE PRACTITIONER

## 2021-07-20 PROCEDURE — 85027 COMPLETE CBC AUTOMATED: CPT

## 2021-07-20 PROCEDURE — 99233 SBSQ HOSP IP/OBS HIGH 50: CPT | Performed by: INTERNAL MEDICINE

## 2021-07-20 PROCEDURE — 2060000000 HC ICU INTERMEDIATE R&B

## 2021-07-20 PROCEDURE — 83036 HEMOGLOBIN GLYCOSYLATED A1C: CPT

## 2021-07-20 PROCEDURE — 2580000003 HC RX 258: Performed by: FAMILY MEDICINE

## 2021-07-20 PROCEDURE — 6360000002 HC RX W HCPCS: Performed by: INTERNAL MEDICINE

## 2021-07-20 PROCEDURE — 94761 N-INVAS EAR/PLS OXIMETRY MLT: CPT

## 2021-07-20 RX ORDER — PREDNISONE 20 MG/1
40 TABLET ORAL DAILY
Status: DISCONTINUED | OUTPATIENT
Start: 2021-07-21 | End: 2021-07-22 | Stop reason: HOSPADM

## 2021-07-20 RX ORDER — DEXTROSE MONOHYDRATE 25 G/50ML
12.5 INJECTION, SOLUTION INTRAVENOUS PRN
Status: DISCONTINUED | OUTPATIENT
Start: 2021-07-20 | End: 2021-07-22 | Stop reason: HOSPADM

## 2021-07-20 RX ORDER — INSULIN LISPRO 100 [IU]/ML
0-3 INJECTION, SOLUTION INTRAVENOUS; SUBCUTANEOUS NIGHTLY
Status: DISCONTINUED | OUTPATIENT
Start: 2021-07-20 | End: 2021-07-22 | Stop reason: HOSPADM

## 2021-07-20 RX ORDER — SPIRONOLACTONE 25 MG/1
12.5 TABLET ORAL DAILY
Status: DISCONTINUED | OUTPATIENT
Start: 2021-07-20 | End: 2021-07-22 | Stop reason: HOSPADM

## 2021-07-20 RX ORDER — INSULIN LISPRO 100 [IU]/ML
0-6 INJECTION, SOLUTION INTRAVENOUS; SUBCUTANEOUS
Status: DISCONTINUED | OUTPATIENT
Start: 2021-07-20 | End: 2021-07-22 | Stop reason: HOSPADM

## 2021-07-20 RX ORDER — NICOTINE POLACRILEX 4 MG
15 LOZENGE BUCCAL PRN
Status: DISCONTINUED | OUTPATIENT
Start: 2021-07-20 | End: 2021-07-22 | Stop reason: HOSPADM

## 2021-07-20 RX ORDER — BISACODYL 10 MG
10 SUPPOSITORY, RECTAL RECTAL DAILY
Status: DISCONTINUED | OUTPATIENT
Start: 2021-07-20 | End: 2021-07-22 | Stop reason: HOSPADM

## 2021-07-20 RX ORDER — POLYETHYLENE GLYCOL 3350 17 G/17G
17 POWDER, FOR SOLUTION ORAL DAILY
Status: DISCONTINUED | OUTPATIENT
Start: 2021-07-20 | End: 2021-07-22 | Stop reason: HOSPADM

## 2021-07-20 RX ORDER — DOCUSATE SODIUM 100 MG/1
100 CAPSULE, LIQUID FILLED ORAL 2 TIMES DAILY
Status: DISCONTINUED | OUTPATIENT
Start: 2021-07-20 | End: 2021-07-22 | Stop reason: HOSPADM

## 2021-07-20 RX ORDER — DEXTROSE MONOHYDRATE 50 MG/ML
100 INJECTION, SOLUTION INTRAVENOUS PRN
Status: DISCONTINUED | OUTPATIENT
Start: 2021-07-20 | End: 2021-07-22 | Stop reason: HOSPADM

## 2021-07-20 RX ADMIN — FUROSEMIDE 40 MG: 10 INJECTION, SOLUTION INTRAMUSCULAR; INTRAVENOUS at 17:32

## 2021-07-20 RX ADMIN — Medication 10 ML: at 00:07

## 2021-07-20 RX ADMIN — DOCUSATE SODIUM 100 MG: 100 CAPSULE ORAL at 12:23

## 2021-07-20 RX ADMIN — GUAIFENESIN 600 MG: 600 TABLET, EXTENDED RELEASE ORAL at 09:42

## 2021-07-20 RX ADMIN — IPRATROPIUM BROMIDE AND ALBUTEROL SULFATE 1 AMPULE: .5; 3 SOLUTION RESPIRATORY (INHALATION) at 19:26

## 2021-07-20 RX ADMIN — Medication 10 ML: at 09:42

## 2021-07-20 RX ADMIN — Medication 5 ML: at 21:39

## 2021-07-20 RX ADMIN — ARFORMOTEROL TARTRATE 15 MCG: 15 SOLUTION RESPIRATORY (INHALATION) at 07:51

## 2021-07-20 RX ADMIN — IPRATROPIUM BROMIDE AND ALBUTEROL SULFATE 1 AMPULE: .5; 3 SOLUTION RESPIRATORY (INHALATION) at 07:50

## 2021-07-20 RX ADMIN — LEVOTHYROXINE SODIUM 75 MCG: 0.07 TABLET ORAL at 05:33

## 2021-07-20 RX ADMIN — FUROSEMIDE 40 MG: 10 INJECTION, SOLUTION INTRAMUSCULAR; INTRAVENOUS at 09:42

## 2021-07-20 RX ADMIN — DOCUSATE SODIUM 100 MG: 100 CAPSULE ORAL at 21:29

## 2021-07-20 RX ADMIN — METHYLPREDNISOLONE SODIUM SUCCINATE 40 MG: 40 INJECTION, POWDER, FOR SOLUTION INTRAMUSCULAR; INTRAVENOUS at 12:23

## 2021-07-20 RX ADMIN — METHYLPREDNISOLONE SODIUM SUCCINATE 40 MG: 40 INJECTION, POWDER, FOR SOLUTION INTRAMUSCULAR; INTRAVENOUS at 00:07

## 2021-07-20 RX ADMIN — INSULIN LISPRO 1 UNITS: 100 INJECTION, SOLUTION INTRAVENOUS; SUBCUTANEOUS at 21:28

## 2021-07-20 RX ADMIN — ATORVASTATIN CALCIUM 10 MG: 10 TABLET, FILM COATED ORAL at 21:29

## 2021-07-20 RX ADMIN — INSULIN LISPRO 1 UNITS: 100 INJECTION, SOLUTION INTRAVENOUS; SUBCUTANEOUS at 12:38

## 2021-07-20 RX ADMIN — BUDESONIDE 500 MCG: 0.5 SUSPENSION RESPIRATORY (INHALATION) at 19:26

## 2021-07-20 RX ADMIN — GUAIFENESIN 600 MG: 600 TABLET, EXTENDED RELEASE ORAL at 21:29

## 2021-07-20 RX ADMIN — BENZOCAINE AND MENTHOL 1 LOZENGE: 15; 3.6 LOZENGE ORAL at 00:07

## 2021-07-20 RX ADMIN — BUDESONIDE 500 MCG: 0.5 SUSPENSION RESPIRATORY (INHALATION) at 07:51

## 2021-07-20 RX ADMIN — ARFORMOTEROL TARTRATE 15 MCG: 15 SOLUTION RESPIRATORY (INHALATION) at 19:26

## 2021-07-20 RX ADMIN — INSULIN LISPRO 2 UNITS: 100 INJECTION, SOLUTION INTRAVENOUS; SUBCUTANEOUS at 16:54

## 2021-07-20 RX ADMIN — SPIRONOLACTONE 12.5 MG: 25 TABLET ORAL at 12:38

## 2021-07-20 RX ADMIN — ENOXAPARIN SODIUM 40 MG: 40 INJECTION SUBCUTANEOUS at 21:29

## 2021-07-20 RX ADMIN — IPRATROPIUM BROMIDE AND ALBUTEROL SULFATE 1 AMPULE: .5; 3 SOLUTION RESPIRATORY (INHALATION) at 11:25

## 2021-07-20 ASSESSMENT — PAIN SCALES - GENERAL
PAINLEVEL_OUTOF10: 0

## 2021-07-20 NOTE — PROGRESS NOTES
Occupational Therapy  Facility/Department: [address]  Daily Treatment Note  NAME: Sandra Moran  : 1935  MRN: [id number]    Date of Service: 2021    Discharge Recommendations: Sandra Moran scored a 17/24 on the AM-PAC ADL Inpatient form. Current research shows that an AM-PAC score of 17 or less is typically not associated with a discharge to the patient's home setting. Based on the patient's AM-PAC score and their current ADL deficits, it is recommended that the patient have 3-5 sessions per week of Occupational Therapy at d/c to increase the patient's independence. Please see assessment section for further patient specific details. If patient discharges prior to next session this note will serve as a discharge summary. Please see below for the latest assessment towards goals. 3-5 sessions per week  OT Equipment Recommendations  Equipment Needed: No  Other: continue to assess    Assessment   Performance deficits / Impairments: Decreased functional mobility ; Decreased balance;Decreased cognition;Decreased ADL status; Decreased endurance;Decreased strength;Decreased high-level IADLs  Assessment: Pt is a 82yr old female functioning below baseline due to the above mentioned deficits. Pt has improved in ADLs and activity tolerance, pt would continue to benefit from skilled OT services to increase functional status. Treatment Diagnosis: Decreased functional mobility, ADL status, endurance, balance, strength, high-level IADLs and cognition due to respiratroy failure. Prognosis: Fair  Decision Making: Medium Complexity  OT Education: OT Role;Plan of Care;Precautions; ADL Adaptive Strategies;Transfer Training  Patient Education: Educated pt on OT role, plan of care, ADL adaptive strategies, transfer training, d/c planning and recommendation, and safety with AD. Continued education for follow through.   Barriers to Learning: cognition  REQUIRES OT FOLLOW UP: Yes  Activity Tolerance  Activity Tolerance: Patient Tolerated treatment well;Patient limited by fatigue  Activity Tolerance: Pt on baseline 3L of O2 at end of session SPO2 94%  Safety Devices  Safety Devices in place: Yes  Type of devices: All fall risk precautions in place; Patient at risk for falls;Call light within reach; Left in chair;Nurse notified; Chair alarm in place;Gait belt (high risk for falls)         Patient Diagnosis(es): The primary encounter diagnosis was Hypoxia. Diagnoses of Acute on chronic respiratory failure with hypoxia (HCC) and COPD exacerbation ([address] were also pertinent to this visit. has a past medical history of Allergic rhinitis, CHF (congestive heart failure) ([facility] COPD (chronic obstructive pulmonary disease) ([facility] [address] Depression, Hypothyroidism, and MARIAA treated with BiPAP. has a past surgical history that includes Hysterectomy, total abdominal; Carpal tunnel release; Cholecystectomy; eye surgery; Tonsillectomy; and Uvulectomy. Restrictions  Restrictions/Precautions  Restrictions/Precautions: Fall Risk, Modified Diet (high fall risk; Dysphagia III Soft and Bite-Sized with Thin liquids, no straws, meds in applesauce)  Position Activity Restriction  Other position/activity restrictions: 81 yo female with hx CHF, COPD, hypothyroidism, MARIAA. She presented to hospital from [facility] with worsening shortness of breath, wheezes, and weakness. She received nebulizer treatment en route to ER and repeated in ER with minimal relief. Placed on bipap. No acute findings on CXR, labs notable for elevated BNP and she was given IV Lasix. Admitted with acute exacerbations CHF and COPD. Subjective   General  Chart Reviewed: Yes  Patient assessed for rehabilitation services?: Yes  Additional Pertinent Hx: CHF, COPD  Family / Caregiver Present: No  Referring Practitioner: Kourtney Cota MD  Diagnosis: acute respiratory failure  Subjective  Subjective: Pt lying supine in bed, agreeable to therapy and denies pain. SPO2 at 95%. Pt on 3 L O2. Orientation     Objective    ADL  Grooming: Setup;Supervision (pt brushed teeth and washed face seated on bedside commode at sink)  UE Bathing: Stand by assistance (performed seated on bedside commode at sink)  UE Dressing: Setup;Stand by assistance (pt donned gown seated on bedside commode at sink)  LE Dressing: Contact guard assistance (CGA for safety and throughness with donning brief, pt used reacher to assist)  Toileting: Minimal assistance (pt had difficulty doffing brief and required assist to remove brief, she had a purewick placed. Pt used a bedisde commode over standard toilet for additional height and performed darren care. purewick left off pt. RN notified        Balance  Sitting Balance: Stand by assistance (supervision static sitting balance; SBA for dynamic sitting balance)  Standing Balance: Minimal assistance (pt used RW and demonstrated a flexed posture over RW. B elbows resting on RW. Pt was cued to stand up straight but was unable to.)  Standing Balance  Time: ~3-4 min  Activity: sit<>stand transfers, ambulate to bathroom and back to chair ~20 ft total  Comment: flexed posture over RW  Functional Mobility  Functional - Mobility Device: Rolling Walker  Activity: To/from bathroom  Assist Level: Minimal assistance  Functional Mobility Comments: Cristin with RW for safety  Toilet Transfers  Toilet - Technique: Ambulating  Equipment Used: Standard toilet (bedside commode over standard toilet for additional height)  Bed mobility  Supine to Sit: Contact guard assistance (HOB elevated.  Pt required increased time, use of bedrail and VC)  Scooting: Contact guard assistance (pt required increased time and VC)  Transfers  Sit to stand: Minimal assistance  Stand to sit: Minimal assistance  Transfer Comments: pt used RW and requred cues to safely perform sit<>stand transfers                       Cognition  Overall Cognitive Status: Exceptions  Arousal/Alertness: Appropriate responses to stimuli  Following Commands: Follows one step commands with increased time; Follows one step commands with repetition  Attention Span: Appears intact  Safety Judgement: Decreased awareness of need for assistance;Decreased awareness of need for safety  Problem Solving: Assistance required to generate solutions;Assistance required to implement solutions;Decreased awareness of errors  Insights: Decreased awareness of deficits  Initiation: Requires cues for some  Sequencing: Does not require cues               Plan   Plan  Times per week: 3-5  Current Treatment Recommendations: Strengthening, Balance Training, Functional Mobility Training, Endurance Training, Self-Care / ADL, Patient/Caregiver Education & Training, Safety Education & Training    AM-PAC Score        AM-[facility] Inpatient Daily Activity Raw Score: 17 (07/20/21 1143)  AM-PAC Inpatient ADL T-Scale Score : 37.26 (07/20/21 1143)  ADL Inpatient CMS 0-100% Score: 50.11 (07/20/21 1143)  ADL Inpatient CMS G-Code Modifier : CK (07/20/21 1143)    Goals  Short term goals  Time Frame for Short term goals: by d/c  Short term goal 1: Pt will complete Lb dressing with min A using AE as needed---- GOAL MET 7/20  Short term goal 2: Pt will complete toileting with SBA----- ongoing 07/20  Short term goal 3: Pt will complete functional transfers with CGA using RW---- ongoing 07/20  Short term goal 4: NW GOAL: standing tolerance for ADL/IADL tasks ~5 minutes       Therapy Time   Individual Concurrent Group Co-treatment   Time In 7816         Time Out 4201         Minutes 80                 Total Treatment Minutes:  80 minutes  [address], [address], OT     Occupational Therapist present and directed patient care, made skilled clinical decisions and was responsible for assessment and treatment this date.   Bruce Dallas, OTR/L [id number]

## 2021-07-20 NOTE — PLAN OF CARE
Problem: Falls - Risk of:  Goal: Will remain free from falls  Description: Will remain free from falls  Note: Call light within reach, bed in lowest position, bed alarm on, and pt educated on the importance of using call light for assistance. Problem: OXYGENATION/RESPIRATORY FUNCTION  Goal: Patient will achieve/maintain normal respiratory rate/effort  Description: Respiratory rate and effort will be within normal limits for the patient  Note: Pt respirations are even and unlabored. No signs of distress at this time. Problem: HEMODYNAMIC STATUS  Goal: Patient has stable vital signs and fluid balance  Note:   Vitals:    07/20/21 0005   BP: 122/71   Pulse: 90   Resp: 16   Temp: 97.7 °F (36.5 °C)   SpO2: 95%          Problem: Pain:  Goal: Pain level will decrease  Description: Pain level will decrease  Note: Pt has throat discomfort and PRN Lozenge given per MAR.

## 2021-07-20 NOTE — CARE COORDINATION
Spoke with patient and her Diandra Dunnemer about a short stay at [facility]. They are agreeable to request Dawn precert before returning to [facility].     Vee Solomon [phone] will initiate precert now for Jazmín.  : [phone]         Fax: [phone]

## 2021-07-20 NOTE — PROGRESS NOTES
P Pulmonary and Critical Care    Follow Up Note    Subjective:   CHIEF COMPLAINT / HPI:   Chief Complaint   Patient presents with    Shortness of Breath     From [facility] via EMS. SOB and dyspnea since last night, weakness. Expiratory wheezing. Given duoneb around 3-4 AM. O2 as needed at baseline. Hx of COPD, CHF. Interval history: The patient is alert this morning. She feels like her breathing has improved a little bit. Used her BiPAP last night. .    Past Medical History:    Reviewed; no changes    Social History:    Reviewed; no changes    REVIEW OF SYSTEMS:    CONSTITUTIONAL:  negative for fevers and chills  RESPIRATORY:  See HPI  CARDIOVASCULAR:  negative for chest pain, palpitations, edema  GASTROINTESTINAL:  negative for nausea, vomiting, diarrhea, constipation and abdominal pain    Objective:   PHYSICAL EXAM:        VITALS:  /66   Pulse 92   Temp 98 °F (36.7 °C) (Oral)   Resp 18   Ht 5' 5\" (1.651 m)   Wt 192 lb 4.8 oz (87.2 kg)   SpO2 96%   BMI 32.00 kg/m²  on 3L NC    24HR INTAKE/OUTPUT:      Intake/Output Summary (Last 24 hours) at 7/20/2021 1039  Last data filed at 7/20/2021 0941  Gross per 24 hour   Intake 775 ml   Output 2375 ml   Net -1600 ml       CONSTITUTIONAL:  awake, alert,  no apparent distress, and appears stated age  LUNGS:  No increased work of breathing and basilar crackles to auscultation  CARDIOVASCULAR: S1 and S2 and no JVD  ABDOMEN:  normal bowel sounds, non-distended and non-tender to palpation  EXT: No edema, no calf tenderness. Pulses are present bilaterally. NEUROLOGIC:  Mental Status Exam:  Level of Alertness:   awake  Orientation:   person, place, time.  Non focal  SKIN:  normal skin color, texture, turgor, no redness, warmth, or swelling at IV sites    DATA:    CBC:  Recent Labs     07/18/21  0518 07/19/21  0449 07/20/21  0454   WBC 16.2* 13.5* 11.5*   RBC 3.72* 3.89* 4.07   HGB 11.1* 11.6* 12.1   HCT 33.3* 34.8* 36.4    197 216   MCV 89.4 89.5 89.3   MCH 29.7 29.8 29.7   MCHC 33.2 33.3 33.2   RDW 13.1 13.3 13.1      BMP:  Recent Labs     07/18/21  0518 07/19/21  0449 07/20/21  0454    137 137   K 4.3 4.2 4.6   CL 97* 96* 95*   CO2 32 34* 37*   BUN 32* 32* 31*   CREATININE 0.9 0.9 0.9   CALCIUM 8.4 8.3 8.5   GLUCOSE 197* 175* 201*      ABG:  No results for input(s): PHART, TOR9EXW, PO2ART, FEV5LMO, O8BNFSTR, BEART in the last 72 hours. Procalcitonin  Recent Labs     07/18/21  0515   PROCAL 0.20*           Radiology Review:  Pertinent images / reports were reviewed as a part of this visit. Assessment:     1. Acute hypercapnic and hypoxemic respiratory failure  2. Exacerbation COPD  3. Decompensated diastolic heart failure  4. Obstructive sleep apnea    Plan:     1. I have reviewed laboratories, medical records and images for this visit  2. I reviewed chest imaging from yesterday. This revealed evidence of mild vascular congestion. 3. She does have mildly elevated procalcitonin and a mild leukocytosis. 4. Not on antibiotics. Clinically no suggestion of pneumonia. 5. Responding slowly to diuresis  6. Continue Lasix. 7. Does have severe COPD with FEV1 about 56% predicted and severely reduced diffusion capacity at 39% predicted. 8. Continue Pulmicort and Brovana twice daily  9. Continue Solu-Medrol  10. Continue DuoNeb  11. Continue BiPAP and supplemental oxygen  12. She is now on 3 L/min nasal cannula oxygen supplement which is her normal home dose.

## 2021-07-20 NOTE — PROGRESS NOTES
Speech Language Pathology  Dysphagia Treatment Note    Name:  Anni Sood  :   1935  Medical Diagnosis:  Acute respiratory failure ([facility] [address] [J96.00]  Treatment Diagnosis: Oropharyngeal Dysphagia  Pain level: Pt denies pain    Diet level prior to treatment: Dysphagia III Soft and Bite-Sized with Nectar (mildly) thick liquids, no straws  Tolerance of Current Diet Level: Pt reports no difficulty with current diet. Assessment of Texture Tolerance:  -Impressions: Requested MBS order received. Attempted to schedule however, due to radiology scheduling conflict MBS unable to be completed this date. Plan is for MBS to be completed in AM of . Pt seen sitting upright in bed on 3L of O2 via nasal cannula. Upon entry, Pt eating breakfast. Pt demonstrated improved WOB at rest. Pt agreeable to trials of thin liquids and solids from breakfast tray. With all textures, Pt demonstrated improved bolus control and improved but mildly delayed swallow initiation. With soft solids, Pt demonstrated adequate mastication with effective oral clearing. No overt clinical s/s of aspiration were noted following any texture. Pt WOB remained stable during po trials with no audible wheezing noted following any po texture. Recommend advance to thin liquids with strict aspiration precautions and monitoring of respiratory status. Diet and Treatment Recommendations 2021:  Dysphagia III Soft and Bite-Sized with Thin liquids, no straws, meds in applesauce    Compensatory strategies: Effortful Swallows , Upright as possible with all PO intake , No straws , Small bites/sips     Dysphagia Goals:   1. Pt will functionally tolerate recommended diet with no overt clinical s/s of aspiration (ongoing 2021)   2. Pt will demonstrate understanding of aspiration risk and precautions via education/demonstration with occasional prompting (ongoing 2021)   3.  Pt will advance to least restrictive diet as indicated (ongoing 2021) 4. If clinical s/s of aspiration/penetration continue to be noted, Pt will participate in Modified Barium Swallow Study (ongoing 7/20/2021)     Plan:  Continued daily Dysphagia treatment with goals per plan of care. Patient/Family Education:Education given to the Pt and nurse, who verbalized understanding    Discharge Recommendations:  Pt will benefit from continued skilled Speech Therapy for Dysphagia services, prior to returning home. Timed Code Treatment: 0 min    Total Treatment Time: 15 min    If patient discharges prior to next session this note will serve as a discharge summary. Signature: LYN Duque  Speech-Language Pathology Student    The speech-language pathologist was present, directed the patient's care, made skilled judgment and was responsible for assessment and treatment.      Amy ALMAZANCatskill Regional Medical Center#[id number]

## 2021-07-20 NOTE — PLAN OF CARE
Problem: Falls - Risk of:  Goal: Will remain free from falls  Description: Will remain free from falls  7/20/2021 1124 by Carmen Tracey RN  Outcome: Ongoing     Problem: Falls - Risk of:  Goal: Absence of physical injury  Description: Absence of physical injury  Outcome: Ongoing     Problem: Skin Integrity:  Goal: Will show no infection signs and symptoms  Description: Will show no infection signs and symptoms  Outcome: Ongoing  Goal: Absence of new skin breakdown  Description: Absence of new skin breakdown  Outcome: Ongoing     Problem: OXYGENATION/RESPIRATORY FUNCTION  Goal: Patient will maintain patent airway  Outcome: Ongoing     Problem: OXYGENATION/RESPIRATORY FUNCTION  Goal: Patient will achieve/maintain normal respiratory rate/effort  Description: Respiratory rate and effort will be within normal limits for the patient  7/20/2021 1124 by Carmen Tracey RN  Outcome: Ongoing     Problem: HEMODYNAMIC STATUS  Goal: Patient has stable vital signs and fluid balance  7/20/2021 1124 by Carmen Tracey RN  Outcome: Ongoing     Problem: FLUID AND ELECTROLYTE IMBALANCE  Goal: Fluid and electrolyte balance are achieved/maintained  Outcome: Ongoing     Problem: ACTIVITY INTOLERANCE/IMPAIRED MOBILITY  Goal: Mobility/activity is maintained at optimum level for patient  Outcome: Ongoing     Problem: Pain:  Goal: Pain level will decrease  Description: Pain level will decrease  7/20/2021 1124 by Carmen Tracey RN  Outcome: Ongoing     Problem: Pain:  Goal: Control of acute pain  Description: Control of acute pain  Outcome: Ongoing     Problem: Pain:  Goal: Control of chronic pain  Description: Control of chronic pain  Outcome: Ongoing     Problem: Nutrition  Goal: Optimal nutrition therapy  Outcome: Ongoing   Pt remains free from falls and physical injury. Pt shows no new signs of infection. Redness under breasts being treated with silver impregnated textile.  Pt airway remains patent at this time and pt able to maintain normal respiratory rate and effort on 3 liters of oxygen. Pt VSS on 3 liters of oxygen at this time. Activity is at optimum level for patient at this time. Pt has denied any pain at this time. Pt PO intake adequate. See flowsheet for assessment.

## 2021-07-20 NOTE — PROGRESS NOTES
WA    methylPREDNISolone  40 mg Intravenous Q12H    atorvastatin  10 mg Oral Nightly    levothyroxine  75 mcg Oral Daily    sodium chloride flush  5-40 mL Intravenous 2 times per day    enoxaparin  40 mg Subcutaneous Nightly     Continuous Infusions:   sodium chloride       PRN Meds:.benzocaine-menthol, benzonatate, sodium chloride flush, sodium chloride, ondansetron **OR** ondansetron, polyethylene glycol, acetaminophen **OR** acetaminophen  Continuous Infusions:   sodium chloride         Intake/Output Summary (Last 24 hours) at 7/20/2021 0853  Last data filed at 7/19/2021 2045  Gross per 24 hour   Intake 295 ml   Output 2500 ml   Net -2205 ml       Lab Data:  CBC:   Lab Results   Component Value Date    WBC 11.5 07/20/2021    HGB 12.1 07/20/2021     07/20/2021     BMP:  Lab Results   Component Value Date     07/20/2021    K 4.6 07/20/2021    K 3.8 07/16/2021    CL 95 07/20/2021    CO2 37 07/20/2021    BUN 31 07/20/2021    CREATININE 0.9 07/20/2021    GLUCOSE 201 07/20/2021     INR:   Lab Results   Component Value Date    INR 1.05 06/27/2018        CARDIAC LABS  ENZYMES:  No results for input(s): CKMB, CKMBINDEX, TROPONINI in the last 72 hours. Invalid input(s): CKTOTAL;3  FASTING LIPID PANEL:  Lab Results   Component Value Date    HDL 54 06/25/2021    LDLCALC 88 06/25/2021    TRIG 125 06/25/2021    TSH 1.85 06/10/2021     LIVER PROFILE:  Lab Results   Component Value Date    AST 22 07/16/2021    AST 24 06/25/2021    ALT 14 07/16/2021    ALT 15 06/25/2021     BNP:   Lab Results   Component Value Date    PROBNP 1,900 07/18/2021    PROBNP 1,024 07/16/2021    PROBNP 272 06/25/2021     Iron Studies:  No results found for: FERRITIN  No results found for: IRON, TIBC, FERRITIN         1. WEIGHT: Admit Weight: 195 lb (88.5 kg)      Today  Weight: 192 lb 4.8 oz (87.2 kg)   2.  I/O     Intake/Output Summary (Last 24 hours) at 7/20/2021 0853  Last data filed at 7/19/2021 2045  Gross per 24 hour   Intake 295 ml   Output 2500 ml   Net -2205 ml       Cardiac Testing: Echo 7/19/21:    Summary   -Normal left ventricle size, wall thickness and systolic function with an   estimated ejection fraction of 55-60%. -No regional wall motion abnormalities are noted. -Grade I diastolic dysfunction with normal LV filling pressures. E/e' =   18.6.   -Severe aortic stenosis with a peak velocity of 4.1 m/s and a mean pressure   gradient of 39mmHg. HOLDEN (VTI) is 2.47 cm2. (gradients showed with definity). -No evidence of aortic valve regurgitation.   -There is mild mitral stenosis with a mean gradient of 3 mmHG, P1/2 is 89   ms., Vmax is 1.4 cm/s, HOLDEN (P1/2) is 2.47 cm2.   -Mild mitral regurgitation.   -Mild tricuspid regurgitation. -IVC size is dilated (>2.1 cm) but collapses > 50% with respiration   consistent with elevated RA pressure (8 mmHg). -Estimated pulmonary artery systolic pressure is elevated at 41 mmHg   assuming a right atrial pressure of 8 mmHg. Echo 5/25/2018:  Susi Douglass   -Normal left ventricle size and systolic function with an estimated ejection fraction of 55-60%.  -There is mild concentric left ventricular hypertrophy.   -No regional wall motion abnormalities are seen.   -Normal diastolic function.   -Mild mitral regurgitation.   -Mild aortic stenosis.   -Mild tricuspid regurgitation with a RVSP of 58 mmHg.        Nuclear stress test 7/27/2017:  Impression     :   1.  80% ejection fraction. 2.  Resting wall motion abnormality in the inferior wall compatible with   an anterior wall myocardial infarction. 3.  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 4.  No photopenia with Lexiscan. FINAL IMPRESSION:   Low-risk Lexiscan nuclear stress test.         CT coronary calcium score 7/26/2017:  IMPRESSION:   1. Findings of chronic granulomatous disease with several small   noncalcified nodules.  Recommend followup in one year as these nodules   have remained stable dating back to 2016.   2. Total calcium score is 107. Assessment/Plan:     1. AHF- 6L out, continue with IV lasix, add lyly  2. CAD- no chest pain, continue statin  3.  AS - severe on echo, discussed with pt today, will have Dr. Cornell Hummel evaluate for possible TAVR after discharge          I appreciate the opportunity of cooperating in the care of this individual.    Bandar Carpio, APRN - CNP, ACNP, [address] 7/20/2021, 8:53 AM  Heart Failure  The [address], [address]  Ph: [phone]      Core Measures:   · Discharge instructions:   · LVEF documented:   · ACEI for LV dysfunction:   · Smoking Cessation:

## 2021-07-20 NOTE — PROGRESS NOTES
[facility]    7/20/2021 2:09 PM        Name: Jr Diaz . Admitted: 7/16/2021  Primary Care Provider: Gallo Viramontes (Tel: [phone])      Chief Complaint   Patient presents with    Shortness of Breath     From [facility] via EMS. SOB and dyspnea since last night, weakness. Expiratory wheezing. Given duoneb around 3-4 AM. O2 as needed at baseline. Hx of COPD, CHF. Brief History: Patient is an 81 yo female with hx CHF, COPD, hypothyroidism, MARIAA. She presented to hospital from [facility] with worsening shortness of breath, wheezes, and weakness. She received nebulizer treatment en route to ER and repeated in ER with minimal relief. Placed on bipap. No acute findings on CXR, labs notable for elevated BNP and she was given IV Lasix. Admitted with acute exacerbations CHF and COPD. Subjective: Patient sitting at sink working with physical therapy. States her breathing is improved and she feels better. Hoping to go back to her assisted living, but understands she needs 24-hour assistance at this time. Refused BiPAP last night. Denies chest pain, worsening shortness of breath, palpitations, abdominal pain nausea vomiting. Reviewed plan of care, denied further needs or questions.       Current Medications  docusate sodium (COLACE) capsule 100 mg, BID  polyethylene glycol (GLYCOLAX) packet 17 g, Daily  bisacodyl (DULCOLAX) suppository 10 mg, Daily  glucose (GLUTOSE) 40 % oral gel 15 g, PRN  dextrose 50 % IV solution, PRN  glucagon (rDNA) injection 1 mg, PRN  dextrose 5 % solution, PRN  insulin lispro (1 Unit Dial) 0-6 Units, TID WC  insulin lispro (1 Unit Dial) 0-3 Units, Nightly  spironolactone (ALDACTONE) tablet 12.5 mg, Daily  benzocaine-menthol (CEPACOL SORE THROAT) lozenge 1 lozenge, PRN  guaiFENesin (MUCINEX) extended release tablet 600 mg, BID  benzonatate (TESSALON) capsule 100 mg, TID PRN  furosemide (LASIX) injection 40 mg, BID  budesonide (PULMICORT) nebulizer suspension 500 mcg, BID  Arformoterol Tartrate (BROVANA) nebulizer solution 15 mcg, BID  ipratropium-albuterol (DUONEB) nebulizer solution 1 ampule, Q4H WA  methylPREDNISolone sodium (SOLU-MEDROL) injection 40 mg, Q12H  atorvastatin (LIPITOR) tablet 10 mg, Nightly  levothyroxine (SYNTHROID) tablet 75 mcg, Daily  sodium chloride flush 0.9 % injection 5-40 mL, 2 times per day  sodium chloride flush 0.9 % injection 5-40 mL, PRN  0.9 % sodium chloride infusion, PRN  enoxaparin (LOVENOX) injection 40 mg, Nightly  ondansetron (ZOFRAN-ODT) disintegrating tablet 4 mg, Q8H PRN   Or  ondansetron (ZOFRAN) injection 4 mg, Q6H PRN  polyethylene glycol (GLYCOLAX) packet 17 g, Daily PRN  acetaminophen (TYLENOL) tablet 650 mg, Q6H PRN   Or  acetaminophen (TYLENOL) suppository 650 mg, Q6H PRN        Objective:  BP (!) 140/79   Pulse 92   Temp 97.4 °F (36.3 °C) (Axillary)   Resp 18   Ht 5' 5\" (1.651 m)   Wt 192 lb 4.8 oz (87.2 kg)   SpO2 98%   BMI 32.00 kg/m²     Intake/Output Summary (Last 24 hours) at 7/20/2021 1409  Last data filed at 7/20/2021 0941  Gross per 24 hour   Intake 775 ml   Output 1500 ml   Net -725 ml      Wt Readings from Last 3 Encounters:   07/20/21 192 lb 4.8 oz (87.2 kg)   06/10/21 195 lb (88.5 kg)   03/30/21 210 lb (95.3 kg)     General:  Awake, alert, oriented in NAD  Skin:  Warm and dry. No unusual bruising or rash  Neck:  Supple. Chest:  Normal effort. Diminished bilateral lower lobes.   No crackles or wheezes  Cardiovascular:  RRR, normal S1/S2, no murmur/gallop/rub  Abdomen:  Soft, nontender, +bowel sounds  Extremities:  Trace edema  Neurological: No focal deficits  Psychological: Normal mood and affect    Labs and Tests:  CBC:   Recent Labs     07/18/21  0518 07/19/21  0449 07/20/21  0454   WBC 16.2* 13.5* 11.5*   HGB 11.1* 11.6* 12.1    197 216     BMP:    Recent Labs 07/18/21  0518 07/19/21  0449 07/20/21  0454    137 137   K 4.3 4.2 4.6   CL 97* 96* 95*   CO2 32 34* 37*   BUN 32* 32* 31*   CREATININE 0.9 0.9 0.9   GLUCOSE 197* 175* 201*     Hepatic:   No results for input(s): AST, ALT, ALB, BILITOT, ALKPHOS in the last 72 hours. CXR 7/17/2021:  No acute cardiopulmonary disease. CXR 7/18/2021:  Slight pulmonary vascular congestion.  No acute airspace disease or other   abnormality. Echo 5/25/2018:  Summary   -Normal left ventricle size and systolic function with an estimated ejection   fraction of 55-60%. -There is mild concentric left ventricular hypertrophy.   -No regional wall motion abnormalities are seen.   -Normal diastolic function. -Mild mitral regurgitation.   -Mild aortic stenosis. -Mild tricuspid regurgitation with a RVSP of 58 mmHg. Problem List  Principal Problem:    Acute respiratory failure (HCC)  Active Problems:    Coronary artery disease due to lipid rich plaque    Acute on chronic diastolic heart failure (HCC)    COPD exacerbation (HCC)    Nonrheumatic aortic valve stenosis  Resolved Problems:    * No resolved hospital problems. *       Assessment & Plan:   1. Acute on chronic dCHF. proBNP 1024->1900. Started on IV Lasix with subjective improvement. Monitor kidney function closely, daily BMP. Appreciate cardio recs, continue IV Lasix, added spironolactone 7/20/2021. Echo with severe aortic stenosis, EF of 55 to 92%, grade 1 diastolic dysfunction. Weight 292 pounds (198). Continue daily weights, accurate I's and O's, low-sodium diet  2. Acute exacerbation COPD. No infiltrate on admission or CXR 7/18. Has congestive cough, productive clear phlegm. Continue IV solumedrol, Brovana, Pulmicort, and duonebs. WBC 11.7->5.9->16.2->13.5, likely secondary to steroids. Procalcitonin 0.18->0.20, considered low risk for bacterial infection. Appreciate pulmonary recs, clinically no suggestion of pneumonia.   Has been on IV Solu-Medrol for 4 days, message sent to pulmonology asking when we can transition to p.o. prednisone, responded \"okay\". Will start prednisone 40 mg p.o. daily, first dose tomorrow. 3. Acute on chronic hypoxic and hypercapnic respiratory failure. Initially required bipap, now on 3 liters with O2 sat 96% (baseline 3 liters). Secondary to CHF and COPD exacerbations. Continue management as above. Patient refused BiPAP last night, encourage use. 4. Oropharyngeal dysphagia. Noted on speech evaluation. Nursing also reports some coughing with meals and fluids. To have modified barium swallow study today. On dysphagia diet, soft and bite sized with mildly thick liquids. 5. HTN. Controlled. Lisinopril held on admission for diuresis, BP was on soft side, improved today. Continue to follow, plan to resume ACE after diuresis. 6. Hyperglycemia: Secondary to steroids. Add low-dose sliding scale insulin, Accu-Cheks AC at bedtime. 7. Hypothyroidism. TSH 2.11 (6/25). Continue levothyroxine. 8. MARIAA. On bipap at home. 9. Constipation: Add Colace, MiraLAX, Dulcolax suppository. Diet: Adult Oral Nutrition Supplement; Frozen Oral Supplement  ADULT DIET; Dysphagia - Soft and Bite Sized; No Drinking Straws  Code:Limited  DVT PPX: enoxaparin  Disposition:  working with [facility] to see if they will accept patient into their rehab program despite out of network for insurance.       JOSE ALFREDO Pugh CNP   7/20/2021 2:09 PM

## 2021-07-21 ENCOUNTER — APPOINTMENT (OUTPATIENT)
Dept: GENERAL RADIOLOGY | Age: 86
DRG: 291 | End: 2021-07-21
Payer: MEDICARE

## 2021-07-21 LAB
ANION GAP SERPL CALCULATED.3IONS-SCNC: 7 MMOL/L (ref 3–16)
BUN BLDV-MCNC: 42 MG/DL (ref 7–20)
CALCIUM SERPL-MCNC: 8.8 MG/DL (ref 8.3–10.6)
CHLORIDE BLD-SCNC: 91 MMOL/L (ref 99–110)
CO2: 35 MMOL/L (ref 21–32)
CREAT SERPL-MCNC: 1.1 MG/DL (ref 0.6–1.2)
ESTIMATED AVERAGE GLUCOSE: 125.5 MG/DL
GFR AFRICAN AMERICAN: 57
GFR NON-AFRICAN AMERICAN: 47
GLUCOSE BLD-MCNC: 104 MG/DL (ref 70–99)
GLUCOSE BLD-MCNC: 119 MG/DL (ref 70–99)
GLUCOSE BLD-MCNC: 161 MG/DL (ref 70–99)
GLUCOSE BLD-MCNC: 189 MG/DL (ref 70–99)
GLUCOSE BLD-MCNC: 204 MG/DL (ref 70–99)
HBA1C MFR BLD: 6 %
HCT VFR BLD CALC: 38.1 % (ref 36–48)
HEMOGLOBIN: 12.5 G/DL (ref 12–16)
MCH RBC QN AUTO: 29.4 PG (ref 26–34)
MCHC RBC AUTO-ENTMCNC: 32.8 G/DL (ref 31–36)
MCV RBC AUTO: 89.7 FL (ref 80–100)
PDW BLD-RTO: 13 % (ref 12.4–15.4)
PERFORMED ON: ABNORMAL
PLATELET # BLD: 235 K/UL (ref 135–450)
PMV BLD AUTO: 8.3 FL (ref 5–10.5)
POTASSIUM REFLEX MAGNESIUM: 4.7 MMOL/L (ref 3.5–5.1)
RBC # BLD: 4.25 M/UL (ref 4–5.2)
SODIUM BLD-SCNC: 133 MMOL/L (ref 136–145)
WBC # BLD: 14.2 K/UL (ref 4–11)

## 2021-07-21 PROCEDURE — 6370000000 HC RX 637 (ALT 250 FOR IP): Performed by: NURSE PRACTITIONER

## 2021-07-21 PROCEDURE — 94640 AIRWAY INHALATION TREATMENT: CPT

## 2021-07-21 PROCEDURE — 97110 THERAPEUTIC EXERCISES: CPT

## 2021-07-21 PROCEDURE — 92611 MOTION FLUOROSCOPY/SWALLOW: CPT

## 2021-07-21 PROCEDURE — 6370000000 HC RX 637 (ALT 250 FOR IP): Performed by: FAMILY MEDICINE

## 2021-07-21 PROCEDURE — 2580000003 HC RX 258: Performed by: FAMILY MEDICINE

## 2021-07-21 PROCEDURE — 94660 CPAP INITIATION&MGMT: CPT

## 2021-07-21 PROCEDURE — 80048 BASIC METABOLIC PNL TOTAL CA: CPT

## 2021-07-21 PROCEDURE — 99232 SBSQ HOSP IP/OBS MODERATE 35: CPT | Performed by: INTERNAL MEDICINE

## 2021-07-21 PROCEDURE — 94761 N-INVAS EAR/PLS OXIMETRY MLT: CPT

## 2021-07-21 PROCEDURE — 97530 THERAPEUTIC ACTIVITIES: CPT

## 2021-07-21 PROCEDURE — 85027 COMPLETE CBC AUTOMATED: CPT

## 2021-07-21 PROCEDURE — 97535 SELF CARE MNGMENT TRAINING: CPT

## 2021-07-21 PROCEDURE — 99232 SBSQ HOSP IP/OBS MODERATE 35: CPT | Performed by: NURSE PRACTITIONER

## 2021-07-21 PROCEDURE — 2060000000 HC ICU INTERMEDIATE R&B

## 2021-07-21 PROCEDURE — 92526 ORAL FUNCTION THERAPY: CPT

## 2021-07-21 PROCEDURE — 74230 X-RAY XM SWLNG FUNCJ C+: CPT

## 2021-07-21 PROCEDURE — 6360000002 HC RX W HCPCS: Performed by: FAMILY MEDICINE

## 2021-07-21 PROCEDURE — 2700000000 HC OXYGEN THERAPY PER DAY

## 2021-07-21 PROCEDURE — 6360000002 HC RX W HCPCS: Performed by: INTERNAL MEDICINE

## 2021-07-21 PROCEDURE — 36415 COLL VENOUS BLD VENIPUNCTURE: CPT

## 2021-07-21 PROCEDURE — 6360000002 HC RX W HCPCS: Performed by: NURSE PRACTITIONER

## 2021-07-21 RX ORDER — SPIRONOLACTONE 25 MG/1
12.5 TABLET ORAL DAILY
Qty: 30 TABLET | Refills: 0
Start: 2021-07-22 | End: 2021-07-22

## 2021-07-21 RX ORDER — LISINOPRIL 5 MG/1
2.5 TABLET ORAL DAILY
Status: DISCONTINUED | OUTPATIENT
Start: 2021-07-21 | End: 2021-07-22 | Stop reason: HOSPADM

## 2021-07-21 RX ORDER — PREDNISONE 10 MG/1
TABLET ORAL
Qty: 40 TABLET | Refills: 0
Start: 2021-07-21 | End: 2021-07-22

## 2021-07-21 RX ORDER — GUAIFENESIN 600 MG/1
600 TABLET, EXTENDED RELEASE ORAL 2 TIMES DAILY
Qty: 28 TABLET | Refills: 0
Start: 2021-07-21 | End: 2021-07-22

## 2021-07-21 RX ORDER — PSEUDOEPHEDRINE HCL 30 MG
100 TABLET ORAL 2 TIMES DAILY
Qty: 120 CAPSULE | Refills: 0
Start: 2021-07-21 | End: 2021-07-22

## 2021-07-21 RX ORDER — FUROSEMIDE 20 MG/1
60 TABLET ORAL 2 TIMES DAILY
Qty: 60 TABLET | Refills: 3
Start: 2021-07-21 | End: 2021-07-22

## 2021-07-21 RX ORDER — POLYETHYLENE GLYCOL 3350 17 G/17G
17 POWDER, FOR SOLUTION ORAL DAILY
Qty: 527 G | Refills: 0
Start: 2021-07-22 | End: 2021-07-22

## 2021-07-21 RX ADMIN — MAGNESIUM CITRATE 296 ML: 1.75 LIQUID ORAL at 14:26

## 2021-07-21 RX ADMIN — DOCUSATE SODIUM 100 MG: 100 CAPSULE ORAL at 21:54

## 2021-07-21 RX ADMIN — ARFORMOTEROL TARTRATE 15 MCG: 15 SOLUTION RESPIRATORY (INHALATION) at 20:22

## 2021-07-21 RX ADMIN — GUAIFENESIN 600 MG: 600 TABLET, EXTENDED RELEASE ORAL at 08:29

## 2021-07-21 RX ADMIN — Medication 10 ML: at 08:30

## 2021-07-21 RX ADMIN — Medication 10 ML: at 21:56

## 2021-07-21 RX ADMIN — PREDNISONE 40 MG: 20 TABLET ORAL at 08:29

## 2021-07-21 RX ADMIN — FUROSEMIDE 60 MG: 20 TABLET ORAL at 17:49

## 2021-07-21 RX ADMIN — LEVOTHYROXINE SODIUM 75 MCG: 0.07 TABLET ORAL at 06:31

## 2021-07-21 RX ADMIN — DOCUSATE SODIUM 100 MG: 100 CAPSULE ORAL at 08:29

## 2021-07-21 RX ADMIN — ARFORMOTEROL TARTRATE 15 MCG: 15 SOLUTION RESPIRATORY (INHALATION) at 08:02

## 2021-07-21 RX ADMIN — INSULIN LISPRO 1 UNITS: 100 INJECTION, SOLUTION INTRAVENOUS; SUBCUTANEOUS at 17:49

## 2021-07-21 RX ADMIN — ATORVASTATIN CALCIUM 10 MG: 10 TABLET, FILM COATED ORAL at 21:54

## 2021-07-21 RX ADMIN — POLYETHYLENE GLYCOL 3350 17 G: 17 POWDER, FOR SOLUTION ORAL at 08:32

## 2021-07-21 RX ADMIN — FUROSEMIDE 40 MG: 10 INJECTION, SOLUTION INTRAMUSCULAR; INTRAVENOUS at 08:30

## 2021-07-21 RX ADMIN — INSULIN LISPRO 1 UNITS: 100 INJECTION, SOLUTION INTRAVENOUS; SUBCUTANEOUS at 21:54

## 2021-07-21 RX ADMIN — INSULIN LISPRO 1 UNITS: 100 INJECTION, SOLUTION INTRAVENOUS; SUBCUTANEOUS at 13:25

## 2021-07-21 RX ADMIN — BENZONATATE 100 MG: 100 CAPSULE ORAL at 09:49

## 2021-07-21 RX ADMIN — BUDESONIDE 500 MCG: 0.5 SUSPENSION RESPIRATORY (INHALATION) at 08:02

## 2021-07-21 RX ADMIN — BENZOCAINE AND MENTHOL 1 LOZENGE: 15; 3.6 LOZENGE ORAL at 09:49

## 2021-07-21 RX ADMIN — IPRATROPIUM BROMIDE AND ALBUTEROL SULFATE 1 AMPULE: .5; 3 SOLUTION RESPIRATORY (INHALATION) at 20:23

## 2021-07-21 RX ADMIN — SPIRONOLACTONE 12.5 MG: 25 TABLET ORAL at 08:29

## 2021-07-21 RX ADMIN — ENOXAPARIN SODIUM 40 MG: 40 INJECTION SUBCUTANEOUS at 21:53

## 2021-07-21 RX ADMIN — LISINOPRIL 2.5 MG: 5 TABLET ORAL at 13:23

## 2021-07-21 RX ADMIN — BUDESONIDE 500 MCG: 0.5 SUSPENSION RESPIRATORY (INHALATION) at 20:23

## 2021-07-21 RX ADMIN — IPRATROPIUM BROMIDE AND ALBUTEROL SULFATE 1 AMPULE: .5; 3 SOLUTION RESPIRATORY (INHALATION) at 16:11

## 2021-07-21 RX ADMIN — GUAIFENESIN 600 MG: 600 TABLET, EXTENDED RELEASE ORAL at 21:54

## 2021-07-21 RX ADMIN — IPRATROPIUM BROMIDE AND ALBUTEROL SULFATE 1 AMPULE: .5; 3 SOLUTION RESPIRATORY (INHALATION) at 07:58

## 2021-07-21 ASSESSMENT — PAIN DESCRIPTION - LOCATION
LOCATION: FOOT
LOCATION: FOOT

## 2021-07-21 ASSESSMENT — PAIN DESCRIPTION - PAIN TYPE
TYPE: ACUTE PAIN
TYPE: ACUTE PAIN

## 2021-07-21 ASSESSMENT — PAIN DESCRIPTION - DESCRIPTORS
DESCRIPTORS: SORE
DESCRIPTORS: SORE

## 2021-07-21 ASSESSMENT — PAIN SCALES - GENERAL
PAINLEVEL_OUTOF10: 0
PAINLEVEL_OUTOF10: 0
PAINLEVEL_OUTOF10: 5
PAINLEVEL_OUTOF10: 5
PAINLEVEL_OUTOF10: 0
PAINLEVEL_OUTOF10: 0

## 2021-07-21 ASSESSMENT — PAIN DESCRIPTION - ORIENTATION: ORIENTATION: RIGHT;LEFT

## 2021-07-21 ASSESSMENT — PAIN DESCRIPTION - ONSET
ONSET: PROGRESSIVE
ONSET: PROGRESSIVE

## 2021-07-21 ASSESSMENT — PAIN DESCRIPTION - FREQUENCY
FREQUENCY: INTERMITTENT
FREQUENCY: CONTINUOUS

## 2021-07-21 ASSESSMENT — PAIN - FUNCTIONAL ASSESSMENT
PAIN_FUNCTIONAL_ASSESSMENT: ACTIVITIES ARE NOT PREVENTED
PAIN_FUNCTIONAL_ASSESSMENT: ACTIVITIES ARE NOT PREVENTED

## 2021-07-21 ASSESSMENT — PAIN DESCRIPTION - PROGRESSION
CLINICAL_PROGRESSION: GRADUALLY WORSENING
CLINICAL_PROGRESSION: GRADUALLY WORSENING

## 2021-07-21 NOTE — PROGRESS NOTES
; [facility]  HEART FAILURE  Progress Note      Admit Date 7/16/2021     Reason for Consult:      Reason for Consultation/Chief Complaint: SOB    HPI:    Zina Márquez is a 82 y.o. female with PMH COPD, CAD, HFpEF, MARIAA - treated admitted with increased SOB and LE edema. Pt has diuresed about 7L on IV Lasix, echo with severe AS. Subjective:  Patient is being seen for SOB. There were no acute overnight cardiac events. Today Ms. Pedro García denies chest pain or palpitations, and tells me SOB is improved today, feels back to baseline.      Baseline Weight: 185-190  Wt Readings from Last 3 Encounters:   07/21/21 187 lb 14.4 oz (85.2 kg)   06/10/21 195 lb (88.5 kg)   03/30/21 210 lb (95.3 kg)          NYHA Class III  Objective:   /71   Pulse 79   Temp 96.5 °F (35.8 °C) (Axillary)   Resp 18   Ht 5' 5\" (1.651 m)   Wt 187 lb 14.4 oz (85.2 kg)   SpO2 97%   BMI 31.27 kg/m²       Intake/Output Summary (Last 24 hours) at 7/21/2021 0900  Last data filed at 7/21/2021 4585  Gross per 24 hour   Intake 480 ml   Output 1750 ml   Net -1270 ml      Wt Readings from Last 3 Encounters:   07/21/21 187 lb 14.4 oz (85.2 kg)   06/10/21 195 lb (88.5 kg)   03/30/21 210 lb (95.3 kg)      In: -   Out: 1350     Physical Exam:  General Appearance:  Non-obese/Well Nourished  Respiratory:  · Resp Auscultation: CTA  Cardiovascular:  · Auscultation: Regular rate and rhythm, normal S1S2, +murmur  · Palpation: Normal    · Pedal Pulses: 2+ and equal   Abdomen:  · Soft, NT, ND, + bs  Extremities:  · No Cyanosis or Clubbing  · Extremities: Trace edema  Neurological/Psychiatric:  · Oriented to time, place, and person  · Non-anxious    MEDICATIONS:   Scheduled Meds:   Scheduled Meds:   docusate sodium  100 mg Oral BID    polyethylene glycol  17 g Oral Daily    bisacodyl  10 mg Rectal Daily    insulin lispro  0-6 Units Subcutaneous TID WC    insulin lispro  0-3 Units Subcutaneous Nightly    spironolactone  12.5 mg Oral Daily    predniSONE  40 mg Oral Daily    guaiFENesin  600 mg Oral BID    furosemide  40 mg Intravenous BID    budesonide  0.5 mg Nebulization BID    Arformoterol Tartrate  15 mcg Nebulization BID    ipratropium-albuterol  1 ampule Inhalation Q4H WA    atorvastatin  10 mg Oral Nightly    levothyroxine  75 mcg Oral Daily    sodium chloride flush  5-40 mL Intravenous 2 times per day    enoxaparin  40 mg Subcutaneous Nightly     Continuous Infusions:   dextrose      sodium chloride       PRN Meds:.glucose, dextrose, glucagon (rDNA), dextrose, benzocaine-menthol, benzonatate, sodium chloride flush, sodium chloride, ondansetron **OR** ondansetron, polyethylene glycol, acetaminophen **OR** acetaminophen  Continuous Infusions:   dextrose      sodium chloride         Intake/Output Summary (Last 24 hours) at 7/21/2021 0900  Last data filed at 7/21/2021 0648  Gross per 24 hour   Intake 480 ml   Output 1750 ml   Net -1270 ml       Lab Data:  CBC:   Lab Results   Component Value Date    WBC 14.2 07/21/2021    HGB 12.5 07/21/2021     07/21/2021     BMP:  Lab Results   Component Value Date     07/21/2021    K 4.7 07/21/2021    CL 91 07/21/2021    CO2 35 07/21/2021    BUN 42 07/21/2021    CREATININE 1.1 07/21/2021    GLUCOSE 119 07/21/2021     INR:   Lab Results   Component Value Date    INR 1.05 06/27/2018        CARDIAC LABS  ENZYMES:  No results for input(s): CKMB, CKMBINDEX, TROPONINI in the last 72 hours.     Invalid input(s): CKTOTAL;3  FASTING LIPID PANEL:  Lab Results   Component Value Date    HDL 54 06/25/2021    LDLCALC 88 06/25/2021    TRIG 125 06/25/2021    TSH 1.85 06/10/2021     LIVER PROFILE:  Lab Results   Component Value Date    AST 22 07/16/2021    AST 24 06/25/2021    ALT 14 07/16/2021    ALT 15 06/25/2021     BNP:   Lab Results   Component Value Date    PROBNP 2,169 07/20/2021    PROBNP 1,900 07/18/2021    PROBNP 1,024 07/16/2021     Iron Studies:  No results found for: FERRITIN  No results found stress test.         CT coronary calcium score 7/26/2017:  IMPRESSION:   1. Findings of chronic granulomatous disease with several small   noncalcified nodules. Recommend followup in one year as these nodules   have remained stable dating back to 2016.   2. Total calcium score is 107. Assessment/Plan:     1. AHF- 7L out on IV Lasix, lyly started yesterday, stop iv lasix today and start po, restart ACE, pt ok for discharge with f/u in one week in CHF clinic  2. CAD- no chest pain, continue statin  3.  AS - severe on echo, discussed with pt today, will have Dr. Ean Foote evaluate for possible TAVR after discharge          I appreciate the opportunity of cooperating in the care of this individual.    Ling Ortega, APRN - CNP, ACNP, [address] 7/21/2021, 9:00 AM  Heart Failure  The [address], [address]  Ph: [phone]      Core Measures:   · Discharge instructions:   · LVEF documented:   · ACEI for LV dysfunction:   · Smoking Cessation:

## 2021-07-21 NOTE — PROGRESS NOTES
[facility] PROGRESS NOTE    7/21/2021 7:32 PM        Name: Matthieu Joiner . Admitted: 7/16/2021  Primary Care Provider: Gallo Blanc (Tel: [phone])      Chief Complaint   Patient presents with    Shortness of Breath     From [facility] via EMS. SOB and dyspnea since last night, weakness. Expiratory wheezing. Given duoneb around 3-4 AM. O2 as needed at baseline. Hx of COPD, CHF. Brief History: Patient is an 81 yo female with hx CHF, COPD, hypothyroidism, MARIAA. She presented to hospital from [facility] with worsening shortness of breath, wheezes, and weakness. She received nebulizer treatment en route to ER and repeated in ER with minimal relief. Placed on bipap. No acute findings on CXR, labs notable for elevated BNP and she was given IV Lasix. Admitted with acute exacerbations CHF and COPD. Subjective: Patient sitting at sink working with physical therapy. States her breathing is improved and she feels better. Hoping to go back to her assisted living, but understands she needs 24-hour assistance at this time. More BiPAP last night. Denies chest pain, worsening shortness of breath, palpitations, abdominal pain nausea vomiting. Reviewed plan of care, denied further needs or questions.       Current Medications  lisinopril (PRINIVIL;ZESTRIL) tablet 2.5 mg, Daily  furosemide (LASIX) tablet 60 mg, BID  docusate sodium (COLACE) capsule 100 mg, BID  polyethylene glycol (GLYCOLAX) packet 17 g, Daily  bisacodyl (DULCOLAX) suppository 10 mg, Daily  glucose (GLUTOSE) 40 % oral gel 15 g, PRN  dextrose 50 % IV solution, PRN  glucagon (rDNA) injection 1 mg, PRN  dextrose 5 % solution, PRN  insulin lispro (1 Unit Dial) 0-6 Units, TID WC  insulin lispro (1 Unit Dial) 0-3 Units, Nightly  spironolactone (ALDACTONE) tablet 12.5 mg, Daily  predniSONE (DELTASONE) tablet 40 mg, Daily  benzocaine-menthol (CEPACOL SORE THROAT) lozenge 1 lozenge, PRN  guaiFENesin (MUCINEX) extended release tablet 600 mg, BID  benzonatate (TESSALON) capsule 100 mg, TID PRN  budesonide (PULMICORT) nebulizer suspension 500 mcg, BID  Arformoterol Tartrate (BROVANA) nebulizer solution 15 mcg, BID  ipratropium-albuterol (DUONEB) nebulizer solution 1 ampule, Q4H WA  atorvastatin (LIPITOR) tablet 10 mg, Nightly  levothyroxine (SYNTHROID) tablet 75 mcg, Daily  sodium chloride flush 0.9 % injection 5-40 mL, 2 times per day  sodium chloride flush 0.9 % injection 5-40 mL, PRN  0.9 % sodium chloride infusion, PRN  enoxaparin (LOVENOX) injection 40 mg, Nightly  ondansetron (ZOFRAN-ODT) disintegrating tablet 4 mg, Q8H PRN   Or  ondansetron (ZOFRAN) injection 4 mg, Q6H PRN  polyethylene glycol (GLYCOLAX) packet 17 g, Daily PRN  acetaminophen (TYLENOL) tablet 650 mg, Q6H PRN   Or  acetaminophen (TYLENOL) suppository 650 mg, Q6H PRN        Objective:  BP (!) 104/57   Pulse 82   Temp 97.9 °F (36.6 °C) (Oral)   Resp 15   Ht 5' 5\" (1.651 m)   Wt 187 lb 14.4 oz (85.2 kg)   SpO2 95%   BMI 31.27 kg/m²     Intake/Output Summary (Last 24 hours) at 7/21/2021 1932  Last data filed at 7/21/2021 1350  Gross per 24 hour   Intake 360 ml   Output 1300 ml   Net -940 ml      Wt Readings from Last 3 Encounters:   07/21/21 187 lb 14.4 oz (85.2 kg)   06/10/21 195 lb (88.5 kg)   03/30/21 210 lb (95.3 kg)     General:  Awake, alert, oriented in NAD  Skin:  Warm and dry. No unusual bruising or rash  Neck:  Supple. Chest:  Normal effort. Diminished bilateral lower lobes.   No crackles or wheezes  Cardiovascular:  RRR, normal S1/S2, no murmur/gallop/rub  Abdomen:  Soft, nontender, +bowel sounds  Extremities:  Trace edema  Neurological: No focal deficits  Psychological: Normal mood and affect    Labs and Tests:  CBC:   Recent Labs     07/19/21  0449 07/20/21  0454 07/21/21  0430   WBC 13.5* 11.5* 14.2*   HGB 11.6* 12.1 12.5    216 235 BMP:    Recent Labs     07/20/21  0454 07/20/21  1543 07/21/21  0430    137 133*   K 4.6 4.8 4.7   CL 95* 95* 91*   CO2 37* 36* 35*   BUN 31* 34* 42*   CREATININE 0.9 1.0 1.1   GLUCOSE 201* 172* 119*     Hepatic:   No results for input(s): AST, ALT, ALB, BILITOT, ALKPHOS in the last 72 hours. CXR 7/17/2021:  No acute cardiopulmonary disease. CXR 7/18/2021:  Slight pulmonary vascular congestion.  No acute airspace disease or other   abnormality. Echo 5/25/2018:  Summary   -Normal left ventricle size and systolic function with an estimated ejection   fraction of 55-60%. -There is mild concentric left ventricular hypertrophy.   -No regional wall motion abnormalities are seen.   -Normal diastolic function. -Mild mitral regurgitation.   -Mild aortic stenosis. -Mild tricuspid regurgitation with a RVSP of 58 mmHg. Problem List  Principal Problem:    Acute respiratory failure (HCC)  Active Problems:    Coronary artery disease due to lipid rich plaque    Acute on chronic diastolic heart failure (HCC)    COPD exacerbation (HCC)    Nonrheumatic aortic valve stenosis  Resolved Problems:    * No resolved hospital problems. *       Assessment & Plan:   1. Acute on chronic dCHF. proBNP 1024->1900. Started on IV Lasix with subjective improvement. Monitor kidney function closely, daily BMP. Appreciate cardio recs, treated with IV Lasix, added spironolactone 7/20/2021. Transition to oral Lasix  7/21/2021. Echo with severe aortic stenosis, EF of 55 to 31%, grade 1 diastolic dysfunction. Cardiology will have Dr. Saenz Aver evaluate for possible TAVR after discharge. Weight 187 pounds (198). Continue daily weights, accurate I's and O's, low-sodium diet. Per cardiology okay for discharge. 2. Acute exacerbation COPD. No infiltrate on admission or CXR 7/18. Has congestive cough, productive clear phlegm. Modified barium swallow studies did show silent aspiration.   Likely contributing factor for COPD exacerbation. Continue with speech therapy diet recommendations, dysphagia level 3, thin liquids, no straws, meds in purée. Continue IV solumedrol, Brovana, Pulmicort, and duonebs. WBC 11.7->5.9->16.2->13.5, likely secondary to steroids. Procalcitonin 0.18->0.20, considered low risk for bacterial infection. Appreciate pulmonary recs, clinically no suggestion of pneumonia. Treated with IV Solu-Medrol for 4 days. Transition to p.o. prednisone taper 7/20/2021. Discussed with pulmonology, who recommends prednisone taper for 2 weeks. 3. Acute on chronic hypoxic and hypercapnic respiratory failure. Initially required bipap, now on 3 liters with O2 sat 96% (baseline 3 liters). Secondary to CHF and COPD exacerbations. Continue management as above. Continue BiPAP. 4. Oropharyngeal dysphagia. Noted on speech evaluation. Nursing also reports some coughing with meals and fluids. Modified barium swallow study with silent aspiration. Speech pathology recommends dysphagia level 3 diet, meds in purée, thin liquids, no straws. 5. HTN. Controlled. Lisinopril held on admission for diuresis, BP was on soft side, improved today. Continue to follow, plan to resume ACE after diuresis. 6. Hyperglycemia: Secondary to steroids. Continue low-dose sliding scale insulin, Accu-Cheks AC at bedtime. 7. Hypothyroidism. TSH 2.11 (6/25). Continue levothyroxine. 8. MARIAA. On bipap at home. 9. Constipation: Continue Colace, MiraLAX, Dulcolax suppository. Still no bowel movement, however patient did not take Dulcolax suppository as prescribed yesterday. Agrees to take it today and will give mag citrate. Diet: Adult Oral Nutrition Supplement; Frozen Oral Supplement  ADULT DIET; Dysphagia - Soft and Bite Sized;  No Drinking Straws  Code:Limited  DVT PPX: enoxaparin  Disposition:  working with [facility] to see if they will accept patient into their rehab program despite out of network for insurance. Patient medically stable for discharge 7/21/2021. Discharge order placed.       JOSE ALFREDO Parmar CNP   7/21/2021 7:32 PM

## 2021-07-21 NOTE — PROCEDURES
[facility] SPEECH THERAPY  MODIFIED BARIUM SWALLOW EVALUATION    Patient's Name: Martha Cleaning O.B: 1935  Medical Diagnosis: Acute respiratory failure ([address] [J96.00]  Treatment Diagnosis: Dysphagia  Radiologist: Dr. Judyth Hammans  Date of Evaluation: 7/21/2021  Type of Study: Modified Barium Swallowing Study (MBS)  Diet Prior to Study:  Dysphagia III Soft and Bite-Sized with thin liquids, no straws, meds with applesauce  Pain Level: Pt did not report pain    Impression:  Modified Barium Swallow evaluation completed on 7/21/2021. Results indicate mild-moderate oropharyngeal dysphagia characterized by premature bolus loss to pharynx, delayed swallow initiation, reduced pharyngeal clearing, and intermittent laryngeal penetration with thin liquids and aspiration of thin liquids via tsp and straw. On initial trial of thin liquids via tsp, immediate loss to laryngeal inlet/level of vocal cords was noted with eventual aspiration, absent cough reflex noted. With thin liquids via cup, intermittent premature spillage to pyriforms was noted with delayed swallow initiation and intermittent laryngeal penetration. Penetration from thin liquids via cup appeared to largely self-clear however difficult to fully determine due to residue from prior aspiration episode. Given thin liquids via straw, reduced coordination with prolonged laryngeal vestibule closer noted with episode of SILENT aspiration on second successive sip. Reduced sensation as cough was not elicited. No aspiration or laryngeal penetration was viewed with nectar and honey thick liquid trials, however intermittent pyriform residue was noted across all liquid textures including thins. Given solid trials, mildly prolonged mastication was noted with adequate oral and pharyngeal clearing achieved. Overall, Pt is at increased risk for episodic aspiration of thin liquids if precautions are not followed or at times of respiratory compromise.  See below for main with cues   4.) Pt will demonstrate improved sensory motor function via targeted exercises and appropriate treatment modalities     Consistencies given: Thin, Mildly Thick (Nectar) Liquids, Moderately Thick (honey) Liquids, Puree, Soft solid, Solid    Oral Phase  -Reduced bolus control  -Premature bolus loss to pharynx  -Mildly prolonged mastication    Pharyngeal Phase  -Delayed swallow onset  -Pooling to the pyriforms intermittently with thin liquids  -Reduced hyolaryngeal excursion  -Intermittent laryngeal penetration with thin liquids via cup, appeared to be mostly self-clearing  -SILENT aspiration with thins via tsp and straw  -Reduced coordination with thin liquids via straw  -Reduced sensation/absent cough reflex to clear aspirated material   -Reduced pharyngeal clearing with pyriform residue post liquid trials    Esophageal Phase  Unremarkable    Following Evaluation:  Results/recommendations and education given to Patient and nurse, who verbalized understanding    Timed Code Treatment: 0 minutes     Total Treatment Time: 30 minutes     Britany Decker., [facility]  Speech-Language Pathologist

## 2021-07-21 NOTE — PROGRESS NOTES
P Pulmonary and Critical Care    Follow Up Note    Subjective:   CHIEF COMPLAINT / HPI:   Chief Complaint   Patient presents with    Shortness of Breath     From [facility] via EMS. SOB and dyspnea since last night, weakness. Expiratory wheezing. Given duoneb around 3-4 AM. O2 as needed at baseline. Hx of COPD, CHF. Interval history: Patient feels like her breathing is improving some. Still having some productive cough. Her son was at the bedside this morning with her. Past Medical History:    Reviewed; no changes    Social History:    Reviewed; no changes    REVIEW OF SYSTEMS:    CONSTITUTIONAL:  negative for fevers and chills  RESPIRATORY:  See HPI  CARDIOVASCULAR:  negative for chest pain, palpitations, edema  GASTROINTESTINAL:  negative for nausea, vomiting, diarrhea, constipation and abdominal pain    Objective:   PHYSICAL EXAM:        VITALS:  /71   Pulse 79   Temp 96.5 °F (35.8 °C) (Axillary)   Resp 18   Ht 5' 5\" (1.651 m)   Wt 187 lb 14.4 oz (85.2 kg)   SpO2 97%   BMI 31.27 kg/m²  on 3L NC    24HR INTAKE/OUTPUT:      Intake/Output Summary (Last 24 hours) at 7/21/2021 1116  Last data filed at 7/21/2021 9264  Gross per 24 hour   Intake --   Output 1350 ml   Net -1350 ml       CONSTITUTIONAL:  awake, alert,  no apparent distress, and appears stated age  LUNGS:  No increased work of breathing and basilar crackles to auscultation  CARDIOVASCULAR: S1 and S2 and no JVD  ABDOMEN:  normal bowel sounds, non-distended and non-tender to palpation  EXT: No edema, no calf tenderness. Pulses are present bilaterally. NEUROLOGIC:  Mental Status Exam:  Level of Alertness:   awake  Orientation:   person, place, time.  Non focal  SKIN:  normal skin color, texture, turgor, no redness, warmth, or swelling at IV sites    DATA:    CBC:  Recent Labs     07/19/21  0449 07/20/21  0454 07/21/21  0430   WBC 13.5* 11.5* 14.2*   RBC 3.89* 4.07 4.25   HGB 11.6* 12.1 12.5   HCT 34.8* 36.4 38.1    216 235   MCV 89.5 89.3 89.7   MCH 29.8 29.7 29.4   MCHC 33.3 33.2 32.8   RDW 13.3 13.1 13.0      BMP:  Recent Labs     07/20/21  0454 07/20/21  1543 07/21/21  0430    137 133*   K 4.6 4.8 4.7   CL 95* 95* 91*   CO2 37* 36* 35*   BUN 31* 34* 42*   CREATININE 0.9 1.0 1.1   CALCIUM 8.5 8.6 8.8   GLUCOSE 201* 172* 119*      ABG:  No results for input(s): PHART, WEB1XUB, PO2ART, NGY6FHK, G7NAEFTZ, BEART in the last 72 hours. Procalcitonin  No results for input(s): PROCAL in the last 72 hours. Radiology Review:  Pertinent images / reports were reviewed as a part of this visit. Assessment:     1. Acute hypercapnic and hypoxemic respiratory failure  2. Exacerbation COPD  3. Decompensated diastolic heart failure  4. Obstructive sleep apnea    Plan:     1. I have reviewed laboratories, medical records and images for this visit  2. Change Solu-Medrol to prednisone yesterday  3. Has tolerated that well  4. Still having some congested cough  5. Going for modified barium swallow  6. Aspiration certainly could be an issue here  7.  Spoke with her son and all questions were addressed

## 2021-07-21 NOTE — PROGRESS NOTES
Lab requested order to draw from leg/foot. Pt has limb alert on left arm and 2 IV access sites on right arm making blood draws difficult. Per Jose López, heparin has been discontinued, and lovenox started, reducing every 6 hour PTT draws.

## 2021-07-21 NOTE — PLAN OF CARE
Ongoing     Problem: Nutrition  Goal: Optimal nutrition therapy  7/21/2021 1123 by Lorenza Stout RN  Outcome: Ongoing   Pt remains free from falls and physical injury. Pt shows no signs or symptoms of infection. No new signs of skin breakdown. Feet elevated and mepilex placed on heels to prevent skin breakdown. Pt airway remains patent at this time. Pt able to maintain normal respiratory rate at this time. Pt VSS on 3 liters of oxygen via nasal cannula. Fluid and electrolyte balance has been maintained. Pt able to ambulate as a CGA with walker. Pt denies any pain at this time. Pt able to achieve adequate PO intake at this time. See flowsheet for assessment.

## 2021-07-21 NOTE — PROGRESS NOTES
were also pertinent to this visit. has a past medical history of Allergic rhinitis, CHF (congestive heart failure) ([address] COPD (chronic obstructive pulmonary disease) ([address] Depression, Hypothyroidism, and MARIAA treated with BiPAP. has a past surgical history that includes Hysterectomy, total abdominal; Carpal tunnel release; Cholecystectomy; eye surgery; Tonsillectomy; and Uvulectomy. Restrictions  Restrictions/Precautions  Restrictions/Precautions: Fall Risk, Modified Diet  Position Activity Restriction  Other position/activity restrictions: 83 yo female with hx CHF, COPD, hypothyroidism, MARIAA. She presented to hospital from [facility] with worsening shortness of breath, wheezes, and weakness. She received nebulizer treatment en route to ER and repeated in ER with minimal relief. Placed on bipap. No acute findings on CXR, labs notable for elevated BNP and she was given IV Lasix. Admitted with acute exacerbations CHF and COPD. Subjective   General  Chart Reviewed: Yes  Family / Caregiver Present: No  Subjective  Subjective: Pt agreeable to PT tx  General Comment  Comments: Pt in bed  Pain Screening  Patient Currently in Pain: Denies  Vital Signs  Patient Currently in Pain: Denies       Orientation  Orientation  Overall Orientation Status: Within Normal Limits  Cognition      Objective   Bed mobility  Supine to Sit: Stand by assistance  Sit to Supine: Minimal assistance  Scooting: Stand by assistance  Comment: Pt needing slight assist to get left LE into bed, assist for positioning once in bed  Transfers  Sit to Stand: Contact guard assistance  Stand to sit: Contact guard assistance  Comment: CGA for sit<>stand from edge of bed  Ambulation  Ambulation?: Yes  Ambulation 1  Surface: level tile  Device: Rolling Walker  Assistance: Contact guard assistance  Quality of Gait: antalgic pattern on right due to some old hip pain when in WB. Gait Deviations: Slow Nicol; Shuffles;Decreased step length;Decreased step height  Distance: 10'  Comments: Pt reports she usually walks resting her forearms on her walker. Pt also ambulating 4 steps up laterally to right to get higher in bed before lying down. Stairs/Curb  Stairs?: No     Balance  Posture: Poor  Sitting - Static: Good  Sitting - Dynamic: Good  Standing - Static: Fair;+  Standing - Dynamic: Fair;+  Exercises  Straight Leg Raise: x3 on right, x 10 on left, right side stopped at 3 due to pain in hip  Hip Abduction: x2 on right, x 10 on left, held on right due to pain at hip  Comments: heel raises, toe raises, minisquats rishi x 10 at RW with rest in between.                                                                      AM-PAC Score  AM-PAC Inpatient Mobility Raw Score : 16 (07/21/21 1537)  AM-PAC Inpatient T-Scale Score : 40.78 (07/21/21 1537)  Mobility Inpatient CMS 0-100% Score: 54.16 (07/21/21 1537)  Mobility Inpatient CMS G-Code Modifier : CK (07/21/21 1537)          Goals  Short term goals  Time Frame for Short term goals: Before discharge  Short term goal 1: Pt will complete bed mobility mod I with HOB elevated, without rail  Short term goal 2: Pt will complete sit<>stand from multiple surfaces Mod I  Short term goal 3: Pt will transfer bed<>chair with RW and Mod I  Short term goal 4: Pt will ambulate 50 ft with RW and Mod I  Patient Goals   Patient goals : Go home  No goals met  Plan    Plan  Times per week: 3-5x  Current Treatment Recommendations: Strengthening, Balance Training, Transfer Training, Endurance Training, Gait Training, Functional Mobility Training, Neuromuscular Re-education, Pain Management, Manual Therapy - Soft Tissue Mobilization, Manual Therapy - Joint Manipulation, Home Exercise Program, Safety Education & Training, Equipment Evaluation, Education, & procurement, Patient/Caregiver Education & Training  Safety Devices  Type of devices: Left in bed, Bed alarm in place, Gait belt, All fall risk precautions in place  Restraints  Initially in place: No     Therapy Time   Individual Concurrent Group Co-treatment   Time In 1510         Time Out 1534         Minutes 24         Timed Code Treatment Minutes: [address], [address] [facility]

## 2021-07-21 NOTE — PROGRESS NOTES
Occupational Therapy  Facility/Department: [address]  Daily Treatment Note  NAME: Willa Zee  : 1935  MRN: [id number]    Date of Service: 2021    Discharge Recommendations: Willa Zee scored a 17/24 on the AM-PAC ADL Inpatient form. Current research shows that an AM-PAC score of 17 or less is typically not associated with a discharge to the patient's home setting. Based on the patient's AM-PAC score and their current ADL deficits, it is recommended that the patient have 3-5 sessions per week of Occupational Therapy at d/c to increase the patient's independence. Please see assessment section for further patient specific details. If patient discharges prior to next session this note will serve as a discharge summary. Please see below for the latest assessment towards goals. 3-5 sessions per week  OT Equipment Recommendations  Equipment Needed: No  Other: continue to assess    Assessment   Performance deficits / Impairments: Decreased functional mobility ; Decreased balance;Decreased cognition;Decreased ADL status; Decreased endurance;Decreased strength;Decreased high-level IADLs  Assessment: Pt is a 82yr old female functioning below baseline due to the above mentioned deficits. Pt has improved in ADLs and activity tolerance, pt would continue to benefit from skilled OT services to increase functional status. Treatment Diagnosis: Decreased functional mobility, ADL status, endurance, balance, strength, high-level IADLs and cognition due to respiratroy failure. Prognosis: Fair  Decision Making: Medium Complexity  OT Education: OT Role;Plan of Care;Precautions; ADL Adaptive Strategies;Transfer Training  Patient Education: Educated pt on OT role, plan of care, ADL adaptive strategies, transfer training, and safety with AD. Continued education for follow through.   Barriers to Learning: cognition  REQUIRES OT FOLLOW UP: Yes  Activity Tolerance  Activity Tolerance: Patient Tolerated Orientation Status: Within Functional Limits  Objective    ADL  Equipment Provided: Reacher  Grooming: Setup;Supervision (pt brushed teeth and washed face seated on bedside commode at sink)  UE Bathing: Stand by assistance (performed seated in recliner)  UE Dressing: Setup;Stand by assistance (pt donned gown seated in recliner)  LE Dressing: Minimal assistance (pt stood to don brief, OT removed purewick and old brief for patient. Pt placed B elbows on sink and required assist to stand and don brief at the same time)  Toileting: Minimal assistance (pt performed anterior and posterior care)        Balance  Sitting Balance: Stand by assistance (supervision static sitting balance; SBA for dynamic sitting balance)  Standing Balance: Minimal assistance (pt used RW and demonstrated a flexed posture over RW. Pt was cued to stand up straight but was unable to maintain position due to arthritis in back.)  Standing Balance  Time: ~5 min  Activity: sit<>stand transfers, ambulate to bathroom and back to chair ~20 ft total  Comment: Flexed posture over RW. When ambulating pt took small steps and required cues to take larger steps. Pt demonstrated inability to advance LE and fatigue and was given a chair to take a ~30 sec rest break when ambulating from bed to bathroom. Functional Mobility  Functional - Mobility Device: Rolling Walker  Activity: To/from bathroom  Assist Level: Minimal assistance  Functional Mobility Comments: Cristin with RW for safety  Toilet Transfers  Toilet - Technique: Ambulating  Equipment Used: Standard bedside commode  Toilet Transfer: Minimal assistance  Toilet Transfers Comments: Pt demonstrated how she would transfer to toilet however did not urinate.  Bedside commode was used to complete ADLs  Bed mobility  Supine to Sit: Minimal assistance (pt required increased time, VC and assist to lift trunk from bed)  Scooting: Contact guard assistance (required increased time and VC)  Transfers  Sit to stand: Minimal assistance  Stand to sit: Minimal assistance  Transfer Comments: pt used RW and requred cues to safely perform sit<>stand transfers                       Cognition  Overall Cognitive Status: Exceptions  Arousal/Alertness: Appropriate responses to stimuli  Following Commands: Follows one step commands with increased time; Follows one step commands with repetition  Attention Span: Appears intact  Safety Judgement: Decreased awareness of need for assistance;Decreased awareness of need for safety  Problem Solving: Assistance required to generate solutions;Assistance required to implement solutions;Decreased awareness of errors  Insights: Decreased awareness of deficits  Initiation: Requires cues for some  Sequencing: Does not require cues            Plan   Plan  Times per week: 3-5  Current Treatment Recommendations: Strengthening, Balance Training, Functional Mobility Training, Endurance Training, Self-Care / ADL, Patient/Caregiver Education & Training, Safety Education & Training      AM-PAC Score        AM-[facility] Inpatient Daily Activity Raw Score: 17 (07/21/21 1330)  AM-PAC Inpatient ADL T-Scale Score : 37.26 (07/21/21 1330)  ADL Inpatient CMS 0-100% Score: 50.11 (07/21/21 1330)  ADL Inpatient CMS G-Code Modifier : CK (07/21/21 1330)    Goals  Short term goals  Time Frame for Short term goals: by d/c  Short term goal 1: Pt will complete Lb dressing with min A using AE as needed---- GOAL MET 7/20  Short term goal 2: Pt will complete toileting with SBA----- ongoing 07/21  Short term goal 3: Pt will complete functional transfers with CGA using RW---- ongoing 07/21  Short term goal 4: NW GOAL: standing tolerance for ADL/IADL tasks ~5 minutes----- ongoing 07/21  Short term goal 5: NEW GOAL: Patient will complete LB dressing with set up and AE as needed.        Therapy Time   Individual Concurrent Group Co-treatment   Time In 0886         Time Out 1305         Minutes 74               Total Treatment Minutes:  74 minutes    Daisy Quintana, [address], [place]   Therapist directly observed and directed this treatment.   Sussy Sweeney, OTR/L [id number]

## 2021-07-21 NOTE — DISCHARGE SUMMARY
Hospital Medicine Discharge Summary    Patient ID: Kenya Christine      Patient's PCP: ELISE Burrell    Admit Date: 7/16/2021     Discharge Date: 7/22/2021      Admitting Physician: Keira Young MD     Discharge Physician: Deysi Young APRN - CNP     Discharge Diagnoses  Acute on chronic diastolic heart failure  Acute exacerbation COPD  Acute on chronic hypoxic and hypercapnic respiratory failure  Oropharyngeal dysphagia  Hypertension  Hyperglycemia  Hypothyroidism  Obstructive sleep apnea  Constipation      Hospital Course: Patient is an 83 yo female with hx CHF, COPD, hypothyroidism, MARIAA. She presented to hospital from [facility] with worsening shortness of breath, wheezes, and weakness. She received nebulizer treatment en route to ER and repeated in ER with minimal relief. Placed on bipap. No acute findings on CXR, labs notable for elevated BNP and she was given IV Lasix. Admitted with acute exacerbations CHF and COPD. 1. Acute on chronic dCHF. proBNP 1024->1900. Started on IV Lasix with subjective improvement. Monitor kidney function closely, daily BMP. Cardiology was consulted, patient transition to oral diuretic therapy. Echo with severe aortic stenosis, EF of 55 to 86%, grade 1 diastolic dysfunction. Weight 292 pounds (198). Continue daily weights, accurate I's and O's, low-sodium diet  2. Acute exacerbation COPD. No infiltrate on admission or CXR 7/18. Has congestive cough, productive clear phlegm. Continue IV solumedrol, Brovana, Pulmicort, and duonebs. WBC 11.7->5.9->16.2->13.5, likely secondary to steroids. Procalcitonin 0.18->0.20, considered low risk for bacterial infection. Appreciate pulmonary recs, clinically no suggestion of pneumonia. Treated with IV Solu-Medrol and transition to prednisone 40 mg p.o. with taper  3. Acute on chronic hypoxic and hypercapnic respiratory failure. Initially required bipap, now on 3 liters with O2 sat 96% (baseline 3 liters). Secondary to CHF and COPD exacerbations. Continue management as above. Continue BiPAP  4. Oropharyngeal dysphagia. Noted on speech evaluation. Nursing also reports some coughing with meals and fluids. Modified barium swallow study did show aspiration, patient recommended dysphagia 3 diet, thin liquids, no straws. HTN. Controlled. Lisinopril held on admission for diuresis, BP was on soft side, improved today. Continue to follow, plan to resume ACE after diuresis. 5. Hyperglycemia: Secondary to steroids. Treated with subcu insulin. Continue home regimen at discharge   6. hypothyroidism. TSH 2.11 (6/25). Continue levothyroxine. 7. MARIAA. On bipap at home. 8. Constipation: Had a bowel movement, continue Colace, MiraLAX, Dulcolax suppository      Patient stated they felt well and wanted to go back to assisted living. Family plans to transition her to skilled nursing facility on their own. Patient denied chest pain, shortness of breath, palpitations, abdominal pain, nausea vomiting, diarrhea, dysuria, headache lightheadedness or dizziness. Appetite good. Voiding without difficulty. Reviewed plan of care with patient, verbalized understanding and agreement. Denied further questions or needs. Physical Exam Performed:     /78   Pulse 87   Temp 98 °F (36.7 °C) (Oral)   Resp 18   Ht 5' 5\" (1.651 m)   Wt 190 lb 12.8 oz (86.5 kg)   SpO2 95%   BMI 31.75 kg/m²     General:  Awake, alert, oriented in NAD  Skin:  Warm and dry. No unusual bruising or rash  Neck:  Supple. Chest:  Normal effort. Diminished bilateral lower lobes. No crackles or wheezes  Cardiovascular:  RRR, normal S1/S2, no murmur/gallop/rub  Abdomen:  Soft, nontender, +bowel sounds  Extremities:  Trace edema  Neurological: No focal deficits  Psychological: Normal mood and affect        Labs:  For convenience and continuity at follow-up the following most recent labs are provided:      CBC:    Lab Results   Component Value Date    WBC 14.2 07/21/2021    HGB 12.5 07/21/2021    HCT 38.1 07/21/2021     07/21/2021       Renal:    Lab Results   Component Value Date     07/21/2021    K 4.7 07/21/2021    CL 91 07/21/2021    CO2 35 07/21/2021    BUN 42 07/21/2021    CREATININE 1.1 07/21/2021    CALCIUM 8.8 07/21/2021    PHOS 3.1 07/02/2018         Significant Diagnostic Studies    Radiology:   Fluoroscopy modified barium swallow with video   Final Result   Aspiration was seen      Please see separate speech pathology report for full discussion of findings   and recommendations. XR CHEST PORTABLE   Final Result   Slight pulmonary vascular congestion. No acute airspace disease or other   abnormality. XR CHEST PORTABLE   Final Result   No acute cardiopulmonary disease. Consults:     IP CONSULT TO HOSPITALIST  IP CONSULT TO PULMONOLOGY  IP CONSULT TO CARDIOLOGY  IP CONSULT TO DIETITIAN  IP CONSULT TO HEART FAILURE NURSE/COORDINATOR    Disposition: Home    Condition at Discharge: Stable    Discharge Instructions/Follow-up:      Modified barium swallow did show aspiration.  Please follow these guidelines to prevent aspiration:  Recommendations:    Diet Level:  1.)  Dysphagia III Soft and Bite-Sized with thin liquids, NO STRAWS, meds in puree  2.) If respiratory status declines recommend downgrade to Mildly Thick (Nectar) Liquids    Follow-up with PCP in 1 week  Follow-up with cardiology heart failure clinic in 1 weeks  Follow-up with pulmonology as directed    Code Status:  Prior     Activity: activity as tolerated    Diet: cardiac diet      Discharge Medications:     Discharge Medication List as of 7/22/2021  4:30 PM           Details   guaiFENesin (MUCINEX) 600 MG extended release tablet Take 1 tablet by mouth 2 times daily for 14 days, Disp-28 tablet, R-0NO PRINT      predniSONE (DELTASONE) 10 MG tablet Take 40mg daily x 1 day, then 30mg daily x 3 days, then 20mg daily x 3 days, then 10mg daily x 3 days, then 5mg daily x 3 days, Disp-40 tablet, R-0NO PRINT      spironolactone (ALDACTONE) 25 MG tablet Take 0.5 tablets by mouth daily, Disp-30 tablet, R-0NO PRINT      docusate sodium (COLACE, DULCOLAX) 100 MG CAPS Take 100 mg by mouth 2 times daily, Disp-120 capsule, R-0NO PRINT      polyethylene glycol (GLYCOLAX) 17 g packet Take 17 g by mouth daily, Disp-527 g, R-0NO PRINT              Details   furosemide (LASIX) 20 MG tablet Take 3 tablets by mouth 2 times daily, Disp-60 tablet, R-3NO PRINT              Details   zoledronic acid (RECLAST) 5 MG/100ML SOLN Infuse 100 mLs intravenously once for 1 dose, Disp-100 mL, R-0Print      citalopram (CELEXA) 10 MG tablet Take 5 mg by mouth dailyHistorical Med      acetaminophen (TYLENOL) 500 MG tablet Take 500 mg by mouth every 6 hours as needed for PainHistorical Med      methocarbamol (ROBAXIN) 500 MG tablet Take 500 mg by mouth 4 times dailyHistorical Med      lisinopril (PRINIVIL;ZESTRIL) 2.5 MG tablet Take 2 tablets by mouth daily, Disp-30 tablet, R-0NO PRINT      OXYGEN Use 3L of oxygen all the time, Disp-3 L, R-3Print      levocetirizine (XYZAL) 5 MG tablet Take 1 tablet by mouth nightly, Disp-30 tablet, R-11Normal      budesonide (PULMICORT) 0.5 MG/2ML nebulizer suspension Take 2 mLs by nebulization 2 times daily DX:COPD J44.9, Disp-60 ampule,R-6Normal      formoterol (PERFOROMIST) 20 MCG/2ML nebulizer solution Take 2 mLs by nebulization every 12 hours DX:COPD J44.9, Disp-120 mL, R-6Normal      ondansetron (ZOFRAN) 4 MG tablet Take 1 tablet by mouth daily as needed for Nausea or Vomiting, Disp-30 tablet,R-0Print      oxybutynin (DITROPAN) 5 MG tablet TAKE ONE TABLET BY MOUTH THREE TIMES A DAY, Disp-90 tablet, R-1Normal      atorvastatin (LIPITOR) 10 MG tablet TAKE 1 TABLET BY MOUTH DAILY AT BEDTIME., Disp-30 tablet, R-5Normal      albuterol sulfate HFA (VENTOLIN HFA) 108 (90 Base) MCG/ACT inhaler Inhale 2 puffs into the lungs every 6 hours as needed for Wheezing, Disp-1 Inhaler, R-3Normal      levothyroxine (SYNTHROID) 75 MCG tablet TAKE 1 TABLET BY MOUTH DAILY, Disp-30 tablet, R-5Normal      ipratropium-albuterol (DUONEB) 0.5-2.5 (3) MG/3ML SOLN nebulizer solution Inhale 3 mLs into the lungs every 4 hours, Disp-360 mL, R-0Normal             Time Spent on discharge is more than 30 minutes in the examination, evaluation, counseling and review of medications and discharge plan. Signed: JOSE ALFREDO Karimi - CNP   7/27/2021    The patient was seen and examined on day of discharge and this discharge summary is in conjunction with any daily progress note from day of discharge. Thank you ELISE Garcia for the opportunity to be involved in this patient's care. If you have any questions or concerns please feel free to contact me at [phone]. NOTE:  This report was transcribed using voice recognition software. Every effort was made to ensure accuracy; however, inadvertent computerized transcription errors may be present.

## 2021-07-22 VITALS
BODY MASS INDEX: 31.79 KG/M2 | WEIGHT: 190.8 LBS | TEMPERATURE: 98 F | OXYGEN SATURATION: 95 % | DIASTOLIC BLOOD PRESSURE: 78 MMHG | HEART RATE: 87 BPM | RESPIRATION RATE: 18 BRPM | HEIGHT: 65 IN | SYSTOLIC BLOOD PRESSURE: 118 MMHG

## 2021-07-22 LAB
GLUCOSE BLD-MCNC: 149 MG/DL (ref 70–99)
GLUCOSE BLD-MCNC: 90 MG/DL (ref 70–99)
PERFORMED ON: ABNORMAL
PERFORMED ON: NORMAL

## 2021-07-22 PROCEDURE — 6370000000 HC RX 637 (ALT 250 FOR IP): Performed by: NURSE PRACTITIONER

## 2021-07-22 PROCEDURE — 6370000000 HC RX 637 (ALT 250 FOR IP): Performed by: FAMILY MEDICINE

## 2021-07-22 PROCEDURE — 99232 SBSQ HOSP IP/OBS MODERATE 35: CPT | Performed by: NURSE PRACTITIONER

## 2021-07-22 PROCEDURE — 6360000002 HC RX W HCPCS: Performed by: INTERNAL MEDICINE

## 2021-07-22 PROCEDURE — 94640 AIRWAY INHALATION TREATMENT: CPT

## 2021-07-22 PROCEDURE — 92526 ORAL FUNCTION THERAPY: CPT

## 2021-07-22 PROCEDURE — 2700000000 HC OXYGEN THERAPY PER DAY

## 2021-07-22 PROCEDURE — 94761 N-INVAS EAR/PLS OXIMETRY MLT: CPT

## 2021-07-22 RX ORDER — PREDNISONE 10 MG/1
TABLET ORAL
Qty: 40 TABLET | Refills: 0 | Status: SHIPPED | OUTPATIENT
Start: 2021-07-22 | End: 2021-10-13

## 2021-07-22 RX ORDER — GUAIFENESIN 600 MG/1
600 TABLET, EXTENDED RELEASE ORAL 2 TIMES DAILY
Qty: 28 TABLET | Refills: 0 | Status: SHIPPED | OUTPATIENT
Start: 2021-07-22 | End: 2021-08-05

## 2021-07-22 RX ORDER — FUROSEMIDE 20 MG/1
60 TABLET ORAL 2 TIMES DAILY
Qty: 180 TABLET | Refills: 0 | Status: ON HOLD | OUTPATIENT
Start: 2021-07-22 | End: 2021-12-31 | Stop reason: HOSPADM

## 2021-07-22 RX ORDER — POLYETHYLENE GLYCOL 3350 17 G/17G
17 POWDER, FOR SOLUTION ORAL DAILY
Qty: 527 G | Refills: 0 | Status: SHIPPED | OUTPATIENT
Start: 2021-07-22 | End: 2021-08-21

## 2021-07-22 RX ORDER — SPIRONOLACTONE 25 MG/1
12.5 TABLET ORAL DAILY
Qty: 30 TABLET | Refills: 0 | Status: ON HOLD | OUTPATIENT
Start: 2021-07-22 | End: 2021-12-31 | Stop reason: HOSPADM

## 2021-07-22 RX ORDER — PSEUDOEPHEDRINE HCL 30 MG
100 TABLET ORAL 2 TIMES DAILY
Qty: 120 CAPSULE | Refills: 0 | Status: SHIPPED | OUTPATIENT
Start: 2021-07-22

## 2021-07-22 RX ADMIN — IPRATROPIUM BROMIDE AND ALBUTEROL SULFATE 1 AMPULE: .5; 3 SOLUTION RESPIRATORY (INHALATION) at 16:26

## 2021-07-22 RX ADMIN — LEVOTHYROXINE SODIUM 75 MCG: 0.07 TABLET ORAL at 06:38

## 2021-07-22 RX ADMIN — PREDNISONE 40 MG: 20 TABLET ORAL at 09:07

## 2021-07-22 RX ADMIN — DOCUSATE SODIUM 100 MG: 100 CAPSULE ORAL at 09:06

## 2021-07-22 RX ADMIN — FUROSEMIDE 60 MG: 20 TABLET ORAL at 09:06

## 2021-07-22 RX ADMIN — GUAIFENESIN 600 MG: 600 TABLET, EXTENDED RELEASE ORAL at 09:07

## 2021-07-22 RX ADMIN — BUDESONIDE 500 MCG: 0.5 SUSPENSION RESPIRATORY (INHALATION) at 13:06

## 2021-07-22 RX ADMIN — IPRATROPIUM BROMIDE AND ALBUTEROL SULFATE 1 AMPULE: .5; 3 SOLUTION RESPIRATORY (INHALATION) at 13:06

## 2021-07-22 RX ADMIN — ARFORMOTEROL TARTRATE 15 MCG: 15 SOLUTION RESPIRATORY (INHALATION) at 13:06

## 2021-07-22 RX ADMIN — FUROSEMIDE 60 MG: 20 TABLET ORAL at 17:36

## 2021-07-22 RX ADMIN — LISINOPRIL 2.5 MG: 5 TABLET ORAL at 09:07

## 2021-07-22 RX ADMIN — SPIRONOLACTONE 12.5 MG: 25 TABLET ORAL at 09:07

## 2021-07-22 RX ADMIN — POLYETHYLENE GLYCOL 3350 17 G: 17 POWDER, FOR SOLUTION ORAL at 09:07

## 2021-07-22 NOTE — PROGRESS NOTES
Prescriptions were sent to pt's pharmacy to be delivered to [address] [place] [address] tomorrow. Mack Petties at facility aware of medication changes.   Sabas Mederos RN

## 2021-07-22 NOTE — PROGRESS NOTES
; [facility]  HEART FAILURE  Progress Note      Admit Date 7/16/2021     Reason for Consult:      Reason for Consultation/Chief Complaint: SOB    HPI:    Jr Diaz is a 82 y.o. female with PMH COPD, CAD, HFpEF, MARIAA - treated admitted with increased SOB and LE edema. Pt has diuresed about 7L on IV Lasix, echo with severe AS. Subjective:  Patient is being seen for SOB. There were no acute overnight cardiac events. Today Ms. Tip Berger denies chest pain or palpitations, and tells me SOB is improved today, feels back to baseline and is awaiting placement for d/c    Baseline Weight: 185-190  Wt Readings from Last 3 Encounters:   07/22/21 190 lb 12.8 oz (86.5 kg)   06/10/21 195 lb (88.5 kg)   03/30/21 210 lb (95.3 kg)          NYHA Class III  Objective:   BP (!) 114/56   Pulse 72   Temp 97.4 °F (36.3 °C) (Oral)   Resp 18   Ht 5' 5\" (1.651 m)   Wt 190 lb 12.8 oz (86.5 kg)   SpO2 100%   BMI 31.75 kg/m²       Intake/Output Summary (Last 24 hours) at 7/22/2021 3010  Last data filed at 7/21/2021 2156  Gross per 24 hour   Intake 130 ml   Output 100 ml   Net 30 ml      Wt Readings from Last 3 Encounters:   07/22/21 190 lb 12.8 oz (86.5 kg)   06/10/21 195 lb (88.5 kg)   03/30/21 210 lb (95.3 kg)      In: 10 [I.V.:10]  Out: -     Physical Exam:  General Appearance:  Non-obese/Well Nourished  Respiratory:  · Resp Auscultation: CTA  Cardiovascular:  · Auscultation: Regular rate and rhythm, normal S1S2, +murmur  · Palpation: Normal    · Pedal Pulses: 2+ and equal   Abdomen:  · Soft, NT, ND, + bs  Extremities:  · No Cyanosis or Clubbing  · Extremities: No edema  Neurological/Psychiatric:  · Oriented to time, place, and person  · Non-anxious    MEDICATIONS:   Scheduled Meds:   Scheduled Meds:   lisinopril  2.5 mg Oral Daily    furosemide  60 mg Oral BID    docusate sodium  100 mg Oral BID    polyethylene glycol  17 g Oral Daily    bisacodyl  10 mg Rectal Daily    insulin lispro  0-6 Units Subcutaneous TID

## 2021-07-22 NOTE — PLAN OF CARE
Vital signs stable. No reports of pain. Wore BiPAP all night. Tolerated well. Foam pad placed on bridge of nose for comfort. Awaiting precert.      Vitals:    07/22/21 0431   BP: 116/68   Pulse: 72   Resp: 20   Temp: 96.2 °F (35.7 °C)   SpO2: 98%       Problem: Falls - Risk of:  Goal: Will remain free from falls  Description: Will remain free from falls  Outcome: Ongoing     Problem: Skin Integrity:  Goal: Will show no infection signs and symptoms  Description: Will show no infection signs and symptoms  Outcome: Ongoing     Problem: OXYGENATION/RESPIRATORY FUNCTION  Goal: Patient will maintain patent airway  Outcome: Ongoing     Problem: HEMODYNAMIC STATUS  Goal: Patient has stable vital signs and fluid balance  Outcome: Ongoing     Problem: FLUID AND ELECTROLYTE IMBALANCE  Goal: Fluid and electrolyte balance are achieved/maintained  Outcome: Ongoing     Problem: ACTIVITY INTOLERANCE/IMPAIRED MOBILITY  Goal: Mobility/activity is maintained at optimum level for patient  Outcome: Ongoing     Problem: Pain:  Goal: Pain level will decrease  Description: Pain level will decrease  Outcome: Ongoing     Problem: Nutrition  Goal: Optimal nutrition therapy  Outcome: Ongoing

## 2021-07-22 NOTE — PROGRESS NOTES
Speech Language Pathology  Dysphagia Treatment Note    Name:  Keon Buitrago  :   1935  Medical Diagnosis:  Acute respiratory failure ([facility] [address] [J96.00]  Treatment Diagnosis: Oropharyngeal Dysphagia  Pain level: Pt denies pain    Diet level prior to treatment: Dysphagia III Soft and Bite-Sized with thin liquids, NO STRAWS, meds in puree  Tolerance of Current Diet Level: Pt reports no difficulty with current diet    \"Modified Barium Swallow evaluation completed on 2021. Results indicate mild-moderate oropharyngeal dysphagia characterized by premature bolus loss to pharynx, delayed swallow initiation, reduced pharyngeal clearing, and intermittent laryngeal penetration with thin liquids and aspiration of thin liquids via tsp and straw. On initial trial of thin liquids via tsp, immediate loss to laryngeal inlet/level of vocal cords was noted with eventual aspiration, absent cough reflex noted. With thin liquids via cup, intermittent premature spillage to pyriforms was noted with delayed swallow initiation and intermittent laryngeal penetration. Penetration from thin liquids via cup appeared to largely self-clear however difficult to fully determine due to residue from prior aspiration episode. Given thin liquids via straw, reduced coordination with prolonged laryngeal vestibule closer noted with episode of SILENT aspiration on second successive sip. Reduced sensation as cough was not elicited. No aspiration or laryngeal penetration was viewed with nectar and honey thick liquid trials, however intermittent pyriform residue was noted across all liquid textures including thins. Given solid trials, mildly prolonged mastication was noted with adequate oral and pharyngeal clearing achieved. Overall, Pt is at increased risk for episodic aspiration of thin liquids if precautions are not followed or at times of respiratory compromise. See below for main factors contributing to dysphagia. \"    Assessment of Texture Tolerance:  -Impressions: Pt sitting upright in chair at bedside, on 3L of O2 via nasal cannula. RR between 16-20/min, reports cough has been productive. Upon entry, pt eating lunch. Pt agreeable to various consistencies off of lunch tray, independently fed: thin liquids via cup edge, mashed potatoes, chopped carrots, and bite-sized meat loaf. Prolonged but adequate oral manipulation and A-P transit, suspect delayed swallow initiation. No anterior spillage or oral residue post swallow. No immediate or delayed overt clinical s/s of aspiration across all consistencies. Education provided re: results and recommendation from instrumental swallow study, pt verbalized understanding. Recommend continuation of current diet with aspiration precautions and safe swallowing strategies. Diet and Treatment Recommendations 7/22/2021:  1. Continue Dysphagia III Soft and Bite-Sized with thin liquids, NO STRAWS, meds in puree  2. If respiratory status declines recommend downgrade to Mildly Thick (Nectar) Liquids    Compensatory strategies: Effortful Swallows , Upright as possible with all PO intake , No straws , Small bites/sips , Alternate liquids/solids , Slow rate of intake    Dysphagia Goals:   1. Pt will functionally tolerate recommended diet with no overt clinical s/s of aspiration (ongoing 7/22/2021)   2. Pt will demonstrate understanding of aspiration risk and precautions via education/demonstration with occasional prompting (ongoing 7/22/2021)   3. Pt will advance to least restrictive diet as indicated (ongoing 7/22/2021)   4. If clinical s/s of aspiration/penetration continue to be noted, Pt will participate in Modified Barium Swallow Study (MET 7/21/2021)     Plan: Continued daily Dysphagia treatment with goals per plan of care.     Patient/Family Education: Education given to the Pt and nurse, who verbalized understanding    Discharge Recommendations:  Pt will benefit from continued skilled Speech Therapy for Dysphagia services, prior to returning home. Timed Code Treatment: 0 min    Total Treatment Time: 13 min    If patient discharges prior to next session this note will serve as a discharge summary.        Signature:   Arlette Cha, [address] [facility]  Speech-Language Pathologist  [place]. [id number]

## 2021-07-23 ENCOUNTER — TELEPHONE (OUTPATIENT)
Dept: FAMILY MEDICINE CLINIC | Age: 86
End: 2021-07-23

## 2021-07-23 ENCOUNTER — TELEPHONE (OUTPATIENT)
Dept: OTHER | Age: 86
End: 2021-07-23

## 2021-07-23 NOTE — TELEPHONE ENCOUNTER
[facility] Transitions Initial Follow Up Call    Outreach made within 2 business days of discharge: Yes    Patient: Willa Zee Patient : 1935   MRN: [id number]  Reason for Admission: There are no discharge diagnoses documented for the most recent discharge.   Discharge Date: 21       Spoke with: unable to leave voicemail    Discharge department/facility: [facility]    Scheduled appointment with PCP within 7-14 days    Follow Up  Future Appointments   Date Time Provider Moe Dial   2021  1:30 PM JOSE ALFREDO Escamilla - CNP FF Cardio MMA   8/10/2021  9:30 AM Liz Freitas MD PULM & CC MMA   2021  8:00 PM [address] [place] [address]   2021 10:30 AM Sheila Guerrero MD FF Cardio MMA   10/1/2021 11:40 AM JOSE ALFREDO Blair FF SLEEP MED MMA   2021 10:00 AM Eudelia Heimlich,  [place], [place]

## 2021-07-23 NOTE — TELEPHONE ENCOUNTER
Called and spoke with nurse at [facility]. Patient had orders in place for daily weights, low sodium diet and fluid restriction along with weekly labs and instructions to call CHF team with any questions or concerns.  Patient has follow up arranged on 8/9 with Kelsi Dawson NP*

## 2021-07-28 ENCOUNTER — TELEPHONE (OUTPATIENT)
Dept: FAMILY MEDICINE CLINIC | Age: 86
End: 2021-07-28

## 2021-08-06 PROBLEM — E78.00 HYPERCHOLESTEREMIA: Status: ACTIVE | Noted: 2021-08-06

## 2021-08-06 NOTE — PROGRESS NOTES
[address]   Congestive Heart Failure    Primary Care Doctor:  ELISE Kinsey  Primary Cardiologist:    Last/Next OV:     Chief Complaint:  CHF    History of Present Illness:  Sharon Tapia is a 82 y.o. female with PMH COPD, CAD, HFpEF, MARIAA - treated, severe AS  who presents today for hospital f/u. Sharon Tapia was admitted 7/16/21 and discharged 7/22/21. Hospital course:  presented to hospital from [facility] with worsening shortness of breath, wheezes, and weakness. She received nebulizer treatment en route to ER and repeated in ER with minimal relief. Placed on bipap. No acute findings on CXR, labs notable for elevated BNP and she was given IV Lasix. Admitted with acute exacerbations CHF and COPD.        Acute on chronic dCHF. proBNP 1024->1900. Started on IV Lasix with subjective improvement. Monitor kidney function closely, daily BMP. Cardiology was consulted, patient transition to oral diuretic therapy. Echo with severe aortic stenosis, EF of 55 to 43%, grade 1 diastolic dysfunction.  Weight 292 pounds (198).  Continue daily weights, accurate I's and O's, low-sodium diet    Since hospitalization she is feeling well, in rehab and reports dyspnea with exertion, mild edema and denies chest pain, palpitations, orthopnea, PND, exertional chest pressure/discomfort, fatigue, early saiety, syncope. Approximate hosp diuresis was 7L, hospital weight on d/c was 190lb and discharge home weight was uk, she thinks it has been stable at SNF      Baseline Weight: 190  Wt Readings from Last 3 Encounters:   08/09/21 191 lb (86.6 kg)   08/09/21 176 lb (79.8 kg)   07/22/21 190 lb 12.8 oz (86.5 kg)      Admit BNP: 1024   Discharge BNP: 2169      EF: 55-60%  Cardiac Imaging:  Echo 7/19/21:    Summary   -Normal left ventricle size, wall thickness and systolic function with an   estimated ejection fraction of 55-60%.    -No regional wall motion abnormalities are noted.   -Grade I diastolic dysfunction with normal LV filling pressures. E/e' =   18.6.   -Severe aortic stenosis with a peak velocity of 4.1 m/s and a mean pressure   gradient of 39mmHg. HOLDEN (VTI) is 2.47 cm2. (gradients showed with definity).  -No evidence of aortic valve regurgitation.   -There is mild mitral stenosis with a mean gradient of 3 mmHG, P1/2 is 89   ms., Vmax is 1.4 cm/s, HOLDEN (P1/2) is 2.47 cm2.   -Mild mitral regurgitation.   -Mild tricuspid regurgitation.   -IVC size is dilated (>2.1 cm) but collapses > 50% with respiration   consistent with elevated RA pressure (8 mmHg).  -Estimated pulmonary artery systolic pressure is elevated at 41 mmHg   assuming a right atrial pressure of 8 mmHg.     Echo 5/25/2018:   Summary   -Normal left ventricle size and systolic function with an estimated ejection fraction of 55-60%.  -There is mild concentric left ventricular hypertrophy.   -No regional wall motion abnormalities are seen.   -Normal diastolic function.   -Mild mitral regurgitation.   -Mild aortic stenosis.   -Mild tricuspid regurgitation with a RVSP of 58 mmHg.        Nuclear stress test 7/27/2017:  Impression  1.  80% ejection fraction. 2.  Resting wall motion abnormality in the inferior wall compatible with   an anterior wall myocardial infarction. 3.  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 4.  No photopenia with Lexiscan. FINAL IMPRESSION:   Low-risk Lexiscan nuclear stress test.         CT coronary calcium score 7/26/2017:  IMPRESSION:   1. Findings of chronic granulomatous disease with several small   noncalcified nodules. Recommend followup in one year as these nodules   have remained stable dating back to 2016.   2. Total calcium score is 107. Activity: below baseline, in therapy  Can you walk 1-2 blocks or do a moderate amount of house/yard work? No      NYHA Class: II       Sodium Restrictions: No Added Salt  Fluid Restrictions: 48-64 oz/day  Sodium and fluid restriction compliance: good    Pt Education: The patient has received education on the following topics: dietary sodium restriction, heart failure medications, the importance of physical activity, symptom management and weight monitoring           Past Medical History:   has a past medical history of Allergic rhinitis, CHF (congestive heart failure) ([facility] [address] COPD (chronic obstructive pulmonary disease) ([facility] [address] Depression, Hypothyroidism, and MARIAA treated with BiPAP. Surgical History:   has a past surgical history that includes Hysterectomy, total abdominal; Carpal tunnel release; Cholecystectomy; eye surgery; Tonsillectomy; and Uvulectomy. Social History:   reports that she has never smoked. She has never used smokeless tobacco. She reports that she does not drink alcohol and does not use drugs. Family History:   Family History   Problem Relation Age of Onset    Cancer Mother         breast    Breast Cancer Mother     Cancer Father         prostate    Cancer Sister         breast    Breast Cancer Daughter     Breast Cancer Maternal Grandmother        Home Medications:  Prior to Admission medications    Medication Sig Start Date End Date Taking?  Authorizing Provider   predniSONE (DELTASONE) 10 MG tablet Take 40mg daily x 1 day, then 30mg daily x 3 days, then 20mg daily x 3 days, then 10mg daily x 3 days, then 5mg daily x 3 days 7/22/21   JOSE ALFREDO Vee CNP   furosemide (LASIX) 20 MG tablet Take 3 tablets by mouth 2 times daily 7/22/21   JOSE ALFREDO Vee CNP   spironolactone (ALDACTONE) 25 MG tablet Take 0.5 tablets by mouth daily 7/22/21   JOSE ALFREDO Vee CNP   docusate (COLACE, DULCOLAX) 100 MG CAPS Take 100 mg by mouth 2 times daily 7/22/21   JOSE ALFREDO Vee CNP   polyethylene glycol (GLYCOLAX) 17 g packet Take 17 g by mouth daily 7/22/21 8/21/21  JOSE ALFREDO Vee CNP   zoledronic acid (RECLAST) 5 MG/100ML SOLN Infuse 100 mLs intravenously once for 1 dose 6/25/21 6/25/21  ELISE Jerry   citalopram (CELEXA) 10 MG tablet Take 5 mg by mouth daily    Historical Provider, MD   acetaminophen (TYLENOL) 500 MG tablet Take 500 mg by mouth every 6 hours as needed for Pain    Historical Provider, MD   methocarbamol (ROBAXIN) 500 MG tablet Take 500 mg by mouth 4 times daily    Historical Provider, MD   lisinopril (PRINIVIL;ZESTRIL) 2.5 MG tablet Take 2 tablets by mouth daily 5/21/21   ELISE Jerry   OXYGEN Use 3L of oxygen all the time 5/21/21   ELISE Jerry   levocetirizine (XYZAL) 5 MG tablet Take 1 tablet by mouth nightly 2/23/21   ELISE Jerry   budesonide (PULMICORT) 0.5 MG/2ML nebulizer suspension Take 2 mLs by nebulization 2 times daily DX:COPD J44.9 7/28/20 7/28/21  Susan Hill MD   formoterol (PERFOROMIST) 20 MCG/2ML nebulizer solution Take 2 mLs by nebulization every 12 hours DX:COPD J44.9 7/28/20 6/10/21  Susan Hill MD   ondansetron (ZOFRAN) 4 MG tablet Take 1 tablet by mouth daily as needed for Nausea or Vomiting  Patient not taking: Reported on 5/21/2021 8/88/11   Reena Metzger MD   oxybutynin (DITROPAN) 5 MG tablet TAKE ONE TABLET BY MOUTH THREE TIMES A DAY 8/20/19   Saurabh Bartholomew MD   atorvastatin (LIPITOR) 10 MG tablet TAKE 1 TABLET BY MOUTH DAILY AT BEDTIME. 7/12/19   Raul Foster MD   albuterol sulfate HFA (VENTOLIN HFA) 108 (90 Base) MCG/ACT inhaler Inhale 2 puffs into the lungs every 6 hours as needed for Wheezing 7/9/19   Susan Hill MD   levothyroxine (SYNTHROID) 75 MCG tablet TAKE 1 TABLET BY MOUTH DAILY 6/11/19   Saurabh Bartholomew MD   ipratropium-albuterol (DUONEB) 0.5-2.5 (3) MG/3ML SOLN nebulizer solution Inhale 3 mLs into the lungs every 4 hours 3/13/18   Saurabh Bartholomew MD        Allergies:  Patient has no known allergies. ROS:   Review of Systems   Constitutional: Negative. Respiratory: Positive for shortness of breath. Cardiovascular: Positive for leg swelling. Gastrointestinal: Negative. Genitourinary: Negative. Musculoskeletal: Negative. Skin: Negative. Neurological: Negative. Hematological: Negative. Psychiatric/Behavioral: Negative. Physical Examination:    Vitals:    08/09/21 1332   BP: 110/60   Site: Left Upper Arm   Position: Sitting   Cuff Size: Large Adult   Pulse: 96   SpO2: 90%   Weight: 191 lb (86.6 kg)   Height: 5' 5\" (1.651 m)           Physical Exam  Vitals reviewed. Constitutional:       Appearance: Normal appearance. She is normal weight. HENT:      Head: Normocephalic and atraumatic. Eyes:      Extraocular Movements: Extraocular movements intact. Pupils: Pupils are equal, round, and reactive to light. Cardiovascular:      Rate and Rhythm: Normal rate and regular rhythm. Pulses: Normal pulses. Heart sounds: Murmur heard. Pulmonary:      Effort: Pulmonary effort is normal.      Breath sounds: Normal breath sounds. Abdominal:      Palpations: Abdomen is soft. Musculoskeletal:         General: Normal range of motion. Cervical back: Normal range of motion and neck supple. Right lower leg: Edema present. Left lower leg: Edema present. Comments: Trace pitting   Skin:     General: Skin is warm and dry. Neurological:      General: No focal deficit present. Mental Status: She is alert. Mental status is at baseline. Psychiatric:         Mood and Affect: Mood normal.         Behavior: Behavior normal.         Thought Content:  Thought content normal.         Judgment: Judgment normal.         Lab Data:    CBC:   Lab Results   Component Value Date    WBC 14.2 07/21/2021    WBC 11.5 07/20/2021    WBC 13.5 07/19/2021    RBC 4.25 07/21/2021    RBC 4.07 07/20/2021    RBC 3.89 07/19/2021    HGB 12.5 07/21/2021    HGB 12.1 07/20/2021    HGB 11.6 07/19/2021    HCT 38.1 07/21/2021    HCT 36.4 07/20/2021    HCT 34.8 07/19/2021    MCV 89.7 07/21/2021    MCV 89.3 07/20/2021    MCV 89.5 07/19/2021    RDW 13.0 07/21/2021    RDW 13.1 07/20/2021    RDW 13.3 07/19/2021     07/21/2021     07/20/2021     07/19/2021     BMP:  Lab Results   Component Value Date     07/21/2021     07/20/2021     07/20/2021    K 4.7 07/21/2021    K 4.8 07/20/2021    K 4.6 07/20/2021    K 4.2 07/19/2021    K 4.3 07/18/2021    K 3.8 07/16/2021    CL 91 07/21/2021    CL 95 07/20/2021    CL 95 07/20/2021    CO2 35 07/21/2021    CO2 36 07/20/2021    CO2 37 07/20/2021    PHOS 3.1 07/02/2018    PHOS 2.8 07/01/2018    PHOS 2.9 06/30/2018    BUN 42 07/21/2021    BUN 34 07/20/2021    BUN 31 07/20/2021    CREATININE 1.1 07/21/2021    CREATININE 1.0 07/20/2021    CREATININE 0.9 07/20/2021     BNP:   Lab Results   Component Value Date    PROBNP 2,169 07/20/2021    PROBNP 1,900 07/18/2021    PROBNP 1,024 07/16/2021     Iron Studies:  No results found for: FERRITIN  No results found for: IRON, TIBC, FERRITIN        Assessment/Plan:    1. Acute on chronic diastolic heart failure ([facility] [address] - compensated, get weekly labs   2.  aortic valve stenosis - has f/u with Dr. Ean Foote   3. Coronary artery disease due to lipid rich plaque - stable, no CP, continue statin   4. Essential hypertension - controlled on ACE         Instructions:   1. Medications:no change  2. Labs: weekly  3. Lifestyle Recommendations: Weigh yourself every day in the morning after urination, call [place] if wt increases 2-3lb in one day or 5lb in one week, Limit sodium to 2000mg/day and fluids to 2L or 64oz/day.    4. Follow up: as scheduled        I appreciate the opportunity of cooperating in the care of this individual.    Ling Ortega, APRN - CNP, CNP, 8/6/2021, 1:18 PM

## 2021-08-06 NOTE — PROGRESS NOTES
[address]    H+P // CONSULT // OUTPATIENT VISIT // FOLLOWUP VISIT     REFER ELISE Thacker   ENC TYPE Followup     CHIEF COMPLAINT     TYPE Chronic   SX Sob, cummings   PROBS AS, HTN, CHOL     HISTORY OF PRESENT ILLNESS     GEN Doing fair. Ongoing sob, cummings. On home o2 for copd related to second hand smoke. AS Denies cp, dizziness, syncope, palpitations, sob and cummings worsening. HTN Ambulatory BP in good range, no ha or dizziness. CHOL Last chol in good range, tolerating statin without sa. MED Compliant with CV meds listed below without reported side effects. HISTORY/ALLERGIES/ROS     MedHx:  has a past medical history of Allergic rhinitis, CHF (congestive heart failure) ([facility] [address] COPD (chronic obstructive pulmonary disease) ([facility] [address] Depression, Hypothyroidism, and MARIAA treated with BiPAP. SurgHx:  has a past surgical history that includes Hysterectomy, total abdominal; Carpal tunnel release; Cholecystectomy; eye surgery; Tonsillectomy; and Uvulectomy. SocHx:  reports that she has never smoked. She has never used smokeless tobacco. She reports that she does not drink alcohol and does not use drugs. FamHx: family history includes Breast Cancer in her daughter, maternal grandmother, and mother; Cancer in her father, mother, and sister. Allerg: Patient has no known allergies.    ROS: [x]Full ROS obtained and negative except as mentioned in HPI     MEDICATIONS      Current Outpatient Medications   Medication Sig Dispense Refill    predniSONE (DELTASONE) 10 MG tablet Take 40mg daily x 1 day, then 30mg daily x 3 days, then 20mg daily x 3 days, then 10mg daily x 3 days, then 5mg daily x 3 days 40 tablet 0    furosemide (LASIX) 20 MG tablet Take 3 tablets by mouth 2 times daily 180 tablet 0    spironolactone (ALDACTONE) 25 MG tablet Take 0.5 tablets by mouth daily 30 tablet 0    docusate (COLACE, DULCOLAX) 100 MG CAPS Take 100 mg by mouth 2 times daily 120 capsule 0    polyethylene glycol (GLYCOLAX) 17 g packet Take 17 g by mouth daily 527 g 0    zoledronic acid (RECLAST) 5 MG/100ML SOLN Infuse 100 mLs intravenously once for 1 dose 100 mL 0    citalopram (CELEXA) 10 MG tablet Take 5 mg by mouth daily      acetaminophen (TYLENOL) 500 MG tablet Take 500 mg by mouth every 6 hours as needed for Pain      methocarbamol (ROBAXIN) 500 MG tablet Take 500 mg by mouth 4 times daily      lisinopril (PRINIVIL;ZESTRIL) 2.5 MG tablet Take 2 tablets by mouth daily 30 tablet 0    OXYGEN Use 3L of oxygen all the time 3 L 3    levocetirizine (XYZAL) 5 MG tablet Take 1 tablet by mouth nightly 30 tablet 11    budesonide (PULMICORT) 0.5 MG/2ML nebulizer suspension Take 2 mLs by nebulization 2 times daily DX:COPD J44.9 60 ampule 6    formoterol (PERFOROMIST) 20 MCG/2ML nebulizer solution Take 2 mLs by nebulization every 12 hours DX:COPD J44.9 120 mL 6    ondansetron (ZOFRAN) 4 MG tablet Take 1 tablet by mouth daily as needed for Nausea or Vomiting (Patient not taking: Reported on 5/21/2021) 30 tablet 0    oxybutynin (DITROPAN) 5 MG tablet TAKE ONE TABLET BY MOUTH THREE TIMES A DAY 90 tablet 1    atorvastatin (LIPITOR) 10 MG tablet TAKE 1 TABLET BY MOUTH DAILY AT BEDTIME. 30 tablet 5    albuterol sulfate HFA (VENTOLIN HFA) 108 (90 Base) MCG/ACT inhaler Inhale 2 puffs into the lungs every 6 hours as needed for Wheezing 1 Inhaler 3    levothyroxine (SYNTHROID) 75 MCG tablet TAKE 1 TABLET BY MOUTH DAILY 30 tablet 5    ipratropium-albuterol (DUONEB) 0.5-2.5 (3) MG/3ML SOLN nebulizer solution Inhale 3 mLs into the lungs every 4 hours 360 mL 0     No current facility-administered medications for this visit.      Reviewed with patient and will remain unchanged except as mentioned in A/P  PHYSICAL EXAM     Vitals:    08/19/21 1032   BP: (!) 102/54   Pulse: 79   SpO2: 96%      Gen Alert, coop, no distress Heart  Rrr, 4/6   Head NC, AT, no abnorm Abd  Soft, NT, +BS, no mass, no OM   Eyes PER, conj/corn clear Ext  Ext nl, AT, no C/C/E   Nose Nares nl, no drain, NT Pulse 2+ and symmetric   Throat Lips, mucosa, tongue nl Skin Col/text/turg nl, no vis rash/les   Neck S/S, TM, NT, no bruit/JVD Psych Nl mood and affect   Lung CTA-B, unlabored, no DTP Lymph   No cervical or axillary LA   Ch wall NT, no deform Neuro  Nl gross M/S exam     ASSESSMENT AND PLAN     *AS    Date EF Detail   Sx     Ruth, sob   DATA   Most recent TTE, LHC reviewed personally   NYHA   III   TTE 5/18 7/21 60%  60% Mild AS MG 24  Severe AS MG 39, , mild MS MG 3, IVC dilated elevated RA pressure   LHC   None   Plan     Standard TAVR Workup - LHC, Carotids, PFTs, Dental, CTS, CTA C/A/P  Poor candidate for SAVR with severe lung disease, age   *HTN  Status Controlled, vitals and available ambulatory monitoring logs reviewed personally  Plan Counseled on diet/salt/exercise/weight, continue meds at doses above  *CHOL  Status  Uncontrolled with last LDL of 88 (goal <70) and HDL of 54 (6/21), labs reviewed personally  Plan Counseled on diet/exercise/weight, continue statin, lipid/liver surveillance per PCP  *COMPLIANCE  Status Compliant  Plan Discussed importance of compliance with meds/diet/salt/exercise; avoid tob/alc/drugs; patient verbalized understanding  *FOLLOWUP  After testing    Scribe Attestation  Audra JIMENES, am scribing for and in the presence of Jada Thorpe MD.   SignedAudra 08/06/21 8:03 AM   Provider Valerio Harris is working as a scribe for and in the presence of me (Jada Thorpe MD). Working as a scribe, Audra Krueger may have prepopulated components of this note with my historical  intellectual property under my direct supervision. Any additions to this intellectual property were performed in my presence and at my direction.   Furthermore, the content and accuracy of this note have been reviewed by me Jada Thorpe MD).  8/19/2021 10:36 AM    CODING     Category Diagnosis   Stable chronic illness  (97245/79369 - 2 or more) AS, HTN, CHOL   Chronic illness w/: Exac, progr or SA of Tx  (85276/47939 - 1 or more)    Undiagnosed new problem w/: uncertain prognosis  (61830/04590 - 1 or more)    Acute illness with systemic Sx  (77219/90358 - 1 or more)    Acute, complicated injury  (29600/85411 - 1 or more)    07813 1 or more chronic illness with exacerbation, progression or SA of treatment    Time  30-39 minutes spent preparing to see patient including reviewing patient history/prior tests/prior consults, performing a medical exam, counseling and educating patient/family/caregiver, ordering medications/tests/procedures, referring and communicating with PCPs and other pertinent consultants, documenting information in the EMR, independently interpreting results and communicating to family and coordination of patient care.

## 2021-08-09 ENCOUNTER — TELEPHONE (OUTPATIENT)
Dept: PHARMACY | Facility: CLINIC | Age: 86
End: 2021-08-09

## 2021-08-09 ENCOUNTER — OFFICE VISIT (OUTPATIENT)
Dept: CARDIOLOGY CLINIC | Age: 86
End: 2021-08-09
Payer: MEDICARE

## 2021-08-09 ENCOUNTER — OFFICE VISIT (OUTPATIENT)
Dept: FAMILY MEDICINE CLINIC | Age: 86
End: 2021-08-09
Payer: MEDICARE

## 2021-08-09 VITALS
WEIGHT: 176 LBS | DIASTOLIC BLOOD PRESSURE: 68 MMHG | HEART RATE: 84 BPM | TEMPERATURE: 98.7 F | BODY MASS INDEX: 29.29 KG/M2 | SYSTOLIC BLOOD PRESSURE: 110 MMHG | OXYGEN SATURATION: 94 %

## 2021-08-09 VITALS
OXYGEN SATURATION: 90 % | WEIGHT: 191 LBS | HEART RATE: 85 BPM | HEIGHT: 65 IN | DIASTOLIC BLOOD PRESSURE: 60 MMHG | SYSTOLIC BLOOD PRESSURE: 110 MMHG | BODY MASS INDEX: 31.82 KG/M2

## 2021-08-09 DIAGNOSIS — I35.0 NONRHEUMATIC AORTIC VALVE STENOSIS: ICD-10-CM

## 2021-08-09 DIAGNOSIS — J44.1 COPD EXACERBATION (HCC): ICD-10-CM

## 2021-08-09 DIAGNOSIS — R68.89 COLD INTOLERANCE: ICD-10-CM

## 2021-08-09 DIAGNOSIS — I10 ESSENTIAL HYPERTENSION: Chronic | ICD-10-CM

## 2021-08-09 DIAGNOSIS — I25.83 CORONARY ARTERY DISEASE DUE TO LIPID RICH PLAQUE: ICD-10-CM

## 2021-08-09 DIAGNOSIS — I50.33 ACUTE ON CHRONIC DIASTOLIC HEART FAILURE (HCC): Primary | ICD-10-CM

## 2021-08-09 DIAGNOSIS — M81.0 SENILE OSTEOPOROSIS: ICD-10-CM

## 2021-08-09 DIAGNOSIS — R60.0 BILATERAL LOWER EXTREMITY EDEMA: Chronic | ICD-10-CM

## 2021-08-09 DIAGNOSIS — R06.02 SOB (SHORTNESS OF BREATH): Chronic | ICD-10-CM

## 2021-08-09 DIAGNOSIS — I25.10 CORONARY ARTERY DISEASE DUE TO LIPID RICH PLAQUE: ICD-10-CM

## 2021-08-09 PROCEDURE — 1111F DSCHRG MED/CURRENT MED MERGE: CPT | Performed by: NURSE PRACTITIONER

## 2021-08-09 PROCEDURE — 99214 OFFICE O/P EST MOD 30 MIN: CPT | Performed by: PHYSICIAN ASSISTANT

## 2021-08-09 PROCEDURE — 99214 OFFICE O/P EST MOD 30 MIN: CPT | Performed by: NURSE PRACTITIONER

## 2021-08-09 RX ORDER — CITALOPRAM 10 MG/1
5 TABLET ORAL DAILY
COMMUNITY
End: 2021-10-13

## 2021-08-09 RX ORDER — PHENOL 1.4 %
1 AEROSOL, SPRAY (ML) MUCOUS MEMBRANE 2 TIMES DAILY
Qty: 60 TABLET | Refills: 5 | Status: SHIPPED | OUTPATIENT
Start: 2021-08-09 | End: 2022-01-06

## 2021-08-09 ASSESSMENT — ENCOUNTER SYMPTOMS
COUGH: 1
ABDOMINAL PAIN: 0
SHORTNESS OF BREATH: 1
BACK PAIN: 0
GASTROINTESTINAL NEGATIVE: 1
SHORTNESS OF BREATH: 1

## 2021-08-09 NOTE — TELEPHONE ENCOUNTER
CLINICAL PHARMACY NOTE  Post-Discharge Transitions of Care [facility])    Patient appears to have been discharged from  on [address]. Patient not found in Outcomes MTM. Please follow-up with patient to review medications. 30 days since discharge = 8/21/21    Kamala Correa CPhT.    [address] [facility] free: [phone]

## 2021-08-09 NOTE — PATIENT INSTRUCTIONS
Ino was seen today for follow-up. Diagnoses and all orders for this visit:    Acute on chronic diastolic heart failure (HCC)    Bilateral lower extremity edema    COPD exacerbation (HCC)    SOB (shortness of breath)    Cold intolerance  -     IRON AND TIBC; Future  -     FERRITIN; Future  -     CBC Auto Differential  -     VITAMIN B12 & FOLATE; Future    Senile osteoporosis  -     calcium carbonate (CALCIUM 600) 600 MG TABS tablet; Take 1 tablet by mouth 2 times daily             Ask pulmology about Bipap, seeing cardiology and pulmonology today and tomorrow, return here in a month.

## 2021-08-09 NOTE — PATIENT INSTRUCTIONS
CHF Orders:     Daily Weights-Document and send in with patient to office visits       Please call CHF office for  weight gain of 3 pounds in one day OR 5 pound weight gain in one week       Diet:   No salt added and 64 oz fluid restriction  No potassium based salt substitutes      Labs:please fax most recent results   BMP,BNP weekly                 Fax results to: CHF Clinic: [phone]         Med Changes:       Hold ACE/ARB if SBP less than 85 or patient symptomatic   Do not hold Spironolactone (aldactone) for hypotension/bradycardia   Avoid NSAIDS and decongestants   Call MD/NP for questions: CHF Clinic: [phone]

## 2021-08-09 NOTE — PROGRESS NOTES
Subjective:      Patient ID: Reagan Jamison is a 82 y.o. female. HPI Patient is here today for a hospital f/u. On 7/16, we got a call from [address] [place] [address] and said she was SOB and slight cough and hypoxia. We advised her to go to ER. She was admitted for 6 days. Main dx of admission was CHF and COPD exacerbation. Then she was discharged to [facility] where she has been for about 2 weeks where she is doing PT to get stronger and better balance. She is supposed to use BIPAP at night but the mask hurts her nose. She did get a new mask at the hospital and has been using that one. Mainly diuresed at hospital, changed lasix dose and inhalers. She sees cardiology today and pulmonology tomorrow. She had swallow study. She is mostly eating a soft diet. She is not allowed to drink from a straw. She is walking with her walker at rehab. Getting better but still weak and SOB with activity. She has also weaned off the Citalopram.    Says she is cold all the time, wants iron checked. Review of Systems   Constitutional: Positive for fatigue. Negative for unexpected weight change. HENT: Negative for nosebleeds. Eyes: Negative for visual disturbance. Respiratory: Positive for cough (mild) and shortness of breath. Cardiovascular: Negative for chest pain, palpitations and leg swelling. Gastrointestinal: Negative for abdominal pain. Musculoskeletal: Negative for back pain. Neurological: Positive for weakness. Negative for dizziness and headaches. Objective:   Physical Exam  Vitals reviewed. Constitutional:       Appearance: Normal appearance. She is well-developed and well-groomed. Cardiovascular:      Rate and Rhythm: Normal rate and regular rhythm. Heart sounds: Murmur heard. Systolic murmur is present with a grade of 3/6. Comments: Trace LLE  Pulmonary:      Effort: Pulmonary effort is normal.      Breath sounds: Normal breath sounds.    Musculoskeletal:      Right lower le+ Edema present. Left lower leg: Edema present. Skin:     General: Skin is warm and dry. Coloration: Skin is pale. Neurological:      Mental Status: She is alert and oriented to person, place, and time. Psychiatric:         Attention and Perception: Attention normal.         Mood and Affect: Mood normal.         Speech: Speech normal.         Behavior: Behavior normal. Behavior is cooperative. Assessment:      Nabil Diggs was seen today for follow-up. Diagnoses and all orders for this visit:    Acute on chronic diastolic heart failure (HCC)    Bilateral lower extremity edema    COPD exacerbation (HCC)    SOB (shortness of breath)    Cold intolerance  -     IRON AND TIBC; Future  -     FERRITIN; Future  -     CBC Auto Differential  -     VITAMIN B12 & FOLATE; Future               Plan:      Has meeting tomorrow with rehab as to when she will get out of rehab, seeing cardiology today and pulmonology tomorrow, return here in a month. Will check iron levels due to cold intolerance.         Gallo Collado

## 2021-08-10 ENCOUNTER — OFFICE VISIT (OUTPATIENT)
Dept: PULMONOLOGY | Age: 86
End: 2021-08-10
Payer: MEDICARE

## 2021-08-10 VITALS — OXYGEN SATURATION: 87 % | HEART RATE: 68 BPM

## 2021-08-10 DIAGNOSIS — G47.33 OBSTRUCTIVE SLEEP APNEA (ADULT) (PEDIATRIC): Chronic | ICD-10-CM

## 2021-08-10 DIAGNOSIS — J96.11 CHRONIC RESPIRATORY FAILURE WITH HYPOXIA (HCC): ICD-10-CM

## 2021-08-10 DIAGNOSIS — R06.02 SOB (SHORTNESS OF BREATH): Primary | Chronic | ICD-10-CM

## 2021-08-10 DIAGNOSIS — T17.908S ASPIRATION INTO AIRWAY, SEQUELA: ICD-10-CM

## 2021-08-10 DIAGNOSIS — I50.32 CHRONIC DIASTOLIC CONGESTIVE HEART FAILURE (HCC): Chronic | ICD-10-CM

## 2021-08-10 DIAGNOSIS — J44.9 COPD, SEVERE (HCC): Chronic | ICD-10-CM

## 2021-08-10 PROBLEM — T17.908A ASPIRATION INTO AIRWAY: Status: ACTIVE | Noted: 2021-08-10

## 2021-08-10 PROCEDURE — 99214 OFFICE O/P EST MOD 30 MIN: CPT | Performed by: INTERNAL MEDICINE

## 2021-08-10 RX ORDER — ALBUTEROL SULFATE 90 UG/1
2 AEROSOL, METERED RESPIRATORY (INHALATION) 4 TIMES DAILY PRN
Qty: 1 INHALER | Refills: 5 | Status: SHIPPED | OUTPATIENT
Start: 2021-08-10 | End: 2021-08-19 | Stop reason: SDUPTHER

## 2021-08-10 NOTE — PROGRESS NOTES
[facility]  Progress Note  MD Matthieu Olivia Gage   YOB: 1935    Date of Visit:  8/10/2021    Assessment/Plan:  1. SOB (shortness of breath)  Improved  She is better since release from hospital with decompensated CHF and COPD flare. 2. COPD, severe ([address]  Worse  PFT 2018:  INTERPRETATION:  Spirometry reveals decreased FVC at 1.65 liters, which is  78% predicted. FEV1 is decreased at 0.81 liters, which is 52% predicted. FEV1/FVC ratio is reduced at 49%. There is no significant change after  inhaled bronchodilators. Lungs volumes reveal normal total lung capacity  and vital capacity. Residual volume has increased at 142% predicted. Diffusion capacity is reduced at 39% predicted.     IMPRESSION:  Moderate to severe obstructive lung disease with air trapping.     Medication Management:  The patient benefits from the medical regimen and reports no complications. Breo ==> hoarseness  Anoro ==> cough  Bevespi ==> expense + she couldn't get a good treatment    Budesonide BID  Perforomist BID   Duoneb  Albuterol HFA prn    She is having trouble understanding her treatment regimen. 3. Aspiration into Airway  Had MBS in hospital showing she is an aspiration risk  Continue to work with Speech therapy      4. Obstructive sleep apnea on BiPAP/Chronic Respiratory Failure. She is not using her BiPAP  Sounds like she needs a new mask  She does have O2  Came today without portable O2 and her Sat was 87% at rest  She needs to use O2 with exertion. She does have a portable concentrator    5. Chronic diastolic congestive heart failure,  Echocardiogram was reviewed  LVEF is normal  RVSP is elevated  Recently hospitalized with decompensation  Better now  Follow-up with cardiology      FOLLOW UP: 6 months      Chief Complaint   Patient presents with    Shortness of Breath     6 month f.u.      Wheezing    Discuss Medications     told the JOSE ALFREDO - CNP   docusate (COLACE, DULCOLAX) 100 MG CAPS Take 100 mg by mouth 2 times daily  JOSE ALFREDO Guzman CNP   polyethylene glycol (GLYCOLAX) 17 g packet Take 17 g by mouth daily  JOSE ALFREDO Guzman CNP   zoledronic acid (RECLAST) 5 MG/100ML SOLN Infuse 100 mLs intravenously once for 1 dose  ELISE Ji   acetaminophen (TYLENOL) 500 MG tablet Take 500 mg by mouth every 6 hours as needed for Pain  Historical Provider, MD   methocarbamol (ROBAXIN) 500 MG tablet Take 500 mg by mouth 4 times daily  Historical Provider, MD   lisinopril (PRINIVIL;ZESTRIL) 2.5 MG tablet Take 2 tablets by mouth daily  ELISE Ji   OXYGEN Use 3L of oxygen all the time  ELISE Ji   levocetirizine (XYZAL) 5 MG tablet Take 1 tablet by mouth nightly  ELISE Ji   budesonide (PULMICORT) 0.5 MG/2ML nebulizer suspension Take 2 mLs by nebulization 2 times daily DX:COPD J44.9  Lydia Morse MD   formoterol (PERFOROMIST) 20 MCG/2ML nebulizer solution Take 2 mLs by nebulization every 12 hours DX:COPD J44.9  Lydia Morse MD   ondansetron (ZOFRAN) 4 MG tablet Take 1 tablet by mouth daily as needed for Nausea or Vomiting  Roxanne Glass MD   oxybutynin (DITROPAN) 5 MG tablet TAKE ONE TABLET BY MOUTH THREE TIMES A DAY  Yvette Richey MD   atorvastatin (LIPITOR) 10 MG tablet TAKE 1 TABLET BY MOUTH DAILY AT BEDTIME. Austin Tubbs MD   albuterol sulfate HFA (VENTOLIN HFA) 108 (90 Base) MCG/ACT inhaler Inhale 2 puffs into the lungs every 6 hours as needed for Wheezing  Lydia Morse MD   levothyroxine (SYNTHROID) 75 MCG tablet TAKE 1 TABLET BY MOUTH DAILY  Yvette Richey MD   ipratropium-albuterol (DUONEB) 0.5-2.5 (3) MG/3ML SOLN nebulizer solution Inhale 3 mLs into the lungs every 4 hours  Yvette Richey MD       Vitals:    08/10/21 0950   Pulse: 68   SpO2: (!) 87%     There is no height or weight on file to calculate BMI.      Wt Readings from Last 3 Encounters: 08/09/21 191 lb (86.6 kg)   08/09/21 176 lb (79.8 kg)   07/22/21 190 lb 12.8 oz (86.5 kg)     BP Readings from Last 3 Encounters:   08/09/21 110/60   08/09/21 110/68   07/22/21 118/78        Social History     Tobacco Use   Smoking Status Never Smoker   Smokeless Tobacco Never Used

## 2021-08-12 NOTE — TELEPHONE ENCOUNTER
Pt was here on the 8/9 and advised son that we haven't received any paperwork. Advised to call Juan Jo and find out what's going on.

## 2021-08-13 ENCOUNTER — HOSPITAL ENCOUNTER (OUTPATIENT)
Dept: SLEEP CENTER | Age: 86
Discharge: HOME | End: 2021-08-13
Payer: MEDICARE

## 2021-08-13 DIAGNOSIS — G47.33 OSA (OBSTRUCTIVE SLEEP APNEA): ICD-10-CM

## 2021-08-13 PROCEDURE — 95811 POLYSOM 6/>YRS CPAP 4/> PARM: CPT

## 2021-08-16 ENCOUNTER — CARE COORDINATION (OUTPATIENT)
Dept: CASE MANAGEMENT | Age: 86
End: 2021-08-16

## 2021-08-16 ENCOUNTER — TELEPHONE (OUTPATIENT)
Dept: FAMILY MEDICINE CLINIC | Age: 86
End: 2021-08-16

## 2021-08-16 NOTE — TELEPHONE ENCOUNTER
Tea from [facility] [phone] wanted to inform Rosamaria Harrison about Pt's BP was 96/72 also, she wanted to speak to [facility] about the PT's care and to see what else is needed for the PT.  Please advise

## 2021-08-16 NOTE — CARE COORDINATION
[facility] Transitions Initial Follow Up Call    Call within 2 business days of discharge: Yes    Patient: Jose Coulter Patient : 1935   MRN: [id number]  Reason for Admission: Acute Respiratory Failure  Discharge Date: 21 RARS: Readmission Risk Score: 16      Last Discharge [facility]       Complaint Diagnosis Description Type Department Provider    21 Shortness of Breath Hypoxia . .. ED to Hosp-Admission (Discharged) (ADMITTED) Camilo Hernandez MD; Nsehniitooh. .. Acute Care Course: Patient admitted to [facility] for Acute respiratory failure with hypoxia from -. Patient discharged to [address] [place] [address] skilled nursing from -. She then discharged back to her assisted living apartment. Sig Hx:Acute COPD. CHF,Oropharyngeal dysphagia, Hyperglycemia, Hypothroidism, MARIAA, Constipation    DME:     Conversation: Per patient ping patient discharged to assisted living. [address] [place] [address]. Per staff Sanjana Grady she confirmed admission. She transferred caller to [employer]. Caller left a hipaa compliant message with call back information requesting a call back. Follow up plan: Will wait for return call. Non-face-to-face services provided:           Follow Up  Future Appointments   Date Time Provider Moe Dial   2021 10:30 AM Christine Neri MD FF Cardio [facility]   9/10/2021  9:30 AM ELISE Sabillon [address]   10/1/2021 11:40 AM JOSE ALFREDO Cardoza FF SLEEP MED [facility]   2021 10:00 AM ELISE Sabillon [address]   2/15/2022 10:00 AM Yanet Courtney MD PULM & CC [facility]       Suzanne Yanes RN

## 2021-08-17 ENCOUNTER — CARE COORDINATION (OUTPATIENT)
Dept: CASE MANAGEMENT | Age: 86
End: 2021-08-17

## 2021-08-17 NOTE — TELEPHONE ENCOUNTER
Berta Cosby of the information. Joyce from [facility]) states her BP was 102/64 today.

## 2021-08-17 NOTE — CARE COORDINATION
[facility] Transitions Follow Up Call    2021    Patient: Maryann James  Patient : 1935   MRN: [id number]  Reason for Admission:   Discharge Date: 21 RARS: Readmission Risk Score: 16    Received a call from [facility] confirming the need for a updated med list fax to secure fax. CTN confirmed. [facility] states she will send updated med list to secure fax. Spoke with: Glory/ [address] [place] [address] Transitions Subsequent and Final Call    Subsequent and Final Calls  Care Transitions Interventions  Other Interventions:            Follow Up  Future Appointments   Date Time Provider Moe Dial   2021 10:30 AM Rosa Blue MD FF Cardio [facility]   9/10/2021  9:30 AM ELISE Levi [address]   10/1/2021 11:40 AM JOSE ALFREDO Coleman FF SLEEP MED [facility]   2021 10:00 AM ELISE [address]   2/15/2022 10:00 AM Darlene Kruger MD PULWOJCIECH & CC [facility]       Lurdes Peoples RN

## 2021-08-17 NOTE — TELEPHONE ENCOUNTER
[facility] called to inform that they just tool the patient bp and it was 98/61. They are holding her lasix's.       Please advise     Provider out of the office

## 2021-08-17 NOTE — TELEPHONE ENCOUNTER
Lizzy Floyd, let me know what BP is tomorrow (Tuesday)    Also I was not in charge of her PT, rehab was and son told me they were evaluating her last week for potential discharged from PT

## 2021-08-18 ENCOUNTER — CARE COORDINATION (OUTPATIENT)
Dept: CASE MANAGEMENT | Age: 86
End: 2021-08-18

## 2021-08-18 DIAGNOSIS — J96.01 ACUTE RESPIRATORY FAILURE WITH HYPOXIA AND HYPERCAPNIA (HCC): Primary | ICD-10-CM

## 2021-08-18 DIAGNOSIS — J96.02 ACUTE RESPIRATORY FAILURE WITH HYPOXIA AND HYPERCAPNIA (HCC): Primary | ICD-10-CM

## 2021-08-18 PROCEDURE — 1111F DSCHRG MED/CURRENT MED MERGE: CPT | Performed by: PHYSICIAN ASSISTANT

## 2021-08-18 NOTE — CARE COORDINATION
[facility] Transitions Follow Up Call    2021    Patient: Sharon Tapia  Patient : 1935   MRN: [id number]  Reason for Admission: Acute Resp Failure  Discharge Date: 21 RARS: Readmission Risk Score: 16      Challenges to be reviewed by the provider   Additional needs identified to be addressed with provider: Yes  medications-1111F needs signed    FYI the medlist was added to Media. I am sorry that I am not allowed to add meds to a list but the following were included on medlist from [address] [place] [address] and not on our list for me to have you review:    Prilosec 20 mg QD for GERD                      Care Transitions Subsequent and Final Call    Subsequent and Final Calls  Care Transitions Interventions  Other Interventions:            Follow Up  Future Appointments   Date Time Provider Moe Dial   2021 10:30 AM Dave Noble MD FF Cardio [facility]   9/10/2021  9:30 AM ELISE Knisey [address]   10/1/2021 11:40 AM JOSE ALFREDO Anaya FF SLEEP MED [facility]   2021 10:00 AM ELISE Kinsey [address]   2/15/2022 10:00 AM Narciso Singer MD PULM & CC [facility]       Charles Camargo, LYNN, RN   [address] [place] [address] Transition Nurse  [phone]

## 2021-08-19 ENCOUNTER — HOSPITAL ENCOUNTER (OUTPATIENT)
Age: 86
Discharge: HOME | End: 2021-08-19
Payer: MEDICARE

## 2021-08-19 ENCOUNTER — TELEPHONE (OUTPATIENT)
Dept: CARDIOLOGY CLINIC | Age: 86
End: 2021-08-19

## 2021-08-19 ENCOUNTER — OFFICE VISIT (OUTPATIENT)
Dept: CARDIOLOGY CLINIC | Age: 86
End: 2021-08-19
Payer: MEDICARE

## 2021-08-19 VITALS — HEART RATE: 79 BPM | DIASTOLIC BLOOD PRESSURE: 54 MMHG | OXYGEN SATURATION: 96 % | SYSTOLIC BLOOD PRESSURE: 102 MMHG

## 2021-08-19 DIAGNOSIS — R42 DIZZINESS: ICD-10-CM

## 2021-08-19 DIAGNOSIS — I10 ESSENTIAL HYPERTENSION: ICD-10-CM

## 2021-08-19 DIAGNOSIS — R06.02 SOB (SHORTNESS OF BREATH): ICD-10-CM

## 2021-08-19 DIAGNOSIS — I35.0 NONRHEUMATIC AORTIC VALVE STENOSIS: Primary | ICD-10-CM

## 2021-08-19 DIAGNOSIS — R06.02 SOB (SHORTNESS OF BREATH): Primary | ICD-10-CM

## 2021-08-19 DIAGNOSIS — E78.00 HYPERCHOLESTEREMIA: ICD-10-CM

## 2021-08-19 LAB
BUN BLDV-MCNC: 23 MG/DL (ref 7–20)
CREAT SERPL-MCNC: 1.1 MG/DL (ref 0.6–1.2)
GFR AFRICAN AMERICAN: 57
GFR NON-AFRICAN AMERICAN: 47

## 2021-08-19 PROCEDURE — 36415 COLL VENOUS BLD VENIPUNCTURE: CPT

## 2021-08-19 PROCEDURE — 82565 ASSAY OF CREATININE: CPT

## 2021-08-19 PROCEDURE — 95811 POLYSOM 6/>YRS CPAP 4/> PARM: CPT | Performed by: INTERNAL MEDICINE

## 2021-08-19 PROCEDURE — 84520 ASSAY OF UREA NITROGEN: CPT

## 2021-08-19 PROCEDURE — 99204 OFFICE O/P NEW MOD 45 MIN: CPT | Performed by: INTERNAL MEDICINE

## 2021-08-19 NOTE — TELEPHONE ENCOUNTER
TAVR testing is scheduled for 9-13-21 per below:  10:00 am - CAROTID STUDY  * Please wear easy to remove clothing  * Please take all medication, unless otherwise instructed  * You will be here for approximately 1 hour    11:00 am - [facility] CTA CHEST ABD PELVIC W/CONTRAST  PREPS:  * NPO 4 hours prior to procedure  * Arrive 30 minutes prior to exam (8:00 am)  * No necklaces, underwire bras, body piercing, no pants with zippers, belts,or suspenders  * Bring a complete list of medications or the test cannot be completed. Lab work was drawn on 8-19-21.    9-22-21 / 8-9:30 - [facility] w/DCE @ [facility]  PREPS:  * Bring list of your medications. * Please notify us before the procedure if you are allergic to anything; especially x-ray contrast dye, iodine, nickel, or any type of jewlry. This is very important! * Do no eat or drink anthing at all after midnight (or 8 hours) prior to the procedure. * Take all morning medications EXCEPT any diuretics (water pills) the day of the procedure with a small amount of water. * If you are on Coumadin, Warfarin, or Campbell Box, please notify us so that we can make adjustments to the medications. * If you are on Xarelto, Eliquis, or Pradaxa please stop taking these medications 3 days prior to the procedure(including the day of the procedure). * If you beatris insulin in the morning, check your blood sugar the morning of your procedure. If your blood sugar is 120 or less, do not take insulin. If your blood suger is more than 120, take half the dose of your normal insulin. No matter what your blood sugar level is, take all oral diabetic medications. * You MUST have someone to drive you home - no driving for 24 hours after your procedure. If an intervention is performed, you might stay overnight in the hospital.   * Discharge instructions will be given to you at the time of your procedure.

## 2021-08-20 ENCOUNTER — TELEPHONE (OUTPATIENT)
Dept: PULMONOLOGY | Age: 86
End: 2021-08-20

## 2021-08-20 ENCOUNTER — TELEPHONE (OUTPATIENT)
Dept: FAMILY MEDICINE CLINIC | Age: 86
End: 2021-08-20

## 2021-08-20 DIAGNOSIS — R79.89 ABNORMAL CBC: ICD-10-CM

## 2021-08-20 DIAGNOSIS — R79.89 ELEVATED FERRITIN LEVEL: Primary | ICD-10-CM

## 2021-08-20 NOTE — TELEPHONE ENCOUNTER
Pt son notfied of lab result. Advised to repeat labs in 3 weeks. Orders in epic. Son states they will be going to [facility] to get labs done.

## 2021-08-25 ENCOUNTER — TELEPHONE (OUTPATIENT)
Dept: FAMILY MEDICINE CLINIC | Age: 86
End: 2021-08-25

## 2021-08-25 NOTE — TELEPHONE ENCOUNTER
Pati Abel from [address] calling asking for an order to be uploaded to epic. She stated pt was discharged back to assisted living and they are asking to resume her PT, OT and Speech Therapy.      Pati Abel can be reached at [phone]

## 2021-08-26 ENCOUNTER — TELEPHONE (OUTPATIENT)
Dept: FAMILY MEDICINE CLINIC | Age: 86
End: 2021-08-26

## 2021-08-26 DIAGNOSIS — R13.10 DYSPHAGIA, UNSPECIFIED TYPE: ICD-10-CM

## 2021-08-26 DIAGNOSIS — I50.9 CHRONIC CONGESTIVE HEART FAILURE, UNSPECIFIED HEART FAILURE TYPE (HCC): Primary | ICD-10-CM

## 2021-08-26 DIAGNOSIS — I50.9 CHRONIC CONGESTIVE HEART FAILURE, UNSPECIFIED HEART FAILURE TYPE (HCC): ICD-10-CM

## 2021-08-26 DIAGNOSIS — J96.11 CHRONIC RESPIRATORY FAILURE WITH HYPOXIA (HCC): ICD-10-CM

## 2021-08-26 DIAGNOSIS — J44.9 COPD, SEVERE (HCC): ICD-10-CM

## 2021-08-26 DIAGNOSIS — J44.9 COPD, SEVERE (HCC): Primary | ICD-10-CM

## 2021-08-26 NOTE — TELEPHONE ENCOUNTER
Cyndy Rosales from [address] called back stating for the PT, OT and Speech Therapy it needs to include Nursing so nurse can go to pts house. New order placed in [facility].

## 2021-08-26 NOTE — TELEPHONE ENCOUNTER
Cole Tovar is calling from [facility] to get a verbal [phone]. Patient needs Physical Therapy one time a week for four weeks per insurance.        Provider out of the office

## 2021-08-30 ENCOUNTER — TELEPHONE (OUTPATIENT)
Dept: FAMILY MEDICINE CLINIC | Age: 86
End: 2021-08-30

## 2021-08-30 NOTE — TELEPHONE ENCOUNTER
Heidi Enriquez called to inform you that patients potassium is 3.3  Chloride is 94  BUN 21  Creatine 1.0  BNT 65  Spironolacton 12.5    And she I taking Lasix 60 ml 2x a day    Patient is not currently taking potassium

## 2021-08-31 ENCOUNTER — TELEPHONE (OUTPATIENT)
Dept: FAMILY MEDICINE CLINIC | Age: 86
End: 2021-08-31

## 2021-08-31 DIAGNOSIS — E87.6 LOW SERUM POTASSIUM LEVEL: ICD-10-CM

## 2021-08-31 DIAGNOSIS — I10 ESSENTIAL HYPERTENSION: Primary | ICD-10-CM

## 2021-08-31 RX ORDER — POTASSIUM CHLORIDE 20 MEQ/1
20 TABLET, EXTENDED RELEASE ORAL DAILY
Qty: 30 TABLET | Refills: 5 | Status: SHIPPED | OUTPATIENT
Start: 2021-08-31

## 2021-08-31 NOTE — TELEPHONE ENCOUNTER
----- Message from Quinton Reynolds sent at 8/31/2021 10:19 AM EDT -----  Subject: Message to Provider    QUESTIONS  Information for Provider? Patient called asking about a power chair that   was supposed to be ordered on the 13th, she wants to know what company the   order was sent to. Please call back to give her the info.  ---------------------------------------------------------------------------  --------------  CALL BACK INFO  What is the best way for the office to contact you? OK to leave message on   voicemail  Preferred Call Back Phone Number? [phone]  ---------------------------------------------------------------------------  --------------  SCRIPT ANSWERS  Relationship to Patient?  Self

## 2021-08-31 NOTE — TELEPHONE ENCOUNTER
Spoke to pt son states [employer] will fax over some paperwork for David Mondragon to sign for the wheelchair.

## 2021-09-13 LAB
BUN BLDV-MCNC: 20 MG/DL
CALCIUM SERPL-MCNC: 9.3 MG/DL
CHLORIDE BLD-SCNC: 99 MMOL/L
CO2: 29 MMOL/L
CREAT SERPL-MCNC: 1 MG/DL
GFR CALCULATED: 53
GLUCOSE BLD-MCNC: 97 MG/DL
POTASSIUM SERPL-SCNC: 4.1 MMOL/L
SODIUM BLD-SCNC: 139 MMOL/L

## 2021-09-14 ENCOUNTER — TELEPHONE (OUTPATIENT)
Dept: CARDIOLOGY CLINIC | Age: 86
End: 2021-09-14

## 2021-09-14 NOTE — TELEPHONE ENCOUNTER
Son states pt is scheduled for heart cath on 9/22/21 and pt had to reschedule CTA and carotid to 10/6/21. Asking if she needs to reschedule cath.  Please call to advise

## 2021-09-14 NOTE — TELEPHONE ENCOUNTER
Yes, she will need to reschedule the [facility],  the Insurance requires the CTA/Carotid to be done prior to the Cath and the Cath needs to be done the week after the testing. I will call her to reschedule.  mg

## 2021-10-01 ENCOUNTER — OFFICE VISIT (OUTPATIENT)
Dept: PULMONOLOGY | Age: 86
End: 2021-10-01
Payer: MEDICARE

## 2021-10-01 VITALS — SYSTOLIC BLOOD PRESSURE: 118 MMHG | DIASTOLIC BLOOD PRESSURE: 64 MMHG | HEART RATE: 83 BPM | OXYGEN SATURATION: 94 %

## 2021-10-01 DIAGNOSIS — E03.9 ACQUIRED HYPOTHYROIDISM: Chronic | ICD-10-CM

## 2021-10-01 DIAGNOSIS — I50.32 CHRONIC DIASTOLIC CONGESTIVE HEART FAILURE (HCC): Chronic | ICD-10-CM

## 2021-10-01 DIAGNOSIS — I10 ESSENTIAL HYPERTENSION: Chronic | ICD-10-CM

## 2021-10-01 DIAGNOSIS — I25.10 CORONARY ARTERY DISEASE DUE TO LIPID RICH PLAQUE: ICD-10-CM

## 2021-10-01 DIAGNOSIS — G47.33 OBSTRUCTIVE SLEEP APNEA (ADULT) (PEDIATRIC): Primary | Chronic | ICD-10-CM

## 2021-10-01 DIAGNOSIS — J44.9 COPD, SEVERE (HCC): Chronic | ICD-10-CM

## 2021-10-01 DIAGNOSIS — E66.01 CLASS 3 SEVERE OBESITY WITHOUT SERIOUS COMORBIDITY WITH BODY MASS INDEX (BMI) OF 40.0 TO 44.9 IN ADULT, UNSPECIFIED OBESITY TYPE (HCC): ICD-10-CM

## 2021-10-01 DIAGNOSIS — I25.83 CORONARY ARTERY DISEASE DUE TO LIPID RICH PLAQUE: ICD-10-CM

## 2021-10-01 PROCEDURE — 99214 OFFICE O/P EST MOD 30 MIN: CPT | Performed by: NURSE PRACTITIONER

## 2021-10-01 ASSESSMENT — SLEEP AND FATIGUE QUESTIONNAIRES
HOW LIKELY ARE YOU TO NOD OFF OR FALL ASLEEP WHILE SITTING INACTIVE IN A PUBLIC PLACE: 0
HOW LIKELY ARE YOU TO NOD OFF OR FALL ASLEEP WHILE SITTING AND READING: 1
HOW LIKELY ARE YOU TO NOD OFF OR FALL ASLEEP WHILE LYING DOWN TO REST IN THE AFTERNOON WHEN CIRCUMSTANCES PERMIT: 0
HOW LIKELY ARE YOU TO NOD OFF OR FALL ASLEEP IN A CAR, WHILE STOPPED FOR A FEW MINUTES IN TRAFFIC: 0
HOW LIKELY ARE YOU TO NOD OFF OR FALL ASLEEP WHILE WATCHING TV: 2
ESS TOTAL SCORE: 3
HOW LIKELY ARE YOU TO NOD OFF OR FALL ASLEEP WHILE SITTING QUIETLY AFTER LUNCH WITHOUT ALCOHOL: 0
HOW LIKELY ARE YOU TO NOD OFF OR FALL ASLEEP WHILE SITTING AND TALKING TO SOMEONE: 0
HOW LIKELY ARE YOU TO NOD OFF OR FALL ASLEEP WHEN YOU ARE A PASSENGER IN A CAR FOR AN HOUR WITHOUT A BREAK: 0

## 2021-10-01 NOTE — ASSESSMENT & PLAN NOTE
Chronic-Stable: Reviewed and analyzed results of physiologic download from patient's machine and reviewed with patient. Supplies and parts as needed for her machine. These are medically necessary. Limit caffeine use after 3pm. Based on the analyzed data will change following settings: Ramp to 30 minutes, Humidity to 3. machines settings were changed via machine modem as patient did not bring mask or machine with her to appointment today. Patient to work with her DME on mask fit and comfort. Discussed using the ramp feature to help her tolerate the pressure of the machine when she puts mask on. Verbal and written instruction on PAP equipment cleaning and disinfection schedule provided. Follow up in 3 months or sooner if issues arise. Discussed the recall of Repironics devices with patient and the severity of her sleep apnea. Discussed the risks of untreated sleep apnea including but not limited to car accidents, heart attacks, strokes, and death. Alternative therapy may not exist or may be severely limited. Felt the benefits of continued usage of these devices may outweigh the risks identified in the recall notification. Advised to avoid use of unproven cleaning methods, such as ozone. Encouraged patient to register her machine for the recall and provided phone number.

## 2021-10-01 NOTE — PROGRESS NOTES
Diagnosis: [x] MARIAA (G47.33) [] CSA (G47.31) [] Apnea (G47.30)   Length of Need: [x] 15 Months [] 99 Months [] Other:   Machine (BERTRAND!): [x] Respironics Dream Station      Auto [] ResMed AirSense     Auto [] Other:     []  CPAP () [] Bilevel ()   Mode: [] Auto [] Spontaneous    Mode: [] Auto [] Spontaneous                      Comfort Settings:      Humidifier: [] Heated ()        [x] Water chamber replacement ()/ 1 per 6 months        Mask:   [] Nasal () /1 per 3 months [x] Full Face () /1 per 3 months   [] Patient choice -Size and fit mask [x] Patient Choice - Size and fit mask   [] Dispense: [] Dispense:   [] Headgear () / 1 per 3 months [x] Headgear () / 1 per 3 months   [] Replacement Nasal Cushion ()/2 per month [x] Interface Replacement ()/1 per month   [] Replacement Nasal Pillows ()/2 per month         Tubing: [x] Heated ()/1 per 3 months    [] Standard ()/1 per 3 months [] Other:           Filters: [x] Non-disposable ()/1 per 6 months     [x] Ultra-Fine, Disposable ()/2 per month        Miscellaneous: [] Chin Strap ()/ 1 per 6 months [] O2 bleed-in:        LPM   [] Oxymetry on CPAP/Bilevel []  Other:         Start Order Date: 10/01/21    MEDICAL JUSTIFICATION:  I, the undersigned, certify that the above prescribed supplies are medically necessary for this patients wellbeing. In my opinion, the supplies are both reasonable and necessary in reference to accepted standards of medicalpractice in treatment of this patients condition. Ryann Bhatti NP    NPI: [id number]       Order Signed Date: 10/01/21  [facility]  Pulmonary, Sleep, and Critical Care    Pulmonary, Sleep, and Critical Care  [facility] [address] [place] [address]  Suite [facility], [address] [facility] , [address]                                    Fer Tuttle  Phone: [phone]    Fax: [address]  1935  [address] [facility]  [address]  [phone] (home)   [phone] (mobile)      Insurance Info (confirm with patient if correct):  Payor/Plan Subscr  Sex Relation Sub. Ins. ID Effective Group Num   1.  [address] 1935 Female Self [id number]                                    [id number]

## 2021-10-01 NOTE — PROGRESS NOTES
Aubrie Ramsey MD, [place] [facility]  Tiffanie Kehrt CNP  Alexey Gooden CNP [place] [address]  [address] [facility], [address] [facility] [phone]   [facility]   [address] [facility]. [address] [phone]     [place] [id number] [phone]      Assessment/Plan:     1. Obstructive sleep apnea (adult) (pediatric)  Assessment & Plan:   Chronic-Stable: Reviewed and analyzed results of physiologic download from patient's machine and reviewed with patient. Supplies and parts as needed for her machine. These are medically necessary. Limit caffeine use after 3pm. Based on the analyzed data will change following settings: Ramp to 30 minutes, Humidity to 3. machines settings were changed via machine modem as patient did not bring mask or machine with her to appointment today. Patient to work with her DME on mask fit and comfort. Discussed using the ramp feature to help her tolerate the pressure of the machine when she puts mask on. Verbal and written instruction on PAP equipment cleaning and disinfection schedule provided. Follow up in 3 months or sooner if issues arise. Discussed the recall of [employer]ironics devices with patient and the severity of her sleep apnea. Discussed the risks of untreated sleep apnea including but not limited to car accidents, heart attacks, strokes, and death. Alternative therapy may not exist or may be severely limited. Felt the benefits of continued usage of these devices may outweigh the risks identified in the recall notification. Advised to avoid use of unproven cleaning methods, such as ozone. Encouraged patient to register her machine for the recall and provided phone number. 2. Coronary artery disease due to lipid rich plaque  Assessment & Plan:   Chronic- Stable. Discussed the importance of treating obstructive sleep apnea as part of the management of this disorder. Cont any meds per PCP and other physicians. 3. Chronic diastolic congestive heart failure ([address]  Assessment & Plan:  Chronic- Stable. Discussed the importance of treating obstructive sleep apnea as part of the management of this disorder. Cont any meds per PCP and other physicians. 4. Essential hypertension  Assessment & Plan:  Chronic- Stable. Discussed the importance of treating obstructive sleep apnea as part of the management of this disorder. Cont any meds per PCP and other physicians. 5. COPD, severe ([address]  Assessment & Plan:   Chronic- Stable. Discussed the importance of treating obstructive sleep apnea as part of the management of this disorder. Cont any meds per PCP and other physicians. 6. Acquired hypothyroidism  Assessment & Plan:   Chronic- Stable. Discussed the importance of treating obstructive sleep apnea as part of the management of this disorder. Cont any meds per PCP and other physicians. 7. Class 3 severe obesity without serious comorbidity with body mass index (BMI) of 40.0 to 44.9 in adult, unspecified obesity type [facility])  Assessment & Plan:  Chronic-not stable:  Discussed importance of treating obstructive sleep apnea and getting sufficient sleep to assist with weight control. Encouraged her to work on weight loss through diet and exercise. Recommended DASH or Mediterranean diets. Reviewed, analyzed, and documented physiologic data from patient's PAP machine. This information was analyzed to assess complexity and medical decision making in regards to further testing and management. The primary encounter diagnosis was Obstructive sleep apnea (adult) (pediatric).  Diagnoses of Coronary artery disease due to lipid rich plaque, Chronic diastolic congestive heart failure ([address] Essential hypertension, COPD, severe ([address] Acquired hypothyroidism, and Class 3 severe obesity without serious comorbidity with body mass index (BMI) of 40.0 to 44.9 in adult, unspecified obesity type [facility]) were also pertinent to this visit. The chronic medical conditions listed are directly related to the primary diagnosis listed above. The management of the primary diagnosis affects the secondary diagnosis and vice versa. Subjective:   Subjective   Patient ID: Chloé Berry is a 82 y.o. female. Chief Complaint   Patient presents with    Sleep Apnea       HPI:  Machine Modem/Download Info:  Compliance (hours/night): 6.55 hrs/night  % of nights >= 4 hrs: 70.8 %  Download AHI (/hour): 6.7 /HR   Average IPAP Pressure: 24 cmH2O  Average EPAP Pressure: 18 cmH2O         AUTO BIPAP - Settings (Yasir)  IPAP Max: 25 cmH2O  EPAP Min: 18 cmH2O  Pressure Support Min: 6  Pressure Support Max: 7             Comfort Settings  Humidity Level (0-8): 0  Heated Tubing (Yes/No): Yes  Flex/EPR (0-3): 3 PAP Mask  Clinically Relevant Leak: Yes     Chloé Berry reports she is doing well with her machine. Pressure feels high. She has not been  Using the ramp button on the machine. She has been having dry mouth and issues with mask comfort and leak. Mask has irritated her nose. she denies headaches, congestion, nosebleeds, aerophagia, or drowsiness while driving. She would like to change [employer] companies to [address] to where she gets her oxygen. She wears 3L during the day and bleed in during the night. Titration on: Study showed improved sleep related breathing disorder with Bilevel pressure of 23/18. Periodic leg movements of sleep 8.5/hr. Recommend auto bilevel: IPAP max: 25, EPAP min: 18, PS min: 6, PS Max: 7 cmH2O with 3 LPM O2 bleed in. [address]    [place] - Total score: 3    Social History     Socioeconomic History    Marital status:       Spouse name: Not on file    Number of children: Not on file    Years of education: Not on file    Highest education level: Not on file   Occupational History    Not on file   Tobacco Use    Smoking status: Never Smoker    Smokeless tobacco: Never Used   Vaping Use    Vaping Use: Never used   Substance and Sexual Activity    Alcohol use: No    Drug use: No    Sexual activity: Never   Other Topics Concern    Not on file   Social History Narrative    Not on file     Social Determinants of Health     Financial Resource Strain:     Difficulty of Paying Living Expenses:    Food Insecurity:     Worried About Running Out of Food in the Last Year:     [address] in the Last Year:    Transportation Needs:     Lack of Transportation (Medical):  Lack of Transportation (Non-Medical):    Physical Activity:     Days of Exercise per Week:     Minutes of Exercise per Session:    Stress:     Feeling of Stress :    Social Connections:     Frequency of Communication with Friends and Family:     Frequency of Social Gatherings with Friends and Family:     Attends Christianity Services:     Active Member of Clubs or Organizations:     Attends Club or Organization Meetings:     Marital Status:    Intimate Partner Violence:     Fear of Current or Ex-Partner:     Emotionally Abused:     Physically Abused:     Sexually Abused:        Current Outpatient Medications   Medication Instructions    acetaminophen (TYLENOL) 500 mg, Oral, EVERY 6 HOURS PRN    albuterol sulfate HFA (VENTOLIN HFA) 108 (90 Base) MCG/ACT inhaler 2 puffs, Inhalation, EVERY 6 HOURS PRN    atorvastatin (LIPITOR) 10 MG tablet TAKE 1 TABLET BY MOUTH DAILY AT BEDTIME.     budesonide (PULMICORT) 500 mcg, Nebulization, 2 TIMES DAILY, DX:COPD J44.9    calcium carbonate (CALCIUM 600) 600 mg, Oral, 2 TIMES DAILY    citalopram (CELEXA) 5 mg, Oral, DAILY    docusate (COLACE, DULCOLAX) 100 mg, Oral, 2 TIMES DAILY    formoterol (PERFOROMIST) 20 mcg, Nebulization, EVERY 12 HOURS, DX:COPD J44.9    furosemide (LASIX) 60 mg, Oral, 2 TIMES DAILY    ipratropium-albuterol (DUONEB) 0.5-2.5 (3) MG/3ML SOLN nebulizer solution 3 mLs, Inhalation, EVERY 4 HOURS    levothyroxine (SYNTHROID) 75 mcg, Oral, DAILY    lisinopril (PRINIVIL;ZESTRIL) 5 mg, Oral, DAILY    methocarbamol (ROBAXIN) 500 mg, Oral, 4 TIMES DAILY    ondansetron (ZOFRAN) 4 mg, Oral, DAILY PRN    oxybutynin (DITROPAN) 5 MG tablet TAKE ONE TABLET BY MOUTH THREE TIMES A DAY    OXYGEN Use 3L of oxygen all the time    potassium chloride (KLOR-CON M) 20 MEQ extended release tablet 20 mEq, Oral, DAILY    predniSONE (DELTASONE) 10 MG tablet Take 40mg daily x 1 day, then 30mg daily x 3 days, then 20mg daily x 3 days, then 10mg daily x 3 days, then 5mg daily x 3 days    spironolactone (ALDACTONE) 12.5 mg, Oral, DAILY    zoledronic acid (RECLAST) 5 mg, IntraVENous, ONCE          Vitals:  Weight BMI   Wt Readings from Last 3 Encounters:   08/09/21 191 lb (86.6 kg)   08/09/21 176 lb (79.8 kg)   07/22/21 190 lb 12.8 oz (86.5 kg)    There is no height or weight on file to calculate BMI.      BP HR SaO2   BP Readings from Last 3 Encounters:   10/01/21 118/64   08/19/21 (!) 102/54   08/09/21 110/60    Pulse Readings from Last 3 Encounters:   10/01/21 83   08/19/21 79   08/10/21 68    SpO2 Readings from Last 3 Encounters:   10/01/21 94%   08/19/21 96%   08/10/21 (!) 87%        Electronically signed by JOSE ALFREDO Haque on 10/1/2021 at 12:41 PM

## 2021-10-01 NOTE — LETTER
[facility] Sleep Medicine  [address] [facility]  [address] [address]  Phone: [phone]  Fax: [phone]    October 1, 2021       Patient: Sushila Lau   MR Number: [id number]   YOB: 1935   Date of Visit: 10/1/2021       Peggy Knutson was seen for a follow up visit today. Here is my assessment and plan as well as an attached copy of her visit today:    Chronic congestive heart failure ([address]  Chronic- Stable. Discussed the importance of treating obstructive sleep apnea as part of the management of this disorder. Cont any meds per PCP and other physicians. COPD, severe ([address]   Chronic- Stable. Discussed the importance of treating obstructive sleep apnea as part of the management of this disorder. Cont any meds per PCP and other physicians. Hypothyroidism   Chronic- Stable. Discussed the importance of treating obstructive sleep apnea as part of the management of this disorder. Cont any meds per PCP and other physicians. Essential hypertension  Chronic- Stable. Discussed the importance of treating obstructive sleep apnea as part of the management of this disorder. Cont any meds per PCP and other physicians. Coronary artery disease due to lipid rich plaque   Chronic- Stable. Discussed the importance of treating obstructive sleep apnea as part of the management of this disorder. Cont any meds per PCP and other physicians. Class 3 severe obesity without serious comorbidity with body mass index (BMI) of 40.0 to 44.9 in adult [facility])  Chronic-not stable:  Discussed importance of treating obstructive sleep apnea and getting sufficient sleep to assist with weight control. Encouraged her to work on weight loss through diet and exercise. Recommended DASH or Mediterranean diets. Obstructive sleep apnea (adult) (pediatric)   Chronic-Stable: Reviewed and analyzed results of physiologic download from patient's machine and reviewed with patient.   Supplies and parts as needed for her machine. These are medically necessary. Limit caffeine use after 3pm. Based on the analyzed data will change following settings: Ramp to 30 minutes, Humidity to 3. machines settings were changed via machine modem as patient did not bring mask or machine with her to appointment today. Patient to work with her DME on mask fit and comfort. Discussed using the ramp feature to help her tolerate the pressure of the machine when she puts mask on. Verbal and written instruction on PAP equipment cleaning and disinfection schedule provided. Follow up in 3 months or sooner if issues arise. Discussed the recall of Repironics devices with patient and the severity of her sleep apnea. Discussed the risks of untreated sleep apnea including but not limited to car accidents, heart attacks, strokes, and death. Alternative therapy may not exist or may be severely limited. Felt the benefits of continued usage of these devices may outweigh the risks identified in the recall notification. Advised to avoid use of unproven cleaning methods, such as ozone. Encouraged patient to register her machine for the recall and provided phone number. If you have questions or concerns, please do not hesitate to call me. I look forward to following Bakari Dial along with you.     Sincerely,    JOSE ALFREDO Quezada    CC providers:  Ilia Britt, [address] [facility] [address] [address]  [place] [place]

## 2021-10-06 ENCOUNTER — HOSPITAL ENCOUNTER (OUTPATIENT)
Dept: VASCULAR LAB | Age: 86
Discharge: HOME | End: 2021-10-06
Payer: MEDICARE

## 2021-10-06 ENCOUNTER — HOSPITAL ENCOUNTER (OUTPATIENT)
Dept: CT IMAGING | Age: 86
Discharge: HOME | End: 2021-10-06
Payer: MEDICARE

## 2021-10-06 DIAGNOSIS — I35.0 NONRHEUMATIC AORTIC VALVE STENOSIS: ICD-10-CM

## 2021-10-06 DIAGNOSIS — R42 DIZZINESS: ICD-10-CM

## 2021-10-06 PROCEDURE — 93880 EXTRACRANIAL BILAT STUDY: CPT

## 2021-10-06 PROCEDURE — 6360000004 HC RX CONTRAST MEDICATION: Performed by: INTERNAL MEDICINE

## 2021-10-06 PROCEDURE — 74174 CTA ABD&PLVS W/CONTRAST: CPT

## 2021-10-06 RX ADMIN — IOPAMIDOL 150 ML: 755 INJECTION, SOLUTION INTRAVENOUS at 14:31

## 2021-10-11 ENCOUNTER — HOSPITAL ENCOUNTER (OUTPATIENT)
Dept: CARDIAC CATH/INVASIVE PROCEDURES | Age: 86
Discharge: HOME | End: 2021-10-11
Attending: INTERNAL MEDICINE | Admitting: INTERNAL MEDICINE
Payer: MEDICARE

## 2021-10-11 ENCOUNTER — TELEPHONE (OUTPATIENT)
Dept: FAMILY MEDICINE CLINIC | Age: 86
End: 2021-10-11

## 2021-10-11 VITALS
SYSTOLIC BLOOD PRESSURE: 137 MMHG | HEART RATE: 77 BPM | RESPIRATION RATE: 18 BRPM | HEIGHT: 65 IN | OXYGEN SATURATION: 99 % | TEMPERATURE: 98.1 F | BODY MASS INDEX: 32.99 KG/M2 | DIASTOLIC BLOOD PRESSURE: 67 MMHG | WEIGHT: 198 LBS

## 2021-10-11 LAB
ANION GAP SERPL CALCULATED.3IONS-SCNC: 10 MMOL/L (ref 3–16)
BUN BLDV-MCNC: 20 MG/DL (ref 7–20)
CALCIUM SERPL-MCNC: 9.4 MG/DL (ref 8.3–10.6)
CHLORIDE BLD-SCNC: 97 MMOL/L (ref 99–110)
CO2: 30 MMOL/L (ref 21–32)
CREAT SERPL-MCNC: 0.8 MG/DL (ref 0.6–1.2)
EKG ATRIAL RATE: 64 BPM
EKG DIAGNOSIS: NORMAL
EKG P AXIS: 98 DEGREES
EKG P-R INTERVAL: 180 MS
EKG Q-T INTERVAL: 376 MS
EKG QRS DURATION: 90 MS
EKG QTC CALCULATION (BAZETT): 387 MS
EKG R AXIS: -32 DEGREES
EKG T AXIS: 55 DEGREES
EKG VENTRICULAR RATE: 64 BPM
GFR AFRICAN AMERICAN: >60
GFR NON-AFRICAN AMERICAN: >60
GLUCOSE BLD-MCNC: 112 MG/DL (ref 70–99)
HCT VFR BLD CALC: 38.1 % (ref 36–48)
HEMOGLOBIN: 12.8 G/DL (ref 12–16)
MCH RBC QN AUTO: 30.3 PG (ref 26–34)
MCHC RBC AUTO-ENTMCNC: 33.7 G/DL (ref 31–36)
MCV RBC AUTO: 90 FL (ref 80–100)
PDW BLD-RTO: 13.5 % (ref 12.4–15.4)
PLATELET # BLD: 194 K/UL (ref 135–450)
PMV BLD AUTO: 8.6 FL (ref 5–10.5)
POTASSIUM SERPL-SCNC: 3.9 MMOL/L (ref 3.5–5.1)
RBC # BLD: 4.23 M/UL (ref 4–5.2)
SODIUM BLD-SCNC: 137 MMOL/L (ref 136–145)
WBC # BLD: 9.3 K/UL (ref 4–11)

## 2021-10-11 PROCEDURE — 2709999900 HC NON-CHARGEABLE SUPPLY

## 2021-10-11 PROCEDURE — 80048 BASIC METABOLIC PNL TOTAL CA: CPT

## 2021-10-11 PROCEDURE — 36415 COLL VENOUS BLD VENIPUNCTURE: CPT

## 2021-10-11 PROCEDURE — C1769 GUIDE WIRE: HCPCS

## 2021-10-11 PROCEDURE — 93010 ELECTROCARDIOGRAM REPORT: CPT | Performed by: INTERNAL MEDICINE

## 2021-10-11 PROCEDURE — 93005 ELECTROCARDIOGRAM TRACING: CPT | Performed by: INTERNAL MEDICINE

## 2021-10-11 PROCEDURE — 93454 CORONARY ARTERY ANGIO S&I: CPT

## 2021-10-11 PROCEDURE — 99152 MOD SED SAME PHYS/QHP 5/>YRS: CPT | Performed by: INTERNAL MEDICINE

## 2021-10-11 PROCEDURE — C1894 INTRO/SHEATH, NON-LASER: HCPCS

## 2021-10-11 PROCEDURE — 93454 CORONARY ARTERY ANGIO S&I: CPT | Performed by: INTERNAL MEDICINE

## 2021-10-11 PROCEDURE — 6360000004 HC RX CONTRAST MEDICATION: Performed by: INTERNAL MEDICINE

## 2021-10-11 PROCEDURE — 85027 COMPLETE CBC AUTOMATED: CPT

## 2021-10-11 PROCEDURE — 6360000002 HC RX W HCPCS

## 2021-10-11 PROCEDURE — 99152 MOD SED SAME PHYS/QHP 5/>YRS: CPT

## 2021-10-11 PROCEDURE — 2500000003 HC RX 250 WO HCPCS

## 2021-10-11 RX ORDER — SODIUM CHLORIDE 0.9 % (FLUSH) 0.9 %
5-40 SYRINGE (ML) INJECTION EVERY 12 HOURS SCHEDULED
Status: DISCONTINUED | OUTPATIENT
Start: 2021-10-11 | End: 2021-10-11 | Stop reason: HOSPADM

## 2021-10-11 RX ORDER — ACETAMINOPHEN 325 MG/1
650 TABLET ORAL EVERY 4 HOURS PRN
Status: CANCELLED | OUTPATIENT
Start: 2021-10-11

## 2021-10-11 RX ORDER — SODIUM CHLORIDE 9 MG/ML
25 INJECTION, SOLUTION INTRAVENOUS PRN
Status: DISCONTINUED | OUTPATIENT
Start: 2021-10-11 | End: 2021-10-11 | Stop reason: HOSPADM

## 2021-10-11 RX ORDER — SODIUM CHLORIDE 0.9 % (FLUSH) 0.9 %
5-40 SYRINGE (ML) INJECTION PRN
Status: DISCONTINUED | OUTPATIENT
Start: 2021-10-11 | End: 2021-10-11 | Stop reason: HOSPADM

## 2021-10-11 RX ORDER — SODIUM CHLORIDE 9 MG/ML
INJECTION, SOLUTION INTRAVENOUS CONTINUOUS
Status: DISCONTINUED | OUTPATIENT
Start: 2021-10-11 | End: 2021-10-11 | Stop reason: HOSPADM

## 2021-10-11 RX ADMIN — IOPAMIDOL 48 ML: 755 INJECTION, SOLUTION INTRAVENOUS at 09:43

## 2021-10-11 NOTE — PRE SEDATION
Brief Pre-Op Note/Sedation Assessment      Jett Randolph  1935  [id number]  6:54 AM    Planned Procedure: Cardiac Catheterization Procedure  Post Procedure Plan: Return to same level of care  Consent: I have discussed with the patient and/or the patient representative the indication, alternatives, and the possible risks and/or complications of the planned procedure and the anesthesia methods. The patient and/or patient representative appear to understand and agree to proceed. Chief Complaint:   Anginal Equivalent  Dyspnea on Exertion  Dyspnea  Fatigue    Indications for Cath Procedure:   Presentation:  New Onset Angina <= 2 months, Worsening Angina, Suspected CAD and Valvular Disease - Aortic Stenosis; Stenosis Severity: Severe   Anginal Classification within 2 weeks:  CCS III - Symptoms with everyday living activities, i.e., moderate limitation   Angina Symptoms Assessment:  Typical Chest Pain   Heart Failure Class within last 2 weeks:  No symptoms   Cardiovascular Instability:  No    Prior Ischemic Workup/Eval:   Pre-Procedural Medications: Yes: STATIN   Stress Test Completed? No    Does Patient need surgery?  Cath Valve Surgery:  Yes:  Aortic Stenosis; Stenosis Severity: Severe Intermediate < 4 METS    Pre-Procedure Medical History:   Vital Signs: There were no vitals taken for this visit.    Allergies:  No Known Allergies  Medications:    Current Outpatient Medications   Medication Sig Dispense Refill    potassium chloride (KLOR-CON M) 20 MEQ extended release tablet Take 1 tablet by mouth daily 30 tablet 5    calcium carbonate (CALCIUM 600) 600 MG TABS tablet Take 1 tablet by mouth 2 times daily 60 tablet 5    citalopram (CELEXA) 10 MG tablet Take 5 mg by mouth daily       predniSONE (DELTASONE) 10 MG tablet Take 40mg daily x 1 day, then 30mg daily x 3 days, then 20mg daily x 3 days, then 10mg daily x 3 days, then 5mg daily x 3 days 40 tablet 0    furosemide (LASIX) 20 MG tablet Take 3 tablets by mouth 2 times daily 180 tablet 0    spironolactone (ALDACTONE) 25 MG tablet Take 0.5 tablets by mouth daily 30 tablet 0    docusate (COLACE, DULCOLAX) 100 MG CAPS Take 100 mg by mouth 2 times daily 120 capsule 0    zoledronic acid (RECLAST) 5 MG/100ML SOLN Infuse 100 mLs intravenously once for 1 dose 100 mL 0    acetaminophen (TYLENOL) 500 MG tablet Take 500 mg by mouth every 6 hours as needed for Pain      methocarbamol (ROBAXIN) 500 MG tablet Take 500 mg by mouth 4 times daily      lisinopril (PRINIVIL;ZESTRIL) 2.5 MG tablet Take 2 tablets by mouth daily 30 tablet 0    OXYGEN Use 3L of oxygen all the time 3 L 3    budesonide (PULMICORT) 0.5 MG/2ML nebulizer suspension Take 2 mLs by nebulization 2 times daily DX:COPD J44.9 60 ampule 6    formoterol (PERFOROMIST) 20 MCG/2ML nebulizer solution Take 2 mLs by nebulization every 12 hours DX:COPD J44.9 120 mL 6    ondansetron (ZOFRAN) 4 MG tablet Take 1 tablet by mouth daily as needed for Nausea or Vomiting 30 tablet 0    oxybutynin (DITROPAN) 5 MG tablet TAKE ONE TABLET BY MOUTH THREE TIMES A DAY 90 tablet 1    atorvastatin (LIPITOR) 10 MG tablet TAKE 1 TABLET BY MOUTH DAILY AT BEDTIME. 30 tablet 5    albuterol sulfate HFA (VENTOLIN HFA) 108 (90 Base) MCG/ACT inhaler Inhale 2 puffs into the lungs every 6 hours as needed for Wheezing 1 Inhaler 3    levothyroxine (SYNTHROID) 75 MCG tablet TAKE 1 TABLET BY MOUTH DAILY 30 tablet 5    ipratropium-albuterol (DUONEB) 0.5-2.5 (3) MG/3ML SOLN nebulizer solution Inhale 3 mLs into the lungs every 4 hours 360 mL 0     No current facility-administered medications for this encounter.       Past Medical History:    Past Medical History:   Diagnosis Date    Allergic rhinitis     [facility]) [facility]     COPD (chronic obstructive pulmonary disease) (HCC)     Depression     Hypothyroidism     MARIAA treated with BiPAP 2/14/2018      Surgical History:    Past Surgical History:   Procedure Laterality Date    CARPAL TUNNEL RELEASE      bilateral    CHOLECYSTECTOMY      EYE SURGERY      bilateral cataracts    HYSTERECTOMY, TOTAL ABDOMINAL      TONSILLECTOMY      UVULECTOMY          Pre-Sedation:   Pre-Sedation Documentation and Exam = I have personally completed a history, physical exam & review of systems for this patient (see notes).  Prior History of Anesthesia Complications = none   Modified Mallampati = II (soft palate, uvula, fauces visible)   ASA Classification = Class 2 - A normal healthy patient with mild systemic disease    Cristopher Scale:   Activity:  2 - Able to move 4 extremities voluntarily on command   Respiration:  2 - Able to breathe deeply and cough freely   Circulation:  2 - BP+/- 20mmHg of normal   Consciousness:  2 - Fully awake   Oxygen Saturation (color):  2 - Able to maintain oxygen saturation >92% on room air    Sedation/Anesthesia Plan:  Guard the patient's safety and welfare. Minimize physical discomfort and pain. Minimize negative psychological responses to treatment by providing sedation and analgesia and maximize the potential amnesia. Patient to meet pre-procedure discharge plan.     Medication Planned:  Midazolam intravenously and fentanyl intravenously    Patient is an appropriate candidate for plan of sedation:   Yes      Electronically signed by Alana Saldana MD on 10/11/2021 at 6:54 AM

## 2021-10-11 NOTE — TELEPHONE ENCOUNTER
I've never ordered labs for her on a weekly basis. If she gets them done, the NH sends them here but I'm not ordering them.

## 2021-10-11 NOTE — H&P
[facility]    H+P // CONSULT // OUTPATIENT VISIT // FOLLOWUP VISIT     REFER ELISE Wallis   ENC TYPE Followup     CHIEF COMPLAINT     TYPE Chronic   SX Sob, cummings   PROBS AS, HTN, CHOL     HISTORY OF PRESENT ILLNESS     GEN Doing fair. Ongoing sob, cummings. On home o2 for copd related to second hand smoke. AS Denies cp, dizziness, syncope, palpitations, sob and cummings worsening. HTN Ambulatory BP in good range, no ha or dizziness. CHOL Last chol in good range, tolerating statin without sa. MED Compliant with CV meds listed below without reported side effects. HISTORY/ALLERGIES/ROS     MedHx:  has a past medical history of Allergic rhinitis, CHF (congestive heart failure) ([facility] COPD (chronic obstructive pulmonary disease) ([facility] Depression, Hypothyroidism, and MARIAA treated with BiPAP. SurgHx:  has a past surgical history that includes Hysterectomy, total abdominal; Carpal tunnel release; Cholecystectomy; eye surgery; Tonsillectomy; and Uvulectomy. SocHx:  reports that she has never smoked. She has never used smokeless tobacco. She reports that she does not drink alcohol and does not use drugs. FamHx: family history includes Breast Cancer in her daughter, maternal grandmother, and mother; Cancer in her father, mother, and sister. Allerg: Patient has no known allergies.    ROS: [x]Full ROS obtained and negative except as mentioned in HPI     MEDICATIONS      Current Outpatient Medications   Medication Sig Dispense Refill    potassium chloride (KLOR-CON M) 20 MEQ extended release tablet Take 1 tablet by mouth daily 30 tablet 5    calcium carbonate (CALCIUM 600) 600 MG TABS tablet Take 1 tablet by mouth 2 times daily 60 tablet 5    citalopram (CELEXA) 10 MG tablet Take 5 mg by mouth daily       predniSONE (DELTASONE) 10 MG tablet Take 40mg daily x 1 day, then 30mg daily x 3 days, then 20mg daily x 3 days, then 10mg daily x 3 days, then 5mg daily x 3 days 40 tablet 0    furosemide (LASIX) 20 MG tablet Take 3 tablets by mouth 2 times daily 180 tablet 0    spironolactone (ALDACTONE) 25 MG tablet Take 0.5 tablets by mouth daily 30 tablet 0    docusate (COLACE, DULCOLAX) 100 MG CAPS Take 100 mg by mouth 2 times daily 120 capsule 0    zoledronic acid (RECLAST) 5 MG/100ML SOLN Infuse 100 mLs intravenously once for 1 dose 100 mL 0    acetaminophen (TYLENOL) 500 MG tablet Take 500 mg by mouth every 6 hours as needed for Pain      methocarbamol (ROBAXIN) 500 MG tablet Take 500 mg by mouth 4 times daily      lisinopril (PRINIVIL;ZESTRIL) 2.5 MG tablet Take 2 tablets by mouth daily 30 tablet 0    OXYGEN Use 3L of oxygen all the time 3 L 3    budesonide (PULMICORT) 0.5 MG/2ML nebulizer suspension Take 2 mLs by nebulization 2 times daily DX:COPD J44.9 60 ampule 6    formoterol (PERFOROMIST) 20 MCG/2ML nebulizer solution Take 2 mLs by nebulization every 12 hours DX:COPD J44.9 120 mL 6    ondansetron (ZOFRAN) 4 MG tablet Take 1 tablet by mouth daily as needed for Nausea or Vomiting 30 tablet 0    oxybutynin (DITROPAN) 5 MG tablet TAKE ONE TABLET BY MOUTH THREE TIMES A DAY 90 tablet 1    atorvastatin (LIPITOR) 10 MG tablet TAKE 1 TABLET BY MOUTH DAILY AT BEDTIME. 30 tablet 5    albuterol sulfate HFA (VENTOLIN HFA) 108 (90 Base) MCG/ACT inhaler Inhale 2 puffs into the lungs every 6 hours as needed for Wheezing 1 Inhaler 3    levothyroxine (SYNTHROID) 75 MCG tablet TAKE 1 TABLET BY MOUTH DAILY 30 tablet 5    ipratropium-albuterol (DUONEB) 0.5-2.5 (3) MG/3ML SOLN nebulizer solution Inhale 3 mLs into the lungs every 4 hours 360 mL 0     No current facility-administered medications for this encounter. Reviewed with patient and will remain unchanged except as mentioned in A/P  PHYSICAL EXAM     There were no vitals filed for this visit.    Gen Alert, coop, no distress Heart  Rrr, 4/6   Head NC, AT, no abnorm Abd  Soft, NT, +BS, no mass, no OM   Eyes PER, conj/corn clear Ext  Ext nl, AT, no C/C/E Nose Nares nl, no drain, NT Pulse 2+ and symmetric   Throat Lips, mucosa, tongue nl Skin Col/text/turg nl, no vis rash/les   Neck S/S, TM, NT, no bruit/JVD Psych Nl mood and affect   Lung CTA-B, unlabored, no DTP Lymph   No cervical or axillary LA   Ch wall NT, no deform Neuro  Nl gross M/S exam     ASSESSMENT AND PLAN     *AS    Date EF Detail   Sx     Ruth, sob   DATA   Most recent TTE, LHC reviewed personally   NYHA   III   TTE 5/18 7/21 60%  60% Mild AS MG 24  Severe AS MG 39, , mild MS MG 3, IVC dilated elevated RA pressure   LHC   None   Plan     Standard TAVR Workup - LHC, Carotids, PFTs, Dental, CTS, CTA C/A/P  Poor candidate for SAVR with severe lung disease, age   *HTN  Status Controlled, vitals and available ambulatory monitoring logs reviewed personally  Plan Counseled on diet/salt/exercise/weight, continue meds at doses above  *CHOL  Status  Uncontrolled with last LDL of 88 (goal <70) and HDL of 54 (6/21), labs reviewed personally  Plan Counseled on diet/exercise/weight, continue statin, lipid/liver surveillance per PCP

## 2021-10-11 NOTE — TELEPHONE ENCOUNTER
Patients sister Betsy Lanier and Patients Son Nan Pierce [phone] called with a concern about her blood work. Patient and family state that the patient is getting blood work done ever Monday and they are not sure why. They would like to speak with Mary Lou Mcclellan nurse so that someone can go over these with them. Please advise.

## 2021-10-12 ENCOUNTER — TELEPHONE (OUTPATIENT)
Dept: CARDIOLOGY CLINIC | Age: 86
End: 2021-10-12

## 2021-10-12 NOTE — TELEPHONE ENCOUNTER
Called and relayed message to Son per Li Jimenez with verbal understanding and encouraged to call office back with any other questions.

## 2021-10-12 NOTE — TELEPHONE ENCOUNTER
Labs from home care 10/11/2021  Creat 0.9 potassium 3.7bnp 64  All stable continue current medications

## 2021-10-13 ENCOUNTER — OFFICE VISIT (OUTPATIENT)
Dept: CARDIOTHORACIC SURGERY | Age: 86
End: 2021-10-13
Payer: MEDICARE

## 2021-10-13 VITALS
OXYGEN SATURATION: 95 % | WEIGHT: 182 LBS | HEIGHT: 60 IN | TEMPERATURE: 97.3 F | HEART RATE: 82 BPM | BODY MASS INDEX: 35.73 KG/M2 | SYSTOLIC BLOOD PRESSURE: 114 MMHG | DIASTOLIC BLOOD PRESSURE: 60 MMHG

## 2021-10-13 DIAGNOSIS — I35.0 AORTIC STENOSIS, SEVERE: Primary | ICD-10-CM

## 2021-10-13 DIAGNOSIS — J43.1 PANLOBULAR EMPHYSEMA (HCC): ICD-10-CM

## 2021-10-13 DIAGNOSIS — E66.01 CLASS 2 SEVERE OBESITY DUE TO EXCESS CALORIES WITH SERIOUS COMORBIDITY AND BODY MASS INDEX (BMI) OF 35.0 TO 35.9 IN ADULT (HCC): ICD-10-CM

## 2021-10-13 PROCEDURE — 99204 OFFICE O/P NEW MOD 45 MIN: CPT | Performed by: THORACIC SURGERY (CARDIOTHORACIC VASCULAR SURGERY)

## 2021-10-13 RX ORDER — OMEPRAZOLE 20 MG/1
20 CAPSULE, DELAYED RELEASE ORAL DAILY
COMMUNITY

## 2021-10-13 RX ORDER — LEVOCETIRIZINE DIHYDROCHLORIDE 5 MG/1
5 TABLET, FILM COATED ORAL NIGHTLY
COMMUNITY

## 2021-10-13 RX ORDER — POLYETHYLENE GLYCOL 3350 17 G/17G
17 POWDER, FOR SOLUTION ORAL DAILY PRN
COMMUNITY

## 2021-10-13 ASSESSMENT — ENCOUNTER SYMPTOMS
TROUBLE SWALLOWING: 1
SHORTNESS OF BREATH: 1
CHEST TIGHTNESS: 0
COUGH: 1
GASTROINTESTINAL NEGATIVE: 1
ALLERGIC/IMMUNOLOGIC NEGATIVE: 1
APNEA: 1
EYE PAIN: 0
EYE REDNESS: 0

## 2021-10-13 NOTE — PROGRESS NOTES
Subjective:      Patient ID: Jett Randolph is a 82 y.o. female. Chief Complaint   Patient presents with   [facility] Patient     Mrs. Kamille Jeffreson is being seen today at the request of Dr. Francois Ortiz for a TAVR consult. HPI Mrs. Kamille Jefferson presents today for discussion regarding transcatheter aortic valve replacement. She is chronically oxygen dependent and walks with a walker. She lives in an extended care facility. She can barely walk across the room before she has to stop because of dyspnea. She has no history of myocardial infarction. She is a former smoker. She wears BiPAP at night. Review of Systems   Constitutional: Positive for activity change and fatigue. HENT: Positive for dental problem (Plans to get teeth extracted) and trouble swallowing. Negative for nosebleeds. Eyes: Positive for visual disturbance. Negative for pain and redness. Respiratory: Positive for apnea (Wears Bipap at night), cough (Productive cough w/white sputum) and shortness of breath (w/exertion). Negative for chest tightness. Wears 02 3L NC    Cardiovascular: Positive for leg swelling. Negative for chest pain and palpitations. Gastrointestinal: Negative.         + Indigestion, + dry mouth   Endocrine: Negative. Genitourinary: Negative. Musculoskeletal: Positive for arthralgias and gait problem (Uses a wheelchair and walker ).        +Right leg pain, + left shoulder pain   Skin: Positive for wound (+ healing skin tear to left forearm). Allergic/Immunologic: Negative. Neurological: Positive for dizziness and light-headedness. Hematological: Negative. Psychiatric/Behavioral: Negative.       Past Medical History:   Diagnosis Date    Allergic rhinitis     Aortic stenosis     CHF (congestive heart failure) ([facility])     COPD (chronic obstructive pulmonary disease) (HCC)     Depression     Hypothyroidism     MARIAA treated with BiPAP 02/14/2018     Past Surgical History:   Procedure Laterality Date    CARDIAC ipratropium-albuterol (DUONEB) 0.5-2.5 (3) MG/3ML SOLN nebulizer solution Inhale 3 mLs into the lungs every 4 hours 360 mL 0    budesonide (PULMICORT) 0.5 MG/2ML nebulizer suspension Take 2 mLs by nebulization 2 times daily DX:COPD J44.9 60 ampule 6    formoterol (PERFOROMIST) 20 MCG/2ML nebulizer solution Take 2 mLs by nebulization every 12 hours DX:COPD J44.9 120 mL 6     No current facility-administered medications for this visit. Social History     Socioeconomic History    Marital status:      Spouse name: Not on file    Number of children: Not on file    Years of education: Not on file    Highest education level: Not on file   Occupational History    Not on file   Tobacco Use    Smoking status: Never Smoker    Smokeless tobacco: Never Used   Vaping Use    Vaping Use: Never used   Substance and Sexual Activity    Alcohol use: No    Drug use: No    Sexual activity: Never   Other Topics Concern    Not on file   Social History Narrative    Not on file     Social Determinants of Health     Financial Resource Strain:     Difficulty of Paying Living Expenses:    Food Insecurity:     Worried About Running Out of Food in the Last Year:     [address] in the Last Year:    Transportation Needs:     Lack of Transportation (Medical):      Lack of Transportation (Non-Medical):    Physical Activity:     Days of Exercise per Week:     Minutes of Exercise per Session:    Stress:     Feeling of Stress :    Social Connections:     Frequency of Communication with Friends and Family:     Frequency of Social Gatherings with Friends and Family:     Attends Rastafarian Services:     Active Member of Clubs or Organizations:     Attends Club or Organization Meetings:     Marital Status:    Intimate Partner Violence:     Fear of Current or Ex-Partner:     Emotionally Abused:     Physically Abused:     Sexually Abused:      Family History   Problem Relation Age of Onset    Cancer Mother breast    Breast Cancer Mother     Cancer Father         prostate    Cancer Sister         breast    Breast Cancer Daughter     Breast Cancer Maternal Grandmother      Objective:   Physical Exam  Constitutional:       General: She is not in acute distress. Appearance: Normal appearance. She is well-developed. She is obese. She is not diaphoretic. Comments: On 3 L nasal cannula O2   HENT:      Head: Normocephalic. Nose: Nose normal.      Mouth/Throat:      Comments: Multiple dental caries with periodontal disease and overall poor dental health  Eyes:      General: No scleral icterus. Conjunctiva/sclera: Conjunctivae normal.      Pupils: Pupils are equal, round, and reactive to light. Neck:      Thyroid: No thyromegaly. Vascular: No carotid bruit or JVD. Trachea: No tracheal deviation. Cardiovascular:      Rate and Rhythm: Normal rate and regular rhythm. Pulses: Normal pulses. Heart sounds: Normal heart sounds. No murmur heard. No friction rub. No gallop. Pulmonary:      Effort: Pulmonary effort is normal. No respiratory distress. Breath sounds: Normal breath sounds. No stridor. No wheezing or rales. Abdominal:      General: Bowel sounds are normal. There is no distension. Palpations: Abdomen is soft. There is no mass. Tenderness: There is no abdominal tenderness. There is no guarding. Musculoskeletal:         General: No deformity. Normal range of motion. Cervical back: Normal range of motion and neck supple. Right lower leg: Edema present. Left lower leg: Edema present. Comments: Lower extremity/ankle edema is mild   Lymphadenopathy:      Cervical: No cervical adenopathy. Skin:     General: Skin is warm and dry. Capillary Refill: Capillary refill takes less than 2 seconds. Coloration: Skin is not jaundiced or pale. Findings: No erythema or rash. Neurological:      General: No focal deficit present. Mental Status: She is alert and oriented to person, place, and time. Cranial Nerves: No cranial nerve deficit. Coordination: Coordination normal.   Psychiatric:         Mood and Affect: Mood normal.         Behavior: Behavior normal.         Thought Content: Thought content normal.         Judgment: Judgment normal.       Vitals:    10/13/21 1017 10/13/21 1020   BP: 112/62 114/60   Site: Left Upper Arm Right Upper Arm   Position: Sitting Sitting   Pulse: 82    Temp: 97.3 °F (36.3 °C)    TempSrc: Infrared    SpO2: 95%  Comment: 3 L o2 NC    Weight: 182 lb (82.6 kg)    Height: 5' (1.524 m)      Assessment:      Severe aortic valve stenosis in a moderately debilitated 74-year-old woman with comorbidities including obesity and oxygen dependent COPD. Plan:      I agree that transcatheter aortic valve is a reasonable option for her. A surgical aortic valve replacement would be a prohibitive event for her. She is going to need extensive cardiac rehab post procedure help restore her strength and stamina. I spoke with her and her son about this specific item in some detail to emphasize to them the importance of her continuous participation in rehab post procedure to make sure that she gets the maximum benefit of the aortic valve replacement. She will need to have her dental extractions performed a couple of weeks before any procedure is planned for her. Apparently the planning for this is in process. She and her son know they are welcome to call or return to my office at any time prior to her transcatheter aortic valve replacement should they have any questions or concerns for which we can be of help.

## 2021-10-14 ENCOUNTER — TELEPHONE (OUTPATIENT)
Dept: CARDIOLOGY CLINIC | Age: 86
End: 2021-10-14

## 2021-10-14 NOTE — TELEPHONE ENCOUNTER
Spoke to pts son, Rachel Maldonado, to let him know that pt is scheduled for her dental extractions with Dr. Minal Adams on 11/12/21. Aware to arrive at [facility] but let him know that he will receive a phone call from [facility] with details. Aware to get an H/P from PCP prior to and to fax to [phone]. Aware that she will be an overnight admit for observation. Also aware of date for TAVR of 11/30/21 w PAT scheduled on 11/22/21at 8am. Instructed to call with any questions/concerns in the interim. Verbalized understanding of all.  Robert NESS

## 2021-11-03 ENCOUNTER — OFFICE VISIT (OUTPATIENT)
Dept: FAMILY MEDICINE CLINIC | Age: 86
End: 2021-11-03
Payer: MEDICARE

## 2021-11-03 VITALS
HEIGHT: 62 IN | SYSTOLIC BLOOD PRESSURE: 120 MMHG | BODY MASS INDEX: 34.96 KG/M2 | DIASTOLIC BLOOD PRESSURE: 60 MMHG | WEIGHT: 190 LBS | TEMPERATURE: 97.2 F | OXYGEN SATURATION: 96 % | HEART RATE: 70 BPM

## 2021-11-03 DIAGNOSIS — I50.32 CHRONIC DIASTOLIC CONGESTIVE HEART FAILURE (HCC): Chronic | ICD-10-CM

## 2021-11-03 DIAGNOSIS — F32.A DEPRESSION, UNSPECIFIED DEPRESSION TYPE: ICD-10-CM

## 2021-11-03 DIAGNOSIS — Z23 NEED FOR INFLUENZA VACCINATION: ICD-10-CM

## 2021-11-03 DIAGNOSIS — J44.9 COPD, SEVERE (HCC): Chronic | ICD-10-CM

## 2021-11-03 DIAGNOSIS — Z01.818 PREOP EXAMINATION: Primary | ICD-10-CM

## 2021-11-03 PROCEDURE — G0008 ADMIN INFLUENZA VIRUS VAC: HCPCS | Performed by: PHYSICIAN ASSISTANT

## 2021-11-03 PROCEDURE — 99214 OFFICE O/P EST MOD 30 MIN: CPT | Performed by: PHYSICIAN ASSISTANT

## 2021-11-03 PROCEDURE — 90694 VACC AIIV4 NO PRSRV 0.5ML IM: CPT | Performed by: PHYSICIAN ASSISTANT

## 2021-11-03 RX ORDER — CITALOPRAM 10 MG/1
10 TABLET ORAL DAILY
Qty: 30 TABLET | Refills: 5 | Status: SHIPPED | OUTPATIENT
Start: 2021-11-03

## 2021-11-03 SDOH — ECONOMIC STABILITY: FOOD INSECURITY: WITHIN THE PAST 12 MONTHS, YOU WORRIED THAT YOUR FOOD WOULD RUN OUT BEFORE YOU GOT MONEY TO BUY MORE.: NEVER TRUE

## 2021-11-03 SDOH — ECONOMIC STABILITY: FOOD INSECURITY: WITHIN THE PAST 12 MONTHS, THE FOOD YOU BOUGHT JUST DIDN'T LAST AND YOU DIDN'T HAVE MONEY TO GET MORE.: NEVER TRUE

## 2021-11-03 ASSESSMENT — SOCIAL DETERMINANTS OF HEALTH (SDOH): HOW HARD IS IT FOR YOU TO PAY FOR THE VERY BASICS LIKE FOOD, HOUSING, MEDICAL CARE, AND HEATING?: NOT HARD AT ALL

## 2021-11-03 NOTE — PROGRESS NOTES
Preoperative Consultation    Neo Hernández  YOB: 1935    This patient presents to the office today for a preoperative consultation at the request of surgeon, Dr. Aden Kent and Dr. Dayan Corley, who plans on performing aortic valve replacement/TAVR on November 30 at [facility]. She has severe aortic stenosis. She has stable, controlled, and treated hypertension. She has not needed any medicine lately, it was getting too low. It has been stable for 3-4 weeks now. She would like a flu shot today.      Planned anesthesia: General   Known anesthesia problems: None   Bleeding risk: No recent or remote history of abnormal bleeding  Personal or FH of DVT/PE: No      Patient Active Problem List   Diagnosis    Chronic seasonal allergic rhinitis    COPD, severe ([address]    Idiopathic peripheral neuropathy    Hypothyroidism    Depression    Chronic congestive heart failure (HCC)    SOB (shortness of breath)    Obstructive sleep apnea (adult) (pediatric)    Bilateral lower extremity edema    Multifocal pneumonia    Centrilobular emphysema ([address]    Acute on chronic diastolic heart failure (HCC)    Pitting edema    BASIA (acute kidney injury) ([address]    Chronic respiratory failure with hypoxia (HCC)    Essential hypertension    Mixed hyperlipidemia    Acute pulmonary edema (HCC)    COPD exacerbation (HCC)    Chronic bronchitis (HCC)    Class 3 severe obesity without serious comorbidity with body mass index (BMI) of 40.0 to 44.9 in adult [facility])    Pulmonary nodule    Acute suppurative otitis media of right ear with spontaneous rupture of tympanic membrane    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    Bilateral hearing loss    Bilateral sensorineural hearing loss    Senile osteoporosis    Acute respiratory failure ([address]    Coronary artery disease due to lipid rich plaque    Nonrheumatic aortic valve stenosis    Hypercholesteremia    Aspiration into airway     Past Surgical History:   Procedure Laterality Date    CARDIAC CATHETERIZATION  10/11/2021    CARPAL TUNNEL RELEASE      bilateral    CHOLECYSTECTOMY      EYE SURGERY      bilateral cataracts    HYSTERECTOMY, TOTAL ABDOMINAL      TONSILLECTOMY      UVULECTOMY         No Known Allergies  Outpatient Medications Marked as Taking for the 11/3/21 encounter (Office Visit) with ELISE Maradiaga   Medication Sig Dispense Refill    citalopram (CELEXA) 10 MG tablet Take 1 tablet by mouth daily 30 tablet 5    polyethylene glycol (GLYCOLAX) 17 g packet Take 17 g by mouth daily as needed for Constipation      omeprazole (PRILOSEC) 20 MG delayed release capsule Take 20 mg by mouth daily      levocetirizine (XYZAL) 5 MG tablet Take 5 mg by mouth nightly      potassium chloride (KLOR-CON M) 20 MEQ extended release tablet Take 1 tablet by mouth daily 30 tablet 5    calcium carbonate (CALCIUM 600) 600 MG TABS tablet Take 1 tablet by mouth 2 times daily 60 tablet 5    furosemide (LASIX) 20 MG tablet Take 3 tablets by mouth 2 times daily (Patient taking differently: Take 40 mg by mouth 2 times daily ) 180 tablet 0    spironolactone (ALDACTONE) 25 MG tablet Take 0.5 tablets by mouth daily 30 tablet 0    docusate (COLACE, DULCOLAX) 100 MG CAPS Take 100 mg by mouth 2 times daily 120 capsule 0    acetaminophen (TYLENOL) 500 MG tablet Take 500 mg by mouth every 6 hours as needed for Pain      methocarbamol (ROBAXIN) 500 MG tablet Take 500 mg by mouth 4 times daily      OXYGEN Use 3L of oxygen all the time 3 L 3    ondansetron (ZOFRAN) 4 MG tablet Take 1 tablet by mouth daily as needed for Nausea or Vomiting 30 tablet 0    oxybutynin (DITROPAN) 5 MG tablet TAKE ONE TABLET BY MOUTH THREE TIMES A DAY 90 tablet 1    atorvastatin (LIPITOR) 10 MG tablet TAKE 1 TABLET BY MOUTH DAILY AT BEDTIME. (Patient taking differently: Take 10 mg by mouth nightly TAKE 1 TABLET BY MOUTH DAILY AT BEDTIME.) 30 tablet 5    albuterol sulfate person, place, and time. She has normal strength. No cranial nerve deficit or sensory deficit. Coordination and gait normal.   Skin: Skin is warm and dry. No rash noted. No erythema. EKG Interpretation:  NA, has cardiac clearance  Lab Review No       Assessment:       Moon Aguilar was seen today for pre-op exam.    Diagnoses and all orders for this visit:    Preop examination    Need for influenza vaccination  -     INFLUENZA, QUADV, ADJUVANTED, 72 YRS =, IM, PF, PREFILL SYR, 0.5ML (FLUAD)    Depression, unspecified depression type  -     citalopram (CELEXA) 10 MG tablet; Take 1 tablet by mouth daily    Chronic diastolic congestive heart failure (HCC)    COPD, severe ([facility]              Plan:     1. Preoperative workup as follows: she has cardiac clearance, has VV with pulmonology tomorrow to get clearance, has PAT's coming up  2. Change in medication regimen before surgery: she will get instructions on meds from surgeon  3. Cleared for surgery. 4.Medical decision making of moderate complexity. Note electronically signed by provider.

## 2021-11-08 NOTE — PROGRESS NOTES
Name_______________________________________Printed:____________________  Date and time of surgery_11/12/2021____0730___________________[facility]rival Time:__0600___[facility]___________   1. The instructions given regarding when and if a patient needs to stop oral intake prior to surgery varies. Follow the specific instructions you were given                  _x__Nothing to eat or to drink after Midnight the night before.                   ____Carbo loading or ERAS instructions will be given to select patients-if you have been given those instructions -please do the following                           The evening before your surgery after dinner before midnight drink 40 ounces of gatorade. If you are diabetic use sugar free. The morning of surgery drink 40 ounces of water. This needs to be finished 3 hours prior to your surgery start time. 2. Take the following pills with a small sip of water on the morning of surgery_prilosec, synthroid, inhalers__________________________________________________                  Do not take blood pressure medications ending in pril or sartan the lazara prior to surgery or the morning of surgery_   3. Aspirin, Ibuprofen, Advil, Naproxen, Vitamin E and other Anti-inflammatory products and supplements should be stopped for 5 -7days before surgery or as directed by your physician. 4. Check with your Doctor regarding stopping Plavix, Coumadin,Eliquis, Lovenox,Effient,Pradaxa,Xarelto, Fragmin or other blood thinners and follow their instructions. 5. Do not smoke, and do not drink any alcoholic beverages 24 hours prior to surgery. This includes NA Beer. Refrain from the usage of any recreational drugs. 6. You may brush your teeth and gargle the morning of surgery. DO NOT SWALLOW WATER   7. You MUST make arrangements for a responsible adult to stay on site while you are here and take you home after your surgery. You will not be allowed to leave alone or drive yourself home.   It is strongly suggested someone stay with you the first 24 hrs. Your surgery will be cancelled if you do not have a ride home. 8. A parent/legal guardian must accompany a child scheduled for surgery and plan to stay at the hospital until the child is discharged. Please do not bring other children with you. 9. Please wear simple, loose fitting clothing to the hospital.  Denzel Hinson not bring valuables (money, credit cards, checkbooks, etc.) Do not wear any makeup (including no eye makeup) or nail polish on your fingers or toes. 10. DO NOT wear any jewelry or piercings on day of surgery. All body piercing jewelry must be removed. 11. If you have _X__dentures, they will be removed before going to the OR; we will provide you a container. If you wear ___contact lenses or _X__glasses, they will be removed; please bring a case for them. 12. Please see your family doctor/pediatrician for a history & physical and/or concerning medications. Bring any test results/reports from your physician's office. PCP__________________Phone___________H&P Appt. Date________             13 If you  have a Living Will and Durable Power of  for Healthcare, please bring in a copy. 15. Notify your Surgeon if you develop any illness between now and surgery  time, cough, cold, fever, sore throat, nausea, vomiting, etc.  Please notify your surgeon if you experience dizziness, shortness of breath or blurred vision between now & the time of your surgery             15. DO NOT shave your operative site 96 hours prior to surgery. For face & neck surgery, men may use an electric razor 48 hours prior to surgery. 16. Shower the night before or morning of surgery using an antibacterial soap or as you have been instructed. 17. To provide excellent care visitors will be limited to one in the room at any given time. 18.  Please bring picture ID and insurance card. 19.  Visit our web site for additional information:  [email or web address]              20.During flu season no children under the age of 15 are permitted in the hospital for the safety of all patients. 21. If you take a long acting insulin in the evening only  take half of your usual  dose the night  before your procedure              22. If you use a c-pap please bring DOS if staying overnight,             23.For your convenience [place] has a pharmacy on site to fill your prescriptions. 24. If you use oxygen and have a portable tank please bring it  with you the DOS             25. Bring a complete list of all your medications with name and dose include any supplements. 26. Other__________________________________________   *Please call pre admission testing if you any further questions   Bill Guy   [address]    [place] [address] [place]  [phone]   [facility] [address] NIKOLE PRATHER   [phone]           COVID TESTING    _X__ Covid test to be done 3-5 days prior to scheduled surgery -patient aware they are REQUIRED to bring a copy of the negative result DOS-if they receive a positive result to notify their surgeon         If known - indicate where patient is getting covid test done ___PLEASE FAX NEGATIVE COVID TEST -[phone] BEFORE DAY OF SURGERY. (sHOULD BE DONE WITHIN 6 DAYS OF SURGERY________________________________________________________    ___ Rapid - DOS    ___ Other___________________________________      [facility] POLICY(subject to change)    There is a one visitor policy at [facility] for all surgeries and endoscopies. Whether the visitor can stay or will be asked to wait in the car will depend on the current policy and if social distancing can be maintained. The policy is subject to change at any time. Please make sure the visitor has a cell phone that is on,charged and able to accept calls, as this may be the way that the staff communicates with them. Pain management is NO VISITOR policyThe patients ride is expected to remain in the car with a cell phone for communication. If the ride is leaving the hospital grounds please make sure they are back in time for pickup. Have the patient inform the staff on arrival what their rides plans are while the patient is in the facility. At the MAIN there is one visitor allowed. Please note that the visitor policy is subject to change. All above information reviewed with patient in person or by phone. Patient verbalizes understanding. All questions and concerns addressed. Patient/Rep_FAXED TO [facility] ON Monday 11/8/2021___________________                                                                                                                                    PRE OP INSTRUCTIONS  On the day of surgery please send with the patient verification of patients NPO status and the date/time all medications were last taken. Please also send the name and number of the transport company bringing patient.

## 2021-11-11 ENCOUNTER — ANESTHESIA EVENT (OUTPATIENT)
Dept: OPERATING ROOM | Age: 86
End: 2021-11-11
Payer: MEDICARE

## 2021-11-11 NOTE — PROGRESS NOTES
Faxed preop instructions and request for Covid results. Staff states faxed but we did not receive. Asked to refax.

## 2021-11-12 ENCOUNTER — HOSPITAL ENCOUNTER (OUTPATIENT)
Age: 86
Setting detail: OUTPATIENT SURGERY
Discharge: HOME | End: 2021-11-12
Attending: DENTIST | Admitting: DENTIST
Payer: MEDICARE

## 2021-11-12 ENCOUNTER — ANESTHESIA (OUTPATIENT)
Dept: OPERATING ROOM | Age: 86
End: 2021-11-12
Payer: MEDICARE

## 2021-11-12 VITALS
HEIGHT: 62 IN | TEMPERATURE: 96.8 F | WEIGHT: 183 LBS | BODY MASS INDEX: 33.68 KG/M2 | RESPIRATION RATE: 22 BRPM | DIASTOLIC BLOOD PRESSURE: 44 MMHG | HEART RATE: 89 BPM | OXYGEN SATURATION: 96 % | SYSTOLIC BLOOD PRESSURE: 109 MMHG

## 2021-11-12 VITALS
DIASTOLIC BLOOD PRESSURE: 56 MMHG | SYSTOLIC BLOOD PRESSURE: 115 MMHG | OXYGEN SATURATION: 97 % | RESPIRATION RATE: 24 BRPM | TEMPERATURE: 98.4 F

## 2021-11-12 DIAGNOSIS — I25.83 CORONARY ARTERY DISEASE DUE TO LIPID RICH PLAQUE: Primary | ICD-10-CM

## 2021-11-12 DIAGNOSIS — I25.10 CORONARY ARTERY DISEASE DUE TO LIPID RICH PLAQUE: Primary | ICD-10-CM

## 2021-11-12 PROCEDURE — 7100000011 HC PHASE II RECOVERY - ADDTL 15 MIN: Performed by: DENTIST

## 2021-11-12 PROCEDURE — 7100000010 HC PHASE II RECOVERY - FIRST 15 MIN: Performed by: DENTIST

## 2021-11-12 PROCEDURE — 6370000000 HC RX 637 (ALT 250 FOR IP): Performed by: DENTIST

## 2021-11-12 PROCEDURE — P9045 ALBUMIN (HUMAN), 5%, 250 ML: HCPCS | Performed by: NURSE ANESTHETIST, CERTIFIED REGISTERED

## 2021-11-12 PROCEDURE — 6370000000 HC RX 637 (ALT 250 FOR IP): Performed by: NURSE ANESTHETIST, CERTIFIED REGISTERED

## 2021-11-12 PROCEDURE — 2500000003 HC RX 250 WO HCPCS: Performed by: DENTIST

## 2021-11-12 PROCEDURE — 6360000002 HC RX W HCPCS: Performed by: FAMILY MEDICINE

## 2021-11-12 PROCEDURE — 2580000003 HC RX 258: Performed by: DENTIST

## 2021-11-12 PROCEDURE — 3700000001 HC ADD 15 MINUTES (ANESTHESIA): Performed by: DENTIST

## 2021-11-12 PROCEDURE — 3600000004 HC SURGERY LEVEL 4 BASE: Performed by: DENTIST

## 2021-11-12 PROCEDURE — 6360000002 HC RX W HCPCS: Performed by: NURSE ANESTHETIST, CERTIFIED REGISTERED

## 2021-11-12 PROCEDURE — 6370000000 HC RX 637 (ALT 250 FOR IP)

## 2021-11-12 PROCEDURE — 2720000010 HC SURG SUPPLY STERILE: Performed by: DENTIST

## 2021-11-12 PROCEDURE — 2500000003 HC RX 250 WO HCPCS: Performed by: NURSE ANESTHETIST, CERTIFIED REGISTERED

## 2021-11-12 PROCEDURE — 3600000014 HC SURGERY LEVEL 4 ADDTL 15MIN: Performed by: DENTIST

## 2021-11-12 PROCEDURE — 7100000001 HC PACU RECOVERY - ADDTL 15 MIN: Performed by: DENTIST

## 2021-11-12 PROCEDURE — 2709999900 HC NON-CHARGEABLE SUPPLY: Performed by: DENTIST

## 2021-11-12 PROCEDURE — 6360000002 HC RX W HCPCS

## 2021-11-12 PROCEDURE — 2580000003 HC RX 258: Performed by: NURSE ANESTHETIST, CERTIFIED REGISTERED

## 2021-11-12 PROCEDURE — 3700000000 HC ANESTHESIA ATTENDED CARE: Performed by: DENTIST

## 2021-11-12 PROCEDURE — 7100000000 HC PACU RECOVERY - FIRST 15 MIN: Performed by: DENTIST

## 2021-11-12 RX ORDER — SODIUM CHLORIDE 9 MG/ML
INJECTION, SOLUTION INTRAVENOUS CONTINUOUS PRN
Status: DISCONTINUED | OUTPATIENT
Start: 2021-11-12 | End: 2021-11-12 | Stop reason: SDUPTHER

## 2021-11-12 RX ORDER — AMOXICILLIN 875 MG/1
875 TABLET, COATED ORAL 2 TIMES DAILY
Qty: 20 TABLET | Refills: 0 | Status: SHIPPED | OUTPATIENT
Start: 2021-11-12 | End: 2021-11-26

## 2021-11-12 RX ORDER — HYDROMORPHONE HCL 110MG/55ML
0.25 PATIENT CONTROLLED ANALGESIA SYRINGE INTRAVENOUS EVERY 5 MIN PRN
Status: DISCONTINUED | OUTPATIENT
Start: 2021-11-12 | End: 2021-11-12 | Stop reason: HOSPADM

## 2021-11-12 RX ORDER — ROCURONIUM BROMIDE 10 MG/ML
INJECTION, SOLUTION INTRAVENOUS PRN
Status: DISCONTINUED | OUTPATIENT
Start: 2021-11-12 | End: 2021-11-12 | Stop reason: SDUPTHER

## 2021-11-12 RX ORDER — OXYCODONE HYDROCHLORIDE 5 MG/1
10 TABLET ORAL PRN
Status: DISCONTINUED | OUTPATIENT
Start: 2021-11-12 | End: 2021-11-12 | Stop reason: HOSPADM

## 2021-11-12 RX ORDER — DEXAMETHASONE SODIUM PHOSPHATE 4 MG/ML
INJECTION, SOLUTION INTRA-ARTICULAR; INTRALESIONAL; INTRAMUSCULAR; INTRAVENOUS; SOFT TISSUE PRN
Status: DISCONTINUED | OUTPATIENT
Start: 2021-11-12 | End: 2021-11-12 | Stop reason: SDUPTHER

## 2021-11-12 RX ORDER — ONDANSETRON 2 MG/ML
INJECTION INTRAMUSCULAR; INTRAVENOUS PRN
Status: DISCONTINUED | OUTPATIENT
Start: 2021-11-12 | End: 2021-11-12 | Stop reason: SDUPTHER

## 2021-11-12 RX ORDER — LIDOCAINE HYDROCHLORIDE AND EPINEPHRINE 10; 10 MG/ML; UG/ML
INJECTION, SOLUTION INFILTRATION; PERINEURAL
Status: COMPLETED | OUTPATIENT
Start: 2021-11-12 | End: 2021-11-12

## 2021-11-12 RX ORDER — LABETALOL HYDROCHLORIDE 5 MG/ML
5 INJECTION, SOLUTION INTRAVENOUS EVERY 10 MIN PRN
Status: DISCONTINUED | OUTPATIENT
Start: 2021-11-12 | End: 2021-11-12 | Stop reason: HOSPADM

## 2021-11-12 RX ORDER — HYDROCODONE BITARTRATE AND ACETAMINOPHEN 5; 325 MG/1; MG/1
1 TABLET ORAL EVERY 6 HOURS PRN
Qty: 12 TABLET | Refills: 0 | Status: SHIPPED | OUTPATIENT
Start: 2021-11-12 | End: 2021-11-15

## 2021-11-12 RX ORDER — ALBUMIN, HUMAN INJ 5% 5 %
SOLUTION INTRAVENOUS PRN
Status: DISCONTINUED | OUTPATIENT
Start: 2021-11-12 | End: 2021-11-12 | Stop reason: SDUPTHER

## 2021-11-12 RX ORDER — PROMETHAZINE HYDROCHLORIDE 25 MG/ML
6.25 INJECTION, SOLUTION INTRAMUSCULAR; INTRAVENOUS PRN
Status: DISCONTINUED | OUTPATIENT
Start: 2021-11-12 | End: 2021-11-12 | Stop reason: HOSPADM

## 2021-11-12 RX ORDER — MEPERIDINE HYDROCHLORIDE 25 MG/ML
12.5 INJECTION INTRAMUSCULAR; INTRAVENOUS; SUBCUTANEOUS EVERY 5 MIN PRN
Status: DISCONTINUED | OUTPATIENT
Start: 2021-11-12 | End: 2021-11-12 | Stop reason: HOSPADM

## 2021-11-12 RX ORDER — BUPIVACAINE HYDROCHLORIDE 5 MG/ML
INJECTION, SOLUTION EPIDURAL; INTRACAUDAL
Status: COMPLETED | OUTPATIENT
Start: 2021-11-12 | End: 2021-11-12

## 2021-11-12 RX ORDER — SUCCINYLCHOLINE/SOD CL,ISO/PF 200MG/10ML
SYRINGE (ML) INTRAVENOUS PRN
Status: DISCONTINUED | OUTPATIENT
Start: 2021-11-12 | End: 2021-11-12 | Stop reason: SDUPTHER

## 2021-11-12 RX ORDER — LIDOCAINE HYDROCHLORIDE 20 MG/ML
INJECTION, SOLUTION EPIDURAL; INFILTRATION; INTRACAUDAL; PERINEURAL PRN
Status: DISCONTINUED | OUTPATIENT
Start: 2021-11-12 | End: 2021-11-12 | Stop reason: SDUPTHER

## 2021-11-12 RX ORDER — CEFAZOLIN SODIUM 1 G/3ML
INJECTION, POWDER, FOR SOLUTION INTRAMUSCULAR; INTRAVENOUS PRN
Status: DISCONTINUED | OUTPATIENT
Start: 2021-11-12 | End: 2021-11-12 | Stop reason: SDUPTHER

## 2021-11-12 RX ORDER — FENTANYL CITRATE 50 UG/ML
50 INJECTION, SOLUTION INTRAMUSCULAR; INTRAVENOUS EVERY 5 MIN PRN
Status: DISCONTINUED | OUTPATIENT
Start: 2021-11-12 | End: 2021-11-12 | Stop reason: HOSPADM

## 2021-11-12 RX ORDER — IPRATROPIUM BROMIDE AND ALBUTEROL SULFATE 2.5; .5 MG/3ML; MG/3ML
1 SOLUTION RESPIRATORY (INHALATION) ONCE
Status: COMPLETED | OUTPATIENT
Start: 2021-11-12 | End: 2021-11-12

## 2021-11-12 RX ORDER — PROPOFOL 10 MG/ML
INJECTION, EMULSION INTRAVENOUS PRN
Status: DISCONTINUED | OUTPATIENT
Start: 2021-11-12 | End: 2021-11-12 | Stop reason: SDUPTHER

## 2021-11-12 RX ORDER — MAGNESIUM HYDROXIDE 1200 MG/15ML
LIQUID ORAL CONTINUOUS PRN
Status: COMPLETED | OUTPATIENT
Start: 2021-11-12 | End: 2021-11-12

## 2021-11-12 RX ORDER — LIDOCAINE HYDROCHLORIDE 40 MG/ML
SOLUTION TOPICAL PRN
Status: DISCONTINUED | OUTPATIENT
Start: 2021-11-12 | End: 2021-11-12 | Stop reason: SDUPTHER

## 2021-11-12 RX ORDER — IPRATROPIUM BROMIDE AND ALBUTEROL SULFATE 2.5; .5 MG/3ML; MG/3ML
SOLUTION RESPIRATORY (INHALATION)
Status: COMPLETED
Start: 2021-11-12 | End: 2021-11-12

## 2021-11-12 RX ORDER — DIPHENHYDRAMINE HYDROCHLORIDE 50 MG/ML
12.5 INJECTION INTRAMUSCULAR; INTRAVENOUS
Status: DISCONTINUED | OUTPATIENT
Start: 2021-11-12 | End: 2021-11-12 | Stop reason: HOSPADM

## 2021-11-12 RX ORDER — HYDROMORPHONE HCL 110MG/55ML
0.5 PATIENT CONTROLLED ANALGESIA SYRINGE INTRAVENOUS EVERY 5 MIN PRN
Status: DISCONTINUED | OUTPATIENT
Start: 2021-11-12 | End: 2021-11-12 | Stop reason: HOSPADM

## 2021-11-12 RX ORDER — FENTANYL CITRATE 50 UG/ML
INJECTION, SOLUTION INTRAMUSCULAR; INTRAVENOUS PRN
Status: DISCONTINUED | OUTPATIENT
Start: 2021-11-12 | End: 2021-11-12 | Stop reason: SDUPTHER

## 2021-11-12 RX ORDER — CEFAZOLIN SODIUM 2 G/100ML
2000 INJECTION, SOLUTION INTRAVENOUS ONCE
Status: DISCONTINUED | OUTPATIENT
Start: 2021-11-12 | End: 2021-11-12 | Stop reason: SDUPTHER

## 2021-11-12 RX ORDER — OXYCODONE HYDROCHLORIDE 5 MG/1
5 TABLET ORAL PRN
Status: DISCONTINUED | OUTPATIENT
Start: 2021-11-12 | End: 2021-11-12 | Stop reason: HOSPADM

## 2021-11-12 RX ORDER — PHENYLEPHRINE HYDROCHLORIDE 10 MG/ML
INJECTION INTRAVENOUS PRN
Status: DISCONTINUED | OUTPATIENT
Start: 2021-11-12 | End: 2021-11-12 | Stop reason: SDUPTHER

## 2021-11-12 RX ADMIN — ROCURONIUM BROMIDE 10 MG: 10 INJECTION, SOLUTION INTRAVENOUS at 10:47

## 2021-11-12 RX ADMIN — FENTANYL CITRATE 25 MCG: 50 INJECTION, SOLUTION INTRAMUSCULAR; INTRAVENOUS at 09:04

## 2021-11-12 RX ADMIN — SODIUM CHLORIDE: 9 INJECTION, SOLUTION INTRAVENOUS at 08:19

## 2021-11-12 RX ADMIN — ROCURONIUM BROMIDE 5 MG: 10 INJECTION, SOLUTION INTRAVENOUS at 12:20

## 2021-11-12 RX ADMIN — ONDANSETRON 4 MG: 2 INJECTION INTRAMUSCULAR; INTRAVENOUS at 08:22

## 2021-11-12 RX ADMIN — DEXAMETHASONE SODIUM PHOSPHATE 4 MG: 4 INJECTION, SOLUTION INTRAMUSCULAR; INTRAVENOUS at 08:22

## 2021-11-12 RX ADMIN — CEFAZOLIN 1000 MG: 1 INJECTION, POWDER, FOR SOLUTION INTRAVENOUS at 11:38

## 2021-11-12 RX ADMIN — ROCURONIUM BROMIDE 10 MG: 10 INJECTION, SOLUTION INTRAVENOUS at 08:42

## 2021-11-12 RX ADMIN — ALBUMIN (HUMAN) 250 ML: 12.5 INJECTION, SOLUTION INTRAVENOUS at 08:45

## 2021-11-12 RX ADMIN — LIDOCAINE HYDROCHLORIDE 2 ML: 40 SOLUTION TOPICAL at 08:07

## 2021-11-12 RX ADMIN — IPRATROPIUM BROMIDE AND ALBUTEROL SULFATE 1 AMPULE: .5; 3 SOLUTION RESPIRATORY (INHALATION) at 07:21

## 2021-11-12 RX ADMIN — FENTANYL CITRATE 25 MCG: 50 INJECTION, SOLUTION INTRAMUSCULAR; INTRAVENOUS at 09:13

## 2021-11-12 RX ADMIN — FENTANYL CITRATE 25 MCG: 50 INJECTION, SOLUTION INTRAMUSCULAR; INTRAVENOUS at 10:15

## 2021-11-12 RX ADMIN — ROCURONIUM BROMIDE 10 MG: 10 INJECTION, SOLUTION INTRAVENOUS at 09:12

## 2021-11-12 RX ADMIN — ROCURONIUM BROMIDE 10 MG: 10 INJECTION, SOLUTION INTRAVENOUS at 08:05

## 2021-11-12 RX ADMIN — ROCURONIUM BROMIDE 10 MG: 10 INJECTION, SOLUTION INTRAVENOUS at 11:19

## 2021-11-12 RX ADMIN — HYDROMORPHONE HYDROCHLORIDE 0.5 MG: 2 INJECTION, SOLUTION INTRAMUSCULAR; INTRAVENOUS; SUBCUTANEOUS at 13:31

## 2021-11-12 RX ADMIN — PHENYLEPHRINE HYDROCHLORIDE 100 MCG: 10 INJECTION INTRAVENOUS at 08:05

## 2021-11-12 RX ADMIN — PROPOFOL 80 MG: 10 INJECTION, EMULSION INTRAVENOUS at 08:05

## 2021-11-12 RX ADMIN — ROCURONIUM BROMIDE 5 MG: 10 INJECTION, SOLUTION INTRAVENOUS at 11:55

## 2021-11-12 RX ADMIN — Medication 100 MG: at 08:06

## 2021-11-12 RX ADMIN — FENTANYL CITRATE 25 MCG: 50 INJECTION, SOLUTION INTRAMUSCULAR; INTRAVENOUS at 08:05

## 2021-11-12 RX ADMIN — SUGAMMADEX 200 MG: 100 INJECTION, SOLUTION INTRAVENOUS at 12:44

## 2021-11-12 RX ADMIN — ROCURONIUM BROMIDE 10 MG: 10 INJECTION, SOLUTION INTRAVENOUS at 10:15

## 2021-11-12 RX ADMIN — CEFAZOLIN 2000 MG: 10 INJECTION, POWDER, FOR SOLUTION INTRAVENOUS at 07:42

## 2021-11-12 RX ADMIN — PHENYLEPHRINE HYDROCHLORIDE 100 MCG: 10 INJECTION INTRAVENOUS at 08:07

## 2021-11-12 RX ADMIN — PHENYLEPHRINE HYDROCHLORIDE 50 MCG/MIN: 10 INJECTION INTRAVENOUS at 08:05

## 2021-11-12 RX ADMIN — ROCURONIUM BROMIDE 10 MG: 10 INJECTION, SOLUTION INTRAVENOUS at 09:45

## 2021-11-12 RX ADMIN — ROCURONIUM BROMIDE 30 MG: 10 INJECTION, SOLUTION INTRAVENOUS at 08:19

## 2021-11-12 RX ADMIN — IPRATROPIUM BROMIDE AND ALBUTEROL SULFATE 1 AMPULE: 2.5; .5 SOLUTION RESPIRATORY (INHALATION) at 07:21

## 2021-11-12 RX ADMIN — SODIUM CHLORIDE: 9 INJECTION, SOLUTION INTRAVENOUS at 07:14

## 2021-11-12 RX ADMIN — PHENYLEPHRINE HYDROCHLORIDE 100 MCG/MIN: 10 INJECTION INTRAVENOUS at 08:07

## 2021-11-12 RX ADMIN — LIDOCAINE HYDROCHLORIDE 40 MG: 20 INJECTION, SOLUTION EPIDURAL; INFILTRATION; INTRACAUDAL; PERINEURAL at 08:05

## 2021-11-12 RX ADMIN — FENTANYL CITRATE 25 MCG: 50 INJECTION, SOLUTION INTRAMUSCULAR; INTRAVENOUS at 08:30

## 2021-11-12 ASSESSMENT — PULMONARY FUNCTION TESTS
PIF_VALUE: 24
PIF_VALUE: 1
PIF_VALUE: 22
PIF_VALUE: 20
PIF_VALUE: 1
PIF_VALUE: 22
PIF_VALUE: 21
PIF_VALUE: 22
PIF_VALUE: 21
PIF_VALUE: 22
PIF_VALUE: 23
PIF_VALUE: 22
PIF_VALUE: 21
PIF_VALUE: 22
PIF_VALUE: 21
PIF_VALUE: 22
PIF_VALUE: 20
PIF_VALUE: 25
PIF_VALUE: 21
PIF_VALUE: 1
PIF_VALUE: 21
PIF_VALUE: 21
PIF_VALUE: 22
PIF_VALUE: 21
PIF_VALUE: 22
PIF_VALUE: 21
PIF_VALUE: 22
PIF_VALUE: 21
PIF_VALUE: 22
PIF_VALUE: 20
PIF_VALUE: 21
PIF_VALUE: 24
PIF_VALUE: 22
PIF_VALUE: 22
PIF_VALUE: 21
PIF_VALUE: 21
PIF_VALUE: 22
PIF_VALUE: 21
PIF_VALUE: 22
PIF_VALUE: 21
PIF_VALUE: 21
PIF_VALUE: 24
PIF_VALUE: 21
PIF_VALUE: 2
PIF_VALUE: 22
PIF_VALUE: 21
PIF_VALUE: 24
PIF_VALUE: 21
PIF_VALUE: 22
PIF_VALUE: 22
PIF_VALUE: 21
PIF_VALUE: 1
PIF_VALUE: 21
PIF_VALUE: 21
PIF_VALUE: 20
PIF_VALUE: 21
PIF_VALUE: 1
PIF_VALUE: 22
PIF_VALUE: 21
PIF_VALUE: 24
PIF_VALUE: 24
PIF_VALUE: 22
PIF_VALUE: 22
PIF_VALUE: 1
PIF_VALUE: 24
PIF_VALUE: 21
PIF_VALUE: 21
PIF_VALUE: 1
PIF_VALUE: 22
PIF_VALUE: 22
PIF_VALUE: 21
PIF_VALUE: 22
PIF_VALUE: 24
PIF_VALUE: 21
PIF_VALUE: 22
PIF_VALUE: 21
PIF_VALUE: 22
PIF_VALUE: 22
PIF_VALUE: 17
PIF_VALUE: 22
PIF_VALUE: 21
PIF_VALUE: 21
PIF_VALUE: 22
PIF_VALUE: 21
PIF_VALUE: 21
PIF_VALUE: 22
PIF_VALUE: 1
PIF_VALUE: 21
PIF_VALUE: 22
PIF_VALUE: 21
PIF_VALUE: 21
PIF_VALUE: 22
PIF_VALUE: 24
PIF_VALUE: 21
PIF_VALUE: 22
PIF_VALUE: 22
PIF_VALUE: 20
PIF_VALUE: 21
PIF_VALUE: 22
PIF_VALUE: 21
PIF_VALUE: 21
PIF_VALUE: 0
PIF_VALUE: 22
PIF_VALUE: 21
PIF_VALUE: 22
PIF_VALUE: 22
PIF_VALUE: 21
PIF_VALUE: 21
PIF_VALUE: 20
PIF_VALUE: 22
PIF_VALUE: 21
PIF_VALUE: 22
PIF_VALUE: 21
PIF_VALUE: 21
PIF_VALUE: 24
PIF_VALUE: 22
PIF_VALUE: 20
PIF_VALUE: 24
PIF_VALUE: 21
PIF_VALUE: 21
PIF_VALUE: 22
PIF_VALUE: 24
PIF_VALUE: 21
PIF_VALUE: 22
PIF_VALUE: 25
PIF_VALUE: 20
PIF_VALUE: 21
PIF_VALUE: 21
PIF_VALUE: 22
PIF_VALUE: 22
PIF_VALUE: 21
PIF_VALUE: 21
PIF_VALUE: 22
PIF_VALUE: 17
PIF_VALUE: 22
PIF_VALUE: 24
PIF_VALUE: 22
PIF_VALUE: 21
PIF_VALUE: 22
PIF_VALUE: 21
PIF_VALUE: 22
PIF_VALUE: 20
PIF_VALUE: 2
PIF_VALUE: 1
PIF_VALUE: 22
PIF_VALUE: 1
PIF_VALUE: 22
PIF_VALUE: 22
PIF_VALUE: 24
PIF_VALUE: 24
PIF_VALUE: 21
PIF_VALUE: 20
PIF_VALUE: 22
PIF_VALUE: 22
PIF_VALUE: 21
PIF_VALUE: 20
PIF_VALUE: 22
PIF_VALUE: 4
PIF_VALUE: 21
PIF_VALUE: 22
PIF_VALUE: 22
PIF_VALUE: 20
PIF_VALUE: 1
PIF_VALUE: 21
PIF_VALUE: 20
PIF_VALUE: 24
PIF_VALUE: 22
PIF_VALUE: 21
PIF_VALUE: 22
PIF_VALUE: 21
PIF_VALUE: 21
PIF_VALUE: 1
PIF_VALUE: 22
PIF_VALUE: 20
PIF_VALUE: 12
PIF_VALUE: 20
PIF_VALUE: 22
PIF_VALUE: 21
PIF_VALUE: 0
PIF_VALUE: 20
PIF_VALUE: 22
PIF_VALUE: 21
PIF_VALUE: 3
PIF_VALUE: 1
PIF_VALUE: 22
PIF_VALUE: 24
PIF_VALUE: 1
PIF_VALUE: 1
PIF_VALUE: 17
PIF_VALUE: 22
PIF_VALUE: 22
PIF_VALUE: 20
PIF_VALUE: 22
PIF_VALUE: 21
PIF_VALUE: 22
PIF_VALUE: 21
PIF_VALUE: 21
PIF_VALUE: 20
PIF_VALUE: 21
PIF_VALUE: 20
PIF_VALUE: 22
PIF_VALUE: 21
PIF_VALUE: 22
PIF_VALUE: 21
PIF_VALUE: 22
PIF_VALUE: 22
PIF_VALUE: 1
PIF_VALUE: 1
PIF_VALUE: 20
PIF_VALUE: 24
PIF_VALUE: 21
PIF_VALUE: 22
PIF_VALUE: 22
PIF_VALUE: 21
PIF_VALUE: 22
PIF_VALUE: 22
PIF_VALUE: 21
PIF_VALUE: 3
PIF_VALUE: 21
PIF_VALUE: 21
PIF_VALUE: 22
PIF_VALUE: 22
PIF_VALUE: 1
PIF_VALUE: 21
PIF_VALUE: 22
PIF_VALUE: 20
PIF_VALUE: 21
PIF_VALUE: 24
PIF_VALUE: 22
PIF_VALUE: 22
PIF_VALUE: 21
PIF_VALUE: 22
PIF_VALUE: 20
PIF_VALUE: 22
PIF_VALUE: 21
PIF_VALUE: 22
PIF_VALUE: 21
PIF_VALUE: 22
PIF_VALUE: 21
PIF_VALUE: 20
PIF_VALUE: 21
PIF_VALUE: 24
PIF_VALUE: 21
PIF_VALUE: 24
PIF_VALUE: 22
PIF_VALUE: 21
PIF_VALUE: 21
PIF_VALUE: 22
PIF_VALUE: 21
PIF_VALUE: 1
PIF_VALUE: 23
PIF_VALUE: 21
PIF_VALUE: 22
PIF_VALUE: 22
PIF_VALUE: 12
PIF_VALUE: 1
PIF_VALUE: 22
PIF_VALUE: 1
PIF_VALUE: 21
PIF_VALUE: 22
PIF_VALUE: 21
PIF_VALUE: 21
PIF_VALUE: 22
PIF_VALUE: 20
PIF_VALUE: 23
PIF_VALUE: 22
PIF_VALUE: 21

## 2021-11-12 ASSESSMENT — PAIN SCALES - GENERAL: PAINLEVEL_OUTOF10: 7

## 2021-11-12 ASSESSMENT — ENCOUNTER SYMPTOMS: SHORTNESS OF BREATH: 1

## 2021-11-12 NOTE — PROGRESS NOTES
Patient resting comfortably in preop, receiving breathing treatment. Son Sylvia Barry) at bedside. Patient verbalizes understanding of procedure. Consents present and verified. H&P from 11/3/2021. Updated by Anesthesia. Perioperative sequence of events explained to patient and son, they verbalize understanding of same. Handoff report received from [facility] RN.

## 2021-11-12 NOTE — ANESTHESIA POSTPROCEDURE EVALUATION
Department of Anesthesiology  Postprocedure Note    Patient: Berhane Felipe  MRN: [id number]  YOB: 1935  Date of evaluation: 11/12/2021  Time:  2:28 PM     Procedure Summary     Date: 11/12/21 Room / Location: [address]    Anesthesia Start: 5997 Anesthesia Stop: 1941    Procedure: SIMPLE EXTRACTIONS TEETH # 4, 13, 18, 23, 24, 25, 26,  AND SURGICAL EXTRACTION TEETH # 2, 3, 14, 15, 19, 20, 21,  28,  29, 30, 31  AND ALSO ALVEOLOPLASTIES ALL FOUR QUADRANTS; GEL FOAM (N/A Mouth) Diagnosis: (DENTAL CARRIES K02. 9)    Surgeons: Tere Chery DDS Responsible Provider: Rishabh Jacobson MD    Anesthesia Type: general, regional ASA Status: 4          Anesthesia Type: general, regional    Cristopher Phase I: Cristopher Score: 9    Cristopher Phase II:      Last vitals: Reviewed and per EMR flowsheets.        Anesthesia Post Evaluation    Patient location during evaluation: PACU  Level of consciousness: awake  Complications: no  Multimodal analgesia pain management approach

## 2021-11-12 NOTE — OP NOTE
Operative Note      Patient: Bonny Hsieh  YOB: 1935  MRN: [id number]    Date of Procedure: 11/12/2021    Pre-Op Diagnosis: DENTAL CARRIES K02.9    Post-Op Diagnosis: Same       Procedure(s):  SIMPLE EXTRACTIONS TEETH # 4, 13, 18, 23, 24, 25, 26,  AND SURGICAL EXTRACTION TEETH # 2, 3, 14, 15, 19, 20, 21,  28,  29, 30, 31  AND ALSO ALVEOLOPLASTIES ALL FOUR QUADRANTS; GEL FOAM    Surgeon(s): Adonay Gupta DDS    Assistant:   * No surgical staff found * Dr. Oleksandr Faust, Jazlyn Bradley    Anesthesia: General    Estimated Blood Loss ( 806TI)      Complications: None    Specimens:   * No specimens in log *none    Implants:  * No implants in log *none      Drains:   [REMOVED] External Urinary Catheter (Removed)       Findings: Detailed Description of Procedure: Simple extractions teeth # 4,13,18,23,24,25,26. Surgical extractions teeth # 4,7,49,57,74,99,04,27,07,43,92 via inverse bevel incisions #1 distal-6 mesial, 11 mesial through 16 distal, 17 distal through 32 distal. Alveoloplasties in conjunction with extractions URQ, ULQ, LLQ, LRQ 1-3 mm as needed. Sutured gel-foam into all extraction sites with 3-0 gut suture. Local anesthesia 1% lido with 1:100,000 epi mixed 1:1 with 0.5% marcaine plain approx 25 ml. All dental infections have been removed; patient now has dental clearance to proceed with valva procedure. Rx: amoxicillin 875 mg X 14 take one tab po bid until gone. R: [facility] 5/325 X 12 take tab q 4-6 h prn pain.             Electronically signed by Adonay Gupta DDS on 11/12/2021 at 1:17 PM

## 2021-11-12 NOTE — ANESTHESIA PROCEDURE NOTES
Arterial Line:    An arterial line was placed using ultrasound guidance, in the OR for the following indication(s): continuous blood pressure monitoring. A 20 gauge (size), (length), Arrow (type) catheter was placed, Seldinger technique used, into the left radial artery, secured by tape and Tegaderm. Events:  patient tolerated procedure well with no complications and EBL < 5mL.   11/12/2021 7:46 AM11/12/2021 8:02 AM  Anesthesiologist: Clem Jaime MD  Resident/CRNA: JOSE ALFREDO Oswald CRNA  Performed: Anesthesiologist and Resident/CRNA   Preanesthetic Checklist  Completed: patient identified, IV checked, site marked, risks and benefits discussed, surgical consent, monitors and equipment checked, pre-op evaluation, timeout performed, anesthesia consent given, oxygen available and patient being monitored

## 2021-11-12 NOTE — PROGRESS NOTES
Phase 1 complete, pt seen by anesthesiologist. VSS, pt resting comfortably. Small amount or oral cavity drainage noted, velasquez changed in PACU x1. Will transition to phase 2 for d/c.

## 2021-11-12 NOTE — PROGRESS NOTES
Pt arrived to PACU from OR, VSS, pt arouses to voice. Oral cavity displays small amount of bloody drainage Will continue to monitor.

## 2021-11-12 NOTE — PROGRESS NOTES
CLINICAL PHARMACY NOTE: MEDS TO BEDS    Total # of Prescriptions Filled: 2   The following medications were delivered to the patient:  · Hydrocodone/APAP 5/325mg  · Amoxicillin 875mg    Additional Documentation:    Medication was delivered to patient's room and patient signed for    Gabriela Farmer

## 2021-11-12 NOTE — ANESTHESIA PRE PROCEDURE
Department of Anesthesiology  Preprocedure Note       Name:  Michelle Murrieta   Age:  82 y.o.  :  1935                                          MRN:  [id number]         Date:  2021      Surgeon: Jessie Mohs): Jono Uribe DDS    Procedure: Procedure(s):  SIMPLE EXTRACTIONS TEETH # [id number] AND 26; SURGICAL EXTRACTION TEETH # 2, 3, 4, 13, 14, 15, 18,19, 20, 21, 28, 29, 30 AND 31; ALVEOLOPLASTIES ALL FOUR QUADRANTS; GEL FOAM    Medications prior to admission:   Prior to Admission medications    Medication Sig Start Date End Date Taking?  Authorizing Provider   polyethylene glycol (GLYCOLAX) 17 g packet Take 17 g by mouth daily as needed for Constipation   Yes Historical Provider, MD   omeprazole (PRILOSEC) 20 MG delayed release capsule Take 20 mg by mouth daily   Yes Historical Provider, MD   levocetirizine (XYZAL) 5 MG tablet Take 5 mg by mouth nightly   Yes Historical Provider, MD   potassium chloride (KLOR-CON M) 20 MEQ extended release tablet Take 1 tablet by mouth daily 21  Yes ELISE Valencia   calcium carbonate (CALCIUM 600) 600 MG TABS tablet Take 1 tablet by mouth 2 times daily 21  Yes ELISE Valencia   furosemide (LASIX) 20 MG tablet Take 3 tablets by mouth 2 times daily  Patient taking differently: Take 40 mg by mouth 2 times daily  21  Yes JOSE ALFREDO Jones CNP   spironolactone (ALDACTONE) 25 MG tablet Take 0.5 tablets by mouth daily 21  Yes JOSE ALFREDO Jones CNP   docusate (COLACE, DULCOLAX) 100 MG CAPS Take 100 mg by mouth 2 times daily 21  Yes JOSE ALFREDO Jones CNP   acetaminophen (TYLENOL) 500 MG tablet Take 500 mg by mouth every 6 hours as needed for Pain   Yes Historical Provider, MD   methocarbamol (ROBAXIN) 500 MG tablet Take 500 mg by mouth 4 times daily   Yes Historical Provider, MD   OXYGEN Use 3L of oxygen all the time 21  Yes ELISE Valencia   budesonide (PULMICORT) 0.5 MG/2ML nebulizer suspension Take 2 mLs by nebulization 2 times daily DX:COPD J44.9 7/28/20 10/22/21 Yes Fredi Kang MD   formoterol (PERFOROMIST) 20 MCG/2ML nebulizer solution Take 2 mLs by nebulization every 12 hours DX:COPD J44.9 7/28/20 10/22/21 Yes Fredi Kang MD   ondansetron [facility]) 4 MG tablet Take 1 tablet by mouth daily as needed for Nausea or Vomiting 0/02/43  Yes Esther Lares MD   oxybutynin (DITROPAN) 5 MG tablet TAKE ONE TABLET BY MOUTH THREE TIMES A DAY 8/20/19  Yes Marcy Araiza MD   atorvastatin (LIPITOR) 10 MG tablet TAKE 1 TABLET BY MOUTH DAILY AT BEDTIME.   Patient taking differently: Take 10 mg by mouth nightly TAKE 1 TABLET BY MOUTH DAILY AT BEDTIME. 7/12/19  Yes Brant Esparza MD   albuterol sulfate HFA (VENTOLIN HFA) 108 (90 Base) MCG/ACT inhaler Inhale 2 puffs into the lungs every 6 hours as needed for Wheezing 7/9/19  Yes Fredi Kang MD   levothyroxine (SYNTHROID) 75 MCG tablet TAKE 1 TABLET BY MOUTH DAILY 6/11/19  Yes Marcy Araiza MD   ipratropium-albuterol (DUONEB) 0.5-2.5 (3) MG/3ML SOLN nebulizer solution Inhale 3 mLs into the lungs every 4 hours 3/13/18  Yes Marcy Araiza MD   citalopram (CELEXA) 10 MG tablet Take 1 tablet by mouth daily 11/3/21   ELISE Membreno       Current medications:    Current Facility-Administered Medications   Medication Dose Route Frequency Provider Last Rate Last Admin    meperidine (DEMEROL) injection 12.5 mg  12.5 mg IntraVENous Q5 Min PRN Abhay Lomas MD        HYDROmorphone (DILAUDID) injection 0.25 mg  0.25 mg IntraVENous Q5 Min PRN Abhay Lomas MD        fentaNYL (SUBLIMAZE) injection 50 mcg  50 mcg IntraVENous Q5 Min PRN Abhay Lomas MD        HYDROmorphone (DILAUDID) injection 0.25 mg  0.25 mg IntraVENous Q5 Min PRN Abhay Lomas MD        HYDROmorphone (DILAUDID) injection 0.5 mg  0.5 mg IntraVENous Q5 Min PRN Abhay Lomas MD        oxyCODONE (ROXICODONE) immediate release tablet 5 mg  5 mg Oral PRN Abhay Lomas MD Or    oxyCODONE (ROXICODONE) immediate release tablet 10 mg  10 mg Oral PRN Kota Eli MD        promethazine [facility]) injection 6.25 mg  6.25 mg IntraVENous PRN Kota Eli MD        diphenhydrAMINE (BENADRYL) injection 12.5 mg  12.5 mg IntraVENous Once PRN Kota Eli MD        labetalol (NORMODYNE;TRANDATE) injection 5 mg  5 mg IntraVENous Q10 Min PRN Kota Eli MD           Allergies:  No Known Allergies    Problem List:    Patient Active Problem List   Diagnosis Code    Chronic seasonal allergic rhinitis J30.2    COPD, 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 A    Chronic congestive heart failure ([facility]) I50.9    SOB (shortness of breath) R06.02    Obstructive sleep apnea (adult) (pediatric) G47.33    Bilateral lower extremity edema R60.0    Multifocal pneumonia J18.9    Centrilobular emphysema ([facility]) J43.2    Acute on chronic diastolic heart failure ([facility]) I50.33    Pitting edema R60.9    BASIA (acute kidney injury) ([facility]) N17.9    Chronic respiratory failure with hypoxia ([facility]) J96.11    Essential hypertension I10    Mixed hyperlipidemia E78.2    Acute pulmonary edema ([facility]) J81.0    COPD exacerbation ([facility]) J44.1    Chronic bronchitis ([facility]) J42    Class 3 severe obesity without serious comorbidity with body mass index (BMI) of 40.0 to 44.9 in adult ([facility]) E66.01, Z68.41    Pulmonary nodule R91.1    Acute suppurative otitis media of right ear with spontaneous rupture of tympanic membrane H66.011    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 H72.01    Bilateral hearing loss H91.93    Bilateral sensorineural hearing loss H90.3    Senile osteoporosis M81.0    Acute respiratory failure ([facility]) J96.00    Coronary artery disease due to lipid rich plaque I25.10, I25.83    Nonrheumatic aortic valve stenosis I35.0    Hypercholesteremia E78.00    Aspiration into airway T17.908A       Past Medical History:        Diagnosis Date    Allergic rhinitis     Anxiety     Aortic stenosis     [facility]est[facility] failure) [facility])     COPD (chronic obstructive pulmonary disease) ([facility])     Depression     Diabetes mellitus ([facility] [address]     Hypertension     Hypothyroidism     MARIAA treated with BiPAP 02/14/2018    Urinary incontinence        Past Surgical History:        Procedure Laterality Date    CARDIAC CATHETERIZATION  10/11/2021    CARPAL TUNNEL RELEASE      bilateral    CHOLECYSTECTOMY      EYE SURGERY      bilateral cataracts    HYSTERECTOMY, TOTAL ABDOMINAL      TONSILLECTOMY      UVULECTOMY         Social History:    Social History     Tobacco Use    Smoking status: Never Smoker    Smokeless tobacco: Never Used   Substance Use Topics    Alcohol use: No                                Counseling given: Not Answered      Vital Signs (Current):   Vitals:    10/22/21 0851   Weight: 183 lb (83 kg)   Height: 5' 2\" (1.575 m)                                              BP Readings from Last 3 Encounters:   11/03/21 120/60   10/13/21 114/60   10/11/21 137/67       NPO Status:                                                                                 BMI:   Wt Readings from Last 3 Encounters:   10/22/21 183 lb (83 kg)   11/03/21 190 lb (86.2 kg)   10/13/21 182 lb (82.6 kg)     Body mass index is 33.47 kg/m².     CBC:   Lab Results   Component Value Date    WBC 9.3 10/11/2021    RBC 4.23 10/11/2021    HGB 12.8 10/11/2021    HCT 38.1 10/11/2021    MCV 90.0 10/11/2021    RDW 13.5 10/11/2021     10/11/2021       CMP:   Lab Results   Component Value Date     10/11/2021    K 3.9 10/11/2021    K 4.7 07/21/2021    CL 97 10/11/2021    CO2 30 10/11/2021    BUN 20 10/11/2021    CREATININE 0.8 10/11/2021    GFRAA >60 10/11/2021    AGRATIO 1.0 07/16/2021    LABGLOM >60 10/11/2021    GLUCOSE 112 10/11/2021    PROT 8.5 07/16/2021    CALCIUM 9.4 10/11/2021    BILITOT 0.5 07/16/2021    ALKPHOS 84 07/16/2021 AST 22 07/16/2021    ALT 14 07/16/2021       POC Tests: No results for input(s): POCGLU, POCNA, POCK, POCCL, POCBUN, POCHEMO, POCHCT in the last 72 hours. Coags:   Lab Results   Component Value Date    PROTIME 12.0 06/27/2018    INR 1.05 06/27/2018    APTT 32.2 06/27/2018       HCG (If Applicable): No results found for: PREGTESTUR, PREGSERUM, HCG, HCGQUANT     ABGs: No results found for: PHART, PO2ART, CZX5XUB, EVM2RLA, BEART, L3ZXUYMF     Type & Screen (If Applicable):  No results found for: LABABO, LABRH    Drug/Infectious Status (If Applicable):  No results found for: HIV, HEPCAB    COVID-19 Screening (If Applicable): No results found for: COVID19        Anesthesia Evaluation    Airway: Mallampati: II  TM distance: >3 FB   Neck ROM: full  Mouth opening: > = 3 FB Dental:          Pulmonary:   (+) COPD:  shortness of breath:  sleep apnea:                             Cardiovascular:    (+) hypertension:, CAD:, CHF:,         Rhythm: regular  Rate: normal                    Neuro/Psych:   (+) neuromuscular disease:, psychiatric history:            GI/Hepatic/Renal:             Endo/Other:    (+) Diabetes, hypothyroidism::., .                 Abdominal:             Vascular: Other Findings:             Anesthesia Plan      general and regional     ASA 4       Induction: intravenous. arterial line    Anesthetic plan and risks discussed with patient. Plan discussed with CRNA. I saw and examined patient. CV is RRR. Lungs are CTA but has pursed lip breathing. There has been no material change in her medical history since last seen by her PMD.  She has chronic, severe BOSE.R/b/A discussed with patient and son; they agree to proceed.   This may be used as an H&P.      Vansesa Pham MD   11/12/2021

## 2021-11-16 ENCOUNTER — TELEPHONE (OUTPATIENT)
Dept: CARDIOLOGY CLINIC | Age: 86
End: 2021-11-16

## 2021-11-16 NOTE — TELEPHONE ENCOUNTER
[address] to talk with the nurse that takes care of Patient and she was busy,left message for call back to go over message below.

## 2021-11-16 NOTE — TELEPHONE ENCOUNTER
Labs reviewed, fluid number going up. Please call pt and check on wt and s/sx SOB. She may need to beatris extra dose of lasix for 3 days.  Rodriguez Marmolejo

## 2021-11-18 ENCOUNTER — TELEPHONE (OUTPATIENT)
Dept: CARDIOLOGY CLINIC | Age: 86
End: 2021-11-18

## 2021-11-22 ENCOUNTER — HOSPITAL ENCOUNTER (OUTPATIENT)
Dept: PREADMISSION TESTING | Age: 86
Discharge: HOME | End: 2021-11-26
Payer: MEDICARE

## 2021-11-22 ENCOUNTER — ANESTHESIA EVENT (OUTPATIENT)
Dept: OPERATING ROOM | Age: 86
End: 2021-11-22

## 2021-11-22 ENCOUNTER — HOSPITAL ENCOUNTER (OUTPATIENT)
Dept: CT IMAGING | Age: 86
Discharge: HOME | End: 2021-11-22
Payer: MEDICARE

## 2021-11-22 ENCOUNTER — NURSE ONLY (OUTPATIENT)
Dept: CARDIOLOGY | Age: 86
End: 2021-11-22

## 2021-11-22 ENCOUNTER — HOSPITAL ENCOUNTER (OUTPATIENT)
Dept: GENERAL RADIOLOGY | Age: 86
Discharge: HOME | End: 2021-11-22
Payer: MEDICARE

## 2021-11-22 VITALS
TEMPERATURE: 99.1 F | HEART RATE: 60 BPM | RESPIRATION RATE: 20 BRPM | BODY MASS INDEX: 35.14 KG/M2 | WEIGHT: 179 LBS | OXYGEN SATURATION: 94 % | SYSTOLIC BLOOD PRESSURE: 115 MMHG | HEIGHT: 60 IN | DIASTOLIC BLOOD PRESSURE: 58 MMHG

## 2021-11-22 DIAGNOSIS — R47.1 DYSARTHRIA: ICD-10-CM

## 2021-11-22 DIAGNOSIS — R06.02 SOB (SHORTNESS OF BREATH): ICD-10-CM

## 2021-11-22 DIAGNOSIS — R47.1 DYSARTHRIA: Primary | ICD-10-CM

## 2021-11-22 LAB
ABO/RH: NORMAL
ALBUMIN SERPL-MCNC: 4.2 G/DL (ref 3.4–5)
ALP BLD-CCNC: 78 U/L (ref 40–129)
ALT SERPL-CCNC: 17 U/L (ref 10–40)
ANION GAP SERPL CALCULATED.3IONS-SCNC: 11 MMOL/L (ref 3–16)
ANTIBODY SCREEN: NORMAL
AST SERPL-CCNC: 26 U/L (ref 15–37)
BASOPHILS ABSOLUTE: 0.1 K/UL (ref 0–0.2)
BASOPHILS RELATIVE PERCENT: 0.6 %
BILIRUB SERPL-MCNC: 0.3 MG/DL (ref 0–1)
BILIRUBIN DIRECT: <0.2 MG/DL (ref 0–0.3)
BILIRUBIN URINE: NEGATIVE
BILIRUBIN, INDIRECT: NORMAL MG/DL (ref 0–1)
BLOOD, URINE: NEGATIVE
BUN BLDV-MCNC: 13 MG/DL (ref 7–20)
CALCIUM SERPL-MCNC: 9.6 MG/DL (ref 8.3–10.6)
CHLORIDE BLD-SCNC: 93 MMOL/L (ref 99–110)
CLARITY: CLEAR
CO2: 31 MMOL/L (ref 21–32)
COLOR: YELLOW
CREAT SERPL-MCNC: 0.7 MG/DL (ref 0.6–1.2)
EOSINOPHILS ABSOLUTE: 0.2 K/UL (ref 0–0.6)
EOSINOPHILS RELATIVE PERCENT: 1.9 %
EPITHELIAL CELLS, UA: 5 /HPF (ref 0–5)
GFR AFRICAN AMERICAN: >60
GFR NON-AFRICAN AMERICAN: >60
GLUCOSE BLD-MCNC: 115 MG/DL (ref 70–99)
GLUCOSE URINE: NEGATIVE MG/DL
HCT VFR BLD CALC: 37.2 % (ref 36–48)
HEMOGLOBIN: 12.4 G/DL (ref 12–16)
HYALINE CASTS: 4 /LPF (ref 0–8)
INR BLD: 1.07 (ref 0.88–1.12)
KETONES, URINE: NEGATIVE MG/DL
LEUKOCYTE ESTERASE, URINE: NEGATIVE
LYMPHOCYTES ABSOLUTE: 2.9 K/UL (ref 1–5.1)
LYMPHOCYTES RELATIVE PERCENT: 32.1 %
MAGNESIUM: 2.3 MG/DL (ref 1.8–2.4)
MCH RBC QN AUTO: 29.2 PG (ref 26–34)
MCHC RBC AUTO-ENTMCNC: 33.2 G/DL (ref 31–36)
MCV RBC AUTO: 88 FL (ref 80–100)
MICROSCOPIC EXAMINATION: YES
MONOCYTES ABSOLUTE: 1.1 K/UL (ref 0–1.3)
MONOCYTES RELATIVE PERCENT: 11.5 %
NEUTROPHILS ABSOLUTE: 4.9 K/UL (ref 1.7–7.7)
NEUTROPHILS RELATIVE PERCENT: 53.9 %
NITRITE, URINE: NEGATIVE
PDW BLD-RTO: 12.7 % (ref 12.4–15.4)
PH UA: 8 (ref 5–8)
PHOSPHORUS: 2.4 MG/DL (ref 2.5–4.9)
PLATELET # BLD: 234 K/UL (ref 135–450)
PMV BLD AUTO: 9 FL (ref 5–10.5)
POTASSIUM SERPL-SCNC: 3.8 MMOL/L (ref 3.5–5.1)
PROTEIN UA: ABNORMAL MG/DL
PROTHROMBIN TIME: 12.1 SEC (ref 9.9–12.7)
RBC # BLD: 4.23 M/UL (ref 4–5.2)
RBC UA: 2 /HPF (ref 0–4)
SODIUM BLD-SCNC: 135 MMOL/L (ref 136–145)
SPECIFIC GRAVITY UA: 1.02 (ref 1–1.03)
TOTAL PROTEIN: 7.8 G/DL (ref 6.4–8.2)
URINE REFLEX TO CULTURE: ABNORMAL
URINE TYPE: ABNORMAL
UROBILINOGEN, URINE: 1 E.U./DL
WBC # BLD: 9.1 K/UL (ref 4–11)
WBC UA: 1 /HPF (ref 0–5)

## 2021-11-22 PROCEDURE — 80069 RENAL FUNCTION PANEL: CPT

## 2021-11-22 PROCEDURE — 80076 HEPATIC FUNCTION PANEL: CPT

## 2021-11-22 PROCEDURE — 83735 ASSAY OF MAGNESIUM: CPT

## 2021-11-22 PROCEDURE — 81001 URINALYSIS AUTO W/SCOPE: CPT

## 2021-11-22 PROCEDURE — 36415 COLL VENOUS BLD VENIPUNCTURE: CPT

## 2021-11-22 PROCEDURE — 86850 RBC ANTIBODY SCREEN: CPT

## 2021-11-22 PROCEDURE — 86900 BLOOD TYPING SEROLOGIC ABO: CPT

## 2021-11-22 PROCEDURE — 85610 PROTHROMBIN TIME: CPT

## 2021-11-22 PROCEDURE — 71046 X-RAY EXAM CHEST 2 VIEWS: CPT

## 2021-11-22 PROCEDURE — 70450 CT HEAD/BRAIN W/O DYE: CPT

## 2021-11-22 PROCEDURE — 86901 BLOOD TYPING SEROLOGIC RH(D): CPT

## 2021-11-22 PROCEDURE — 85025 COMPLETE CBC W/AUTO DIFF WBC: CPT

## 2021-11-22 ASSESSMENT — ENCOUNTER SYMPTOMS: SHORTNESS OF BREATH: 1

## 2021-11-22 ASSESSMENT — COPD QUESTIONNAIRES: CAT_SEVERITY: MODERATE

## 2021-11-22 NOTE — PROGRESS NOTES
Name: Royer Vo___Date and time of surgery:_11/30/21 0700___ Arrival Time:__0530______________     1. Do not eat or drink anything after 12 midnight prior to surgery. This includes no water, chewing gum or mints. 2. Take the following pills with a small sip of water on the morning of surgery:_DuoNeb and Synthroid_________________________     3. Please stop taking Ibuprofen, Advil, Naproxen, Vitamin E and other Anti-inflammatory products should be stopped for 5 days before surgery or as directed by your physician. 4. Stopping Plavix, Coumadin,Eliquis, Lovenox,Effient,Pradaxa,Xarelto, Fragmin or other blood thinners on _NA________________. 5. Shower the night before with Hibiclens. 6.  If you take a long acting insulin in the evening only  take half of your usual  dose the night  before your procedure     7. Do not smoke and do not drink alcoholic beverages 24 hours prior to surgery, including NA beer. 8.  You may brush your teeth and gargle the morning of surgery. DO NOT SWALLOW WATER     9. If you have dentures, they will be removed before going to the OR; we will provide you a container. If you wear contact lenses please do not wear them the day of surgery. If you wear glasses, they will be removed; please bring a case for them. 10. Please wear simple, loose fitting clothing to the hospital.  Pat Pérez not bring valuables (money, credit cards, checkbooks, etc.) Do not wear any makeup (including no eye makeup) or nail polish on your fingers or toes. 11. DO NOT wear any jewelry or piercings on day of surgery. All body piercing jewelry must be removed.                15. Notify your Surgeon if you develop any illness between now and surgery  time, cough, cold, fever, sore throat, nausea, vomiting, etc.  Please notify your surgeon if you experience dizziness, shortness of breath or blurred vision between now & the time of your surgery               13. During flu season no children under the age of 15 are permitted in the hospital for the safety of all patients. 15. For your convenience [facility] has a pharmacy on site to fill your prescriptions. If you have any questions between now and surgery, please call [phone]. All above information reviewed with patient in person or by phone. Patient verbalizes understanding. All questions and concerns addressed. Instructed to bring Bipap with her.                                                                                                                            CVPRE OP INSTRUCTIONS

## 2021-11-22 NOTE — PROGRESS NOTES
Patient here fpr PAT. Labs sent as ordered. PFT's and EKG on chart. Dr. Duncan Reddy here to interview patient. Pre op instructions given with full understanding voiced. All questions answered. Will be released to home after CT of head with no contrast and 2 view chest xray.

## 2021-11-22 NOTE — ANESTHESIA PRE PROCEDURE
Department of Anesthesiology  Preprocedure Note       Name:  Thalia Villatoro   Age:  82 y.o.  :  1935                                          MRN:  [id number]         Date:  2021      Surgeon: Alma Parry):  MD Muna Ga MD    Procedure: Procedure(s):  TRANSCATHETER AORTIC VALVE REPLACEMENT FEMORAL APPROACH  TRANSCATHETER AORTIC VALVE REPLACEMENT FEMORAL APPROACH    Medications prior to admission:   Prior to Admission medications    Medication Sig Start Date End Date Taking?  Authorizing Provider   amoxicillin (AMOXIL) 875 MG tablet Take 1 tablet by mouth 2 times daily for 14 days 21  Juma Bejarano DDS   citalopram (CELEXA) 10 MG tablet Take 1 tablet by mouth daily 11/3/21   ELISE Berry   polyethylene glycol (GLYCOLAX) 17 g packet Take 17 g by mouth daily as needed for Constipation    Historical Provider, MD   omeprazole (PRILOSEC) 20 MG delayed release capsule Take 20 mg by mouth daily    Historical Provider, MD   levocetirizine (XYZAL) 5 MG tablet Take 5 mg by mouth nightly    Historical Provider, MD   potassium chloride (KLOR-CON M) 20 MEQ extended release tablet Take 1 tablet by mouth daily 21   ELISE Berry   calcium carbonate (CALCIUM 600) 600 MG TABS tablet Take 1 tablet by mouth 2 times daily 21   ELISE Berry   furosemide (LASIX) 20 MG tablet Take 3 tablets by mouth 2 times daily  Patient taking differently: Take 40 mg by mouth 2 times daily  21   JOSE ALFREDO Collins CNP   spironolactone (ALDACTONE) 25 MG tablet Take 0.5 tablets by mouth daily 21   JOSE ALFREDO Collins CNP   docusate (COLACE, DULCOLAX) 100 MG CAPS Take 100 mg by mouth 2 times daily 21   JOSE ALFREDO Collins CNP   acetaminophen (TYLENOL) 500 MG tablet Take 500 mg by mouth every 6 hours as needed for Pain    Historical Provider, MD   methocarbamol (ROBAXIN) 500 MG tablet Take 500 mg by mouth 4 times daily    Historical Provider, MD   OXYGEN Use 3L of oxygen all the time 5/21/21   [place] ELISE Bell   budesonide (PULMICORT) 0.5 MG/2ML nebulizer suspension Take 2 mLs by nebulization 2 times daily DX:COPD J44.9 7/28/20 10/22/21  Adry Poole MD   formoterol (PERFOROMIST) 20 MCG/2ML nebulizer solution Take 2 mLs by nebulization every 12 hours DX:COPD J44.9 7/28/20 10/22/21  Adry Poole MD   [facility]ansetron [facility]) 4 MG tablet Take 1 tablet by mouth daily as needed for Nausea or Vomiting 6/88/22   Radha Bullock MD   oxybutynin (DITROPAN) 5 MG tablet TAKE ONE TABLET BY MOUTH THREE TIMES A DAY 8/20/19   Anthony Huynh MD   atorvastatin (LIPITOR) 10 MG tablet TAKE 1 TABLET BY MOUTH DAILY AT BEDTIME.   Patient taking differently: Take 10 mg by mouth nightly TAKE 1 TABLET BY MOUTH DAILY AT BEDTIME. 7/12/19   Bolivar Carrero MD   albuterol sulfate HFA (VENTOLIN HFA) 108 (90 Base) MCG/ACT inhaler Inhale 2 puffs into the lungs every 6 hours as needed for Wheezing 7/9/19   Adry Poole MD   levothyroxine (SYNTHROID) 75 MCG tablet TAKE 1 TABLET BY MOUTH DAILY 6/11/19   Anthony Huynh MD   ipratropium-albuterol (DUONEB) 0.5-2.5 (3) MG/3ML SOLN nebulizer solution Inhale 3 mLs into the lungs every 4 hours 3/13/18   Anthony Huynh MD       Current medications:    Current Outpatient Medications   Medication Sig Dispense Refill    amoxicillin (AMOXIL) 875 MG tablet Take 1 tablet by mouth 2 times daily for 14 days 20 tablet 0    citalopram (CELEXA) 10 MG tablet Take 1 tablet by mouth daily 30 tablet 5    polyethylene glycol (GLYCOLAX) 17 g packet Take 17 g by mouth daily as needed for Constipation      omeprazole (PRILOSEC) 20 MG delayed release capsule Take 20 mg by mouth daily      levocetirizine (XYZAL) 5 MG tablet Take 5 mg by mouth nightly      potassium chloride (KLOR-CON M) 20 MEQ extended release tablet Take 1 tablet by mouth daily 30 tablet 5    calcium carbonate (CALCIUM 600) 600 MG TABS tablet Take 1 tablet by mouth 2 times daily 60 tablet 5    furosemide (LASIX) 20 MG tablet Take 3 tablets by mouth 2 times daily (Patient taking differently: Take 40 mg by mouth 2 times daily ) 180 tablet 0    spironolactone (ALDACTONE) 25 MG tablet Take 0.5 tablets by mouth daily 30 tablet 0    docusate (COLACE, DULCOLAX) 100 MG CAPS Take 100 mg by mouth 2 times daily 120 capsule 0    acetaminophen (TYLENOL) 500 MG tablet Take 500 mg by mouth every 6 hours as needed for Pain      methocarbamol (ROBAXIN) 500 MG tablet Take 500 mg by mouth 4 times daily      OXYGEN Use 3L of oxygen all the time 3 L 3    budesonide (PULMICORT) 0.5 MG/2ML nebulizer suspension Take 2 mLs by nebulization 2 times daily DX:COPD J44.9 60 ampule 6    formoterol (PERFOROMIST) 20 MCG/2ML nebulizer solution Take 2 mLs by nebulization every 12 hours DX:COPD J44.9 120 mL 6    ondansetron (ZOFRAN) 4 MG tablet Take 1 tablet by mouth daily as needed for Nausea or Vomiting 30 tablet 0    oxybutynin (DITROPAN) 5 MG tablet TAKE ONE TABLET BY MOUTH THREE TIMES A DAY 90 tablet 1    atorvastatin (LIPITOR) 10 MG tablet TAKE 1 TABLET BY MOUTH DAILY AT BEDTIME. (Patient taking differently: Take 10 mg by mouth nightly TAKE 1 TABLET BY MOUTH DAILY AT BEDTIME.) 30 tablet 5    albuterol sulfate HFA (VENTOLIN HFA) 108 (90 Base) MCG/ACT inhaler Inhale 2 puffs into the lungs every 6 hours as needed for Wheezing 1 Inhaler 3    levothyroxine (SYNTHROID) 75 MCG tablet TAKE 1 TABLET BY MOUTH DAILY 30 tablet 5    ipratropium-albuterol (DUONEB) 0.5-2.5 (3) MG/3ML SOLN nebulizer solution Inhale 3 mLs into the lungs every 4 hours 360 mL 0     No current facility-administered medications for this visit. Allergies:  No Known Allergies    Problem List:    Patient Active Problem List   Diagnosis Code    Chronic seasonal allergic rhinitis J30.2    COPD, severe ([address] J44.9    Idiopathic peripheral neuropathy G60.9    Hypothyroidism E03.9    Depression F32. A    Chronic congestive heart failure (HCC) I50.9    SOB (shortness of breath) R06.02    Obstructive sleep apnea (adult) (pediatric) G47.33    Bilateral lower extremity edema R60.0    Multifocal pneumonia J18.9    Centrilobular emphysema (HCC) J43.2    Acute on chronic diastolic heart failure ([facility]) I50.33    Pitting edema R60.9    BASIA (acute kidney injury) ([facility]) N17.9    Chronic respiratory failure with hypoxia ([facility]) J96.11    Essential hypertension I10    Mixed hyperlipidemia E78.2    Acute pulmonary edema ([facility]) J81.0    COPD exacerbation ([facility]) J44.1    Chronic bronchitis ([facility]) J42    Class 3 severe obesity without serious comorbidity with body mass index (BMI) of 40.0 to 44.9 in adult ([facility]) E66.01, Z68.41    Pulmonary nodule R91.1    Acute suppurative otitis media of right ear with spontaneous rupture of tympanic membrane H66.011    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 H72.01    Bilateral hearing loss H91.93    Bilateral sensorineural hearing loss H90.3    Senile osteoporosis M81.0    Acute respiratory failure ([facility]) J96.00    Coronary artery disease due to lipid rich plaque I25.10, I25.83    Nonrheumatic aortic valve stenosis I35.0    Hypercholesteremia E78.00    Aspiration into airway T17.908A       Past Medical History:        Diagnosis Date    Allergic rhinitis     Anxiety     Aortic stenosis     CHF (congestive heart failure) ([facility])     COPD (chronic obstructive pulmonary disease) ([facility])     Depression     Diabetes mellitus ([email or web address]     Hypertension     Hypothyroidism     MARIAA treated with BiPAP 02/14/2018    Urinary incontinence        Past Surgical History:        Procedure Laterality Date    CARDIAC CATHETERIZATION  10/11/2021    CARPAL TUNNEL RELEASE      bilateral    CHOLECYSTECTOMY      DENTAL SURGERY N/A 11/12/2021    SIMPLE EXTRACTIONS TEETH # 4, 13, 18, 23, 24, 25, 26,  AND SURGICAL EXTRACTION TEETH # 2, 3, 14, 15, 19, 20, 21,  28,  29, 30, 31  AND ALSO ALVEOLOPLASTIES ALL FOUR QUADRANTS; GEL FOAM performed by Pritesh Johnson DDS at [address]      bilateral cataracts    HYSTERECTOMY, TOTAL ABDOMINAL      TONSILLECTOMY      UVULECTOMY         Social History:    Social History     Tobacco Use    Smoking status: Never Smoker    Smokeless tobacco: Never Used   Substance Use Topics    Alcohol use: No                                Counseling given: Not Answered      Vital Signs (Current): There were no vitals filed for this visit. BP Readings from Last 3 Encounters:   11/22/21 (!) 115/58   11/12/21 (!) 115/56   11/12/21 (!) 109/44       NPO Status:                                                                                 BMI:   Wt Readings from Last 3 Encounters:   11/22/21 179 lb (81.2 kg)   10/22/21 183 lb (83 kg)   11/03/21 190 lb (86.2 kg)     There is no height or weight on file to calculate BMI.    CBC:   Lab Results   Component Value Date    WBC 9.3 10/11/2021    RBC 4.23 10/11/2021    HGB 12.8 10/11/2021    HCT 38.1 10/11/2021    MCV 90.0 10/11/2021    RDW 13.5 10/11/2021     10/11/2021       CMP:   Lab Results   Component Value Date     10/11/2021    K 3.9 10/11/2021    K 4.7 07/21/2021    CL 97 10/11/2021    CO2 30 10/11/2021    BUN 20 10/11/2021    CREATININE 0.8 10/11/2021    GFRAA >60 10/11/2021    AGRATIO 1.0 07/16/2021    LABGLOM >60 10/11/2021    GLUCOSE 112 10/11/2021    PROT 8.5 07/16/2021    CALCIUM 9.4 10/11/2021    BILITOT 0.5 07/16/2021    ALKPHOS 84 07/16/2021    AST 22 07/16/2021    ALT 14 07/16/2021       POC Tests: No results for input(s): POCGLU, POCNA, POCK, POCCL, POCBUN, POCHEMO, POCHCT in the last 72 hours.     Coags:   Lab Results   Component Value Date    PROTIME 12.0 06/27/2018    INR 1.05 06/27/2018    APTT 32.2 06/27/2018       HCG (If Applicable): No results found for: PREGTESTUR, PREGSERUM, HCG, HCGQUANT     ABGs: No results found for: PHART, PO2ART, TAP4NHE, WCS0ZGU, BEART, X0SDTFRI     Type & Screen (If Applicable):  No results found for: LABABO, LABRH    Drug/Infectious Status (If Applicable):  No results found for: HIV, HEPCAB    COVID-19 Screening (If Applicable): No results found for: COVID19        Anesthesia Evaluation  Patient summary reviewed and Nursing notes reviewed  Airway: Mallampati: II  TM distance: >3 FB   Neck ROM: full  Mouth opening: > = 3 FB Dental:          Pulmonary:   (+) COPD: moderate,  shortness of breath:  sleep apnea:                            ROS comment: On home oxygen 2 lit/min   Cardiovascular:  Exercise tolerance: poor (<4 METS),   (+) hypertension: moderate, CAD: non-obstructive, CHF:,         Rhythm: regular  Rate: normal                    Neuro/Psych:   (+) neuromuscular disease:, psychiatric history:             ROS comment: Pt has slurred speech GI/Hepatic/Renal:             Endo/Other:    (+) Diabetes, hypothyroidism::., .                 Abdominal:             Vascular: Other Findings:             Anesthesia Plan      MAC     ASA 4       Induction: intravenous. arterial line    Anesthetic plan and risks discussed with patient. I saw and examined patient. CV is RRR. Lungs are CTA but has pursed lip breathing. There has been no material change in her medical history since last seen by her PMD.  She has chronic, severe BOSE.R/b/A discussed with patient and son; they agree to proceed. This may be used as an H&P.       Beatrice Leon MD   11/22/2021

## 2021-11-22 NOTE — PROGRESS NOTES
Saw pt in PAT today for scheduled TAVR 11/30/21. Dr. Barbara Jama evalated pt and was concerned about pts complaint of speech issue. In talking w her, she states that yesterday at her NH, she had a coughing spell requiring HHN. She states that she coughed up quite a bit of white phlegm. She then describes \"not being able to get words out right\". She states she knew what she was saying but others could not understand her. She feels that it may be due to dry mouth. Currently, her speech is clear (recently had several teeth pulled and sutures removed this morning). She denies weakness of one side, hands or legs. Smile is symmetrical. She states that overall, she is tired/weak. Has had diarrhea from abx on for her dental work. She is only able to eat pureed food. Ordered CT head to rule out TIA per Dr. Krishna ivory. Also ordered CXR due to cough, SOB. Let her know that I will let her son, Odalis Dinh, know of findings of all PAT testing.  Robert NESS

## 2021-11-24 ENCOUNTER — TELEPHONE (OUTPATIENT)
Dept: CARDIOLOGY CLINIC | Age: 86
End: 2021-11-24

## 2021-11-24 NOTE — TELEPHONE ENCOUNTER
I spoke with an associate at [facility] and relayed message per [facility].  SHe wanted to knw- were the labs dc'd?

## 2021-11-24 NOTE — TELEPHONE ENCOUNTER
[facility] states they have orders for BNP, BMP, but  pt refused draws today stating that labs were discontinued. Please call to advise.

## 2021-11-29 ENCOUNTER — HOSPITAL ENCOUNTER (INPATIENT)
Age: 86
LOS: 4 days | Discharge: NURSING, REHAB OR LONG-TERM CARE | DRG: 871 | End: 2021-12-03
Attending: EMERGENCY MEDICINE | Admitting: INTERNAL MEDICINE
Payer: MEDICARE

## 2021-11-29 ENCOUNTER — APPOINTMENT (OUTPATIENT)
Dept: GENERAL RADIOLOGY | Age: 86
DRG: 871 | End: 2021-11-29
Payer: MEDICARE

## 2021-11-29 DIAGNOSIS — J44.1 COPD EXACERBATION (HCC): Primary | ICD-10-CM

## 2021-11-29 PROBLEM — J96.01 ACUTE RESPIRATORY FAILURE WITH HYPOXEMIA (HCC): Status: ACTIVE | Noted: 2021-11-29

## 2021-11-29 LAB
A/G RATIO: 1.1 (ref 1.1–2.2)
ALBUMIN SERPL-MCNC: 4 G/DL (ref 3.4–5)
ALP BLD-CCNC: 82 U/L (ref 40–129)
ALT SERPL-CCNC: 13 U/L (ref 10–40)
AMYLASE: 12 U/L (ref 25–115)
ANION GAP SERPL CALCULATED.3IONS-SCNC: 9 MMOL/L (ref 3–16)
APTT: 29.5 SEC (ref 26.2–38.6)
AST SERPL-CCNC: 18 U/L (ref 15–37)
ATYPICAL LYMPHOCYTE RELATIVE PERCENT: 1 % (ref 0–6)
BASE EXCESS VENOUS: 9.1 MMOL/L (ref -3–3)
BASOPHILS ABSOLUTE: 0 K/UL (ref 0–0.2)
BASOPHILS RELATIVE PERCENT: 0 %
BILIRUB SERPL-MCNC: 0.6 MG/DL (ref 0–1)
BILIRUBIN URINE: NEGATIVE
BLOOD, URINE: NEGATIVE
BUN BLDV-MCNC: 13 MG/DL (ref 7–20)
C-REACTIVE PROTEIN: 73.4 MG/L (ref 0–5.1)
CALCIUM SERPL-MCNC: 9.2 MG/DL (ref 8.3–10.6)
CARBOXYHEMOGLOBIN: 2.5 % (ref 0–1.5)
CHLORIDE BLD-SCNC: 93 MMOL/L (ref 99–110)
CLARITY: CLEAR
CO2: 35 MMOL/L (ref 21–32)
COLOR: YELLOW
CREAT SERPL-MCNC: 0.7 MG/DL (ref 0.6–1.2)
EOSINOPHILS ABSOLUTE: 0 K/UL (ref 0–0.6)
EOSINOPHILS RELATIVE PERCENT: 0 %
EPITHELIAL CELLS, UA: 3 /HPF (ref 0–5)
GFR AFRICAN AMERICAN: >60
GFR NON-AFRICAN AMERICAN: >60
GLUCOSE BLD-MCNC: 147 MG/DL (ref 70–99)
GLUCOSE URINE: NEGATIVE MG/DL
HCO3 VENOUS: 37 MMOL/L (ref 23–29)
HCT VFR BLD CALC: 38.3 % (ref 36–48)
HEMOGLOBIN, VEN, REDUCED: 12 %
HEMOGLOBIN: 12.7 G/DL (ref 12–16)
HYALINE CASTS: 0 /LPF (ref 0–8)
HYPOCHROMIA: ABNORMAL
INR BLD: 1.1 (ref 0.88–1.12)
KETONES, URINE: NEGATIVE MG/DL
LEUKOCYTE ESTERASE, URINE: NEGATIVE
LIPASE: 15 U/L (ref 13–60)
LYMPHOCYTES ABSOLUTE: 1.4 K/UL (ref 1–5.1)
LYMPHOCYTES RELATIVE PERCENT: 10 %
MAGNESIUM: 2.2 MG/DL (ref 1.8–2.4)
MCH RBC QN AUTO: 29.3 PG (ref 26–34)
MCHC RBC AUTO-ENTMCNC: 33.1 G/DL (ref 31–36)
MCV RBC AUTO: 88.4 FL (ref 80–100)
METHEMOGLOBIN VENOUS: 0 %
MICROSCOPIC EXAMINATION: YES
MONOCYTES ABSOLUTE: 1.5 K/UL (ref 0–1.3)
MONOCYTES RELATIVE PERCENT: 12 %
NEUTROPHILS ABSOLUTE: 9.5 K/UL (ref 1.7–7.7)
NEUTROPHILS RELATIVE PERCENT: 77 %
NITRITE, URINE: NEGATIVE
O2 CONTENT, VEN: 16 VOL %
O2 SAT, VEN: 88 %
O2 THERAPY: ABNORMAL
PCO2, VEN: 65.2 MMHG (ref 40–50)
PDW BLD-RTO: 13.1 % (ref 12.4–15.4)
PH UA: 6 (ref 5–8)
PH VENOUS: 7.36 (ref 7.35–7.45)
PLATELET # BLD: 217 K/UL (ref 135–450)
PMV BLD AUTO: 9 FL (ref 5–10.5)
PO2, VEN: 55.3 MMHG (ref 25–40)
POTASSIUM REFLEX MAGNESIUM: 3.3 MMOL/L (ref 3.5–5.1)
PRO-BNP: 684 PG/ML (ref 0–449)
PROTEIN UA: ABNORMAL MG/DL
PROTHROMBIN TIME: 12.5 SEC (ref 9.9–12.7)
RBC # BLD: 4.34 M/UL (ref 4–5.2)
RBC # BLD: NORMAL 10*6/UL
RBC UA: 2 /HPF (ref 0–4)
SODIUM BLD-SCNC: 137 MMOL/L (ref 136–145)
SPECIFIC GRAVITY UA: 1.02 (ref 1–1.03)
TCO2 CALC VENOUS: 87 MMOL/L
TOTAL PROTEIN: 7.8 G/DL (ref 6.4–8.2)
TROPONIN: <0.01 NG/ML
URINE TYPE: ABNORMAL
UROBILINOGEN, URINE: 1 E.U./DL
WBC # BLD: 12.3 K/UL (ref 4–11)
WBC UA: 0 /HPF (ref 0–5)

## 2021-11-29 PROCEDURE — 6370000000 HC RX 637 (ALT 250 FOR IP): Performed by: INTERNAL MEDICINE

## 2021-11-29 PROCEDURE — U0003 INFECTIOUS AGENT DETECTION BY NUCLEIC ACID (DNA OR RNA); SEVERE ACUTE RESPIRATORY SYNDROME CORONAVIRUS 2 (SARS-COV-2) (CORONAVIRUS DISEASE [COVID-19]), AMPLIFIED PROBE TECHNIQUE, MAKING USE OF HIGH THROUGHPUT TECHNOLOGIES AS DESCRIBED BY CMS-2020-01-R: HCPCS

## 2021-11-29 PROCEDURE — 83690 ASSAY OF LIPASE: CPT

## 2021-11-29 PROCEDURE — 1200000000 HC SEMI PRIVATE

## 2021-11-29 PROCEDURE — 84484 ASSAY OF TROPONIN QUANT: CPT

## 2021-11-29 PROCEDURE — 2580000003 HC RX 258: Performed by: EMERGENCY MEDICINE

## 2021-11-29 PROCEDURE — 71045 X-RAY EXAM CHEST 1 VIEW: CPT

## 2021-11-29 PROCEDURE — 85610 PROTHROMBIN TIME: CPT

## 2021-11-29 PROCEDURE — 82803 BLOOD GASES ANY COMBINATION: CPT

## 2021-11-29 PROCEDURE — 87186 SC STD MICRODIL/AGAR DIL: CPT

## 2021-11-29 PROCEDURE — 87077 CULTURE AEROBIC IDENTIFY: CPT

## 2021-11-29 PROCEDURE — 83735 ASSAY OF MAGNESIUM: CPT

## 2021-11-29 PROCEDURE — 2580000003 HC RX 258: Performed by: INTERNAL MEDICINE

## 2021-11-29 PROCEDURE — 2700000000 HC OXYGEN THERAPY PER DAY

## 2021-11-29 PROCEDURE — 83880 ASSAY OF NATRIURETIC PEPTIDE: CPT

## 2021-11-29 PROCEDURE — 6360000002 HC RX W HCPCS: Performed by: INTERNAL MEDICINE

## 2021-11-29 PROCEDURE — 82150 ASSAY OF AMYLASE: CPT

## 2021-11-29 PROCEDURE — U0005 INFEC AGEN DETEC AMPLI PROBE: HCPCS

## 2021-11-29 PROCEDURE — 94761 N-INVAS EAR/PLS OXIMETRY MLT: CPT

## 2021-11-29 PROCEDURE — 99223 1ST HOSP IP/OBS HIGH 75: CPT | Performed by: INTERNAL MEDICINE

## 2021-11-29 PROCEDURE — 81001 URINALYSIS AUTO W/SCOPE: CPT

## 2021-11-29 PROCEDURE — 85730 THROMBOPLASTIN TIME PARTIAL: CPT

## 2021-11-29 PROCEDURE — 99283 EMERGENCY DEPT VISIT LOW MDM: CPT

## 2021-11-29 PROCEDURE — 6360000002 HC RX W HCPCS: Performed by: EMERGENCY MEDICINE

## 2021-11-29 PROCEDURE — 96374 THER/PROPH/DIAG INJ IV PUSH: CPT

## 2021-11-29 PROCEDURE — 93005 ELECTROCARDIOGRAM TRACING: CPT | Performed by: EMERGENCY MEDICINE

## 2021-11-29 PROCEDURE — 86140 C-REACTIVE PROTEIN: CPT

## 2021-11-29 PROCEDURE — 94640 AIRWAY INHALATION TREATMENT: CPT

## 2021-11-29 PROCEDURE — 80053 COMPREHEN METABOLIC PANEL: CPT

## 2021-11-29 PROCEDURE — 85025 COMPLETE CBC W/AUTO DIFF WBC: CPT

## 2021-11-29 PROCEDURE — 6370000000 HC RX 637 (ALT 250 FOR IP): Performed by: EMERGENCY MEDICINE

## 2021-11-29 PROCEDURE — 87086 URINE CULTURE/COLONY COUNT: CPT

## 2021-11-29 RX ORDER — ONDANSETRON 2 MG/ML
4 INJECTION INTRAMUSCULAR; INTRAVENOUS EVERY 6 HOURS PRN
Status: DISCONTINUED | OUTPATIENT
Start: 2021-11-29 | End: 2021-12-03 | Stop reason: HOSPADM

## 2021-11-29 RX ORDER — POLYETHYLENE GLYCOL 3350 17 G/17G
17 POWDER, FOR SOLUTION ORAL DAILY PRN
Status: DISCONTINUED | OUTPATIENT
Start: 2021-11-29 | End: 2021-12-03 | Stop reason: HOSPADM

## 2021-11-29 RX ORDER — ACETAMINOPHEN 325 MG/1
650 TABLET ORAL EVERY 6 HOURS PRN
Status: DISCONTINUED | OUTPATIENT
Start: 2021-11-29 | End: 2021-12-03 | Stop reason: HOSPADM

## 2021-11-29 RX ORDER — IPRATROPIUM BROMIDE AND ALBUTEROL SULFATE 2.5; .5 MG/3ML; MG/3ML
1 SOLUTION RESPIRATORY (INHALATION) EVERY 4 HOURS
Status: DISCONTINUED | OUTPATIENT
Start: 2021-11-29 | End: 2021-11-29 | Stop reason: SDUPTHER

## 2021-11-29 RX ORDER — SODIUM CHLORIDE 0.9 % (FLUSH) 0.9 %
5-40 SYRINGE (ML) INJECTION PRN
Status: DISCONTINUED | OUTPATIENT
Start: 2021-11-29 | End: 2021-12-03 | Stop reason: HOSPADM

## 2021-11-29 RX ORDER — PHENOL 1.4 %
1 AEROSOL, SPRAY (ML) MUCOUS MEMBRANE 2 TIMES DAILY
Status: DISCONTINUED | OUTPATIENT
Start: 2021-11-29 | End: 2021-11-29 | Stop reason: RX

## 2021-11-29 RX ORDER — POTASSIUM CHLORIDE 20 MEQ/1
20 TABLET, EXTENDED RELEASE ORAL DAILY
Status: DISCONTINUED | OUTPATIENT
Start: 2021-11-29 | End: 2021-12-03 | Stop reason: HOSPADM

## 2021-11-29 RX ORDER — SPIRONOLACTONE 25 MG/1
12.5 TABLET ORAL DAILY
Status: DISCONTINUED | OUTPATIENT
Start: 2021-11-29 | End: 2021-12-03 | Stop reason: HOSPADM

## 2021-11-29 RX ORDER — LEVOTHYROXINE SODIUM 0.07 MG/1
75 TABLET ORAL DAILY
Status: DISCONTINUED | OUTPATIENT
Start: 2021-11-30 | End: 2021-12-03 | Stop reason: HOSPADM

## 2021-11-29 RX ORDER — ACETAMINOPHEN 500 MG
500 TABLET ORAL EVERY 6 HOURS PRN
Status: DISCONTINUED | OUTPATIENT
Start: 2021-11-29 | End: 2021-11-29 | Stop reason: SDUPTHER

## 2021-11-29 RX ORDER — ONDANSETRON 4 MG/1
4 TABLET, ORALLY DISINTEGRATING ORAL EVERY 8 HOURS PRN
Status: DISCONTINUED | OUTPATIENT
Start: 2021-11-29 | End: 2021-12-03 | Stop reason: HOSPADM

## 2021-11-29 RX ORDER — POTASSIUM CHLORIDE 20 MEQ/1
40 TABLET, EXTENDED RELEASE ORAL PRN
Status: DISCONTINUED | OUTPATIENT
Start: 2021-11-29 | End: 2021-12-03 | Stop reason: HOSPADM

## 2021-11-29 RX ORDER — DEXAMETHASONE SODIUM PHOSPHATE 4 MG/ML
10 INJECTION, SOLUTION INTRA-ARTICULAR; INTRALESIONAL; INTRAMUSCULAR; INTRAVENOUS; SOFT TISSUE ONCE
Status: COMPLETED | OUTPATIENT
Start: 2021-11-29 | End: 2021-11-29

## 2021-11-29 RX ORDER — FUROSEMIDE 40 MG/1
40 TABLET ORAL 2 TIMES DAILY
Status: DISCONTINUED | OUTPATIENT
Start: 2021-11-29 | End: 2021-12-03 | Stop reason: HOSPADM

## 2021-11-29 RX ORDER — BUDESONIDE 0.5 MG/2ML
0.5 INHALANT ORAL 2 TIMES DAILY
Status: DISCONTINUED | OUTPATIENT
Start: 2021-11-29 | End: 2021-11-29

## 2021-11-29 RX ORDER — FLUTICASONE PROPIONATE 110 UG/1
1 AEROSOL, METERED RESPIRATORY (INHALATION) 2 TIMES DAILY
Status: DISCONTINUED | OUTPATIENT
Start: 2021-11-29 | End: 2021-11-30

## 2021-11-29 RX ORDER — POTASSIUM CHLORIDE 7.45 MG/ML
10 INJECTION INTRAVENOUS PRN
Status: DISCONTINUED | OUTPATIENT
Start: 2021-11-29 | End: 2021-12-03 | Stop reason: HOSPADM

## 2021-11-29 RX ORDER — DOCUSATE SODIUM 100 MG/1
100 CAPSULE, LIQUID FILLED ORAL 2 TIMES DAILY
Status: DISCONTINUED | OUTPATIENT
Start: 2021-11-29 | End: 2021-12-03 | Stop reason: HOSPADM

## 2021-11-29 RX ORDER — ALBUTEROL SULFATE 90 UG/1
2 AEROSOL, METERED RESPIRATORY (INHALATION)
Status: DISCONTINUED | OUTPATIENT
Start: 2021-11-30 | End: 2021-11-30

## 2021-11-29 RX ORDER — SODIUM CHLORIDE 9 MG/ML
25 INJECTION, SOLUTION INTRAVENOUS PRN
Status: DISCONTINUED | OUTPATIENT
Start: 2021-11-29 | End: 2021-12-03 | Stop reason: HOSPADM

## 2021-11-29 RX ORDER — FORMOTEROL FUMARATE 20 UG/2ML
20 SOLUTION RESPIRATORY (INHALATION) EVERY 12 HOURS
Status: DISCONTINUED | OUTPATIENT
Start: 2021-11-29 | End: 2021-11-29

## 2021-11-29 RX ORDER — 0.9 % SODIUM CHLORIDE 0.9 %
500 INTRAVENOUS SOLUTION INTRAVENOUS ONCE
Status: COMPLETED | OUTPATIENT
Start: 2021-11-29 | End: 2021-11-29

## 2021-11-29 RX ORDER — ATORVASTATIN CALCIUM 10 MG/1
10 TABLET, FILM COATED ORAL NIGHTLY
Status: DISCONTINUED | OUTPATIENT
Start: 2021-11-29 | End: 2021-12-03 | Stop reason: HOSPADM

## 2021-11-29 RX ORDER — ACETAMINOPHEN 650 MG/1
650 SUPPOSITORY RECTAL EVERY 6 HOURS PRN
Status: DISCONTINUED | OUTPATIENT
Start: 2021-11-29 | End: 2021-12-03 | Stop reason: HOSPADM

## 2021-11-29 RX ORDER — CETIRIZINE HYDROCHLORIDE 10 MG/1
5 TABLET ORAL DAILY
Status: DISCONTINUED | OUTPATIENT
Start: 2021-11-30 | End: 2021-12-03 | Stop reason: HOSPADM

## 2021-11-29 RX ORDER — HYDRALAZINE HYDROCHLORIDE 20 MG/ML
10 INJECTION INTRAMUSCULAR; INTRAVENOUS EVERY 6 HOURS PRN
Status: DISCONTINUED | OUTPATIENT
Start: 2021-11-29 | End: 2021-12-03 | Stop reason: HOSPADM

## 2021-11-29 RX ORDER — CITALOPRAM 20 MG/1
10 TABLET ORAL DAILY
Status: DISCONTINUED | OUTPATIENT
Start: 2021-11-29 | End: 2021-12-03 | Stop reason: HOSPADM

## 2021-11-29 RX ORDER — IPRATROPIUM BROMIDE AND ALBUTEROL SULFATE 2.5; .5 MG/3ML; MG/3ML
1 SOLUTION RESPIRATORY (INHALATION)
Status: DISCONTINUED | OUTPATIENT
Start: 2021-11-29 | End: 2021-11-29

## 2021-11-29 RX ORDER — METHOCARBAMOL 500 MG/1
500 TABLET, FILM COATED ORAL 4 TIMES DAILY
Status: DISCONTINUED | OUTPATIENT
Start: 2021-11-29 | End: 2021-12-03 | Stop reason: HOSPADM

## 2021-11-29 RX ORDER — ONDANSETRON 4 MG/1
4 TABLET, FILM COATED ORAL DAILY PRN
Status: DISCONTINUED | OUTPATIENT
Start: 2021-11-29 | End: 2021-11-29 | Stop reason: SDUPTHER

## 2021-11-29 RX ORDER — LIDOCAINE 4 G/G
1 PATCH TOPICAL ONCE
Status: CANCELLED | OUTPATIENT
Start: 2021-11-30

## 2021-11-29 RX ORDER — ALBUTEROL SULFATE 90 UG/1
2 AEROSOL, METERED RESPIRATORY (INHALATION) EVERY 6 HOURS PRN
Status: DISCONTINUED | OUTPATIENT
Start: 2021-11-29 | End: 2021-12-03 | Stop reason: HOSPADM

## 2021-11-29 RX ORDER — PANTOPRAZOLE SODIUM 40 MG/1
40 TABLET, DELAYED RELEASE ORAL
Status: DISCONTINUED | OUTPATIENT
Start: 2021-11-30 | End: 2021-12-03 | Stop reason: HOSPADM

## 2021-11-29 RX ORDER — IPRATROPIUM BROMIDE AND ALBUTEROL SULFATE 2.5; .5 MG/3ML; MG/3ML
1 SOLUTION RESPIRATORY (INHALATION) ONCE
Status: COMPLETED | OUTPATIENT
Start: 2021-11-29 | End: 2021-11-29

## 2021-11-29 RX ORDER — PREDNISONE 20 MG/1
40 TABLET ORAL DAILY
Status: DISCONTINUED | OUTPATIENT
Start: 2021-12-02 | End: 2021-12-01

## 2021-11-29 RX ORDER — OXYBUTYNIN CHLORIDE 5 MG/1
2.5 TABLET ORAL 3 TIMES DAILY
Status: DISCONTINUED | OUTPATIENT
Start: 2021-11-29 | End: 2021-12-03 | Stop reason: HOSPADM

## 2021-11-29 RX ORDER — METHYLPREDNISOLONE SODIUM SUCCINATE 40 MG/ML
40 INJECTION, POWDER, LYOPHILIZED, FOR SOLUTION INTRAMUSCULAR; INTRAVENOUS EVERY 6 HOURS
Status: DISCONTINUED | OUTPATIENT
Start: 2021-11-29 | End: 2021-12-01

## 2021-11-29 RX ORDER — SODIUM CHLORIDE 0.9 % (FLUSH) 0.9 %
5-40 SYRINGE (ML) INJECTION EVERY 12 HOURS SCHEDULED
Status: DISCONTINUED | OUTPATIENT
Start: 2021-11-29 | End: 2021-12-03 | Stop reason: HOSPADM

## 2021-11-29 RX ORDER — 0.9 % SODIUM CHLORIDE 0.9 %
500 INTRAVENOUS SOLUTION INTRAVENOUS PRN
Status: DISCONTINUED | OUTPATIENT
Start: 2021-11-29 | End: 2021-12-03 | Stop reason: HOSPADM

## 2021-11-29 RX ORDER — BUDESONIDE 0.5 MG/2ML
500 INHALANT ORAL 2 TIMES DAILY
Status: DISCONTINUED | OUTPATIENT
Start: 2021-11-29 | End: 2021-11-29

## 2021-11-29 RX ADMIN — SODIUM CHLORIDE 500 ML: 9 INJECTION, SOLUTION INTRAVENOUS at 08:30

## 2021-11-29 RX ADMIN — DOCUSATE SODIUM 100 MG: 100 CAPSULE ORAL at 20:04

## 2021-11-29 RX ADMIN — IPRATROPIUM BROMIDE AND ALBUTEROL SULFATE 1 AMPULE: .5; 3 SOLUTION RESPIRATORY (INHALATION) at 07:06

## 2021-11-29 RX ADMIN — FUROSEMIDE 40 MG: 40 TABLET ORAL at 17:51

## 2021-11-29 RX ADMIN — OXYBUTYNIN CHLORIDE 2.5 MG: 5 TABLET ORAL at 20:05

## 2021-11-29 RX ADMIN — Medication 10 ML: at 20:06

## 2021-11-29 RX ADMIN — POTASSIUM CHLORIDE 20 MEQ: 20 TABLET, EXTENDED RELEASE ORAL at 17:51

## 2021-11-29 RX ADMIN — SPIRONOLACTONE 12.5 MG: 25 TABLET ORAL at 17:51

## 2021-11-29 RX ADMIN — METHOCARBAMOL TABLETS 500 MG: 500 TABLET, COATED ORAL at 20:05

## 2021-11-29 RX ADMIN — CITALOPRAM HYDROBROMIDE 10 MG: 20 TABLET ORAL at 17:51

## 2021-11-29 RX ADMIN — METHYLPREDNISOLONE SODIUM SUCCINATE 40 MG: 40 INJECTION, POWDER, FOR SOLUTION INTRAMUSCULAR; INTRAVENOUS at 17:51

## 2021-11-29 RX ADMIN — ENOXAPARIN SODIUM 40 MG: 100 INJECTION SUBCUTANEOUS at 20:04

## 2021-11-29 RX ADMIN — ATORVASTATIN CALCIUM 10 MG: 10 TABLET, FILM COATED ORAL at 20:04

## 2021-11-29 RX ADMIN — DEXAMETHASONE SODIUM PHOSPHATE 10 MG: 4 INJECTION, SOLUTION INTRAMUSCULAR; INTRAVENOUS at 07:32

## 2021-11-29 ASSESSMENT — PAIN SCALES - GENERAL
PAINLEVEL_OUTOF10: 0
PAINLEVEL_OUTOF10: 3

## 2021-11-29 ASSESSMENT — PAIN DESCRIPTION - LOCATION: LOCATION: BUTTOCKS;COCCYX

## 2021-11-29 ASSESSMENT — PAIN DESCRIPTION - PAIN TYPE: TYPE: ACUTE PAIN

## 2021-11-29 NOTE — PROGRESS NOTES
Pt to unit. VSS. On baseline O2, 3L. Pt has stage 2 wound on coccyx covered in mepilex and zinc cream. Food ordered for dinner. Bed alarm, call light within reach.

## 2021-11-29 NOTE — CONSULTS
[address]  H+P // Zara Bazzi // OP VISIT // ELISE Peck  ENC TYPE FU    CHIEF COMPLAINT/HPI   CC HPI   sob Alisia Leo is a 82 y. o.presents with sob. Sob chronic with exacerbation last day or two. Lives in [facility] with known covid cases. Plan was for TAVR tomorrow for severe AS. HISTORY/ALLLERGY/ROS   MedHx  has a past medical history of Allergic rhinitis, Anxiety, Aortic stenosis, [facility]F (congestive heart failure) ([facility] COPD (chronic obstructive pulmonary disease) ([facility] Depression, Diabetes mellitus ([facility] Hypertension, Hypothyroidism, MARIAA treated with BiPAP, and Urinary incontinence. SurgHx  has a past surgical history that includes Hysterectomy, total abdominal; Carpal tunnel release; Cholecystectomy; eye surgery; Tonsillectomy; Uvulectomy; Cardiac catheterization (10/11/2021); and Dental surgery (N/A, 11/12/2021). SocHx  reports that she has never smoked. She has never used smokeless tobacco. She reports that she does not drink alcohol and does not use drugs. FamHx family history includes Breast Cancer in her daughter, maternal grandmother, and mother; Cancer in her father, mother, and sister. Allerg Patient has no known allergies.    ROS [x]Full ROS obtained and negative except as mentioned in HPI      MEDICATIONS      Current Facility-Administered Medications   Medication Dose Route Frequency Provider Last Rate Last Admin    formoterol (PERFOROMIST) nebulizer solution 20 mcg  20 mcg Nebulization Q12H Daniela Louise MD        budesonide (PULMICORT) nebulizer suspension 500 mcg  500 mcg Nebulization BID Daniela Louise MD        levothyroxine (SYNTHROID) tablet 75 mcg  1 tablet Oral Daily Daniela Louise MD        albuterol sulfate  (90 Base) MCG/ACT inhaler 2 puff  2 puff Inhalation Q6H PRN Daniela Louise MD        atorvastatin (LIPITOR) tablet 10 mg  10 mg Oral Nightly Daniela Louise MD        oxybutynin [facility]) tablet 2.5 mg  2.5 mg Oral TID Gary Barnett MD        methocarbamol (ROBAXIN) tablet 500 mg  500 mg Oral 4x Daily Gary Barnett MD        furosemide (LASIX) tablet 60 mg  60 mg Oral BID Gary Barnett MD        spironolactone (ALDACTONE) tablet 12.5 mg  12.5 mg Oral Daily Gary Barnett MD        docusate (COLACE, DULCOLAX) CAPS 100 mg  100 mg Oral BID Gary Barnett MD        calcium carbonate tablet 600 mg  1 tablet Oral BID Gary Barnett MD        potassium chloride (KLOR-CON M) extended release tablet 20 mEq  20 mEq Oral Daily Gary Barnett MD        polyethylene glycol [facility]) packet 17 g  17 g Oral Daily PRN Gary Barnett MD        [START ON 11/30/2021] pantoprazole (PROTONIX) tablet 40 mg  40 mg Oral QAM AC Gary Barnett MD        cetirizine (ZYRTEC) tablet 10 mg  10 mg Oral Daily Gary Barnett MD        citalopram (CELEXA) tablet 10 mg  10 mg Oral Daily Gary Barnett MD        sodium chloride flush 0.9 % injection 5-40 mL  5-40 mL IntraVENous 2 times per day Gary Barnett MD        sodium chloride flush 0.9 % injection 5-40 mL  5-40 mL IntraVENous PRN Gary Barnett MD        0.9 % sodium chloride infusion  25 mL IntraVENous PRN Gary Barnett MD        ondansetron (ZOFRAN-ODT) disintegrating tablet 4 mg  4 mg Oral Q8H PRN Gary Barnett MD        Or    ondansetron [facility]) injection 4 mg  4 mg IntraVENous Q6H PRN Gary Barnett MD        magnesium hydroxide (MILK OF MAGNESIA) 400 MG/5ML suspension 30 mL  30 mL Oral Daily PRN Gary Barnett MD        enoxaparin (LOVENOX) injection 40 mg  40 mg SubCUTAneous Daily Gary Barnett MD        acetaminophen (TYLENOL) tablet 650 mg  650 mg Oral Q6H PRN Gary Barnett MD        Or    acetaminophen (TYLENOL) suppository 650 mg  650 mg Rectal Q6H PRN Gary Barnett MD        ipratropium-albuterol (DUONEB) nebulizer solution 1 ampule  1 ampule Inhalation Q4H WA Gary Barnett MD        methylPREDNISolone sodium (SOLU-MEDROL) injection 40 mg  40 mg IntraVENous Q6H Tim Robison MD        Followed by   Sky Mccarty ON 12/1/2021] predniSONE (DELTASONE) tablet 40 mg  40 mg Oral Daily Tim Robison MD        hydrALAZINE (APRESOLINE) injection 10 mg  10 mg IntraVENous Q6H PRN Tim Robison MD        potassium chloride (KLOR-CON M) extended release tablet 40 mEq  40 mEq Oral PRN Tim Robison MD        Or    potassium bicarb-citric acid (EFFER-K) effervescent tablet 40 mEq  40 mEq Oral PRN Tim Robison MD        Or    potassium chloride 10 mEq/100 mL IVPB (Peripheral Line)  10 mEq IntraVENous PRN Tim Robison MD        0.9 % sodium chloride bolus  500 mL IntraVENous PRN Tim Robison MD         Current Outpatient Medications   Medication Sig Dispense Refill    citalopram (CELEXA) 10 MG tablet Take 1 tablet by mouth daily 30 tablet 5    polyethylene glycol (GLYCOLAX) 17 g packet Take 17 g by mouth daily as needed for Constipation      omeprazole (PRILOSEC) 20 MG delayed release capsule Take 20 mg by mouth daily      levocetirizine (XYZAL) 5 MG tablet Take 5 mg by mouth nightly      potassium chloride (KLOR-CON M) 20 MEQ extended release tablet Take 1 tablet by mouth daily 30 tablet 5    calcium carbonate (CALCIUM 600) 600 MG TABS tablet Take 1 tablet by mouth 2 times daily 60 tablet 5    furosemide (LASIX) 20 MG tablet Take 3 tablets by mouth 2 times daily (Patient taking differently: Take 40 mg by mouth 2 times daily ) 180 tablet 0    spironolactone (ALDACTONE) 25 MG tablet Take 0.5 tablets by mouth daily 30 tablet 0    docusate (COLACE, DULCOLAX) 100 MG CAPS Take 100 mg by mouth 2 times daily 120 capsule 0    acetaminophen (TYLENOL) 500 MG tablet Take 500 mg by mouth every 6 hours as needed for Pain      methocarbamol (ROBAXIN) 500 MG tablet Take 500 mg by mouth 4 times daily      OXYGEN Use 3L of oxygen all the time 3 L 3    budesonide (PULMICORT) 0.5 MG/2ML nebulizer suspension Take 2 mLs by nebulization 2 times daily DX:COPD J44.9 60 ampule 6    formoterol (PERFOROMIST) 20 MCG/2ML nebulizer solution Take 2 mLs by nebulization every 12 hours DX:COPD J44.9 120 mL 6    ondansetron (ZOFRAN) 4 MG tablet Take 1 tablet by mouth daily as needed for Nausea or Vomiting 30 tablet 0    oxybutynin (DITROPAN) 5 MG tablet TAKE ONE TABLET BY MOUTH THREE TIMES A DAY 90 tablet 1    atorvastatin (LIPITOR) 10 MG tablet TAKE 1 TABLET BY MOUTH DAILY AT BEDTIME. (Patient taking differently: Take 10 mg by mouth nightly TAKE 1 TABLET BY MOUTH DAILY AT BEDTIME.) 30 tablet 5    albuterol sulfate HFA (VENTOLIN HFA) 108 (90 Base) MCG/ACT inhaler Inhale 2 puffs into the lungs every 6 hours as needed for Wheezing 1 Inhaler 3    levothyroxine (SYNTHROID) 75 MCG tablet TAKE 1 TABLET BY MOUTH DAILY 30 tablet 5    ipratropium-albuterol (DUONEB) 0.5-2.5 (3) MG/3ML SOLN nebulizer solution Inhale 3 mLs into the lungs every 4 hours 360 mL 0     [x]Reviewed with patient and will remain unchanged except as mentioned in A/P    PHYSICAL EXAM   Vitals:    11/29/21 1210   BP: 96/63   Pulse: 70   Resp: 21   Temp:    SpO2: 96%        Gen Alert, coop, no distress Heart  Rrr, 4/6   Head NC, AT, no abnorm Abd  Soft, NT, +BS, no mass, no OM   Eyes PER, conj/corn clear Ext  Ext nl, AT, no C/C/E   Nose Nares nl, no drain, NT Pulse decr bilat   Throat Lips, mucosa, tongue nl Skin Col/text/turg nl, no vis rash/les   Neck S/S, TM, NT, no bruit/JVD Psych Nl mood and affect   Lung CTA-B, unlabored, no DTP Lymph   No cervical or axillary LA   Ch wall NT, no deform Neuro  Nl gross M/S exam     ASSESSMENT AND PLAN   *AS  Status Severe  Plan TAVR will be deferred tomorrow due to sob, possible covid, elevated wbc, diaphoretic, nausea   Continued medical management   Possible TAVR Tuesday next week  *SOB  Status Underlying O2 dependent COPD, possible COVID  Plan Per med/pulmonary

## 2021-11-29 NOTE — PROGRESS NOTES
Pt seen in  ED, admission completed. Pt is alert and oriented x 4. Pt lives at [place], and is being admitted for COPD exacerbation. Plan of care updated, all questions answered.

## 2021-11-29 NOTE — Clinical Note
Patient Class: Inpatient [101]  REQUIRED: Diagnosis: [facility] [[id number]]  Estimated Length of Stay: Estimated stay of more than 2 midnights

## 2021-11-29 NOTE — H&P
History and Physical  Dr. Kina Diaz  11/29/2021    PCP: ELISE Goldsmith    Cc:   Chief Complaint   Patient presents with    Shortness of Breath       HPI:  Narayan Wheat is a 82 y.o. female who has a past medical history of Allergic rhinitis, Anxiety, Aortic stenosis, CHF (congestive heart failure) ([address] COPD (chronic obstructive pulmonary disease) ([address] Depression, Diabetes mellitus ([address] Hypertension, Hypothyroidism, MARIAA treated with BiPAP, and Urinary incontinence. Patient presents with COPD, severe ([address] HPI  (1-3 for expanded problem focused, ?4 for detailed/comprehensive)     Narayan Wheat is a 82 y.o. female who presents from SNF for dyspnea. From [facility] (Covered by Iliana Jacob there). Today, presents with with dyspnea and wheezing. C/o waking up SOB - she has a hx of COPD, CHF. No edema, weight gain, no n/v/d. She is On spironolactone, lasix. Also, she is slated for [facility] TAVR tomorrow per dr Shiela Arriola      cxr in ER is reviewed by self  Impression:        No acute abnormality.              Problem list of hospitalization thus far: Active Hospital Problems    Diagnosis     Hypercholesteremia [E78.00]     Nonrheumatic aortic valve stenosis [I35.0]     Essential hypertension [I10]     COPD, severe ([address] [J44.9]     Hypothyroidism [E03.9]          Review of Systems: (1 system for EPF, 2-9 for detailed, 10+ for comprehensive)  Constitutional: Negative for chills and fever. HENT: Negative for dental problem, nosebleeds and rhinorrhea. Eyes: Negative for photophobia and visual disturbance. Respiratory: Negative for cough, chest tightness. Positive for shortness of breath. Cardiovascular: Negative for chest pain and leg swelling. Gastrointestinal: Negative for diarrhea, nausea and vomiting. Endocrine: Negative for polydipsia and polyphagia. Genitourinary: Negative for frequency, hematuria and urgency.      Musculoskeletal: Negative for back pain and myalgias. Skin: Negative for rash. Allergic/Immunologic: Negative for food allergies. Neurological: Negative for dizziness, seizures, syncope and facial asymmetry. Hematological: Negative for adenopathy. Psychiatric/Behavioral: Negative for dysphoric mood. The patient is not nervous/anxious. Past Medical History:   Past Medical History:   Diagnosis Date    Allergic rhinitis     Anxiety     Aortic stenosis     CHF (congestive heart failure) (HCC)     COPD (chronic obstructive pulmonary disease) (HCC)     Depression     Diabetes mellitus [facility]     Hypertension     Hypothyroidism     MARIAA treated with BiPAP 02/14/2018    Urinary incontinence        Past Surgical History:   Past Surgical History:   Procedure Laterality Date    CARDIAC CATHETERIZATION  10/11/2021    CARPAL TUNNEL RELEASE      bilateral    CHOLECYSTECTOMY      DENTAL SURGERY N/A 11/12/2021    SIMPLE EXTRACTIONS TEETH # 4, 13, 18, 23, 24, 25, 26,  AND SURGICAL EXTRACTION TEETH # 2, 3, 14, 15, 19, 20, 21,  28,  29, 30, 31  AND ALSO ALVEOLOPLASTIES ALL FOUR QUADRANTS; GEL FOAM performed by Christian Jorge DDS at [address] [place] [address]      bilateral cataracts    HYSTERECTOMY, TOTAL ABDOMINAL      TONSILLECTOMY      UVULECTOMY         Social History:   Social History     Tobacco History     Smoking Status  Never Smoker    Smokeless Tobacco Use  Never Used          Alcohol History     Alcohol Use Status  No          Drug Use     Drug Use Status  No          Sexual Activity     Sexually Active  Never                Fam History:   Family History   Problem Relation Age of Onset    Cancer Mother         breast    Breast Cancer Mother     Cancer Father         prostate    Cancer Sister         breast    Breast Cancer Daughter     Breast Cancer Maternal Grandmother        PFSH: The above PMHx, PSHx, SocHx, FamHx has been reviewed by myself.  (1 area for detailed, 2-3 for comprehensive)      Code Status: Prior    [facility]  [facility]  Prior to Admission medications    Medication Sig Start Date End Date Taking?  Authorizing Provider   citalopram (CELEXA) 10 MG tablet Take 1 tablet by mouth daily 11/3/21   [place] ELISE Roca   polyethylene glycol (GLYCOLAX) 17 g packet Take 17 g by mouth daily as needed for Constipation    Historical Provider, MD   omeprazole (PRILOSEC) 20 MG delayed release capsule Take 20 mg by mouth daily    Historical Provider, MD   levocetirizine (XYZAL) 5 MG tablet Take 5 mg by mouth nightly    Historical Provider, MD   potassium chloride (KLOR-CON M) 20 MEQ extended release tablet Take 1 tablet by mouth daily 8/31/21   [facility]   calcium carbonate (CALCIUM 600) 600 MG TABS tablet Take 1 tablet by mouth 2 times daily 8/9/21   [facility]   furosemide (LASIX) 20 MG tablet Take 3 tablets by mouth 2 times daily  Patient taking differently: Take 40 mg by mouth 2 times daily  7/22/21   JOSE ALFREDO Jones CNP   spironolactone (ALDACTONE) 25 MG tablet Take 0.5 tablets by mouth daily 7/22/21   JOSE ALFREDO Jones CNP   docusate (COLACE, DULCOLAX) 100 MG CAPS Take 100 mg by mouth 2 times daily 7/22/21   JOSE ALFREDO Jones CNP   acetaminophen (TYLENOL) 500 MG tablet Take 500 mg by mouth every 6 hours as needed for Pain    Historical Provider, MD   methocarbamol (ROBAXIN) 500 MG tablet Take 500 mg by mouth 4 times daily    Historical Provider, MD   OXYGEN Use 3L of oxygen all the time 5/21/21   [place] ELISE Roca   budesonide (PULMICORT) 0.5 MG/2ML nebulizer suspension Take 2 mLs by nebulization 2 times daily DX:COPD J44.9 7/28/20 10/22/21  Lane Silva MD   formoterol (PERFOROMIST) 20 MCG/2ML nebulizer solution Take 2 mLs by nebulization every 12 hours DX:COPD J44.9 7/28/20 10/22/21  Lane Silva MD   ondansetron (ZOFRAN) 4 MG tablet Take 1 tablet by mouth daily as needed for Nausea or Vomiting 9/65/70   Myriam Ignacio MD   oxybutynin (DITROPAN) 5 MG tablet TAKE ONE TABLET BY MOUTH THREE TIMES A DAY 8/20/19   Krystal Mcfarlane MD   atorvastatin (LIPITOR) 10 MG tablet TAKE 1 TABLET BY MOUTH DAILY AT BEDTIME. Patient taking differently: Take 10 mg by mouth nightly TAKE 1 TABLET BY MOUTH DAILY AT BEDTIME. 7/12/19   Leonel Jeong MD   albuterol sulfate HFA (VENTOLIN HFA) 108 (90 Base) MCG/ACT inhaler Inhale 2 puffs into the lungs every 6 hours as needed for Wheezing 7/9/19   Damion Garcia MD   levothyroxine (SYNTHROID) 75 MCG tablet TAKE 1 TABLET BY MOUTH DAILY 6/11/19   Krystal Mcfarlane MD   ipratropium-albuterol (DUONEB) 0.5-2.5 (3) MG/3ML SOLN nebulizer solution Inhale 3 mLs into the lungs every 4 hours 3/13/18   Krystal Mcfarlane MD         No Known Allergies          EXAM: (2-7 system for EPF/Detailed, ?8 for Comprehensive)  BP (!) 106/43   Pulse 99   Temp 98.9 °F (37.2 °C) (Oral)   Resp 23   SpO2 99%   Constitutional: vitals as above: alert, appears stated age and cooperative    Psychiatric: normal insight and judgment, oriented to person, place, time, and general circumstances    Head: Normocephalic, without obvious abnormality, atraumatic    Eyes:lids and lashes normal, conjunctivae and sclerae normal and pupils equal, round, reactive to light and accomodation    EMNT: external ears normal, nares midline    Neck: no carotid bruit, supple, symmetrical, trachea midline and thyroid not enlarged, symmetric, no tenderness/mass/nodules     Respiratory: End expiratory wheezes bilaterally. Moderate air movement  Cardiovascular: normal rate, regular rhythm, normal S1 and S2 and no murmurs    Gastrointestinal: soft, non-tender, non-distended, normal bowel sounds, no masses or organomegaly    Extremities: no clubbing, no edema    Skin:No rashes or nodules noted.     Neurologic:negative         LABS:  Labs Reviewed   COMPREHENSIVE METABOLIC PANEL W/ REFLEX TO MG FOR LOW K - Abnormal; Notable for the following components:       Result Value    Potassium reflex Magnesium 3.3 (*)     Chloride 93 (*)     CO2 35 (*)     Glucose 147 (*)     All other components within normal limits    Narrative:     Performed at:  [facility] [address]   Phone [phone]   CBC WITH AUTO DIFFERENTIAL - Abnormal; Notable for the following components:    WBC 12.3 (*)     Neutrophils Absolute 9.5 (*)     Monocytes Absolute 1.5 (*)     Hypochromia Occasional (*)     All other components within normal limits    Narrative:     Performed at:  [facility] [address] [facility]   Phone [phone]   AMYLASE - Abnormal; Notable for the following components:    Amylase 12 (*)     All other components within normal limits    Narrative:     Performed at:  [facility] [address]   Phone [phone]   BRAIN NATRIURETIC PEPTIDE - Abnormal; Notable for the following components:    Pro- (*)     All other components within normal limits    Narrative:     Performed at:  [facility] [address]   Phone [phone]   BLOOD GAS, VENOUS - Abnormal; Notable for the following components:    pCO2, Lei 65.2 (*)     pO2, Lei 55.3 (*)     HCO3, Venous 37.0 (*)     Base Excess, Lei 9.1 (*)     Carboxyhemoglobin 2.5 (*)     All other components within normal limits    Narrative:     Performed at:  [facility] [address]   Phone [phone]   CULTURE, URINE   LIPASE    Narrative:     Performed at:  [facility] [address]   Phone [phone]   TROPONIN    Narrative:     Performed at:  [facility] [address]   Phone [phone]   PROTIME-INR    Narrative:     Performed at:  [facility] [address],  [address], [address]   Phone [phone]   APTT    Narrative:     Performed at:  [facility] [address],  [address], [address]   Phone [phone]   MAGNESIUM    Narrative:     Performed at:  [facility] [address],  [address], [address]   Phone [phone]   URINALYSIS         IMAGING:  Imaging results from the ER have been reviewed in the computerized chart. XR CHEST (2 VW)    Result Date: 11/22/2021  EXAMINATION: TWO XRAY VIEWS OF THE CHEST 11/22/2021 1:24 pm COMPARISON: Chest x-ray dated 07/18/2021. HISTORY: ORDERING SYSTEM PROVIDED HISTORY: SOB (shortness of breath) TECHNOLOGIST PROVIDED HISTORY: Reason for exam:->sob FINDINGS: HEART/MEDIASTINUM: The cardiomediastinal silhouette is stable. PLEURA/LUNGS: There are no focal consolidations or pleural effusions. There is no appreciable pneumothorax. BONES/SOFT TISSUE: No acute abnormality. No radiographic evidence of acute pulmonary disease. CT HEAD WO CONTRAST    Result Date: 11/22/2021  EXAMINATION: CT OF THE HEAD WITHOUT CONTRAST  11/22/2021 1:21 pm TECHNIQUE: CT of the head was performed without the administration of intravenous contrast. Dose modulation, iterative reconstruction, and/or weight based adjustment of the mA/kV was utilized to reduce the radiation dose to as low as reasonably achievable. COMPARISON: None. HISTORY: ORDERING SYSTEM PROVIDED HISTORY: Dysarthria TECHNOLOGIST PROVIDED HISTORY: Reason for exam:->speech issue FINDINGS: BRAIN/VENTRICLES: There is no acute infarct or acute intracranial hemorrhage present. There is no mass effect or midline shift present. There is no ventriculomegaly or abnormal extra-axial fluid collection present.   There is mild hypoattenuation within the periventricular and deep white matter of both hemispheres. ORBITS: Limited evaluation of the orbits is unremarkable. SINUSES: There is mild mucosal thickening within the maxillary sinuses. The paranasal sinuses and mastoid air cells are otherwise clear. SOFT TISSUES/SKULL:  No lytic or blastic osseous lesions are identified. 1. No acute intracranial process identified. 2. Mild chronic small vessel ischemic changes. XR CHEST PORTABLE    Result Date: 11/29/2021  EXAMINATION: ONE XRAY VIEW OF THE CHEST 11/29/2021 7:08 am COMPARISON: 11/22/2021 HISTORY: ORDERING SYSTEM PROVIDED HISTORY: Eval for infiltrate TECHNOLOGIST PROVIDED HISTORY: Reason for exam:->Eval for infiltrate Reason for Exam: eval fo infiltrate Acuity: Unknown Type of Exam: Unknown FINDINGS: No lung infiltrate or consolidation. No pneumothorax or pleural effusion. Heart size is normal.     No acute abnormality. EKG:   EKG from ER, reviewed by self  it shows normal sinus rhythm at 96. No acute ST elevation is noted  Old chart reviewed, EKG dated 10/11/2021 is reviewed, there is  difference noted. Old study shows sinus rhythm at lower rate of 64    Lab Results   Component Value Date    GLUCOSE 147 11/29/2021     Lab Results   Component Value Date    POCGLU 149 07/22/2021     BP (!) 106/43   Pulse 99   Temp 98.9 °F (37.2 °C) (Oral)   Resp 23   SpO2 99%     MEDICAL DECISION MAKING:    Principal Problem:    COPD, severe (HCC) -New Problem to me. Patient with apparent COPD exacerbation and respiratory failure  Plan: Admit, IV steroids, oxygen support as needed to keep saturation above 88%. Active Problems:    Hypothyroidism - Established problem. Stable. Plan: cont meds    Essential hypertension -Established problem. Stable. 106/43  Plan: Pt home BP meds reviewed and will be continued. IV Hydralazine ordered for control of extremely high blood pressures. Will monitor labs to assess Creat/K for possible complications of medications.      Nonrheumatic aortic valve stenosis  Plan: will notify dr Carolina Degroot of admit. Consideration of TAVR while here (she is scheduled to have it done November 30)    Hypercholesteremia  Plan: Continue statin. Monitor for myalgias  Suspected COVID-19 infection -patient with increasing oxygen requirement, she is vaccinated, but she does come from a SNF which has known Covid cases   plan -check Covid swab. Place in droplet isolation until swab result is known. Check CRP level. Diagnoses as listed above, designated as new or established and plan outlined for each. Data Reviewed:   (1) Lab tests were reviewed or ordered. (1) Radiology tests were reviewed or ordered. (1) Medical test (Echo, EKG, PFT/lyly) were ordered. (1)History was not obtained from someone other than patient  (1) Old records were reviewed - see HPI/MDM for pertinent details if review done. (2) Case was discussed with another health care provider: dr Sukh Sierra  (2) Imaging was viewed by myself. (2) EKG  was viewed by myself. The patient is being placed in inpatient status with the expectation of requiring a hospital stay spanning at least two midnights for care and treatment of the problems noted in the problem list.  This determination is also based on thepatients comorbidities and past medical history, the severity and timing of the signs and symptoms upon presentation.     (Please note that portions of this note were completed with a voice recognition program.  Efforts were made to edit the dictations but occasionally words are mis-transcribed.)      Electronically signed by: Chris Calvillo MD 11/29/2021

## 2021-11-29 NOTE — ED NOTES
Bed: 22  Expected date:   Expected time:   Means of arrival: [facility]  Comments:     Jeimy Corley RN  11/29/21 [id number]

## 2021-11-29 NOTE — ED TRIAGE NOTES
Patient brought in by squad from nursing home, patient has hx of CHF and has been having increased swelling in her legs.

## 2021-11-29 NOTE — RT PROTOCOL NOTE
RT Inhaler-Nebulizer Bronchodilator Protocol Note    There is a bronchodilator order in the chart from a provider indicating to follow the RT Bronchodilator Protocol and there is an Initiate RT Inhaler-Nebulizer Bronchodilator Protocol order as well (see protocol at bottom of note). CXR Findings:  XR CHEST PORTABLE    Result Date: 11/29/2021  No acute abnormality. The findings from the last RT Protocol Assessment were as follows:   History Pulmonary Disease: None or smoker <15 pack years  Respiratory Pattern: Dyspnea on exertion or RR 21-25 bpm  Breath Sounds: Slightly diminished and/or crackles  Cough: Strong, productive  Indication for Bronchodilator Therapy: Decreased or absent breath sounds  Bronchodilator Assessment Score: 5    Aerosolized bronchodilator medication orders have been revised according to the RT Inhaler-Nebulizer Bronchodilator Protocol below. Respiratory Therapist to perform RT Therapy Protocol Assessment initially then follow the protocol. Repeat RT Therapy Protocol Assessment PRN for score 0-3 or on second treatment, BID, and PRN for scores above 3. No Indications  adjust the frequency to every 6 hours PRN wheezing or bronchospasm, if no treatments needed after 48 hours then discontinue using Per Protocol order mode. If indication present, adjust the RT bronchodilator orders based on the Bronchodilator Assessment Score as indicated below. Use Inhaler orders unless patient has one or more of the following: on home nebulizer, not able to hold breath for 10 seconds, is not alert and oriented, cannot activate and use MDI correctly, or respiratory rate 25 breaths per minute or more, then use the equivalent nebulizer order(s) with same Frequency and PRN reasons based on the score. If a patient is on this medication at home then do not decrease Frequency below that used at home.     0-3  enter or revise RT bronchodilator order(s) to equivalent RT Bronchodilator order with productive  Indication for Bronchodilator Therapy: Decreased or absent breath sounds  Bronchodilator Assessment Score: 5    Aerosolized bronchodilator medication orders have been revised according to the RT Inhaler-Nebulizer Bronchodilator Protocol below. Respiratory Therapist to perform RT Therapy Protocol Assessment initially then follow the protocol. Repeat RT Therapy Protocol Assessment PRN for score 0-3 or on second treatment, BID, and PRN for scores above 3. No Indications  adjust the frequency to every 6 hours PRN wheezing or bronchospasm, if no treatments needed after 48 hours then discontinue using Per Protocol order mode. If indication present, adjust the RT bronchodilator orders based on the Bronchodilator Assessment Score as indicated below. Use Inhaler orders unless patient has one or more of the following: on home nebulizer, not able to hold breath for 10 seconds, is not alert and oriented, cannot activate and use MDI correctly, or respiratory rate 25 breaths per minute or more, then use the equivalent nebulizer order(s) with same Frequency and PRN reasons based on the score. If a patient is on this medication at home then do not decrease Frequency below that used at home. 0-3  enter or revise RT bronchodilator order(s) to equivalent RT Bronchodilator order with Frequency of every 4 hours PRN for wheezing or increased work of breathing using Per Protocol order mode. 4-6  enter or revise RT Bronchodilator order(s) to two equivalent RT bronchodilator orders with one order with BID Frequency and one order with Frequency of every 4 hours PRN wheezing or increased work of breathing using Per Protocol order mode. 7-10  enter or revise RT Bronchodilator order(s) to two equivalent RT bronchodilator orders with one order with TID Frequency and one order with Frequency of every 4 hours PRN wheezing or increased work of breathing using Per Protocol order mode.        11-13  enter or revise RT Bronchodilator order(s) to one equivalent RT bronchodilator order with QID Frequency and an Albuterol order with Frequency of every 4 hours PRN wheezing or increased work of breathing using Per Protocol order mode. Greater than 13  enter or revise RT Bronchodilator order(s) to one equivalent RT bronchodilator order with every 4 hours Frequency and an Albuterol order with Frequency of every 2 hours PRN wheezing or increased work of breathing using Per Protocol order mode. RT to enter RT Home Evaluation for COPD & MDI Assessment order using Per Protocol order mode.     Electronically signed by Shabbir Alves RCP on 11/29/2021 at 6:07 PM

## 2021-11-29 NOTE — ED PROVIDER NOTES
[facility]        Pt Name: Neo Hernández  MRN: [id number]  [place] 1935  Date of evaluation: 11/29/2021  Provider: Zofia Vega MD  PCP: Cordie Bamberger, PA    This patient was seen and evaluated by the attending physician Zofia Vega MD.      [address] [place] [address]       Chief Complaint   Patient presents with    Shortness of Breath       HISTORY OF PRESENT ILLNESS   (Location/Symptom, Timing/Onset, Context/Setting, Quality, Duration, Modifying Factors, Severity)  Note limiting factors. Neo Hernández is a 82 y.o. female BIB EMS from SNF for dyspnea. From [facility] ([facility] by [facility]). Here with dyspnea  Slated for femoral-approach TAVR tomorrow. C/o waking up SOB  Hx of COPD, CHF. No edema, weight gain, no n/v/d. On spironolactone, lasix. Nursing Notes were all reviewed and agreed with or any disagreements were addressed  in the HPI. REVIEW OF SYSTEMS    (2-9 systems for level 4, 10 or more for level 5)     Review of Systems    Positives and Pertinent negatives as per HPI. Except as noted abovein the ROS, all other systems were reviewed and negative.        PAST MEDICAL HISTORY     Past Medical History:   Diagnosis Date    Allergic rhinitis     Anxiety     Aortic stenosis     CHF (congestive heart failure) (HCC)     [facility]     Depression     Diabetes mellitus ([facility]     Hypertension     Hypothyroidism     MARIAA treated with BiPAP 02/14/2018    Urinary incontinence          SURGICAL HISTORY     Past Surgical History:   Procedure Laterality Date    CARDIAC CATHETERIZATION  10/11/2021    CARPAL TUNNEL RELEASE      bilateral    CHOLECYSTECTOMY      DENTAL SURGERY N/A 11/12/2021    SIMPLE EXTRACTIONS TEETH # 4, 13, 18, 23, 24, 25, 26,  AND SURGICAL EXTRACTION TEETH # 2, 3, 14, 15, 19, 20, 21,  28,  29, 30, 31  AND ALSO ALVEOLOPLASTIES ALL FOUR daily    SPIRONOLACTONE (ALDACTONE) 25 MG TABLET    Take 0.5 tablets by mouth daily         ALLERGIES     Patient has no known allergies. FAMILYHISTORY       Family History   Problem Relation Age of Onset    Cancer Mother         breast    Breast Cancer Mother     Cancer Father         prostate    Cancer Sister         breast    Breast Cancer Daughter     Breast Cancer Maternal Grandmother           SOCIAL HISTORY       Social History     Socioeconomic History    Marital status:      Spouse name: Not on file    Number of children: Not on file    Years of education: Not on file    Highest education level: Not on file   Occupational History    Not on file   Tobacco Use    Smoking status: Never Smoker    Smokeless tobacco: Never Used   Vaping Use    Vaping Use: Never used   Substance and Sexual Activity    Alcohol use: No    Drug use: No    Sexual activity: Never   Other Topics Concern    Not on file   Social History Narrative    Not on file     Social Determinants of Health     Financial Resource Strain: Low Risk     Difficulty of Paying Living Expenses: Not hard at all   Food Insecurity: No Food Insecurity    Worried About Running Out of Food in the Last Year: Never true    [place] of Food in the Last Year: Never true   Transportation Needs:     Lack of Transportation (Medical): Not on file    Lack of Transportation (Non-Medical):  Not on file   Physical Activity:     Days of Exercise per Week: Not on file    Minutes of Exercise per Session: Not on file   Stress:     Feeling of Stress : Not on file   Social Connections:     Frequency of Communication with Friends and Family: Not on file    Frequency of Social Gatherings with Friends and Family: Not on file    Attends Zoroastrian Services: Not on file    Active Member of Clubs or Organizations: Not on file    Attends Club or Organization Meetings: Not on file    Marital Status: Not on file   Intimate Partner Violence:     Fear of Current or Ex-Partner: Not on file    Emotionally Abused: Not on file    Physically Abused: Not on file    Sexually Abused: Not on file   Housing Stability:     Unable to Pay for Housing in the Last Year: Not on file    Number of [place] in the Last Year: Not on file    Unstable Housing in the Last Year: Not on file       SCREENINGS             PHYSICAL EXAM    (up to 7 for level 4, 8 or more for level 5)     ED Triage Vitals   BP Temp Temp src Pulse Resp SpO2 Height Weight   -- -- -- -- -- -- -- --       Physical Exam     General Appearance:  Alert, cooperative, no distress, appears stated age. Head:  Normocephalic, without obvious abnormality, atraumatic. Eyes:  conjunctiva/corneas clear, EOM's intact. Sclera anicteric. ENT: Mucous membranes moist.   Neck: Supple, symmetrical, trachea midline, no adenopathy. No jugular venous distention. Lungs:   No Respiratory Distress.; on NRB. Wheezing in all lung fields   Chest Wall:  Atraumatic   Heart:  RRR, + aortic rumble, no c/g/r   Abdomen:   Soft, NT, ND   Extremities:  Full range of motion. Pulses: Symmetric x4   Skin:  No rashes or lesions to exposed skin. Neurologic: Alert and oriented X 3. Motor grossly normal.  Speech clear.           DIAGNOSTIC RESULTS   LABS:    Labs Reviewed   COMPREHENSIVE METABOLIC PANEL W/ REFLEX TO MG FOR LOW K - Abnormal; Notable for the following components:       Result Value    Potassium reflex Magnesium 3.3 (*)     Chloride 93 (*)     CO2 35 (*)     Glucose 147 (*)     All other components within normal limits    Narrative:     Performed at:  [facility] [address], [place], [facility] [address]   Phone [phone]   CBC WITH AUTO DIFFERENTIAL - Abnormal; Notable for the following components:    WBC 12.3 (*)     Neutrophils Absolute 9.5 (*)     Monocytes Absolute 1.5 (*)     Hypochromia Occasional (*)     All other components within normal limits    Narrative:     Performed at:  [facility] [address], [address]   Phone [phone]   AMYLASE - Abnormal; Notable for the following components:    Amylase 12 (*)     All other components within normal limits    Narrative:     Performed at:  [facility] [address] [address], [address]   Phone [phone]   BRAIN NATRIURETIC PEPTIDE - Abnormal; Notable for the following components:    Pro- (*)     All other components within normal limits    Narrative:     Performed at:  [facility] [address], [address]   Phone [phone]   BLOOD GAS, VENOUS - Abnormal; Notable for the following components:    pCO2, Lei 65.2 (*)     pO2, Lei 55.3 (*)     HCO3, Venous 37.0 (*)     Base Excess, Lei 9.1 (*)     Carboxyhemoglobin 2.5 (*)     All other components within normal limits    Narrative:     Performed at:  [facility] [address], [address]   Phone [phone]   CULTURE, URINE   LIPASE    Narrative:     Performed at:  [facility] [address] [address], [place]   Phone [phone]   TROPONIN    Narrative:     Performed at:  [facility] [address], [place]   Phone [phone]   PROTIME-INR    Narrative:     Performed at:  [facility] [address], [place]   Phone [phone]   APTT    Narrative:     Performed at:  [facility] [address] [facility] [address], [place]   Phone [phone]   MAGNESIUM    Narrative:     Performed at:  [facility] [address], [place]   Phone [phone]   URINALYSIS       All other labs were within normal range or not returned as of this dictation. EKG:  All EKG's are interpreted by the Emergency Department Physician in the absence of a cardiologist.  Please see their note for interpretation of EKG. EKG reviewed by myself  Dated today, 0654  Rate 96, NSR, Normal EKG. MS ~190ms  No change from 10/11/21    RADIOLOGY:   Non-plain film images such as CT, Ultrasound and MRI are read by the radiologist. Plain radiographic images are visualized andpreliminarily interpreted by the  ED Provider with the below findings:        Interpretation perthe Radiologist below, if available at the time of this note:    XR CHEST PORTABLE   Final Result   No acute abnormality. XR CHEST (2 VW)    Result Date: 11/22/2021  EXAMINATION: TWO XRAY VIEWS OF THE CHEST 11/22/2021 1:24 pm COMPARISON: Chest x-ray dated 07/18/2021. HISTORY: ORDERING SYSTEM PROVIDED HISTORY: SOB (shortness of breath) TECHNOLOGIST PROVIDED HISTORY: Reason for exam:->sob FINDINGS: HEART/MEDIASTINUM: The cardiomediastinal silhouette is stable. PLEURA/LUNGS: There are no focal consolidations or pleural effusions. There is no appreciable pneumothorax. BONES/SOFT TISSUE: No acute abnormality. No radiographic evidence of acute pulmonary disease. CT HEAD WO CONTRAST    Result Date: 11/22/2021  EXAMINATION: CT OF THE HEAD WITHOUT CONTRAST  11/22/2021 1:21 pm TECHNIQUE: CT of the head was performed without the administration of intravenous contrast. Dose modulation, iterative reconstruction, and/or weight based adjustment of the mA/kV was utilized to reduce the radiation dose to as low as reasonably achievable. COMPARISON: None. HISTORY: ORDERING SYSTEM PROVIDED HISTORY: Dysarthria TECHNOLOGIST PROVIDED HISTORY: Reason for exam:->speech issue FINDINGS: BRAIN/VENTRICLES: There is no acute infarct or acute intracranial hemorrhage present. There is no mass effect or midline shift present. There is no ventriculomegaly or abnormal extra-axial fluid collection present.   There is mild hypoattenuation within the periventricular and deep white matter of both hemispheres. ORBITS: Limited evaluation of the orbits is unremarkable. SINUSES: There is mild mucosal thickening within the maxillary sinuses. The paranasal sinuses and mastoid air cells are otherwise clear. SOFT TISSUES/SKULL:  No lytic or blastic osseous lesions are identified. 1. No acute intracranial process identified. 2. Mild chronic small vessel ischemic changes. PROCEDURES   Unless otherwise noted below, none     Procedures    CRITICAL CARE TIME   N/A    CONSULTS:  IP CONSULT TO INTERNAL MEDICINE  IP CONSULT TO CARDIOLOGY  IP CONSULT TO CARDIOLOGY      EMERGENCY DEPARTMENT COURSE and DIFFERENTIAL DIAGNOSIS/MDM:   Vitals:    Vitals:    11/29/21 0703 11/29/21 0705   BP: (!) 106/43    Pulse: 99    Resp: 23    Temp:  98.9 °F (37.2 °C)   TempSrc:  Oral   SpO2: 99%        Patient was given thefollowing medications:  Medications   0.9 % sodium chloride bolus (has no administration in time range)   ipratropium-albuterol (DUONEB) nebulizer solution 1 ampule (1 ampule Inhalation Given 11/29/21 0706)   Dexamethasone Sodium Phosphate injection 10 mg (10 mg IntraVENous Given 11/29/21 0732)       86F, hx of CHF, COPD, slated for TAVR tomorrow, from [facility] SN[facility], here for dyspnea. Gestalt for COPD exac. EKG unchanged. Labs and imaging as above were reviewed; consistent with COPD exacerbation  She's improved, but still symptomatic  Will admit  CS placed to Dr. Pippa Armstrong, cardiology given upcoming TAVR. 35min critical care time; indication and intervention as COPD exacerbation, serial reassessments, oxygenation. FINAL IMPRESSION      1. COPD exacerbation (HCC)          DISPOSITION/PLAN   DISPOSITION        PATIENT REFERREDTO:  No follow-up provider specified.     DISCHARGE MEDICATIONS:  New Prescriptions    No medications on file       DISCONTINUED MEDICATIONS:  Discontinued Medications    No medications on file              (Please note that portions ofthis note were completed with a voice recognition program.  Efforts were made to edit the dictations but occasionally words are mis-transcribed.)    Ken Coker MD (electronically signed)           Ken Coker MD  11/29/21 5044

## 2021-11-30 ENCOUNTER — ANESTHESIA (OUTPATIENT)
Dept: OPERATING ROOM | Age: 86
End: 2021-11-30

## 2021-11-30 ENCOUNTER — APPOINTMENT (OUTPATIENT)
Dept: GENERAL RADIOLOGY | Age: 86
DRG: 871 | End: 2021-11-30
Payer: MEDICARE

## 2021-11-30 LAB
A/G RATIO: 0.9 (ref 1.1–2.2)
ALBUMIN SERPL-MCNC: 3.4 G/DL (ref 3.4–5)
ALP BLD-CCNC: 69 U/L (ref 40–129)
ALT SERPL-CCNC: 12 U/L (ref 10–40)
ANION GAP SERPL CALCULATED.3IONS-SCNC: 7 MMOL/L (ref 3–16)
AST SERPL-CCNC: 15 U/L (ref 15–37)
BASOPHILS ABSOLUTE: 0 K/UL (ref 0–0.2)
BASOPHILS RELATIVE PERCENT: 0.2 %
BILIRUB SERPL-MCNC: 0.3 MG/DL (ref 0–1)
BUN BLDV-MCNC: 18 MG/DL (ref 7–20)
CALCIUM SERPL-MCNC: 8.7 MG/DL (ref 8.3–10.6)
CHLORIDE BLD-SCNC: 98 MMOL/L (ref 99–110)
CO2: 30 MMOL/L (ref 21–32)
CREAT SERPL-MCNC: 0.7 MG/DL (ref 0.6–1.2)
D DIMER: 619 NG/ML DDU (ref 0–229)
EKG ATRIAL RATE: 96 BPM
EKG DIAGNOSIS: NORMAL
EKG P AXIS: 84 DEGREES
EKG P-R INTERVAL: 190 MS
EKG Q-T INTERVAL: 352 MS
EKG QRS DURATION: 84 MS
EKG QTC CALCULATION (BAZETT): 444 MS
EKG R AXIS: -26 DEGREES
EKG T AXIS: 40 DEGREES
EKG VENTRICULAR RATE: 96 BPM
EOSINOPHILS ABSOLUTE: 0 K/UL (ref 0–0.6)
EOSINOPHILS RELATIVE PERCENT: 0 %
GFR AFRICAN AMERICAN: >60
GFR NON-AFRICAN AMERICAN: >60
GLUCOSE BLD-MCNC: 159 MG/DL (ref 70–99)
HCT VFR BLD CALC: 32.7 % (ref 36–48)
HEMOGLOBIN: 10.9 G/DL (ref 12–16)
LACTIC ACID: 3.1 MMOL/L (ref 0.4–2)
LYMPHOCYTES ABSOLUTE: 0.7 K/UL (ref 1–5.1)
LYMPHOCYTES RELATIVE PERCENT: 9.8 %
MCH RBC QN AUTO: 29.4 PG (ref 26–34)
MCHC RBC AUTO-ENTMCNC: 33.3 G/DL (ref 31–36)
MCV RBC AUTO: 88.4 FL (ref 80–100)
MONOCYTES ABSOLUTE: 0.3 K/UL (ref 0–1.3)
MONOCYTES RELATIVE PERCENT: 3.8 %
NEUTROPHILS ABSOLUTE: 6.2 K/UL (ref 1.7–7.7)
NEUTROPHILS RELATIVE PERCENT: 86.2 %
PDW BLD-RTO: 13.2 % (ref 12.4–15.4)
PLATELET # BLD: 210 K/UL (ref 135–450)
PMV BLD AUTO: 9.4 FL (ref 5–10.5)
POTASSIUM REFLEX MAGNESIUM: 3.7 MMOL/L (ref 3.5–5.1)
PROCALCITONIN: 0.23 NG/ML (ref 0–0.15)
RBC # BLD: 3.7 M/UL (ref 4–5.2)
SARS-COV-2: NOT DETECTED
SEDIMENTATION RATE, ERYTHROCYTE: 52 MM/HR (ref 0–30)
SODIUM BLD-SCNC: 135 MMOL/L (ref 136–145)
TOTAL PROTEIN: 7 G/DL (ref 6.4–8.2)
WBC # BLD: 7.2 K/UL (ref 4–11)

## 2021-11-30 PROCEDURE — 94660 CPAP INITIATION&MGMT: CPT

## 2021-11-30 PROCEDURE — 6360000002 HC RX W HCPCS: Performed by: INTERNAL MEDICINE

## 2021-11-30 PROCEDURE — 97162 PT EVAL MOD COMPLEX 30 MIN: CPT

## 2021-11-30 PROCEDURE — 97535 SELF CARE MNGMENT TRAINING: CPT

## 2021-11-30 PROCEDURE — 85379 FIBRIN DEGRADATION QUANT: CPT

## 2021-11-30 PROCEDURE — 99232 SBSQ HOSP IP/OBS MODERATE 35: CPT | Performed by: INTERNAL MEDICINE

## 2021-11-30 PROCEDURE — 87077 CULTURE AEROBIC IDENTIFY: CPT

## 2021-11-30 PROCEDURE — 94640 AIRWAY INHALATION TREATMENT: CPT

## 2021-11-30 PROCEDURE — 6370000000 HC RX 637 (ALT 250 FOR IP): Performed by: INTERNAL MEDICINE

## 2021-11-30 PROCEDURE — 2580000003 HC RX 258: Performed by: INTERNAL MEDICINE

## 2021-11-30 PROCEDURE — 80053 COMPREHEN METABOLIC PANEL: CPT

## 2021-11-30 PROCEDURE — 99223 1ST HOSP IP/OBS HIGH 75: CPT | Performed by: INTERNAL MEDICINE

## 2021-11-30 PROCEDURE — 71046 X-RAY EXAM CHEST 2 VIEWS: CPT

## 2021-11-30 PROCEDURE — 87205 SMEAR GRAM STAIN: CPT

## 2021-11-30 PROCEDURE — 1200000000 HC SEMI PRIVATE

## 2021-11-30 PROCEDURE — 94761 N-INVAS EAR/PLS OXIMETRY MLT: CPT

## 2021-11-30 PROCEDURE — 97530 THERAPEUTIC ACTIVITIES: CPT

## 2021-11-30 PROCEDURE — 84145 PROCALCITONIN (PCT): CPT

## 2021-11-30 PROCEDURE — 87449 NOS EACH ORGANISM AG IA: CPT

## 2021-11-30 PROCEDURE — 85025 COMPLETE CBC W/AUTO DIFF WBC: CPT

## 2021-11-30 PROCEDURE — 87040 BLOOD CULTURE FOR BACTERIA: CPT

## 2021-11-30 PROCEDURE — 85652 RBC SED RATE AUTOMATED: CPT

## 2021-11-30 PROCEDURE — 87070 CULTURE OTHR SPECIMN AEROBIC: CPT

## 2021-11-30 PROCEDURE — 36415 COLL VENOUS BLD VENIPUNCTURE: CPT

## 2021-11-30 PROCEDURE — 2709999900 HC NON-CHARGEABLE SUPPLY: Performed by: INTERNAL MEDICINE

## 2021-11-30 PROCEDURE — 2700000000 HC OXYGEN THERAPY PER DAY

## 2021-11-30 PROCEDURE — 87186 SC STD MICRODIL/AGAR DIL: CPT

## 2021-11-30 PROCEDURE — 97116 GAIT TRAINING THERAPY: CPT

## 2021-11-30 PROCEDURE — 83605 ASSAY OF LACTIC ACID: CPT

## 2021-11-30 PROCEDURE — 93010 ELECTROCARDIOGRAM REPORT: CPT | Performed by: INTERNAL MEDICINE

## 2021-11-30 PROCEDURE — 97166 OT EVAL MOD COMPLEX 45 MIN: CPT

## 2021-11-30 RX ORDER — LINEZOLID 2 MG/ML
600 INJECTION, SOLUTION INTRAVENOUS EVERY 12 HOURS
Status: DISCONTINUED | OUTPATIENT
Start: 2021-11-30 | End: 2021-12-02

## 2021-11-30 RX ORDER — IPRATROPIUM BROMIDE AND ALBUTEROL SULFATE 2.5; .5 MG/3ML; MG/3ML
1 SOLUTION RESPIRATORY (INHALATION)
Status: DISCONTINUED | OUTPATIENT
Start: 2021-11-30 | End: 2021-12-03 | Stop reason: HOSPADM

## 2021-11-30 RX ORDER — ARFORMOTEROL TARTRATE 15 UG/2ML
15 SOLUTION RESPIRATORY (INHALATION) 2 TIMES DAILY
Status: DISCONTINUED | OUTPATIENT
Start: 2021-11-30 | End: 2021-12-03 | Stop reason: HOSPADM

## 2021-11-30 RX ORDER — BUDESONIDE 0.5 MG/2ML
0.5 INHALANT ORAL 2 TIMES DAILY
Status: DISCONTINUED | OUTPATIENT
Start: 2021-11-30 | End: 2021-12-03 | Stop reason: HOSPADM

## 2021-11-30 RX ADMIN — ARFORMOTEROL TARTRATE 15 MCG: 15 SOLUTION RESPIRATORY (INHALATION) at 12:35

## 2021-11-30 RX ADMIN — DOCUSATE SODIUM 100 MG: 100 CAPSULE ORAL at 08:35

## 2021-11-30 RX ADMIN — LINEZOLID 600 MG: 600 INJECTION, SOLUTION INTRAVENOUS at 18:23

## 2021-11-30 RX ADMIN — BUDESONIDE 500 MCG: 0.5 SUSPENSION RESPIRATORY (INHALATION) at 12:33

## 2021-11-30 RX ADMIN — SPIRONOLACTONE 12.5 MG: 25 TABLET ORAL at 08:41

## 2021-11-30 RX ADMIN — Medication 10 ML: at 20:44

## 2021-11-30 RX ADMIN — METHOCARBAMOL TABLETS 500 MG: 500 TABLET, COATED ORAL at 20:43

## 2021-11-30 RX ADMIN — POTASSIUM CHLORIDE 20 MEQ: 20 TABLET, EXTENDED RELEASE ORAL at 08:35

## 2021-11-30 RX ADMIN — ENOXAPARIN SODIUM 40 MG: 100 INJECTION SUBCUTANEOUS at 20:43

## 2021-11-30 RX ADMIN — METHOCARBAMOL TABLETS 500 MG: 500 TABLET, COATED ORAL at 08:35

## 2021-11-30 RX ADMIN — OXYBUTYNIN CHLORIDE 2.5 MG: 5 TABLET ORAL at 20:42

## 2021-11-30 RX ADMIN — ARFORMOTEROL TARTRATE 15 MCG: 15 SOLUTION RESPIRATORY (INHALATION) at 21:13

## 2021-11-30 RX ADMIN — FUROSEMIDE 40 MG: 40 TABLET ORAL at 08:36

## 2021-11-30 RX ADMIN — CETIRIZINE HYDROCHLORIDE 5 MG: 10 TABLET, FILM COATED ORAL at 08:35

## 2021-11-30 RX ADMIN — METHYLPREDNISOLONE SODIUM SUCCINATE 40 MG: 40 INJECTION, POWDER, FOR SOLUTION INTRAMUSCULAR; INTRAVENOUS at 07:17

## 2021-11-30 RX ADMIN — FUROSEMIDE 40 MG: 40 TABLET ORAL at 16:06

## 2021-11-30 RX ADMIN — ATORVASTATIN CALCIUM 10 MG: 10 TABLET, FILM COATED ORAL at 20:43

## 2021-11-30 RX ADMIN — OXYBUTYNIN CHLORIDE 2.5 MG: 5 TABLET ORAL at 08:36

## 2021-11-30 RX ADMIN — Medication 2 PUFF: at 08:45

## 2021-11-30 RX ADMIN — IPRATROPIUM BROMIDE AND ALBUTEROL SULFATE 1 AMPULE: .5; 3 SOLUTION RESPIRATORY (INHALATION) at 16:00

## 2021-11-30 RX ADMIN — CITALOPRAM HYDROBROMIDE 10 MG: 20 TABLET ORAL at 08:35

## 2021-11-30 RX ADMIN — BUDESONIDE 500 MCG: 0.5 SUSPENSION RESPIRATORY (INHALATION) at 21:13

## 2021-11-30 RX ADMIN — DOCUSATE SODIUM 100 MG: 100 CAPSULE ORAL at 20:43

## 2021-11-30 RX ADMIN — METHYLPREDNISOLONE SODIUM SUCCINATE 40 MG: 40 INJECTION, POWDER, FOR SOLUTION INTRAMUSCULAR; INTRAVENOUS at 16:06

## 2021-11-30 RX ADMIN — METHYLPREDNISOLONE SODIUM SUCCINATE 40 MG: 40 INJECTION, POWDER, FOR SOLUTION INTRAMUSCULAR; INTRAVENOUS at 22:10

## 2021-11-30 RX ADMIN — METHYLPREDNISOLONE SODIUM SUCCINATE 40 MG: 40 INJECTION, POWDER, FOR SOLUTION INTRAMUSCULAR; INTRAVENOUS at 01:58

## 2021-11-30 RX ADMIN — METHOCARBAMOL TABLETS 500 MG: 500 TABLET, COATED ORAL at 16:06

## 2021-11-30 RX ADMIN — Medication 10 ML: at 08:37

## 2021-11-30 RX ADMIN — IPRATROPIUM BROMIDE AND ALBUTEROL SULFATE 1 AMPULE: .5; 3 SOLUTION RESPIRATORY (INHALATION) at 21:13

## 2021-11-30 RX ADMIN — Medication 1 PUFF: at 08:46

## 2021-11-30 RX ADMIN — LEVOTHYROXINE SODIUM 75 MCG: 0.07 TABLET ORAL at 08:35

## 2021-11-30 RX ADMIN — OXYBUTYNIN CHLORIDE 2.5 MG: 5 TABLET ORAL at 16:11

## 2021-11-30 RX ADMIN — PANTOPRAZOLE SODIUM 40 MG: 40 TABLET, DELAYED RELEASE ORAL at 07:17

## 2021-11-30 ASSESSMENT — PAIN SCALES - GENERAL
PAINLEVEL_OUTOF10: 3
PAINLEVEL_OUTOF10: 0
PAINLEVEL_OUTOF10: 8

## 2021-11-30 ASSESSMENT — ENCOUNTER SYMPTOMS
STRIDOR: 0
RHINORRHEA: 0
FACIAL SWELLING: 0
EYE DISCHARGE: 0
ABDOMINAL PAIN: 0
BLOOD IN STOOL: 0
COLOR CHANGE: 0
TROUBLE SWALLOWING: 0
COUGH: 0
EYE REDNESS: 0
SHORTNESS OF BREATH: 1
CHEST TIGHTNESS: 0
DIARRHEA: 0
PHOTOPHOBIA: 0
CHOKING: 0
NAUSEA: 0
APNEA: 0

## 2021-11-30 NOTE — PROGRESS NOTES
Progress Note - Dr. Victor Hugo Vivar - Internal Medicine  PCP: Maynor Valencia [address] [facility] [address] Day: 1  Code Status: Full Code  Current Diet: ADULT DIET; Easy to Chew        CC: follow up on medical issues    Subjective:   Michelle Murrieta is a 82 y.o. female. She denies problems    Doing ok  covid test negative  Awaiting cards eval - suspect TAVR will need to be deferred given new o2 requirement  No wheezing today    She denies chest pain, complains of shortness of breath, denies nausea,  denies emesis. 10 system Review of Systems is reviewed with patient, and pertinent positives are noted in HPI above . Otherwise, Review of systems is negative. I have reviewed the patient's medical and social history in detail and updated the computerized patient record. To recap: She  has a past medical history of Allergic rhinitis, Anxiety, Aortic stenosis, CHF (congestive heart failure) ([address] COPD (chronic obstructive pulmonary disease) ([address] Depression, Diabetes mellitus ([address] Hypertension, Hypothyroidism, MARIAA treated with BiPAP, and Urinary incontinence. . She  has a past surgical history that includes Hysterectomy, total abdominal; Carpal tunnel release; Cholecystectomy; eye surgery; Tonsillectomy; Uvulectomy; Cardiac catheterization (10/11/2021); and Dental surgery (N/A, 11/12/2021). . She  reports that she has never smoked. She has never used smokeless tobacco. She reports that she does not drink alcohol and does not use drugs. .        Active Hospital Problems    Diagnosis Date Noted    Acute respiratory failure with hypoxemia (HCC) [J96.01] 11/29/2021    Hypercholesteremia [E78.00] 08/06/2021    Nonrheumatic aortic valve stenosis [I35.0]     COPD exacerbation ([address] [J44.1]     Essential hypertension [I10] 06/28/2018    COPD, severe ([address] [J44.9] 01/12/2018    Hypothyroidism [E03.9] 01/12/2018       Current Facility-Administered Medications: levothyroxine (SYNTHROID) tablet 75 mcg, 75 mcg, Oral, Daily  albuterol sulfate  (90 Base) MCG/ACT inhaler 2 puff, 2 puff, Inhalation, Q6H PRN  atorvastatin (LIPITOR) tablet 10 mg, 10 mg, Oral, Nightly  oxybutynin (DITROPAN) tablet 2.5 mg, 2.5 mg, Oral, TID  methocarbamol (ROBAXIN) tablet 500 mg, 500 mg, Oral, 4x Daily  furosemide (LASIX) tablet 40 mg, 40 mg, Oral, BID  spironolactone (ALDACTONE) tablet 12.5 mg, 12.5 mg, Oral, Daily  docusate sodium (COLACE) capsule 100 mg, 100 mg, Oral, BID  potassium chloride (KLOR-CON M) extended release tablet 20 mEq, 20 mEq, Oral, Daily  polyethylene glycol (GLYCOLAX) packet 17 g, 17 g, Oral, Daily PRN  pantoprazole (PROTONIX) tablet 40 mg, 40 mg, Oral, QAM AC  cetirizine (ZYRTEC) tablet 5 mg, 5 mg, Oral, Daily  citalopram (CELEXA) tablet 10 mg, 10 mg, Oral, Daily  sodium chloride flush 0.9 % injection 5-40 mL, 5-40 mL, IntraVENous, 2 times per day  sodium chloride flush 0.9 % injection 5-40 mL, 5-40 mL, IntraVENous, PRN  0.9 % sodium chloride infusion, 25 mL, IntraVENous, PRN  ondansetron (ZOFRAN-ODT) disintegrating tablet 4 mg, 4 mg, Oral, Q8H PRN **OR** ondansetron (ZOFRAN) injection 4 mg, 4 mg, IntraVENous, Q6H PRN  magnesium hydroxide (MILK OF MAGNESIA) 400 MG/5ML suspension 30 mL, 30 mL, Oral, Daily PRN  enoxaparin (LOVENOX) injection 40 mg, 40 mg, SubCUTAneous, BID  acetaminophen (TYLENOL) tablet 650 mg, 650 mg, Oral, Q6H PRN **OR** acetaminophen (TYLENOL) suppository 650 mg, 650 mg, Rectal, Q6H PRN  methylPREDNISolone sodium (SOLU-MEDROL) injection 40 mg, 40 mg, IntraVENous, Q6H **FOLLOWED BY** [START ON 12/2/2021] predniSONE (DELTASONE) tablet 40 mg, 40 mg, Oral, Daily  hydrALAZINE (APRESOLINE) injection 10 mg, 10 mg, IntraVENous, Q6H PRN  potassium chloride (KLOR-CON M) extended release tablet 40 mEq, 40 mEq, Oral, PRN **OR** potassium bicarb-citric acid (EFFER-K) effervescent tablet 40 mEq, 40 mEq, Oral, PRN **OR** potassium chloride 10 mEq/100 mL IVPB (Peripheral Line), 10 mEq, cooperative  Head: Normocephalic, without obvious abnormality, atraumatic  Lungs: clear to auscultation bilaterally  Heart: regular rate and rhythm, S1, S2 normal, no murmur, click, rub or gallop  Abdomen: soft, non-tender; bowel sounds normal; no masses,  no organomegaly  Extremities: extremities normal, atraumatic, no cyanosis or edema    Labs:  Lab Results   Component Value Date    WBC 7.2 11/30/2021    HGB 10.9 (L) 11/30/2021    HCT 32.7 (L) 11/30/2021     11/30/2021    CHOL 167 06/25/2021    TRIG 125 06/25/2021    HDL 54 06/25/2021    ALT 12 11/30/2021    AST 15 11/30/2021     (L) 11/30/2021    K 3.7 11/30/2021    CL 98 (L) 11/30/2021    CREATININE 0.7 11/30/2021    BUN 18 11/30/2021    CO2 30 11/30/2021    TSH 1.85 06/10/2021    INR 1.10 11/29/2021    LABA1C 6.0 07/20/2021    LABMICR YES 11/29/2021     Lab Results   Component Value Date    TROPONINI <0.01 11/29/2021           Recent Imaging Results are Reviewed:  XR CHEST (2 VW)    Result Date: 11/22/2021  EXAMINATION: TWO XRAY VIEWS OF THE CHEST 11/22/2021 1:24 pm COMPARISON: Chest x-ray dated 07/18/2021. HISTORY: ORDERING SYSTEM PROVIDED HISTORY: SOB (shortness of breath) TECHNOLOGIST PROVIDED HISTORY: Reason for exam:->sob FINDINGS: HEART/MEDIASTINUM: The cardiomediastinal silhouette is stable. PLEURA/LUNGS: There are no focal consolidations or pleural effusions. There is no appreciable pneumothorax. BONES/SOFT TISSUE: No acute abnormality. No radiographic evidence of acute pulmonary disease. CT HEAD WO CONTRAST    Result Date: 11/22/2021  EXAMINATION: CT OF THE HEAD WITHOUT CONTRAST  11/22/2021 1:21 pm TECHNIQUE: CT of the head was performed without the administration of intravenous contrast. Dose modulation, iterative reconstruction, and/or weight based adjustment of the mA/kV was utilized to reduce the radiation dose to as low as reasonably achievable. COMPARISON: None.  HISTORY: ORDERING SYSTEM PROVIDED HISTORY: Dysarthria TECHNOLOGIST PROVIDED HISTORY: Reason for exam:->speech issue FINDINGS: BRAIN/VENTRICLES: There is no acute infarct or acute intracranial hemorrhage present. There is no mass effect or midline shift present. There is no ventriculomegaly or abnormal extra-axial fluid collection present. There is mild hypoattenuation within the periventricular and deep white matter of both hemispheres. ORBITS: Limited evaluation of the orbits is unremarkable. SINUSES: There is mild mucosal thickening within the maxillary sinuses. The paranasal sinuses and mastoid air cells are otherwise clear. SOFT TISSUES/SKULL:  No lytic or blastic osseous lesions are identified. 1. No acute intracranial process identified. 2. Mild chronic small vessel ischemic changes. XR CHEST PORTABLE    Result Date: 11/29/2021  EXAMINATION: ONE XRAY VIEW OF THE CHEST 11/29/2021 7:08 am COMPARISON: 11/22/2021 HISTORY: ORDERING SYSTEM PROVIDED HISTORY: Eval for infiltrate TECHNOLOGIST PROVIDED HISTORY: Reason for exam:->Eval for infiltrate Reason for Exam: eval fo infiltrate Acuity: Unknown Type of Exam: Unknown FINDINGS: No lung infiltrate or consolidation. No pneumothorax or pleural effusion. Heart size is normal.     No acute abnormality. Lab Results   Component Value Date    GLUCOSE 159 11/30/2021     Lab Results   Component Value Date    POCGLU 149 07/22/2021       Assessment and Plan:  Principal Problem:    COPD, severe ([address] -Established problem. Stable. No wheezing, still on 3L  Plan: cont steroids, inhaled tx - ask her pulm group to see   Active Problems:    Hypothyroidism -Established problem. Stable. Plan: cont on synthroid    Essential hypertension -Established problem. Stable. 108/63  Plan: stay on same meds    COPD exacerbation ([address]    Nonrheumatic aortic valve stenosis  Plan: dr Army Berger consulted. Await decision on tavr    Hypercholesteremia    Acute respiratory failure with hypoxemia ([address]  Plan: cont o2 for now.  Pt would like her pulmonologist to see her            Morena Edgar MD  11/30/2021

## 2021-11-30 NOTE — CONSULTS
stenosis, CHF (congestive heart failure) ([facility] [address] COPD (chronic obstructive pulmonary disease) ([facility] [address] Depression, Diabetes mellitus ([facility] [address] Hypertension, Hypothyroidism, MARIAA treated with BiPAP (02/14/2018), and Urinary incontinence. with following problems:    · Met sepsis criteria on admission with leukocytosis, tachycardia, tachypnea and hypotension  · Acute respiratory failure with hypoxia   · coccygeal decubitus wound  · Severe aortic stenosis, candidate for TAVR  · Negative COVID-19 PCR on 11/29/2021  · Elevated D-dimer of 619  · Elevated BNP of 684 on admission  · Elevated CRP of 73.4  · COPD  · Essential hypertension  · History of depression      Discussion:      The patient was admitted with elevated white cell count of 12,300, heart rate of 99, respiratory rate of 23 and has been hypotensive. She meets sepsis criteria. She is requiring 3 L oxygen via nasal cannula. Chest x-ray reviewed. No obvious lung infiltrates. D-dimer is elevated. COVID-19 PCR test done in the ER was negative. She also has a coccygeal wound    Her CRP was elevated at 73.4 yesterday. BNP was elevated at 684. Procalcitonin was only slightly elevated at 0.23. Plan:     Diagnostic Workup:    · Will order 3 sets of blood cultures to be done stat  · Will order urine Legionella and pneumococcal antigens  · Continue to follow fever curve, WBC count and blood cultures  · Check baseline sed rate level  · We will order coccygeal wound culture  · Check lactic acid level  · Follow up on liverand renal functions closely    Antimicrobials:    · Will order empiric IV linezolid 600 mg every 12 hours  · If the patient spikes any fever or low white cell count goes up, will also add empiric IV Zosyn 3.375 g every 8 hours  · We will follow up on the culture results and clinical progress and will make further recommendations accordingly. · Continue close vitals monitoring. · Maintain good glycemic control.   · Fall precautions. Aspiration precautions. · Continue to watch for new fever or diarrhea. · DVT prophylaxis. · Discussed all above with patient and RN. Drug Monitoring:    · Continue serial monitoring for antibiotic toxicity as follows: CBC, CMP  · Continue to watch for following: new or worsening fever, hypotension, hives, lip swelling and redness or purulence at vascular access sites. I/v access Management:    · Continue to monitor i.v access sites for erythema, induration, discharge or tenderness. · As always, continue efforts to minimizetubes/lines/drains as clinically appropriate to reduce chances of line associated infections. Current isolation precautions: There are no current isolations documented for this patient. Level of complexity of consult: High     Risk of Complications/Morbidity: High     · Illness(es)/ Infection present that pose threat to life/bodily function. · There is potential for severe exacerbation of infection/side effects of treatment. · Therapy requires intensive monitoring for antimicrobial agent toxicity. Thank you for involving me in the care of your patient. I will continue to follow. If you have any additional questions, please do not hesitate to contact me. Subjective:     Presenting complaint in ER:     Chief Complaint   Patient presents with    Shortness of Breath        HPI: Guido Flanagan is a 82 y.o. female patient, who was seen at the request of Dr. Pedro Hunter MD.    History was obtained from chart review and the patient. The patient was admitted on 11/29/2021. I have been consulted to see the patient for above mentioned reason(s). The patient has multiple medical comorbidities, and presented to the ER for shortness of breath. The patient is scheduled for TAVR tomorrow. The patient had a COVID-19 test done yesterday and that was negative    The patient has a coccygeal decubitus wound.   Cardiology team is concerned about the wound.      I have been asked for my opinion for management for this patient. Past Medical History: All past medical history reviewed today. Past Medical History:   Diagnosis Date    Allergic rhinitis     Anxiety     Aortic stenosis     [facility]     COPD (chronic obstructive pulmonary disease) (HCC)     Depression     Diabetes mellitus ([facility] [address]     Hypertension     Hypothyroidism     MARIAA treated with BiPAP 02/14/2018    Urinary incontinence          Past Surgical History: All pastsurgical history was reviewed today. Past Surgical History:   Procedure Laterality Date    CARDIAC CATHETERIZATION  10/11/2021    CARPAL TUNNEL RELEASE      bilateral    CHOLECYSTECTOMY      DENTAL SURGERY N/A 11/12/2021    SIMPLE EXTRACTIONS TEETH # 4, 13, 18, 23, 24, 25, 26,  AND SURGICAL EXTRACTION TEETH # 2, 3, 14, 15, 19, 20, 21,  28,  29, 30, 31  AND ALSO ALVEOLOPLASTIES ALL FOUR QUADRANTS; GEL FOAM performed by Rivas Chen DDS at [address] [place] [address]      bilateral cataracts    HYSTERECTOMY, TOTAL ABDOMINAL      TONSILLECTOMY      UVULECTOMY           Family History: All family history was reviewed today. Problem Relation Age of Onset    Cancer Mother         breast    Breast Cancer Mother     Cancer Father         prostate    Cancer Sister         breast    Breast Cancer Daughter     Breast Cancer Maternal Grandmother          Medications: All current and past medications were reviewed.     Medications Prior to Admission: citalopram (CELEXA) 10 MG tablet, Take 1 tablet by mouth daily  polyethylene glycol (GLYCOLAX) 17 g packet, Take 17 g by mouth daily as needed for Constipation  omeprazole (PRILOSEC) 20 MG delayed release capsule, Take 20 mg by mouth daily  levocetirizine (XYZAL) 5 MG tablet, Take 5 mg by mouth nightly  potassium chloride (KLOR-CON M) 20 MEQ extended release tablet, Take 1 tablet by mouth daily  calcium carbonate (CALCIUM 600) 600 MG TABS tablet, Take 1 tablet by mouth 2 times daily  furosemide (LASIX) 20 MG tablet, Take 3 tablets by mouth 2 times daily (Patient taking differently: Take 40 mg by mouth 2 times daily )  spironolactone (ALDACTONE) 25 MG tablet, Take 0.5 tablets by mouth daily  docusate (COLACE, DULCOLAX) 100 MG CAPS, Take 100 mg by mouth 2 times daily  methocarbamol (ROBAXIN) 500 MG tablet, Take 500 mg by mouth 4 times daily  OXYGEN, Use 3L of oxygen all the time  budesonide (PULMICORT) 0.5 MG/2ML nebulizer suspension, Take 2 mLs by nebulization 2 times daily DX:COPD J44.9  oxybutynin (DITROPAN) 5 MG tablet, TAKE ONE TABLET BY MOUTH THREE TIMES A DAY  atorvastatin (LIPITOR) 10 MG tablet, TAKE 1 TABLET BY MOUTH DAILY AT BEDTIME. (Patient taking differently: Take 10 mg by mouth nightly TAKE 1 TABLET BY MOUTH DAILY AT BEDTIME.)  levothyroxine (SYNTHROID) 75 MCG tablet, TAKE 1 TABLET BY MOUTH DAILY  acetaminophen (TYLENOL) 500 MG tablet, Take 500 mg by mouth every 6 hours as needed for Pain  formoterol (PERFOROMIST) 20 MCG/2ML nebulizer solution, Take 2 mLs by nebulization every 12 hours DX:COPD J44.9  ondansetron (ZOFRAN) 4 MG tablet, Take 1 tablet by mouth daily as needed for Nausea or Vomiting  albuterol sulfate HFA (VENTOLIN HFA) 108 (90 Base) MCG/ACT inhaler, Inhale 2 puffs into the lungs every 6 hours as needed for Wheezing  ipratropium-albuterol (DUONEB) 0.5-2.5 (3) MG/3ML SOLN nebulizer solution, Inhale 3 mLs into the lungs every 4 hours     budesonide  0.5 mg Nebulization BID    ipratropium-albuterol  1 ampule Inhalation Q6H WA    Arformoterol Tartrate  15 mcg Nebulization BID    levothyroxine  75 mcg Oral Daily    atorvastatin  10 mg Oral Nightly    oxybutynin  2.5 mg Oral TID    methocarbamol  500 mg Oral 4x Daily    furosemide  40 mg Oral BID    spironolactone  12.5 mg Oral Daily    docusate sodium  100 mg Oral BID    potassium chloride  20 mEq Oral Daily    pantoprazole  40 mg Oral QAM AC    cetirizine  5 mg Oral Daily    citalopram  10 mg Oral Daily    sodium chloride flush  5-40 mL IntraVENous 2 times per day    enoxaparin  40 mg SubCUTAneous BID    methylPREDNISolone  40 mg IntraVENous Q6H    Followed by   Anabelle Lazaro ON 12/2/2021] predniSONE  40 mg Oral Daily          REVIEW OF SYSTEMS:       Review of Systems   Constitutional: Positive for fatigue. Negative for chills, diaphoresis and unexpected weight change. HENT: Negative for congestion, ear discharge, ear pain, facial swelling, hearing loss, rhinorrhea and trouble swallowing. Eyes: Negative for photophobia, discharge, redness and visual disturbance. Respiratory: Positive for shortness of breath. Negative for apnea, cough, choking, chest tightness and stridor. Cardiovascular: Negative for chest pain and palpitations. Gastrointestinal: Negative for abdominal pain, blood in stool, diarrhea and nausea. Endocrine: Negative for polydipsia, polyphagia and polyuria. Genitourinary: Negative for difficulty urinating, dysuria, frequency, hematuria, menstrual problem and vaginal discharge. Musculoskeletal: Negative for arthralgias, joint swelling, myalgias and neck stiffness. Skin: Negative for color change and rash. Coccygeal wound   Allergic/Immunologic: Negative for immunocompromised state. Neurological: Negative for dizziness, seizures, speech difficulty, light-headedness and headaches. Hematological: Negative for adenopathy. Psychiatric/Behavioral: Negative for agitation, hallucinations and suicidal ideas. Objective:       PHYSICAL EXAM:      Vitals:   Vitals:    11/30/21 0156 11/30/21 0716 11/30/21 0830 11/30/21 0840   BP: 99/66 (!) 102/57 108/63    Pulse: 76 81 81    Resp: 18 18 18 20   Temp: 97.2 °F (36.2 °C) 97.8 °F (36.6 °C)     TempSrc: Oral Oral Oral    SpO2: 94% 94% 94% 94%   Weight:       Height:           Physical Exam  Vitals and nursing note reviewed.    Constitutional:       General: She is not in acute distress. Appearance: She is well-developed. She is not diaphoretic. HENT:      Head: Normocephalic. Right Ear: External ear normal.      Left Ear: External ear normal.      Nose: Nose normal.   Eyes:      General: No scleral icterus. Right eye: No discharge. Left eye: No discharge. Conjunctiva/sclera: Conjunctivae normal.      Pupils: Pupils are equal, round, and reactive to light. Cardiovascular:      Rate and Rhythm: Normal rate and regular rhythm. Heart sounds: No murmur heard. No friction rub. Pulmonary:      Effort: Pulmonary effort is normal.      Breath sounds: No stridor. Rales (Bilateral, patchy) present. No wheezing. Chest:      Chest wall: No tenderness. Abdominal:      Palpations: Abdomen is soft. There is no mass. Tenderness: There is no abdominal tenderness. There is no guarding or rebound. Musculoskeletal:         General: No tenderness. Cervical back: Normal range of motion and neck supple. Lymphadenopathy:      Cervical: No cervical adenopathy. Skin:     General: Skin is warm and dry. Findings: No erythema or rash. Comments: Coccygeal wound noted   Neurological:      Mental Status: She is alert and oriented to person, place, and time. Motor: No abnormal muscle tone. Psychiatric:         Judgment: Judgment normal.           Lines and drains: All vascular access sites are healthy with no local erythema, discharge or tenderness. Intake and output:     No intake/output data recorded. Lab Data:   All available labs were reviewed by me today.      CBC:   Recent Labs     11/29/21  0750 11/30/21  0631   WBC 12.3* 7.2   RBC 4.34 3.70*   HGB 12.7 10.9*   HCT 38.3 32.7*    210   MCV 88.4 88.4   MCH 29.3 29.4   MCHC 33.1 33.3   RDW 13.1 13.2        BMP:  Recent Labs     11/29/21  0750 11/30/21  0631    135*   K 3.3* 3.7   CL 93* 98*   CO2 35* 30   BUN 13 18   CREATININE 0.7 0.7   CALCIUM 9.2 8.7   GLUCOSE 147* 159*        Hepatic FunctionPanel:   Lab Results   Component Value Date    ALKPHOS 69 11/30/2021    ALT 12 11/30/2021    AST 15 11/30/2021    PROT 7.0 11/30/2021    BILITOT 0.3 11/30/2021    BILIDIR <0.2 11/22/2021    IBILI see below 11/22/2021    LABALBU 3.4 11/30/2021       CPK: No results found for: CKTOTAL  ESR: No results found for: SEDRATE  CRP:   Lab Results   Component Value Date    CRP 73.4 (H) 11/29/2021         Imaging: All pertinent images and reports for the current visit were reviewed by meduring this visit. XR CHEST PORTABLE   Final Result   No acute abnormality. XR CHEST (2 VW)    (Results Pending)       Outside records:    Labs, Microbiology, Radiology and pertinent results from Care everywhere, if available, were reviewed as a part ofthe consultation. Problem list:       Patient Active Problem List   Diagnosis Code    Chronic seasonal allergic rhinitis J30.2    COPD, severe ([facility] [address] J44.9    Idiopathic peripheral neuropathy G60.9    Hypothyroidism E03.9    Depression F32. A    Chronic congestive heart failure (HCC) I50.9    SOB (shortness of breath) R06.02    Obstructive sleep apnea (adult) (pediatric) G47.33    Bilateral lower extremity edema R60.0    Multifocal pneumonia J18.9    Centrilobular emphysema ([facility]) J43.2    Acute on chronic diastolic heart failure ([facility]) I50.33    Pitting edema R60.9    BASIA (acute kidney injury) ([facility]) N17.9    Chronic respiratory failure with hypoxia ([facility]) J96.11    Essential hypertension I10    Mixed hyperlipidemia E78.2    Acute pulmonary edema ([facility]) J81.0    Chronic obstructive pulmonary disease ([facility]) J44.9    Chronic bronchitis ([facility]) J42    Class 3 severe obesity without serious comorbidity with body mass index (BMI) of 40.0 to 44.9 in adult ([facility]) E66.01, Z68.41    Pulmonary nodule R91.1    Acute suppurative otitis media of right ear with spontaneous rupture of tympanic membrane H66.011    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 H72.01    Bilateral hearing loss H91.93    Bilateral sensorineural hearing loss H90.3    Senile osteoporosis M81.0    Acute respiratory failure (HCC) J96.00    Coronary artery disease due to lipid rich plaque I25.10, I25.83    Severe aortic stenosis I35.0    Hypercholesteremia E78.00    Aspiration into airway T17.908A    Acute respiratory failure with hypoxemia (HCC) J96.01    Severe sepsis (HCC) A41.9, R65.20    Elevated brain natriuretic peptide (BNP) level R79.89    Elevated C-reactive protein (CRP) R79.82    History of depression Z86.59         Please note that this chart was generated using Dragon dictation software. Although every effort was made to ensure the accuracy of this automated transcription, some errors in transcription may have occurred inadvertently. If you may need any clarification, please do not hesitate to contact me through EPIC or at the phone number provided below with my electronic signature. Any pictures or media included in this note were obtained after taking informed verbal consent from the patient and with their approval to include those in the patient's medical record.         Rachana Aragon MD, MPH, [address] [place] [address]  11/30/2021, 11:07 AM  [facility]   [address] [place] [address], [address] [place] [address]  Office: [phone]  Fax: [phone]  Clinic days:  Tuesday & Thursday

## 2021-11-30 NOTE — CONSULTS
PULMONARY AND CRITICAL CARE CONSULTATION NOTE    CONSULTING PHYSICIAN:      REASON FOR CONSULT:   Chief Complaint   Patient presents with    Shortness of Breath       DATE OF CONSULT: 11/30/2021    HISTORY OF PRESENT ILLNESS: 82y.o. year old female with past medical history of heart failure with preserved ejection fraction, aortic stenosis, COPD with FEV1 56% and DLCO 39%, Chronic hypoxic respiratory failure on home O2 at 2 L/m, hypertension, obstructive sleep apnea on auto BiPAP who presented to hospital with complaints of shortness of breath and wheezing. COVID negative. CXR does not show any infiltrates. She is scheduled for TAVR tomorrow. NO fever, no leucocytosis. Mild elevation of procalcitonin at 0.23  Patient takes oxygen at 2 L/min at home. Here she is requiring up to 3 L/min oxygen. REVIEW OF SYSTEMS:   CONSTITUTIONAL SYMPTOMS: The patient denies fever, fatigue, night sweats, weight loss or weight gain. HEENT: No vision changes. No tinnitus, Denies sinus pain. No hoarseness, or dysphagia. NECK: Patient denies swelling in the neck. CARDIOVASCULAR: Denies chest pain, palpitation, syncope. RESPIRATORY: See above. GASTROINTESTINAL: Denies nausea, abdominal pain or change in bowel function. GENITOURINARY: Denies obstructive symptoms. No history of incontinence. BREASTS: No masses or lumps in the breasts. SKIN: No rashes or itching. MUSCULOSKELETAL: Denies weakness or bone pain. NEUROLOGICAL: No headaches or seizures. PSYCHIATRIC: Denies mood swings or depression. ENDOCRINE: Denies heat or cold intolerance or excessive thirst.  HEMATOLOGIC/LYMPHATIC: Denies easy bruising or lymph node swelling. ALLERGIC/IMMUNOLOGIC: No environmental allergies.     PAST MEDICAL HISTORY:   Past Medical History:   Diagnosis Date    Allergic rhinitis     Anxiety     Aortic stenosis     [facility]     [facility] [address]     Depression     Diabetes mellitus [facility] [address]     Hypertension     Hypothyroidism     MARIAA treated with BiPAP 02/14/2018    Urinary incontinence        PAST SURGICAL HISTORY:   Past Surgical History:   Procedure Laterality Date    CARDIAC CATHETERIZATION  10/11/2021    CARPAL TUNNEL RELEASE      bilateral    CHOLECYSTECTOMY      DENTAL SURGERY N/A 11/12/2021    SIMPLE EXTRACTIONS TEETH # 4, 13, 18, 23, 24, 25, 26,  AND SURGICAL EXTRACTION TEETH # 2, 3, 14, 15, 19, 20, 21,  28,  29, 30, 31  AND ALSO ALVEOLOPLASTIES ALL FOUR QUADRANTS; GEL FOAM performed by Mirela Kim DDS at [address] [place] [address]      bilateral cataracts    HYSTERECTOMY, TOTAL ABDOMINAL      TONSILLECTOMY      UVULECTOMY         SOCIAL HISTORY:   Social History     Tobacco Use    Smoking status: Never Smoker    Smokeless tobacco: Never Used   Vaping Use    Vaping Use: Never used   Substance Use Topics    Alcohol use: No    Drug use: No       FAMILY HISTORY:   Family History   Problem Relation Age of Onset    Cancer Mother         breast    Breast Cancer Mother     Cancer Father         prostate    Cancer Sister         breast    Breast Cancer Daughter     Breast Cancer Maternal Grandmother        MEDICATIONS:     No current facility-administered medications on file prior to encounter.      Current Outpatient Medications on File Prior to Encounter   Medication Sig Dispense Refill    citalopram (CELEXA) 10 MG tablet Take 1 tablet by mouth daily 30 tablet 5    polyethylene glycol (GLYCOLAX) 17 g packet Take 17 g by mouth daily as needed for Constipation      omeprazole (PRILOSEC) 20 MG delayed release capsule Take 20 mg by mouth daily      levocetirizine (XYZAL) 5 MG tablet Take 5 mg by mouth nightly      potassium chloride (KLOR-CON M) 20 MEQ extended release tablet Take 1 tablet by mouth daily 30 tablet 5    calcium carbonate (CALCIUM 600) 600 MG TABS tablet Take 1 tablet by mouth 2 times daily 60 tablet 5    furosemide (LASIX) 20 MG tablet Take 3 tablets by mouth 2 times daily (Patient taking differently: Take 40 mg by mouth 2 times daily ) 180 tablet 0    spironolactone (ALDACTONE) 25 MG tablet Take 0.5 tablets by mouth daily 30 tablet 0    docusate (COLACE, DULCOLAX) 100 MG CAPS Take 100 mg by mouth 2 times daily 120 capsule 0    methocarbamol (ROBAXIN) 500 MG tablet Take 500 mg by mouth 4 times daily      OXYGEN Use 3L of oxygen all the time 3 L 3    budesonide (PULMICORT) 0.5 MG/2ML nebulizer suspension Take 2 mLs by nebulization 2 times daily DX:COPD J44.9 60 ampule 6    oxybutynin (DITROPAN) 5 MG tablet TAKE ONE TABLET BY MOUTH THREE TIMES A DAY 90 tablet 1    atorvastatin (LIPITOR) 10 MG tablet TAKE 1 TABLET BY MOUTH DAILY AT BEDTIME. (Patient taking differently: Take 10 mg by mouth nightly TAKE 1 TABLET BY MOUTH DAILY AT BEDTIME.) 30 tablet 5    levothyroxine (SYNTHROID) 75 MCG tablet TAKE 1 TABLET BY MOUTH DAILY 30 tablet 5    acetaminophen (TYLENOL) 500 MG tablet Take 500 mg by mouth every 6 hours as needed for Pain      formoterol (PERFOROMIST) 20 MCG/2ML nebulizer solution Take 2 mLs by nebulization every 12 hours DX:COPD J44.9 120 mL 6    ondansetron (ZOFRAN) 4 MG tablet Take 1 tablet by mouth daily as needed for Nausea or Vomiting 30 tablet 0    albuterol sulfate HFA (VENTOLIN HFA) 108 (90 Base) MCG/ACT inhaler Inhale 2 puffs into the lungs every 6 hours as needed for Wheezing 1 Inhaler 3    ipratropium-albuterol (DUONEB) 0.5-2.5 (3) MG/3ML SOLN nebulizer solution Inhale 3 mLs into the lungs every 4 hours 360 mL 0        levothyroxine  75 mcg Oral Daily    atorvastatin  10 mg Oral Nightly    oxybutynin  2.5 mg Oral TID    methocarbamol  500 mg Oral 4x Daily    furosemide  40 mg Oral BID    spironolactone  12.5 mg Oral Daily    docusate sodium  100 mg Oral BID    potassium chloride  20 mEq Oral Daily    pantoprazole  40 mg Oral QAM AC    cetirizine  5 mg Oral Daily    citalopram  10 mg Oral Daily    sodium chloride flush  5-40 mL IntraVENous 2 times per day    enoxaparin  40 mg SubCUTAneous BID    methylPREDNISolone  40 mg IntraVENous Q6H    Followed by   Amanda Sanderson ON 12/2/2021] predniSONE  40 mg Oral Daily    albuterol sulfate HFA  2 puff Inhalation Q4H WA    ipratropium  2 puff Inhalation Q4H WA    fluticasone  1 puff Inhalation BID      sodium chloride       albuterol sulfate HFA, polyethylene glycol, sodium chloride flush, sodium chloride, ondansetron **OR** ondansetron, magnesium hydroxide, acetaminophen **OR** acetaminophen, hydrALAZINE, potassium chloride **OR** potassium alternative oral replacement **OR** potassium chloride, sodium chloride    ALLERGIES:   Allergies as of 11/29/2021    (No Known Allergies)      OBJECTIVE:   height is 5' (1.524 m) and weight is 182 lb 8 oz (82.8 kg). Her oral temperature is 97.8 °F (36.6 °C). Her blood pressure is 108/63 and her pulse is 81. Her respiration is 20 and oxygen saturation is 94%. I/O this shift:  In: 210 [P.O.:210]  Out: -      PHYSICAL EXAM:  CONSTITUTIONAL: She is a 82y.o.-year-old who appears her stated age. She is alert and oriented x 3 and in no acute distress. HEENT: PERRL. No scleral icterus. No thrush, atraumatic, normocephalic. NECK: Supple, without cervical or supraclavicular lymphadenopathy:  CARDIOVASCULAR: S1 S2 RRR. Without murmer  RESPIRATORY & CHEST: Lungs are clear to auscultation and percussion. No wheezing, no crackles. Good air movement  GASTROINTESTINAL & ABDOMEN: Soft, nontender, positive bowel sounds in all quadrants, non-distended, without hepatosplenomegaly. GENITOURINARY: Deferred. MUSCULOSKELETAL: No tenderness to palpation of the axial skeleton. There is no clubbing. No cyanosis. No edema of the lower extremities. SKIN OF BODY: No rash or jaundice. PSYCHIATRIC EVALUATION: Normal affect. Patient answers questions appropriately. HEMATOLOGIC/LYMPHATIC/ IMMUNOLOGIC: No palpable lymphadenopathy.   NEUROLOGIC: Alert and oriented x 3. Groslly non-focal. Motor strength is 5+/5 in all muscle groups. The patient has a normal sensorium globally. LABS:  Lab Results   Component Value Date    WBC 7.2 11/30/2021    HGB 10.9 (L) 11/30/2021    HCT 32.7 (L) 11/30/2021     11/30/2021    CHOL 167 06/25/2021    TRIG 125 06/25/2021    HDL 54 06/25/2021    ALT 12 11/30/2021    AST 15 11/30/2021     (L) 11/30/2021    K 3.7 11/30/2021    CL 98 (L) 11/30/2021    CREATININE 0.7 11/30/2021    BUN 18 11/30/2021    CO2 30 11/30/2021    TSH 1.85 06/10/2021    INR 1.10 11/29/2021       Lab Results   Component Value Date    GLUCOSE 159 (H) 11/30/2021    CALCIUM 8.7 11/30/2021     (L) 11/30/2021    K 3.7 11/30/2021    CO2 30 11/30/2021    CL 98 (L) 11/30/2021    BUN 18 11/30/2021    CREATININE 0.7 11/30/2021       IMAGING:  I reviewed the chest x-ray and my interpretation is as follows. No infiltrates noted. IMPRESSION:   Chronic hypoxic respiratory failure  COPD with acute exacerbation  Heart failure with preserved ejection fraction  Severe aortic stenosis, plan for TAVR  Obstructive sleep apnea on auto BiPAP      RECOMMENDATION:   Patient presents with worsening shortness of breath. Being treated for acute exacerbation of COPD. Currently she is not wheezing. She is on Solu-Medrol. I will switch her to her home bronchodilator regimen which would be budesonide and duo nebs twice a day. We will also initiate duo nebs every 6 hours and discontinue albuterol and ipratropium inhaler. Patient takes home oxygen at 2 L/min. Currently on 3 L/min oxygen which is not far off. Titrate FiO2 for saturation of 88 to 92%. Patient wears auto BiPAP at home. Initiate BiPAP overnight for MARIAA. Plan for TAVR for severe aortic stenosis which was originally scheduled for tomorrow. Cardiologist is deciding about timing of TAVR. Thank you for your consultation. We will continue to follow the patient.       John Cordova MD  Pulmonary Critical Care and Sleep Medicine  11/30/2021, 10:59 AM    This note was completed using dragon medical speech recognition software. Grammatical errors, random word insertions, pronoun errors and incomplete sentences are occasional consequences of this technology due to software limitations. If there are questions or concerns about the content of this note of information contained within the body of this dictation they should be addressed with the provider for clarification.

## 2021-11-30 NOTE — PROGRESS NOTES
This nurse called the lab at this time to ask phlebotomist to come draw stat labs. Will continue to monitor.

## 2021-11-30 NOTE — PROGRESS NOTES
[address]  Daily Progress Note    Admit: 11/29/2021      CC: AS, SOB  Subj: Today, doing fair. COVID negative. Still wheezing. ID consulted for coccyx wound and risk for TAVR.       Obj:  /63   Pulse 81   Temp 97.8 °F (36.6 °C) (Oral)   Resp 20   Ht 5' (1.524 m)   Wt 182 lb 8 oz (82.8 kg)   SpO2 94%   BMI 35.64 kg/m²   No intake or output data in the 24 hours ending 11/30/21 0907  Gen Alert, coop, no distress Heart Rrr, 4/6   Head NC, AT, no abnorm Abd Soft, NT, ND, +BS, no mass, no OM   Eyes PERRLA, conj/corn clear Ext Ext nl, AT, no c/c/e   Nose Nares nl, no drain, NT Pulse decr   Throat Lips, mucosa, tongue nl Skin Color/tect/turg nl, no rash/lesion   Neck S/S, TM, NT, no bruit/JVD Psych Nl mood and affect   Lung decr bilat Lymph No cervical or ax LA   Ch Wall NT, no deform Neuro Nl gross M/S exam     Medications:    levothyroxine  75 mcg Oral Daily    atorvastatin  10 mg Oral Nightly    oxybutynin  2.5 mg Oral TID    methocarbamol  500 mg Oral 4x Daily    furosemide  40 mg Oral BID    spironolactone  12.5 mg Oral Daily    docusate sodium  100 mg Oral BID    potassium chloride  20 mEq Oral Daily    pantoprazole  40 mg Oral QAM AC    cetirizine  5 mg Oral Daily    citalopram  10 mg Oral Daily    sodium chloride flush  5-40 mL IntraVENous 2 times per day    enoxaparin  40 mg SubCUTAneous BID    methylPREDNISolone  40 mg IntraVENous Q6H    Followed by   Aj Naranjo ON 12/2/2021] predniSONE  40 mg Oral Daily    albuterol sulfate HFA  2 puff Inhalation Q4H WA    ipratropium  2 puff Inhalation Q4H WA    fluticasone  1 puff Inhalation BID      sodium chloride       Lab Data: Relevant and available CV data reviewed    ASSESSMENT AND PLAN   *AS  Status Severe  Plan TAVR will be deferred tomorrow due to sob, possible covid, elevated wbc, diaphoretic, nausea   Continued medical management   Possible TAVR Tuesday next week  *SOB  Status Underlying O2 dependent COPD, possible COVID  Plan Per med/pulmonary  *Ulcer  Plan Consult ID given upcoming TAVR  *Disposition  Discharge when ok from med standpoint  Plan for TAVR next week

## 2021-11-30 NOTE — PROGRESS NOTES
Physical Therapy    Facility/Department: [address] ONCOLOGY  Initial Assessment    NAME: Graham Mcleod  : 1935  MRN: [id number]    Date of Service: 2021    Discharge Recommendations:      PT Equipment Recommendations  Equipment Needed: No  Other: Defer to next level of care     Graham Mcleod scored a 14/24 on the AM-PAC short mobility form. Current research shows that an AM-PAC score of 17 or less is typically not associated with a discharge to the patient's home setting. Based on the patient's AM-PAC score and their current functional mobility deficits, it is recommended that the patient have 3-5 sessions per week of Physical Therapy at d/c to increase the patient's independence. Please see assessment section for further patient specific details. If patient discharges prior to next session this note will serve as a discharge summary. Please see below for the latest assessment towards goals. Assessment   Body structures, Functions, Activity limitations: Decreased balance; Decreased functional mobility ; Decreased endurance; Decreased strength; Increased pain  Assessment: Pt is an 81 yo female admitted to [facility] for COPD exacerbation. The patient is from assisted living but is currently at a high risk of falling and is unsafe to return to her prior environment. The patient presents with decreased tolerance to activity and generalized weakness with all out of bed activity. The patient fatigued rapidly with ambulating 6 ft and required CGA-Min A. Recommending continued skilled PT to safely progress functional mobility back to baseline.   Treatment Diagnosis: Generalized weakness and decreased tolerance to activity  Prognosis: Fair  Decision Making: Medium Complexity  Exam: Balance, functional mobility  Clinical Presentation: Evolving  PT Education: Goals; Plan of Care; PT Role; Transfer Training; General Safety; Gait Training  Patient Education: D/c recommendations - pt verbalized understanding  Barriers to Learning: None  REQUIRES PT FOLLOW UP: Yes  Activity Tolerance  Activity Tolerance: Patient limited by endurance; Patient limited by fatigue  Activity Tolerance: At rest: SpO2 95% on 3L, HR 83 bpm. After ambulation: SpO2 93% on 3L, HR 95 bpm. Pt with mild-moderate BOSE at rest, and moderate-severe BOSE with all out of bed activity. Patient Diagnosis(es): The encounter diagnosis was COPD exacerbation ([facility] [address] has a past medical history of Allergic rhinitis, Anxiety, Aortic stenosis, CHF (congestive heart failure) ([address] COPD (chronic obstructive pulmonary disease) ([address] Depression, Diabetes mellitus ([facility] [address] Hypertension, Hypothyroidism, MARIAA treated with BiPAP, and Urinary incontinence. has a past surgical history that includes Hysterectomy, total abdominal; Carpal tunnel release; Cholecystectomy; eye surgery; Tonsillectomy; Uvulectomy; Cardiac catheterization (10/11/2021); and Dental surgery (N/A, 11/12/2021). Restrictions  Restrictions/Precautions  Restrictions/Precautions: Fall Risk, Modified Diet (high fall risk; easy to chew)  Position Activity Restriction  Other position/activity restrictions: Per H&P on 11/29 \"86 y.o. female who presents from SNF for dyspnea. From [address] (Covered by [facility] Blake there). Today, presents with with dyspnea and wheezing. C/o waking up SOB - she has a hx of COPD, CHF. No edema, weight gain, no n/v/d. She is On spironolactone, lasix.   Also, she is slated for [facility] TAVR tomorrow per dr Sandra Graves" 11/30 - TAVR deferred until SOB improved per cardiology     Vision/Hearing  Vision: Impaired  Vision Exceptions: Wears glasses for reading (reports she uses a magnified glass)  Hearing: Exceptions to [facility]  Hearing Exceptions: Hard of hearing/hearing concerns; Bilateral hearing aid     Subjective  General  Chart Reviewed: Yes  Patient assessed for rehabilitation services?: Yes  Diagnosis: COPD exacerbation  Follows Commands: Within Functional Limits  General Comment  Comments: Pt semi-reclined in bed upon therapist arrival.  Subjective  Subjective: Pt agreeable to PT/OT eval. Denies pain. Pain Screening  Patient Currently in Pain: Denies  Vital Signs  Patient Currently in Pain: Denies  Pre Treatment Pain Screening  Intervention List: Patient able to continue with treatment    Orientation  Orientation  Overall Orientation Status: Impaired  Orientation Level: Oriented to person; Oriented to situation; Oriented to place; Disoriented to time     Social/Functional History  Social/Functional History  Lives With: Alone  Type of Home: Facility ([facility])  Home Layout: One level  Home Access: Level entry  Bathroom Shower/Tub: Walk-in shower, Shower chair with back  [place] Toilet: Handicap height  Bathroom Equipment: Hand-held shower, Toilet raiser, Grab bars in shower, Grab bars around toilet  Bathroom Accessibility: [facility]: 4 wheeled walker, Wheelchair-electric, Alert Button, Oxygen, Sock aid, Reacher, [address] chair, Hospital bed (2-3 L O2 at baseline)  ADL Assistance: Needs assistance (uses sock aide for dressing, supervision for shower)  Homemaking Assistance: Needs assistance (meals provided, facility cleans, does sheets. pt does her own laundry)  Ambulation Assistance: Independent (4WW in home, scooter in community)  Transfer Assistance: Needs assistance (needs assist in/out of shower)  Active : No  Patient's  Info: pt son or facility [address] 79: bingo, crafts  Additional Comments: pt reports she fell out of bed, fell reaching for the remote while sitting on a tote     Cognition   Cognition  Overall Cognitive Status: WFL  Cognition Comment: flat affect during eval    Objective  AROM RLE (degrees)  RLE AROM: WFL  AROM LLE (degrees)  LLE AROM : WFL  Strength RLE  Strength RLE: WFL  Comment: Grossly at least 3/5 as observed through functional mobility. Formal testing deferred due to SOB sitting EOB.   Strength LLE  Strength LLE: WFL  Comment: Grossly at least 3/5 as observed through functional mobility. Formal testing deferred due to SOB sitting EOB. Sensation  Overall Sensation Status: WNL     Bed mobility  Rolling to Left: Minimal assistance  Supine to Sit: 2 Person assistance; Moderate assistance  Scooting: Maximal assistance  Comment: HOB slightly elevated with use of rail     Transfers  Sit to Stand: Contact guard assistance (CGA from EOB and from elevated toilet with use of grab bar)  Stand to sit: Contact guard assistance (To chair and BSC (over toilet), cues for hand placement)     Ambulation  Ambulation?: Yes  Ambulation 1  Surface: level tile  Device: Rolling Walker  Other Apparatus: O2 (3L)  Assistance: Contact guard assistance; Minimal assistance (CGA with 2 instances of Min A due to (R) knee buckling)  Quality of Gait: Slow lala, decreased stance time on (R), decreased step length on (L), lack heel strike (B)  Distance: 2 + 6 + 6 ft  Comments: Pt ambulated bed>chair, then to/from bathroom with seated rest breaks in between. Severe BOSE noted with ambulation. Balance  Sitting - Static: Good  Sitting - Dynamic: Good; -  Standing - Static: Fair; +  Standing - Dynamic: Fair  Comments: Indep for static sitting at EOB. Supervision for dynamic sitting at EOB, limited forward flexion tolerance this date due to SOB. CGA for static standing balance with (B) UE support on RW. CGA-Min A for dynamic standing balance with (B) UE support on RW. Other activity  Pt assisted up to bathroom, voided small BM. See OT note for assist with lower body dressing and toileting. Plan   Plan  Times per week: 3-5x  Current Treatment Recommendations: Strengthening, Endurance Training, Transfer Training, Patient/Caregiver Education & Training, Safety Education & Training, Gait Training, Balance Training, Functional Mobility Training  Safety Devices  Type of devices:  All fall risk precautions in place, Gait belt, Patient at risk for falls, Left in chair, Chair alarm in place, Call light within reach, Nurse notified    AM-PAC Score  AM-PAC Inpatient Mobility Raw Score : 14 (11/30/21 1605)  AM-PAC Inpatient T-Scale Score : 38.1 (11/30/21 1605)  Mobility Inpatient CMS 0-100% Score: 61.29 (11/30/21 1605)  Mobility Inpatient CMS G-Code Modifier : CL (11/30/21 1605)          Goals  Short term goals  Time Frame for Short term goals: Before discharge  Short term goal 1: Pt will complete bed mobility with HOB elevated mod I  Short term goal 2: Pt will complete sit<>stand Mod I  Short term goal 3: Pt will ambulate 50 ft with RW Mod I  Short term goal 4: Pt will tolerate 5 minutes of standing with unilateral UE support Mod I in order to complete ADLs  Patient Goals   Patient goals : Go home       Therapy Time   Individual Concurrent Group Co-treatment   Time In 1420         Time Out 1515         Minutes 55         Timed Code Treatment Minutes: 40 Minutes       Alberto Severino PT, DPT #[id number]

## 2021-11-30 NOTE — PROGRESS NOTES
Occupational Therapy   Occupational Therapy Initial Assessment  Date: 2021   Patient Name: Colette Martinez  MRN: [id number]     : 1935    Date of Service: 2021    Discharge Recommendations:  Colette Martinez scored a 14/24 on the AM-PAC ADL Inpatient form. Current research shows that an AM-PAC score of 17 or less is typically not associated with a discharge to the patient's home setting. Based on the patient's AM-PAC score and their current ADL deficits, it is recommended that the patient have 3-5 sessions per week of Occupational Therapy at d/c to increase the patient's independence. Please see assessment section for further patient specific details. If patient discharges prior to next session this note will serve as a discharge summary. Please see below for the latest assessment towards goals. OT Equipment Recommendations  Equipment Needed: No (continue to assess needs)    Assessment   Performance deficits / Impairments: Decreased functional mobility ; Decreased ADL status; Decreased endurance; Decreased high-level IADLs; Decreased strength  Assessment: pt not at baseline level of functioning and would benefit from ongoing skilled OT services in order to return to [facility]. pt presents with decreased strength and endurance. she typically lives alone and is currently unsafe to return to home setting  Treatment Diagnosis: COPD exacerbation  Prognosis: Fair  Decision Making: Medium Complexity  History: [place] from [place], assist for shower transfer, supervision for shower, independent with 4WW and scooter  Assistance / Modification: assist of 1-2  Patient Education: eval, POC, discharge, transfers-pt would benefit from ongoing education for carryover  Barriers to Learning: hearing  REQUIRES OT FOLLOW UP: Yes  Activity Tolerance  Activity Tolerance: Patient Tolerated treatment well  Safety Devices  Safety Devices in place: Yes  Type of devices:  All fall risk precautions in place; Nurse notified; Call light within reach; Left in chair; Chair alarm in place; Patient at risk for falls; Gait belt           Patient Diagnosis(es): The encounter diagnosis was COPD exacerbation ([facility] [address] has a past medical history of Allergic rhinitis, Anxiety, Aortic stenosis, CHF (congestive heart failure) ([facility] [address] COPD (chronic obstructive pulmonary disease) ([facility] [address] Depression, Diabetes mellitus ([facility] [address] Hypertension, Hypothyroidism, MARIAA treated with BiPAP, and Urinary incontinence. has a past surgical history that includes Hysterectomy, total abdominal; Carpal tunnel release; Cholecystectomy; eye surgery; Tonsillectomy; Uvulectomy; Cardiac catheterization (10/11/2021); and Dental surgery (N/A, 11/12/2021). Treatment Diagnosis: COPD exacerbation      Restrictions  Restrictions/Precautions  Restrictions/Precautions: Fall Risk, Modified Diet (high fall risk; easy to chew)  Position Activity Restriction  Other position/activity restrictions: Per H&P on 11/29 \"86 y.o. female who presents from SNF for dyspnea. From [address] (Covered by Josh Melo there). Today, presents with with dyspnea and wheezing. C/o waking up SOB - she has a hx of COPD, CHF. No edema, weight gain, no n/v/d. She is On spironolactone, lasix. Also, she is slated for [facility] TAVR tomorrow per dr Mason Berry" 11/30 - TAVR deferred until SOB improved per cardiology    Subjective   General  Chart Reviewed: Yes  Patient assessed for rehabilitation services?: Yes  Family / Caregiver Present: No  Diagnosis: COPD exacerbation  Subjective  Subjective: pt supine upon arrival, on 3 L O2. SPo2 at 95%.  agreeable to OT eval, denies pain  Patient Currently in Pain: Denies  Vital Signs  Patient Currently in Pain: Denies  Social/Functional History  Social/Functional History  Lives With: Alone  Type of Home: Facility ([facility])  Home Layout: One level  Home Access: Level entry  Bathroom Shower/Tub: Walk-in shower, Shower chair with back  [employer]: Handicap height  Bathroom Equipment: Hand-held shower, Toilet raiser, Grab bars in shower, Grab bars around toilet  Bathroom Accessibility: Accessible  Home Equipment: 4 wheeled walker, Wheelchair-electric, Alert Button, Oxygen, Sock aid, Reacher, Lift chair, Hospital bed (2-3 L O2 at baseline)  ADL Assistance: Needs assistance (uses sock aide for dressing, supervision for shower)  Homemaking Assistance: Needs assistance (meals provided, facility cleans, does sheets. pt does her own laundry)  Ambulation Assistance: Independent (4WW in home, scooter in community)  Transfer Assistance: Needs assistance (needs assist in/out of shower)  Active : No  Patient's  Info: pt son or facility Joseph 79: jung vega  Additional Comments: pt reports she fell out of bed, fell reaching for the remote while sitting on a tote       Objective   Vision: Impaired  Vision Exceptions: Wears glasses for reading (reports she uses a magnified glass)  Hearing: Exceptions to [facility]  Hearing Exceptions: Hard of hearing/hearing concerns; Bilateral hearing aid    Orientation  Overall Orientation Status: Within Functional Limits  Observation/Palpation  Posture: Fair  Observation: forward flexed posture  Balance  Sitting Balance: Stand by assistance  Standing Balance: Contact guard assistance  Standing Balance  Time: ~4-5 minutes x 2  Activity: mobility to/from bathroom  Comment: R knee buckling x 2. pt able to self correct. ambulation varies from Cristin to [address] increased time. SOB after mobility SPo2 at 93%. HR 95 BPM  Functional Mobility  Functional - Mobility Device: Rolling Walker  Activity: To/from bathroom  Assist Level: Minimal assistance  Functional Mobility Comments: assist varies from Cristin to [address]  R knee buckling x 2 during mobility, flexed posture, pt typically uses at 4WW. resting B elbows on RW during ambulation  Toilet Transfers  Toilet - Technique: Ambulating  Equipment Used: Standard toilet (with BSC over osmar)  Toilet Transfer: Contact guard assistance  Toilet Transfers Comments: grab bar used  ADL  Feeding: pt reporting she had multiple teeth pulled in the past week. Reports difficulty feeding since teeth being pulled, spoke with RN, recommend speech  Grooming: Setup  UE Bathing: Maximum assistance (frequent rest breaks needed d/t SOB. maxA for washing arm pits and under breasts)  LE Bathing: Maximum assistance (frequent rest breaks d/t SOB)  UE Dressing: Minimal assistance (donning and doffing gowns)  LE Dressing: Dependent/Total; Maximum assistance (donning socks, total assist, pt uses reacher/sock aide at baseline, maxA for brief)  Toileting: Dependent/Total (darren care and change of mepilex)  Additional Comments: pt seated EOB for ADL bathing and dressing, walked to/from bathroom with CGA to Cristin with use of RW. SOB after mobility. SPo2 at 93%. assist for darren care and brief change. purewick in chair secondary to incontinence  Tone RUE  RUE Tone: Normotonic  Tone LUE  LUE Tone: Normotonic  Coordination  Movements Are Fluid And Coordinated: Yes     Bed mobility  Rolling to Left: Minimal assistance  Supine to Sit: 2 Person assistance; Moderate assistance (modA of 2)  Scooting: Maximal assistance  Comment: HOB elevated, used bed rails. slightly dizzy upon sitting EOB.  sat EOB for bathing and dressing  Transfers  Sit to stand: Contact guard assistance  Stand to sit: Contact guard assistance  Transfer Comments: VCs for hand placement for all transfers     Cognition  Overall Cognitive Status: WFL  Cognition Comment: flat affect during eval  Perception  Overall Perceptual Status: WFL     Sensation  Overall Sensation Status: WFL        LUE AROM (degrees)  LUE AROM : Exceptions  L Shoulder Flexion 0-180: ~0-90 degrees  RUE AROM (degrees)  RUE AROM : Exceptions  R Shoulder Flexion 0-180: ~0-90 degrees  LUE Strength  Gross LUE Strength: WFL  RUE Strength  Gross RUE Strength: WFL                   Plan   Plan  Times per week: 3-5  Times per day: Daily  Current Treatment Recommendations: Strengthening, Patient/Caregiver Education & Training, Balance Training, Functional Mobility Training, Endurance Training, Self-Care / ADL                AM-PAC Score        AM-PAC Inpatient Daily Activity Raw Score: 14 (11/30/21 1605)  AM-PAC Inpatient ADL T-Scale Score : 33.39 (11/30/21 1605)  ADL Inpatient CMS 0-100% Score: 59.67 (11/30/21 1605)  ADL Inpatient CMS G-Code Modifier : CK (11/30/21 1605)    Goals  Short term goals  Time Frame for Short term goals: discharge  Short term goal 1: bed mobility Cristin  Short term goal 2: functional ADL transfer SBA  Short term goal 3: functional mobility with RW/4WW and SBA  Short term goal 4: UB ADLs Cristin  Short term goal 5: LB ADLs with AE as needed Cristin  Patient Goals   Patient goals : go home to get therapy       Therapy Time   Individual Concurrent Group Co-treatment   Time In 1421         Time Out 1515         Minutes 54           Timed Code Treatment Minutes:  39 Minutes    Total Treatment Minutes:  54 minutes      Johan Fu, OTR/L [id number] [facility] 289, OT

## 2021-12-01 PROBLEM — L89.159 DECUBITUS ULCER OF COCCYX: Status: ACTIVE | Noted: 2021-12-01

## 2021-12-01 LAB
A/G RATIO: 1 (ref 1.1–2.2)
ALBUMIN SERPL-MCNC: 3.7 G/DL (ref 3.4–5)
ALP BLD-CCNC: 70 U/L (ref 40–129)
ALT SERPL-CCNC: 26 U/L (ref 10–40)
ANION GAP SERPL CALCULATED.3IONS-SCNC: 11 MMOL/L (ref 3–16)
AST SERPL-CCNC: 34 U/L (ref 15–37)
BASOPHILS ABSOLUTE: 0 K/UL (ref 0–0.2)
BASOPHILS RELATIVE PERCENT: 0.1 %
BILIRUB SERPL-MCNC: <0.2 MG/DL (ref 0–1)
BUN BLDV-MCNC: 19 MG/DL (ref 7–20)
CALCIUM SERPL-MCNC: 8.9 MG/DL (ref 8.3–10.6)
CHLORIDE BLD-SCNC: 98 MMOL/L (ref 99–110)
CO2: 29 MMOL/L (ref 21–32)
CREAT SERPL-MCNC: 0.9 MG/DL (ref 0.6–1.2)
EOSINOPHILS ABSOLUTE: 0 K/UL (ref 0–0.6)
EOSINOPHILS RELATIVE PERCENT: 0.1 %
GFR AFRICAN AMERICAN: >60
GFR NON-AFRICAN AMERICAN: 60
GLUCOSE BLD-MCNC: 200 MG/DL (ref 70–99)
HCT VFR BLD CALC: 36.6 % (ref 36–48)
HEMOGLOBIN: 12 G/DL (ref 12–16)
L. PNEUMOPHILA SEROGP 1 UR AG: NORMAL
LYMPHOCYTES ABSOLUTE: 0.8 K/UL (ref 1–5.1)
LYMPHOCYTES RELATIVE PERCENT: 7.5 %
MCH RBC QN AUTO: 29.6 PG (ref 26–34)
MCHC RBC AUTO-ENTMCNC: 32.7 G/DL (ref 31–36)
MCV RBC AUTO: 90.6 FL (ref 80–100)
MONOCYTES ABSOLUTE: 0.5 K/UL (ref 0–1.3)
MONOCYTES RELATIVE PERCENT: 4.9 %
NEUTROPHILS ABSOLUTE: 9.6 K/UL (ref 1.7–7.7)
NEUTROPHILS RELATIVE PERCENT: 87.4 %
ORGANISM: ABNORMAL
PDW BLD-RTO: 13.4 % (ref 12.4–15.4)
PLATELET # BLD: 185 K/UL (ref 135–450)
PMV BLD AUTO: 10.1 FL (ref 5–10.5)
POTASSIUM REFLEX MAGNESIUM: 4.1 MMOL/L (ref 3.5–5.1)
RBC # BLD: 4.04 M/UL (ref 4–5.2)
SODIUM BLD-SCNC: 138 MMOL/L (ref 136–145)
STREP PNEUMONIAE ANTIGEN, URINE: NORMAL
TOTAL PROTEIN: 7.3 G/DL (ref 6.4–8.2)
URINE CULTURE, ROUTINE: ABNORMAL
WBC # BLD: 11 K/UL (ref 4–11)

## 2021-12-01 PROCEDURE — 6370000000 HC RX 637 (ALT 250 FOR IP): Performed by: INTERNAL MEDICINE

## 2021-12-01 PROCEDURE — 94640 AIRWAY INHALATION TREATMENT: CPT

## 2021-12-01 PROCEDURE — 6360000002 HC RX W HCPCS: Performed by: INTERNAL MEDICINE

## 2021-12-01 PROCEDURE — 99233 SBSQ HOSP IP/OBS HIGH 50: CPT | Performed by: INTERNAL MEDICINE

## 2021-12-01 PROCEDURE — 36415 COLL VENOUS BLD VENIPUNCTURE: CPT

## 2021-12-01 PROCEDURE — 97530 THERAPEUTIC ACTIVITIES: CPT

## 2021-12-01 PROCEDURE — 99233 SBSQ HOSP IP/OBS HIGH 50: CPT | Performed by: NURSE PRACTITIONER

## 2021-12-01 PROCEDURE — 80053 COMPREHEN METABOLIC PANEL: CPT

## 2021-12-01 PROCEDURE — 2700000000 HC OXYGEN THERAPY PER DAY

## 2021-12-01 PROCEDURE — 2580000003 HC RX 258: Performed by: INTERNAL MEDICINE

## 2021-12-01 PROCEDURE — 94660 CPAP INITIATION&MGMT: CPT

## 2021-12-01 PROCEDURE — 94761 N-INVAS EAR/PLS OXIMETRY MLT: CPT

## 2021-12-01 PROCEDURE — 1200000000 HC SEMI PRIVATE

## 2021-12-01 PROCEDURE — 85025 COMPLETE CBC W/AUTO DIFF WBC: CPT

## 2021-12-01 RX ORDER — CIPROFLOXACIN 500 MG/1
500 TABLET, FILM COATED ORAL EVERY 12 HOURS SCHEDULED
Status: DISCONTINUED | OUTPATIENT
Start: 2021-12-01 | End: 2021-12-02

## 2021-12-01 RX ADMIN — DOCUSATE SODIUM 100 MG: 100 CAPSULE ORAL at 09:07

## 2021-12-01 RX ADMIN — CETIRIZINE HYDROCHLORIDE 5 MG: 10 TABLET, FILM COATED ORAL at 09:09

## 2021-12-01 RX ADMIN — OXYBUTYNIN CHLORIDE 2.5 MG: 5 TABLET ORAL at 09:11

## 2021-12-01 RX ADMIN — MAGNESIUM HYDROXIDE 30 ML: 400 SUSPENSION ORAL at 10:35

## 2021-12-01 RX ADMIN — FUROSEMIDE 40 MG: 40 TABLET ORAL at 15:35

## 2021-12-01 RX ADMIN — IPRATROPIUM BROMIDE AND ALBUTEROL SULFATE 1 AMPULE: .5; 3 SOLUTION RESPIRATORY (INHALATION) at 15:00

## 2021-12-01 RX ADMIN — Medication 10 ML: at 21:09

## 2021-12-01 RX ADMIN — ATORVASTATIN CALCIUM 10 MG: 10 TABLET, FILM COATED ORAL at 21:06

## 2021-12-01 RX ADMIN — PANTOPRAZOLE SODIUM 40 MG: 40 TABLET, DELAYED RELEASE ORAL at 06:28

## 2021-12-01 RX ADMIN — BUDESONIDE 500 MCG: 0.5 SUSPENSION RESPIRATORY (INHALATION) at 20:02

## 2021-12-01 RX ADMIN — BUDESONIDE 500 MCG: 0.5 SUSPENSION RESPIRATORY (INHALATION) at 08:27

## 2021-12-01 RX ADMIN — METHYLPREDNISOLONE SODIUM SUCCINATE 40 MG: 40 INJECTION, POWDER, FOR SOLUTION INTRAMUSCULAR; INTRAVENOUS at 04:41

## 2021-12-01 RX ADMIN — METHOCARBAMOL TABLETS 500 MG: 500 TABLET, COATED ORAL at 09:07

## 2021-12-01 RX ADMIN — CITALOPRAM HYDROBROMIDE 10 MG: 20 TABLET ORAL at 09:07

## 2021-12-01 RX ADMIN — SPIRONOLACTONE 12.5 MG: 25 TABLET ORAL at 10:35

## 2021-12-01 RX ADMIN — METHOCARBAMOL TABLETS 500 MG: 500 TABLET, COATED ORAL at 21:07

## 2021-12-01 RX ADMIN — OXYBUTYNIN CHLORIDE 2.5 MG: 5 TABLET ORAL at 21:06

## 2021-12-01 RX ADMIN — LINEZOLID 600 MG: 600 INJECTION, SOLUTION INTRAVENOUS at 06:30

## 2021-12-01 RX ADMIN — CIPROFLOXACIN 500 MG: 500 TABLET, FILM COATED ORAL at 21:06

## 2021-12-01 RX ADMIN — ARFORMOTEROL TARTRATE 15 MCG: 15 SOLUTION RESPIRATORY (INHALATION) at 08:27

## 2021-12-01 RX ADMIN — LEVOTHYROXINE SODIUM 75 MCG: 0.07 TABLET ORAL at 09:11

## 2021-12-01 RX ADMIN — ARFORMOTEROL TARTRATE 15 MCG: 15 SOLUTION RESPIRATORY (INHALATION) at 20:02

## 2021-12-01 RX ADMIN — POTASSIUM CHLORIDE 20 MEQ: 20 TABLET, EXTENDED RELEASE ORAL at 09:08

## 2021-12-01 RX ADMIN — FUROSEMIDE 40 MG: 40 TABLET ORAL at 09:11

## 2021-12-01 RX ADMIN — OXYBUTYNIN CHLORIDE 2.5 MG: 5 TABLET ORAL at 15:35

## 2021-12-01 RX ADMIN — LINEZOLID 600 MG: 600 INJECTION, SOLUTION INTRAVENOUS at 19:37

## 2021-12-01 RX ADMIN — DOCUSATE SODIUM 100 MG: 100 CAPSULE ORAL at 21:06

## 2021-12-01 RX ADMIN — IPRATROPIUM BROMIDE AND ALBUTEROL SULFATE 1 AMPULE: .5; 3 SOLUTION RESPIRATORY (INHALATION) at 20:02

## 2021-12-01 RX ADMIN — ENOXAPARIN SODIUM 40 MG: 100 INJECTION SUBCUTANEOUS at 21:09

## 2021-12-01 RX ADMIN — IPRATROPIUM BROMIDE AND ALBUTEROL SULFATE 1 AMPULE: .5; 3 SOLUTION RESPIRATORY (INHALATION) at 08:27

## 2021-12-01 RX ADMIN — METHOCARBAMOL TABLETS 500 MG: 500 TABLET, COATED ORAL at 15:36

## 2021-12-01 RX ADMIN — Medication 10 ML: at 09:12

## 2021-12-01 RX ADMIN — ENOXAPARIN SODIUM 40 MG: 100 INJECTION SUBCUTANEOUS at 09:12

## 2021-12-01 ASSESSMENT — ENCOUNTER SYMPTOMS
TROUBLE SWALLOWING: 0
FACIAL SWELLING: 0
PHOTOPHOBIA: 0
STRIDOR: 0
SHORTNESS OF BREATH: 0
CHOKING: 0
NAUSEA: 0
EYE DISCHARGE: 0
RHINORRHEA: 0
COUGH: 0
APNEA: 0
ABDOMINAL PAIN: 0
EYE REDNESS: 0
COLOR CHANGE: 0
BLOOD IN STOOL: 0
DIARRHEA: 0
CHEST TIGHTNESS: 0

## 2021-12-01 ASSESSMENT — PAIN SCALES - GENERAL
PAINLEVEL_OUTOF10: 0

## 2021-12-01 NOTE — PROGRESS NOTES
Progress Note - Dr. Edie Yousif - Internal Medicine  PCP: Maynor Kimball [address] [facility] Keturah Angela / [facility] [address] Day: 2  Code Status: Full Code  Current Diet: ADULT DIET; Easy to Chew        CC: follow up on medical issues    Subjective:   Chris Torres is a 82 y.o. female. She denies problems    Doing ok  No new issues  tavr postponed until next week    Now on zyvox for decub ulcer  Appreciate id eval    She denies chest pain, denies shortness of breath, denies nausea,  denies emesis. 10 system Review of Systems is reviewed with patient, and pertinent positives are noted in HPI above . Otherwise, Review of systems is negative. I have reviewed the patient's medical and social history in detail and updated the computerized patient record. To recap: She  has a past medical history of Allergic rhinitis, Anxiety, Aortic stenosis, CHF (congestive heart failure) ([facility] [address] COPD (chronic obstructive pulmonary disease) ([facility] [address] Depression, Diabetes mellitus ([facility] [address] Hypertension, Hypothyroidism, MARIAA treated with BiPAP, and Urinary incontinence. . She  has a past surgical history that includes Hysterectomy, total abdominal; Carpal tunnel release; Cholecystectomy; eye surgery; Tonsillectomy; Uvulectomy; Cardiac catheterization (10/11/2021); and Dental surgery (N/A, 11/12/2021). . She  reports that she has never smoked. She has never used smokeless tobacco. She reports that she does not drink alcohol and does not use drugs. .        Active Hospital Problems    Diagnosis Date Noted    Severe sepsis ([facility] [address] [A41.9, R65.20]     Elevated brain natriuretic peptide (BNP) level [R79.89]     Elevated C-reactive protein (CRP) [R79.82]     History of depression [Z86.59]     Acute respiratory failure with hypoxemia (HCC) [J96.01] 11/29/2021    Hypercholesteremia [E78.00] 08/06/2021    Severe aortic stenosis [I35.0]     Chronic obstructive pulmonary disease ([facility] [address] [J44.9]     Essential hypertension [I10] 06/28/2018    [facility] [J44.9] 01/12/2018    Hypothyroidism [E03.9] 01/12/2018       Current Facility-Administered Medications: budesonide (PULMICORT) nebulizer suspension 500 mcg, 0.5 mg, Nebulization, BID  ipratropium-albuterol (DUONEB) nebulizer solution 1 ampule, 1 ampule, Inhalation, Q6H WA  Arformoterol Tartrate (BROVANA) nebulizer solution 15 mcg, 15 mcg, Nebulization, BID  linezolid (ZYVOX) IVPB 600 mg, 600 mg, IntraVENous, Q12H  levothyroxine (SYNTHROID) tablet 75 mcg, 75 mcg, Oral, Daily  albuterol sulfate  (90 Base) MCG/ACT inhaler 2 puff, 2 puff, Inhalation, Q6H PRN  atorvastatin (LIPITOR) tablet 10 mg, 10 mg, Oral, Nightly  oxybutynin (DITROPAN) tablet 2.5 mg, 2.5 mg, Oral, TID  methocarbamol (ROBAXIN) tablet 500 mg, 500 mg, Oral, 4x Daily  furosemide (LASIX) tablet 40 mg, 40 mg, Oral, BID  spironolactone (ALDACTONE) tablet 12.5 mg, 12.5 mg, Oral, Daily  docusate sodium (COLACE) capsule 100 mg, 100 mg, Oral, BID  potassium chloride (KLOR-CON M) extended release tablet 20 mEq, 20 mEq, Oral, Daily  polyethylene glycol (GLYCOLAX) packet 17 g, 17 g, Oral, Daily PRN  pantoprazole (PROTONIX) tablet 40 mg, 40 mg, Oral, QAM AC  cetirizine (ZYRTEC) tablet 5 mg, 5 mg, Oral, Daily  citalopram (CELEXA) tablet 10 mg, 10 mg, Oral, Daily  sodium chloride flush 0.9 % injection 5-40 mL, 5-40 mL, IntraVENous, 2 times per day  sodium chloride flush 0.9 % injection 5-40 mL, 5-40 mL, IntraVENous, PRN  0.9 % sodium chloride infusion, 25 mL, IntraVENous, PRN  ondansetron (ZOFRAN-ODT) disintegrating tablet 4 mg, 4 mg, Oral, Q8H PRN **OR** ondansetron (ZOFRAN) injection 4 mg, 4 mg, IntraVENous, Q6H PRN  magnesium hydroxide (MILK OF MAGNESIA) 400 MG/5ML suspension 30 mL, 30 mL, Oral, Daily PRN  enoxaparin (LOVENOX) injection 40 mg, 40 mg, SubCUTAneous, BID  acetaminophen (TYLENOL) tablet 650 mg, 650 mg, Oral, Q6H PRN **OR** acetaminophen (TYLENOL) suppository 650 mg, 650 mg, Rectal, Q6H PRN  methylPREDNISolone sodium (SOLU-MEDROL) injection 40 mg, 40 mg, IntraVENous, Q6H **FOLLOWED BY** [START ON 12/2/2021] predniSONE (DELTASONE) tablet 40 mg, 40 mg, Oral, Daily  hydrALAZINE (APRESOLINE) injection 10 mg, 10 mg, IntraVENous, Q6H PRN  potassium chloride (KLOR-CON M) extended release tablet 40 mEq, 40 mEq, Oral, PRN **OR** potassium bicarb-citric acid (EFFER-K) effervescent tablet 40 mEq, 40 mEq, Oral, PRN **OR** potassium chloride 10 mEq/100 mL IVPB (Peripheral Line), 10 mEq, IntraVENous, PRN  0.9 % sodium chloride bolus, 500 mL, IntraVENous, PRN         Objective:  /65   Pulse 85   Temp 97.6 °F (36.4 °C) (Temporal)   Resp 20   Ht 5' (1.524 m)   Wt 182 lb 8 oz (82.8 kg)   SpO2 96%   BMI 35.64 kg/m²      Patient Vitals for the past 24 hrs:   BP Temp Temp src Pulse Resp SpO2   12/01/21 0440 112/65 97.6 °F (36.4 °C) Temporal 85 20 96 %   12/01/21 0410     23 96 %   11/30/21 2352     26 95 %   11/30/21 2247 115/67 98.4 °F (36.9 °C) Oral 82 20 96 %   11/30/21 2113      96 %   11/30/21 2039 102/64        11/30/21 1915 (!) 90/56 98.7 °F (37.1 °C) Oral 81 20 95 %   11/30/21 1630 106/62 98.6 °F (37 °C) Oral 79 20 97 %   11/30/21 1615      94 %   11/30/21 1129 116/67 98.2 °F (36.8 °C)  98 20 95 %   11/30/21 0840     20 94 %   11/30/21 0830 108/63  Oral 81 18 94 %     Patient Vitals for the past 96 hrs (Last 3 readings):   Weight   11/29/21 1711 182 lb 8 oz (82.8 kg)           Intake/Output Summary (Last 24 hours) at 12/1/2021 8514  Last data filed at 12/1/2021 0441  Gross per 24 hour   Intake 410 ml   Output 600 ml   Net -190 ml         Physical Exam:   /65   Pulse 85   Temp 97.6 °F (36.4 °C) (Temporal)   Resp 20   Ht 5' (1.524 m)   Wt 182 lb 8 oz (82.8 kg)   SpO2 96%   BMI 35.64 kg/m²   General appearance: alert, appears stated age and cooperative  Head: Normocephalic, without obvious abnormality, atraumatic  Lungs: clear to auscultation bilaterally  Heart: regular rate and rhythm, S1, S2 normal, no murmur, click, rub or gallop  Abdomen: soft, non-tender; bowel sounds normal; no masses,  no organomegaly  Extremities: extremities normal, atraumatic, no cyanosis or edema  Skin: wound to coccyx    Labs:  Lab Results   Component Value Date    WBC 11.0 12/01/2021    HGB 12.0 12/01/2021    HCT 36.6 12/01/2021     12/01/2021    CHOL 167 06/25/2021    TRIG 125 06/25/2021    HDL 54 06/25/2021    ALT 26 12/01/2021    AST 34 12/01/2021     12/01/2021    K 4.1 12/01/2021    CL 98 (L) 12/01/2021    CREATININE 0.9 12/01/2021    BUN 19 12/01/2021    CO2 29 12/01/2021    TSH 1.85 06/10/2021    INR 1.10 11/29/2021    LABA1C 6.0 07/20/2021    LABMICR YES 11/29/2021     Lab Results   Component Value Date    TROPONINI <0.01 11/29/2021           Recent Imaging Results are Reviewed:  XR CHEST (2 VW)    Result Date: 11/30/2021  EXAMINATION: TWO XRAY VIEWS OF THE CHEST 11/30/2021 5:46 pm COMPARISON: Chest radiograph 11/29/2021; chest, abdomen, and pelvis CT 10/06/2021 HISTORY: ORDERING SYSTEM PROVIDED HISTORY: cough TECHNOLOGIST PROVIDED HISTORY: Reason for exam:->cough Reason for Exam: cough Acuity: Unknown Type of Exam: Unknown FINDINGS: 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 Clear lungs. Diffuse interstitial prominence with indistinct pulmonary vasculature but no definite interlobular septal thickening. No findings of pneumothorax or pleural effusion. Normal mediastinal contour. Mildly prominent cardiac contour. 1. No acute cardiopulmonary process. 2. Pulmonary vascular congestion and mild cardiomegaly. XR CHEST (2 VW)    Result Date: 11/22/2021  EXAMINATION: TWO XRAY VIEWS OF THE CHEST 11/22/2021 1:24 pm COMPARISON: Chest x-ray dated 07/18/2021. HISTORY: ORDERING SYSTEM PROVIDED HISTORY: SOB (shortness of breath) TECHNOLOGIST PROVIDED HISTORY: Reason for exam:->sob FINDINGS: HEART/MEDIASTINUM: The cardiomediastinal silhouette is stable.  PLEURA/LUNGS: There are no focal consolidations or pleural effusions. There is no appreciable pneumothorax. BONES/SOFT TISSUE: No acute abnormality. No radiographic evidence of acute pulmonary disease. CT HEAD WO CONTRAST    Result Date: 11/22/2021  EXAMINATION: CT OF THE HEAD WITHOUT CONTRAST  11/22/2021 1:21 pm TECHNIQUE: CT of the head was performed without the administration of intravenous contrast. Dose modulation, iterative reconstruction, and/or weight based adjustment of the mA/kV was utilized to reduce the radiation dose to as low as reasonably achievable. COMPARISON: None. HISTORY: ORDERING SYSTEM PROVIDED HISTORY: Dysarthria TECHNOLOGIST PROVIDED HISTORY: Reason for exam:->speech issue FINDINGS: BRAIN/VENTRICLES: There is no acute infarct or acute intracranial hemorrhage present. There is no mass effect or midline shift present. There is no ventriculomegaly or abnormal extra-axial fluid collection present. There is mild hypoattenuation within the periventricular and deep white matter of both hemispheres. ORBITS: Limited evaluation of the orbits is unremarkable. SINUSES: There is mild mucosal thickening within the maxillary sinuses. The paranasal sinuses and mastoid air cells are otherwise clear. SOFT TISSUES/SKULL:  No lytic or blastic osseous lesions are identified. 1. No acute intracranial process identified. 2. Mild chronic small vessel ischemic changes. XR CHEST PORTABLE    Result Date: 11/29/2021  EXAMINATION: ONE XRAY VIEW OF THE CHEST 11/29/2021 7:08 am COMPARISON: 11/22/2021 HISTORY: ORDERING SYSTEM PROVIDED HISTORY: Eval for infiltrate TECHNOLOGIST PROVIDED HISTORY: Reason for exam:->Eval for infiltrate Reason for Exam: eval fo infiltrate Acuity: Unknown Type of Exam: Unknown FINDINGS: No lung infiltrate or consolidation. No pneumothorax or pleural effusion. Heart size is normal.     No acute abnormality.        Lab Results   Component Value Date    GLUCOSE 200 12/01/2021     Lab Results   Component Value Date    POCGLU 149 07/22/2021       Assessment and Plan:    COPD, severe ([address] -Established problem. Stable. No wheezing, still on 3L  Plan: cont steroids, inhaled tx - appreciate pulm eval  Active Problems:    Hypothyroidism -Established problem. Stable. Plan: cont on synthroid    Essential hypertension -Established problem. Stable. 112/65  Plan: stay on same meds    COPD exacerbation ([address]    Nonrheumatic aortic valve stenosis  Plan: dr Eb Mcdonald consulted. Await decision on tavr    Hypercholesteremia    Acute respiratory failure with hypoxemia ([address]  Plan: cont o2 for now. Severe sepsis ([address]  Plan: cont abx per ID    Decubitus ulcer of coccyx -Established problem. Stable. Plan: on zyvox. WBC 11    Disp - await ID plan for abx. Home vs SNF depending on whether iv abx needed.   Defer timing of TAVR to cards (Depending on infection status)            Kenn Farrar MD  12/1/2021

## 2021-12-01 NOTE — PROGRESS NOTES
[address]   Cardiology Progress Note     Date: 12/1/2021  Admit Date: 11/29/2021     Reason for consultation: AS    Chief Complaint:   Chief Complaint   Patient presents with    Shortness of Breath       History of Present Illness: History obtained from patient and medical record. Vicky Fine is a 82 y.o. female presented with sob. Sob chronic with exacerbation last day or two. Lives in [facility] with known covid cases. Plan was for TAVR 11/30/21 for severe AS. Interval Hx: TAVR postponed d/t pulmonary status and infection. Today, she is feeling better. Breathing back to baseline. On baseline O2. Patient seen and examined. Clinical notes reviewed. Telemetry reviewed. No new complaints today. No major events overnight. Denies having chest pain, palpitations, shortness of breath, orthopnea/PND, cough, or dizziness at the time of this visit. Past Medical History:  Past Medical History:   Diagnosis Date    Allergic rhinitis     Anxiety     Aortic stenosis     [facility]congest[facility] failure) [facility]     COPD (chronic obstructive pulmonary disease) ([facility])     Depression     Diabetes mellitus [facility] [address]     Hypertension     Hypothyroidism     MARIAA treated with BiPAP 02/14/2018    Urinary incontinence         Past Surgical History:    has a past surgical history that includes Hysterectomy, total abdominal; Carpal tunnel release; Cholecystectomy; eye surgery; Tonsillectomy; Uvulectomy; Cardiac catheterization (10/11/2021); and Dental surgery (N/A, 11/12/2021). Social History:  Reviewed. reports that she has never smoked. She has never used smokeless tobacco. She reports that she does not drink alcohol and does not use drugs. Allergies:  No Known Allergies    Family History:  Reviewed. family history includes Breast Cancer in her daughter, maternal grandmother, and mother; Cancer in her father, mother, and sister.  Denies family history of sudden cardiac death, arrhythmia, premature MD Bharath   formoterol (PERFOROMIST) 20 MCG/2ML nebulizer solution Take 2 mLs by nebulization every 12 hours DX:COPD J44.9  Merlin Braver, MD   ondansetron (ZOFRAN) 4 MG tablet Take 1 tablet by mouth daily as needed for Nausea or Vomiting  Gaby Villagomez MD   albuterol sulfate HFA (VENTOLIN HFA) 108 (90 Base) MCG/ACT inhaler Inhale 2 puffs into the lungs every 6 hours as needed for Wheezing  Merlin Braver, MD   ipratropium-albuterol (DUONEB) 0.5-2.5 (3) MG/3ML SOLN nebulizer solution Inhale 3 mLs into the lungs every 4 hours  Bety Orr MD        Scheduled Meds:   ciprofloxacin  500 mg Oral 2 times per day    budesonide  0.5 mg Nebulization BID    ipratropium-albuterol  1 ampule Inhalation Q6H WA    Arformoterol Tartrate  15 mcg Nebulization BID    linezolid  600 mg IntraVENous Q12H    levothyroxine  75 mcg Oral Daily    atorvastatin  10 mg Oral Nightly    oxybutynin  2.5 mg Oral TID    methocarbamol  500 mg Oral 4x Daily    furosemide  40 mg Oral BID    spironolactone  12.5 mg Oral Daily    docusate sodium  100 mg Oral BID    potassium chloride  20 mEq Oral Daily    pantoprazole  40 mg Oral QAM AC    cetirizine  5 mg Oral Daily    citalopram  10 mg Oral Daily    sodium chloride flush  5-40 mL IntraVENous 2 times per day    enoxaparin  40 mg SubCUTAneous BID     Continuous Infusions:   sodium chloride       PRN Meds:albuterol sulfate HFA, polyethylene glycol, sodium chloride flush, sodium chloride, ondansetron **OR** ondansetron, magnesium hydroxide, acetaminophen **OR** acetaminophen, hydrALAZINE, potassium chloride **OR** potassium alternative oral replacement **OR** potassium chloride, sodium chloride     Review of Systems:  · Constitutional: Negative for fever, night sweats, chills,  · Skin: Negative for rash, pruritus, bleeding, or bruising    · HEENT: Negative for vision changes, ringing in the ears, dysphagia  · Respiratory: Reviewed in HPI  · Cardiovascular: Reviewed in HPI  · Gastrointestinal: Negative for abdominal pain, N/V/D, constipation, or black/tarry stools  · Genito-Urinary: Negative for dysuria, incontinence, or hematuria  · Musculoskeletal: Negative for joint swelling, muscle pain, or injuries  · Neurological/Psych: Negative for confusion, seizures, headaches, or TIA-like symptoms. No anxiety or depression. Physical Examination:  Vitals:    12/01/21 1619   BP:    Pulse:    Resp:    Temp:    SpO2: 96%      In: 400 [P.O.:400]  Out: 600    Wt Readings from Last 3 Encounters:   11/29/21 182 lb 8 oz (82.8 kg)   11/22/21 179 lb (81.2 kg)   10/22/21 183 lb (83 kg)       Intake/Output Summary (Last 24 hours) at 12/1/2021 1646  Last data filed at 12/1/2021 1300  Gross per 24 hour   Intake 400 ml   Output 600 ml   Net -200 ml       Telemetry: Personally Reviewed  - Sinus rhythm   · Constitutional: Cooperative and in no apparent distress, and appears well nourished  · Skin: Warm and pink; no pallor, cyanosis, clubbing, or bruising   · HEENT: Symmetric and normocephalic. PERRL. Conjunctiva pink with clear sclera. Mucus membranes moist.   · Cardiovascular: Regular rate and rhythm. S1/S2 present with murmur, no rubs or gallops. Peripheral pulses 2+, capillary refill < 3 seconds. No elevation of JVP. No peripheral edema  · Respiratory: Respirations symmetric and unlabored. Lungs clear to auscultation bilaterally, no wheezing, crackles, or rhonchi  · Gastrointestinal: Abdomen soft and round. Bowel sounds active without tenderness. · Musculoskeletal: Bilateral upper and lower extremity strength generalized weakness   · Neurologic/Psych: Awake and orientated to person, place and time.  Calm affect, appropriate mood    Pertinent labs, diagnostic, device, and imaging results reviewed as a part of this visit    Labs:    BMP:   Recent Labs     11/29/21  0750 11/30/21  0631 12/01/21  0607    135* 138   K 3.3* 3.7 4.1   CL 93* 98* 98*   CO2 35* 30 29   BUN 13 18 19   CREATININE 0.7 0.7 0.9   MG 2.20  --   --      Estimated Creatinine Clearance: 44 mL/min (based on SCr of 0.9 mg/dL). CBC:   Recent Labs     21  0750 21  0631 21  0607   WBC 12.3* 7.2 11.0   HGB 12.7 10.9* 12.0   HCT 38.3 32.7* 36.6   MCV 88.4 88.4 90.6    210 185     Thyroid:   Lab Results   Component Value Date    TSH 1.85 06/10/2021     Lipids:   Lab Results   Component Value Date    CHOL 167 2021    HDL 54 2021    TRIG 125 2021     LFTS:   Lab Results   Component Value Date    ALT 26 2021    AST 34 2021    ALKPHOS 70 2021    PROT 7.3 2021    AGRATIO 1.0 2021    BILITOT <0.2 2021     Cardiac Enzymes:   Lab Results   Component Value Date    TROPONINI <0.01 2021    TROPONINI 0.02 2021    TROPONINI <0.01 2018     Coags:   Lab Results   Component Value Date    PROTIME 12.5 2021    INR 1.10 2021       EC21  Baseline artifact  Normal sinus rhythm    ECHO:  21  Summary   -Normal left ventricle size, wall thickness and systolic function with an   estimated ejection fraction of 55-60%. -No regional wall motion abnormalities are noted. -Grade I diastolic dysfunction with normal LV filling pressures. E/e' =   18.6.   -Severe aortic stenosis with a peak velocity of 4.1 m/s and a mean pressure   gradient of 39mmHg. HOLDEN (VTI) is 2.47 cm2. (gradients showed with definity). -No evidence of aortic valve regurgitation.   -There is mild mitral stenosis with a mean gradient of 3 mmHG, P1/2 is 89   ms., Vmax is 1.4 cm/s, HOLDEN (P1/2) is 2.47 cm2.   -Mild mitral regurgitation.   -Mild tricuspid regurgitation. -IVC size is dilated (>2.1 cm) but collapses > 50% with respiration   consistent with elevated RA pressure (8 mmHg). -Estimated pulmonary artery systolic pressure is elevated at 41 mmHg   assuming a right atrial pressure of 8 mmHg.     Cath:10/2021  Findings:  Artery Findings/Result   LM Normal   LAD Normal Cx Normal   RI N/A   RCA Normal   LVEDP NA   LVG NA      Intervention:         None     Post Cath Dx:       Normal coronaries  Continued TAVR workup    Problem List:   Patient Active Problem List    Diagnosis Date Noted    Acute pulmonary edema ([address]     Coronary artery disease due to lipid rich plaque 07/17/2021    Decubitus ulcer of coccyx 12/01/2021    Weight loss counseling, encounter for     E coli infection     Severe sepsis ([address]     Elevated brain natriuretic peptide (BNP) level     Elevated C-reactive protein (CRP)     History of depression     Acute respiratory failure with hypoxemia ([address] 11/29/2021    Aspiration into airway 08/10/2021    Hypercholesteremia 08/06/2021    Nonrheumatic aortic valve stenosis     Acute respiratory failure ([address] 07/16/2021    Senile osteoporosis 06/28/2021    Bilateral sensorineural hearing loss 07/31/2019    Bilateral hearing loss 06/18/2019    Acute suppurative otitis media of right ear with spontaneous rupture of tympanic membrane 06/04/2019    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 06/04/2019    Pulmonary nodule 02/26/2019    Class 3 severe obesity without serious comorbidity with body mass index (BMI) of 40.0 to 44.9 in adult ([address] 01/15/2019    Chronic bronchitis (HCC)     COPD exacerbation (HCC)     Pitting edema 06/28/2018    BASIA (acute kidney injury) ([address] 06/28/2018    Chronic respiratory failure with hypoxia ([address] 06/28/2018    Essential hypertension 06/28/2018    Mixed hyperlipidemia 06/28/2018    Acute on chronic diastolic heart failure ([address] 06/21/2018    Multifocal pneumonia 04/04/2018    Centrilobular emphysema ([address] 04/04/2018    Bilateral lower extremity edema 03/09/2018    Obstructive sleep apnea (adult) (pediatric) 02/14/2018    SOB (shortness of breath) 02/02/2018    Chronic seasonal allergic rhinitis 01/12/2018    COPD, severe ([address] 01/12/2018    Idiopathic peripheral neuropathy 01/12/2018    Hypothyroidism 01/12/2018    Depression 01/12/2018    Chronic congestive heart failure ([facility] [address] 01/12/2018        Assessment and Plan: Aortic stenosis   ~ severe   ~ TAVR was planned for 11/30 but deferred d/t pulmonary status and infection. Plan for TAVR once cleared from an ID perspective.   ~ BP improved, avoid hypotension     Shortness of breath   ~ hx of COPD and oxygen dependent   ~ on steroids, inhalers, and nebs   ~ improved, on baseline O2    Decubitus ulcer of coccyx   ~ on abx per ID     Pt cleared for discharge when ok with others. Will follow peripherally. Plan for TAVR when cleared by ID. Multiple medical conditions with risk of decompensation. All pertinent information and plan of care discussed with the physician. All questions and concerns were addressed to the patient. Alternatives to my treatment were discussed. I have discussed the above stated plan with patient and the nurse. The patient verbalized understanding and agreed with the plan. Thank you for allowing to us to participate in the care of Isabelle Mitchell. Total visit time > 35 minutes; > 50% spend counseling / coordinating care. I reviewed interval history, physical exam, review of data including labs, imaging, development and implementation of treatment plan and coordination of complex care. Counseled on risk factor modifications. Regan VELIZ-CNP  [address]   Office: [phone]    NOTE:  This report was transcribed using voice recognition software. Every effort was made to ensure accuracy; however, inadvertent computerized transcription errors may be present.

## 2021-12-01 NOTE — PROGRESS NOTES
PULMONARY AND CRITICAL CARE MEDICINE PROGRESS NOTE      SUBJECTIVE: No acute events overnight. Remains on 3 L nasal cannula saturating well. Sitting comfortably in the chair. Denies any shortness of breath. No wheezing. REVIEW OF SYSTEMS:   CONSTITUTIONAL SYMPTOMS: The patient denies fever, fatigue, night sweats, weight loss or weight gain. HEENT: No vision changes. No tinnitus, Denies sinus pain. No hoarseness, or dysphagia. CARDIOVASCULAR: Denies chest pain, palpitation, syncope. RESPIRATORY: Denies shortness of breath or cough. GASTROINTESTINAL: Denies nausea, abdominal pain or change in bowel function. SKIN: No rashes or itching. MUSCULOSKELETAL: Denies weakness or bone pain. NEUROLOGICAL: No headaches or seizures.      MEDICATIONS:      budesonide  0.5 mg Nebulization BID    ipratropium-albuterol  1 ampule Inhalation Q6H WA    Arformoterol Tartrate  15 mcg Nebulization BID    linezolid  600 mg IntraVENous Q12H    levothyroxine  75 mcg Oral Daily    atorvastatin  10 mg Oral Nightly    oxybutynin  2.5 mg Oral TID    methocarbamol  500 mg Oral 4x Daily    furosemide  40 mg Oral BID    spironolactone  12.5 mg Oral Daily    docusate sodium  100 mg Oral BID    potassium chloride  20 mEq Oral Daily    pantoprazole  40 mg Oral QAM AC    cetirizine  5 mg Oral Daily    citalopram  10 mg Oral Daily    sodium chloride flush  5-40 mL IntraVENous 2 times per day    enoxaparin  40 mg SubCUTAneous BID    methylPREDNISolone  40 mg IntraVENous Q6H    Followed by   Aj Naranjo ON 12/2/2021] predniSONE  40 mg Oral Daily      sodium chloride       albuterol sulfate HFA, polyethylene glycol, sodium chloride flush, sodium chloride, ondansetron **OR** ondansetron, magnesium hydroxide, acetaminophen **OR** acetaminophen, hydrALAZINE, potassium chloride **OR** potassium alternative oral replacement **OR** potassium chloride, sodium chloride     ALLERGIES:   Allergies as of 11/29/2021    (No Known Allergies)        OBJECTIVE:   height is 5' (1.524 m) and weight is 182 lb 8 oz (82.8 kg). Her temporal temperature is 97.6 °F (36.4 °C). Her blood pressure is 112/65 and her pulse is 85. Her respiration is 18 and oxygen saturation is 97%. No intake/output data recorded. PHYSICAL EXAM:  CONSTITUTIONAL: She is a 82y.o.-year-old who appears her stated age. She is alert and oriented x 3 and in no acute distress. CARDIOVASCULAR: S1 S2 RRR. Without murmer  RESPIRATORY & CHEST: Lungs are clear to auscultation and percussion. No wheezing, no crackles. Good air movement  GASTROINTESTINAL & ABDOMEN: Soft, nontender, positive bowel sounds in all quadrants, non-distended, without hepatosplenomegaly. GENITOURINARY: Deferred. MUSCULOSKELETAL: No tenderness to palpation of the axial skeleton. There is no clubbing. No cyanosis. No edema of the lower extremities. SKIN OF BODY: No rash or jaundice. PSYCHIATRIC EVALUATION: Normal affect. Patient answers questions appropriately. HEMATOLOGIC/LYMPHATIC/ IMMUNOLOGIC: No palpable lymphadenopathy. NEUROLOGIC: Alert and oriented x 3. Groslly non-focal. Motor strength is 5+/5 in all muscle groups. The patient has a normal sensorium globally.       LABS:   Lab Results   Component Value Date    WBC 11.0 12/01/2021    HGB 12.0 12/01/2021    HCT 36.6 12/01/2021     12/01/2021    CHOL 167 06/25/2021    TRIG 125 06/25/2021    HDL 54 06/25/2021    ALT 26 12/01/2021    AST 34 12/01/2021     12/01/2021    K 4.1 12/01/2021    CL 98 (L) 12/01/2021    CREATININE 0.9 12/01/2021    BUN 19 12/01/2021    CO2 29 12/01/2021    TSH 1.85 06/10/2021    INR 1.10 11/29/2021       Lab Results   Component Value Date    GLUCOSE 200 (H) 12/01/2021    CALCIUM 8.9 12/01/2021     12/01/2021    K 4.1 12/01/2021    CO2 29 12/01/2021    CL 98 (L) 12/01/2021    BUN 19 12/01/2021    CREATININE 0.9 12/01/2021       Lab Results   Component Value Date    GLUCOSE 200 (H) 12/01/2021    CALCIUM 8.9 12/01/2021     12/01/2021    K 4.1 12/01/2021    CO2 29 12/01/2021    CL 98 (L) 12/01/2021    BUN 19 12/01/2021    CREATININE 0.9 12/01/2021          IMAGING:  I reviewed the chest x-ray and my interpretation is as follows. No infiltrates noted.        IMPRESSION:   Chronic hypoxic respiratory failure  COPD with acute exacerbation  Heart failure with preserved ejection fraction  Severe aortic stenosis, plan for TAVR  Obstructive sleep apnea on auto BiPAP        RECOMMENDATION:   Patient presents with worsening shortness of breath. Being treated for acute exacerbation of COPD. Currently she is not wheezing. Switch to prednisone 40 mg daily with taper over 7 days. Continue home bronchodilator regimen, budesonide and duo nebs twice a day. Continue duo nebs every 6 hours. On 3 L/min oxygen which is not far off. Titrate FiO2 for saturation of 88 to 92%. Patient wears auto BiPAP at home. Continue BiPAP overnight for MARIAA. Plan for TAVR for severe aortic stenosis has been postponed for next week due to coccygeal wound. Patient can be discharged from pulmonary prospective. Pulmonary will follow as outpatient. Sole Sharif MD  Pulmonary Critical Care and Sleep Medicine  12/1/2021, 10:13 AM    This note was completed using dragon medical speech recognition software. Grammatical errors, random word insertions, pronoun errors and incomplete sentences are occasional consequences of this technology due to software limitations. If there are questions or concerns about the content of this note of information contained within the body of this dictation they should be addressed with the provider for clarification.

## 2021-12-01 NOTE — PROGRESS NOTES
Physical Therapy  Facility/Department: [address] ONCOLOGY  Daily Treatment Note  NAME: Graham Mcleod  : 1935  MRN: [id number]    Date of Service: 2021    Discharge Recommendations:    Graham Mcleod scored a 14/24 on the AM-PAC short mobility form. Current research shows that an AM-PAC score of 17 or less is typically not associated with a discharge to the patient's home setting. Based on the patient's AM-PAC score and their current functional mobility deficits, it is recommended that the patient have 3-5 sessions per week of Physical Therapy at d/c to increase the patient's independence. Please see assessment section for further patient specific details. If patient discharges prior to next session this note will serve as a discharge summary. Please see below for the latest assessment towards goals. PT Equipment Recommendations  Equipment Needed: No  Other: Defer to next level of care  Assessment   Body structures, Functions, Activity limitations: Decreased balance; Decreased functional mobility ; Decreased endurance; Decreased strength; Increased pain  Assessment: Pt is an 81 yo female admitted to [facility] for COPD exacerbation. The patient is from assisted living but is currently at a high risk of falling and is unsafe to return to her prior environment. The patient presents with decreased tolerance to activity and generalized weakness with all out of bed activity. The patient fatigued rapidly with ambulating 6 ft and required CGA-Min A. Recommending continued skilled PT to safely progress functional mobility back to baseline.   Treatment Diagnosis: Generalized weakness and decreased tolerance to activity  Prognosis: Fair  Decision Making: Medium Complexity  Exam: Balance, functional mobility  Clinical Presentation: Evolving  PT Education: Goals; Plan of Care; PT Role; Transfer Training; General Safety; Gait Training  Patient Education: D/c recommendations - pt verbalized understanding  Barriers to Learning: None  REQUIRES PT FOLLOW UP: Yes  Activity Tolerance  Activity Tolerance: Patient limited by endurance; Patient limited by fatigue  Activity Tolerance: At rest and activity: SpO2 96%   Patient Diagnosis(es): The encounter diagnosis was COPD exacerbation ([facility] [address] has a past medical history of Allergic rhinitis, Anxiety, Aortic stenosis, CHF (congestive heart failure) ([facility] COPD (chronic obstructive pulmonary disease) ([facility] [address] Depression, Diabetes mellitus ([facility] [address] Hypertension, Hypothyroidism, MARIAA treated with BiPAP, and Urinary incontinence. has a past surgical history that includes Hysterectomy, total abdominal; Carpal tunnel release; Cholecystectomy; eye surgery; Tonsillectomy; Uvulectomy; Cardiac catheterization (10/11/2021); and Dental surgery (N/A, 11/12/2021). Restrictions  Restrictions/Precautions  Restrictions/Precautions: Fall Risk, Modified Diet (high fall risk; easy to chew)  Position Activity Restriction  Other position/activity restrictions: Per H&P on 11/29 \"86 y.o. female who presents from SNF for dyspnea. From [address] (Covered by [facility] there). Today, presents with with dyspnea and wheezing. C/o waking up SOB - she has a hx of COPD, CHF. No edema, weight gain, no n/v/d. She is On spironolactone, lasix. Also, she is slated for [facility] TAVR tomorrow per dr Lora Valle" 11/30 - TAVR deferred until SOB improved per cardiology  Subjective   General  Chart Reviewed: Yes  Subjective  Subjective: Pt agreeable to PT. General Comment  Comments: Ptsitting in chaair upon arrival. Denies pain. Request to urgently use bathroom. Orientation  Orientation  Overall Orientation Status: Impaired  Orientation Level: Oriented to person; Oriented to situation; Oriented to place; Disoriented to time  Cognition   Objective   Transfers  Sit to Stand:  Moderate Assistance  Stand to sit: Minimal Assistance  Ambulation  Ambulation?: Yes  Ambulation 1  Surface: level tile  Device: Rolling Walker  Other PT.  Thanks, Cristiana Ness, PT, DPT [id number]

## 2021-12-01 NOTE — PROGRESS NOTES
Physician Progress Note      Jeuss Jacobo  CSN #:                  [id number]  :                       1935  ADMIT DATE:       2021 6:46 AM  DISCH DATE:  RESPONDING  PROVIDER #:        Sheila Reyes MD          QUERY TEXT:    Pt admitted with COPD exacerbation and Decubitus ulcer of coccyx . Pt noted to   have Sepsis. If possible, please document in progress notes and/or discharge   summary the present on admission status of Sepsis as being: The medical record reflects the following:  Risk Factors: Presented with Decubitus ulcer to coccyx  Clinical Indicators: HR 99, Resp 26, B/P 77/37, WBC 12.3 and CRP 73.4 on   . Then on  Lactic Acid 3.1, and Procalcitonin 0.23. Also diagnosed   with Severe Sepsis and Acute Resp Failure on .   Treatment: ID consult, IV Fluids and bolus, IV Zosyn  Options provided:  -- Yes, Sepsis was present on admit  -- No, Sepsis was not present on admission and developed during the inpatient   stay  -- Other - I will add my own diagnosis  -- Disagree - Not applicable / Not valid  -- Disagree - Clinically unable to determine / Unknown  -- Refer to Clinical Documentation Reviewer    PROVIDER RESPONSE TEXT:    Yes, Sepsis was present on admission to the hospital.    Query created by: Dave Fry on 2021 2:38 PM      Electronically signed by:  Sheila Reyes MD 2021 3:45 PM

## 2021-12-01 NOTE — PROGRESS NOTES
Infectious Diseases   Progress Note      Admission Date: 11/29/2021  Hospital Day: Hospital Day: 3   Attending: Vashti Rosenberg MD  Date of service: 12/1/2021     Chief complaint/ Reason for consult:     · Met sepsis criteria on admission with leukocytosis, tachycardia, tachypnea and hypotension  · Acute respiratory failure with hypoxia   · coccygeal decubitus wound  · Severe aortic stenosis, candidate for TAVR  · Negative COVID-19 PCR on 11/29/2021    Microbiology:        I have reviewed allavailable micro lab data and cultures    · Blood culture (2/2) - collected on 11/29/2021: Negative so far    · Urine culture  - collected on 11/29/2021: 25,000 CFU per mL of E. coli    Susceptibility     Escherichia coli     BACTERIAL SUSCEPTIBILITY PANEL BY NILS     ampicillin 8 mcg/mL Sensitive     ceFAZolin >=64 mcg/mL Resistant 1     cefepime <=0.12 mcg/mL Sensitive     cefTRIAXone <=0.25 mcg/mL Sensitive     ciprofloxacin <=0.25 mcg/mL Sensitive     ertapenem <=0.12 mcg/mL Sensitive     gentamicin <=1 mcg/mL Sensitive     levofloxacin <=0.12 mcg/mL Sensitive     nitrofurantoin <=16 mcg/mL Sensitive     piperacillin-tazobactam <=4 mcg/mL Sensitive     trimethoprim-sulfamethoxazole <=20 mcg/mL Sensitive               Antibiotics and immunizations:       Current antibiotics: All antibiotics and their doses were reviewed by me    Recent Abx Admin                   linezolid (ZYVOX) IVPB 600 mg (mg) 600 mg New Bag 12/01/21 0630     600 mg New Bag 11/30/21 1823                  Immunization History: All immunization history was reviewed by me today.     Immunization History   Administered Date(s) Administered    COVID-19, Pfizer, PF, 30mcg/0.3mL 01/22/2021, 02/11/2021    Influenza, High Dose (Fluzone 65 yrs and older) 10/11/2018    Influenza, Quadv, adjuvanted, 65 yrs +, IM, PF (Fluad) 11/03/2021    Pneumococcal Conjugate 13-valent ([id number]) 10/01/2015, 05/21/2021    Pneumococcal Polysaccharide ([id number]) 11/01/2016  Tdap (Boostrix, Adacel) 10/01/2012    Zoster Live (Zostavax) 11/02/2014       Known drug allergies: All allergies were reviewed and updated    No Known Allergies    Social history:     Social History:  All social andepidemiologic history was reviewed and updated by me today as needed. · Tobacco use:   reports that she has never smoked. She has never used smokeless tobacco.  · Alcohol use:   reports no history of alcohol use. · Currently lives in: [facility]  ·  reports no history of drug use. COVID VACCINATION AND LAB RESULT RECORDS:     Internal Administration   First Dose COVID-19, Pfizer, PF, 30mcg/0.3mL  01/22/2021   Second Dose COVID-19, Pfizer, PF, 30mcg/0.3mL   02/11/2021       Last COVID Lab SARS-CoV-2 (no units)   Date Value   11/29/2021 Not Detected            Assessment:     The patient is a 82 y.o. old female who  has a past medical history of Allergic rhinitis, Anxiety, Aortic stenosis, CHF (congestive heart failure) ([facility] COPD (chronic obstructive pulmonary disease) ([facility] Depression, Diabetes mellitus ([facility] Hypertension, Hypothyroidism, MARIAA treated with BiPAP (02/14/2018), and Urinary incontinence. with following problems:    · Met sepsis criteria on admission with leukocytosis, tachycardia, tachypnea and hypotension-improving, still hypotensive  · Acute respiratory failure with hypoxia-she is on 3 L simple nasal cannula  · coccygeal decubitus wound-culture in process  · Severe aortic stenosis, candidate for TAVR  · Negative COVID-19 PCR on 11/29/2021  · Elevated D-dimer of 619  · Elevated BNP of 684 on admission  · Elevated CRP of 73.4  · COPD  · Essential hypertension-has been hypotensive  · History of depression        Discussion:      The patient is afebrile. Urine culture had 25,000 CFU per mL of E. coli. I had started the patient on empiric linezolid yesterday. She h presented with sepsis syndrome and has a coccygeal wound.     Blood cultures from yesterday are in process. Left buttock decubitus wound culture is growing E. coli      Plan:     Diagnostic Workup:    · Follow-up on blood cultures and coccygeal wound cultures  · Continue to follow  fever curve, WBC count and blood cultures. · Continue to monitor blood counts, liver and renal function. Antimicrobials:    · Will continue empiric IV linezolid 6 mg every 12 hour  · Continue to monitor her vitals closely  · E. coli colony count in the urine culture was low and indicates E. coli bacteriuria. However, left buttock wound culture is also growing E. coli. Will add empiric p.o. Cipro 500 mg every 12 hours  · We will follow up on the culture results and clinical progress and will make further recommendations accordingly. · Coccygeal wound care  · Continue close vitals monitoring. · Maintain good glycemic control. · Fall precautions. Aspiration precautions. · Continue to watch for new fever or diarrhea. · DVT prophylaxis. · Discussed all above with patient and RN. Drug Monitoring:    · Continue monitoring for antibiotic toxicity as follows: CBC, CMP   · Continue to watch for following: new or worsening fever, new hypotension, hives, lip swelling and redness or purulence at vascular access sites. I/v access Management:    · Continue to monitor i.v access sites for erythema, induration, discharge or tenderness. · As always, continue efforts to minimize tubes/lines/drains as clinically appropriate to reduce chances of line associated infections. Patient education and counseling:        · The patient was educated in detail about the side-effects of various antibiotics and things to watch for like new rashes, lip swelling, severe reaction, worsening diarrhea, break through fever etc.  · Discussed patient's condition and what to expect. All of the patient's questions were addressed in a satisfactory manner and patient verbalized understanding all instructions.       Level of complexity of visit: High Weight loss counseling:    Extensive weight loss counseling was done. It is important to set a realistic weight loss goal. First goal should be to avoid gaining more weight and staying at current weight (or within 5 percent). People at high risk of developing diabetes who are able to lose 5 percent of their body weight and maintain this weight will reduce their risk of developing diabetes by about 50 percent and reduce their blood pressure. Losing more than 15 percent of  body weight and staying at this weight is an extremely good result, even if you never reach your \"dream\" or \"ideal\" weight. Lifestyle changes including changing eating habits, substituting excess carbohydrates with proteins, stress reduction, using self-help programs like Weight Watchers®, Overeaters Anonymous®, and Take Off Pounds Sensibly (TOPS)© , following DASH diet and increasing exercise or walking briskly daily for half hour to and hour 5-7 days a week was suggested among other measures. Information was given about various weight loss education programs and their websites like www.cdc.gov/healthyweight, www.choosemyplate.gov and www.health.gov/dietaryguidelines/      TIME SPENT TODAY:     - Spent over  36 minutes on visit (including interval history, physical exam, review of data including labs, cultures, imaging, development and implementation of treatment plan and coordination of complex care). More than 50 percent of this includes face-to-face time spent with the patient for counseling and coordination of care. Thank you for involving me in the care of your patient. I will continue to follow. If you have anyadditional questions, please do not hesitate to contact me. Subjective: Interval history: Interval history was obtained from chart review and patient/ RN. The patient is afebrile. She is tolerating antibiotics okay. No diarrhea     REVIEW OF SYSTEMS:      Review of Systems   Constitutional: Positive for fatigue. Negative for chills, diaphoresis and unexpected weight change. HENT: Negative for congestion, ear discharge, ear pain, facial swelling, hearing loss, rhinorrhea and trouble swallowing. Eyes: Negative for photophobia, discharge, redness and visual disturbance. Respiratory: Negative for apnea, cough, choking, chest tightness, shortness of breath and stridor. Cardiovascular: Negative for chest pain and palpitations. Gastrointestinal: Negative for abdominal pain, blood in stool, diarrhea and nausea. Endocrine: Negative for polydipsia, polyphagia and polyuria. Genitourinary: Negative for difficulty urinating, dysuria, frequency, hematuria, menstrual problem and vaginal discharge. Musculoskeletal: Negative for arthralgias, joint swelling, myalgias and neck stiffness. Skin: Negative for color change and rash. Allergic/Immunologic: Negative for immunocompromised state. Neurological: Negative for dizziness, seizures, speech difficulty, light-headedness and headaches. Hematological: Negative for adenopathy. Psychiatric/Behavioral: Negative for agitation, hallucinations and suicidal ideas. Past Medical History: All past medical history reviewed today. Past Medical History:   Diagnosis Date    Allergic rhinitis     Anxiety     Aortic stenosis     [facility]     COPD [facility]chronic obstructive [facility] disease) [facility]     Depression     Diabetes mellitus [facility]     Hypertension     Hypothyroidism     MARIAA treated with BiPAP 02/14/2018    Urinary incontinence        Past Surgical History: All past surgical history was reviewed today.     Past Surgical History:   Procedure Laterality Date    CARDIAC CATHETERIZATION  10/11/2021    CARPAL TUNNEL RELEASE      bilateral    CHOLECYSTECTOMY      DENTAL SURGERY N/A 11/12/2021    SIMPLE EXTRACTIONS TEETH # 4, 13, 18, 23, 24, 25, 26,  AND SURGICAL EXTRACTION TEETH # 2, 3, 14, 15, 19, 20, 21,  28,  29, 30, 31  AND ALSO ALVEOLOPLASTIES ALL FOUR QUADRANTS; GEL FOAM performed by Jono Uribe DDS at [address] [place] [address]      bilateral cataracts    HYSTERECTOMY, TOTAL ABDOMINAL      TONSILLECTOMY      UVULECTOMY         Family History: All family history was reviewed today. Problem Relation Age of Onset    Cancer Mother         breast    Breast Cancer Mother     Cancer Father         prostate    Cancer Sister         breast    Breast Cancer Daughter     Breast Cancer Maternal Grandmother        Objective:       PHYSICAL EXAM:      Vitals:   Vitals:    11/30/21 2352 12/01/21 0410 12/01/21 0440 12/01/21 0829   BP:   112/65    Pulse:   85    Resp: 26 23 20 18   Temp:   97.6 °F (36.4 °C)    TempSrc:   Temporal    SpO2: 95% 96% 96% 97%   Weight:       Height:           Physical Exam  Vitals and nursing note reviewed. Constitutional:       General: She is not in acute distress. Appearance: She is well-developed. She is not diaphoretic. HENT:      Head: Normocephalic. Right Ear: External ear normal.      Left Ear: External ear normal.      Nose: Nose normal.   Eyes:      General: No scleral icterus. Right eye: No discharge. Left eye: No discharge. Conjunctiva/sclera: Conjunctivae normal.      Pupils: Pupils are equal, round, and reactive to light. Cardiovascular:      Rate and Rhythm: Normal rate and regular rhythm. Heart sounds: No murmur heard. No friction rub. Pulmonary:      Effort: Pulmonary effort is normal.      Breath sounds: No stridor. No wheezing or rales. Chest:      Chest wall: No tenderness. Abdominal:      Palpations: Abdomen is soft. There is no mass. Tenderness: There is no abdominal tenderness. There is no guarding or rebound. Musculoskeletal:         General: No tenderness. Cervical back: Normal range of motion and neck supple. Lymphadenopathy:      Cervical: No cervical adenopathy. Skin:     General: Skin is warm and dry.       Findings: No erythema or rash. Comments: Superficial  Ulcer on left buttock   Neurological:      Mental Status: She is alert and oriented to person, place, and time. Motor: No abnormal muscle tone. Psychiatric:         Judgment: Judgment normal.       Lines and drains: All vascular access sites are healthy with no local erythema, discharge or tenderness. Intake and output:    I/O last 3 completed shifts:  In: -   Out: 600 [Urine:600]    Lab Data:   All available labs and old records have been reviewed by me. CBC:  Recent Labs     11/29/21  0750 11/30/21  0631 12/01/21  0607   WBC 12.3* 7.2 11.0   RBC 4.34 3.70* 4.04   HGB 12.7 10.9* 12.0   HCT 38.3 32.7* 36.6    210 185   MCV 88.4 88.4 90.6   MCH 29.3 29.4 29.6   MCHC 33.1 33.3 32.7   RDW 13.1 13.2 13.4        BMP:  Recent Labs     11/29/21  0750 11/30/21  0631 12/01/21  0607    135* 138   K 3.3* 3.7 4.1   CL 93* 98* 98*   CO2 35* 30 29   BUN 13 18 19   CREATININE 0.7 0.7 0.9   CALCIUM 9.2 8.7 8.9   GLUCOSE 147* 159* 200*        Hepatic Function Panel:   Lab Results   Component Value Date    ALKPHOS 70 12/01/2021    ALT 26 12/01/2021    AST 34 12/01/2021    PROT 7.3 12/01/2021    BILITOT <0.2 12/01/2021    BILIDIR <0.2 11/22/2021    IBILI see below 11/22/2021    LABALBU 3.7 12/01/2021       CPK: No results found for: CKTOTAL  ESR:   Lab Results   Component Value Date    SEDRATE 52 (H) 11/30/2021     CRP:   Lab Results   Component Value Date    CRP 73.4 (H) 11/29/2021           Imaging: All pertinent images and reports for the current visit were reviewed by me during this visit. XR CHEST (2 VW)   Final Result   1. No acute cardiopulmonary process. 2. Pulmonary vascular congestion and mild cardiomegaly. XR CHEST PORTABLE   Final Result   No acute abnormality. Medications: All current and past medications were reviewed.      ciprofloxacin  500 mg Oral 2 times per day    budesonide  0.5 mg Nebulization BID    R91.1    Acute suppurative otitis media of right ear with spontaneous rupture of tympanic membrane H66.011    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 H72.01    Bilateral hearing loss H91.93    Bilateral sensorineural hearing loss H90.3    Senile osteoporosis M81.0    Acute respiratory failure (HCC) J96.00    Coronary artery disease due to lipid rich plaque I25.10, I25.83    Nonrheumatic aortic valve stenosis I35.0    Hypercholesteremia E78.00    Aspiration into airway T17.908A    Acute respiratory failure with hypoxemia (HCC) J96.01    Severe sepsis (HCC) A41.9, R65.20    Elevated brain natriuretic peptide (BNP) level R79.89    Elevated C-reactive protein (CRP) R79.82    History of depression Z86.59    Decubitus ulcer of coccyx L89.159    Weight loss counseling, encounter for Z71.3    E coli infection A49.8       Please note that this chart was generated using Dragon dictation software. Although every effort was made to ensure the accuracy of this automated transcription, some errors in transcription may have occurred inadvertently. If you may need any clarification, please do not hesitate to contact me through EPIC or at the phone number provided below with my electronic signature. Any pictures or media included in this note were obtained after taking informed verbal consent from the patient and with their approval to include those in the patient's medical record.       Flor Garcia MD, MPH, FACP, [facility]  12/1/2021, 3:02 PM  [facility]   [address] [place] [address], [address] [place] [address]  Office: [phone]  Fax: [phone]  Clinic days:  Tuesday & Thursday

## 2021-12-01 NOTE — PROGRESS NOTES
VSS. Patient awake, resting in bed. O2 sats 96% on 3L NC. 3L is patients baseline oxygen. Head to toe assessment completed. Scheduled medications given. Purewick changed. Patient denies being in pain at this moment. Call light within reached. No other needs expressed at this time.

## 2021-12-01 NOTE — DISCHARGE INSTR - COC
Continuity of Care Form    Patient Name: Luli Paula   :  1935  MRN:  [id number]    [facility] date:  2021  Discharge date:  12/3/21    Code Status Order: Full Code   Advance Directives:      Admitting Physician:  Artelia Halsted, MD  PCP: ELISE Mcfarland    Discharging Jaylen Lay RN BSN  [address] Unit/Room#: [id number]-[id number]/3776-77  Discharging Unit Phone Number: [phone]    Emergency Contact:   Extended Emergency Contact Information  Primary Emergency Contact: Cyndy Bang Phone: [phone]  Relation: Child  Secondary Emergency Contact: marii novoa Phone: [phone]  Relation: Child    Past Surgical History:  Past Surgical History:   Procedure Laterality Date    CARDIAC CATHETERIZATION  10/11/2021    CARPAL TUNNEL RELEASE      bilateral    CHOLECYSTECTOMY      DENTAL SURGERY N/A 2021    SIMPLE EXTRACTIONS TEETH # 4, 13, 18, 23, 24, 25, 26,  AND SURGICAL EXTRACTION TEETH # 2, 3, 14, 15, 19, 20, 21,  28,  29, 30, 31  AND ALSO ALVEOLOPLASTIES ALL FOUR QUADRANTS; GEL FOAM performed by Carolyn Estrada DDS at [address] [place] [address]      bilateral cataracts    HYSTERECTOMY, TOTAL ABDOMINAL      TONSILLECTOMY      UVULECTOMY         Immunization History:   Immunization History   Administered Date(s) Administered    COVID-19, Pfizer, PF, 30mcg/0.3mL 2021, 2021    Influenza, High Dose (Fluzone 65 yrs and older) 10/11/2018    Influenza, Quadv, adjuvanted, 65 yrs +, IM, PF (Fluad) 2021    Pneumococcal Conjugate 13-valent (Nilda Sermon) 10/01/2015, 2021    Pneumococcal Polysaccharide ([id number]) 2016    Tdap (Boostrix, Adacel) 10/01/2012    Zoster Live (Zostavax) 2014       Active Problems:  Patient Active Problem List   Diagnosis Code    Chronic seasonal allergic rhinitis J30.2    COPD, severe ([facility] J44.9    Idiopathic peripheral neuropathy G60.9    Hypothyroidism E03.9    Depression F32. A    Chronic congestive heart failure (HCC) I50.9 SOB (shortness of breath) R06.02    Obstructive sleep apnea (adult) (pediatric) G47.33    Bilateral lower extremity edema R60.0    Multifocal pneumonia J18.9    Centrilobular emphysema (HCC) J43.2    Acute on chronic diastolic heart failure ([facility]) I50.33    Pitting edema R60.9    BASIA (acute kidney injury) ([facility] N17.9    Chronic respiratory failure with hypoxia ([facility]) J96.11    Essential hypertension I10    Mixed hyperlipidemia E78.2    Acute pulmonary edema (HCC) J81.0    Chronic obstructive pulmonary disease (HCC) J44.9    Chronic bronchitis ([facility]) J42    Class 3 severe obesity without serious comorbidity with body mass index (BMI) of 40.0 to 44.9 in adult ([facility]) E66.01, Z68.41    Pulmonary nodule R91.1    Acute suppurative otitis media of right ear with spontaneous rupture of tympanic membrane H66.011    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 H72.01    Bilateral hearing loss H91.93    Bilateral sensorineural hearing loss H90.3    Senile osteoporosis M81.0    Acute respiratory failure ([facility]) J96.00    Coronary artery disease due to lipid rich plaque I25.10, I25.83    Severe aortic stenosis I35.0    Hypercholesteremia E78.00    Aspiration into airway T17.908A    Acute respiratory failure with hypoxemia ([facility]) J96.01    Severe sepsis ([facility]) A41.9, R65.20    Elevated brain natriuretic peptide (BNP) level R79.89    Elevated C-reactive protein (CRP) R79.82    History of depression Z86.59    Decubitus ulcer of coccyx L89.159       Isolation/Infection:   Isolation            No Isolation          Patient Infection Status       Infection Onset Added Last Indicated Last Indicated By Review Planned Expiration Resolved Resolved By    None active    Resolved    COVID-19 (Rule Out) 11/29/21 11/29/21 11/29/21 COVID-19 (Ordered)   11/30/21 Rule-Out Test Resulted            Nurse Assessment:  Last Vital Signs: /65   Pulse 85   Temp 97.6 °F (36.4 °C) (Temporal)   Resp 18   Ht 5' (1.524 m)   Wt 182 lb 8 oz (82.8 kg)   SpO2 97%   BMI 35.64 kg/m²     Last documented pain score (0-10 scale): Pain Level: 0  Last Weight:   Wt Readings from Last 1 Encounters:   11/29/21 182 lb 8 oz (82.8 kg)     Mental Status:  oriented and alert    IV Access:  - None    Nursing Mobility/ADLs:  Walking   Assisted  Transfer  Assisted  Bathing  Assisted  Dressing  Assisted  Toileting  Assisted  Feeding  Independent  Med Admin  Assisted  Med Delivery   prefers mixed with applesauce    Wound Care Documentation and Therapy:  Wound 11/29/21 Coccyx Mid stage 2 & DTI (Active)   Wound Etiology Pressure Stage  2 11/30/21 2045   Dressing Status New dressing applied 11/30/21 2045   Dressing/Treatment Foam; Zinc paste 11/30/21 2045   Drainage Amount None 11/30/21 2045   Odor None 11/30/21 2045   Gaby-wound Assessment Non-blanchable erythema 11/30/21 2045   Number of days: 1        Elimination:  Continence: Bowel: Yes  Bladder: Yes  Urinary Catheter: None   Colostomy/Ileostomy/Ileal Conduit: No       Date of Last BM: 12/2/21    Intake/Output Summary (Last 24 hours) at 12/1/2021 1124  Last data filed at 12/1/2021 0441  Gross per 24 hour   Intake 200 ml   Output 600 ml   Net -400 ml     I/O last 3 completed shifts: In: 80 [P.O.:410]  Out: 600 [Urine:600]    Safety Concerns:     None    Impairments/Disabilities:      Vision and Hearing    Nutrition Therapy:  Current Nutrition Therapy:   - Oral Diet:  General    Routes of Feeding: Oral  Liquids: Thin Liquids  Daily Fluid Restriction: no  Last Modified Barium Swallow with Video (Video Swallowing Test): not done    Treatments at the Time of Hospital Discharge:   Respiratory Treatments:   Oxygen Therapy:  is on oxygen at 3 L/min per nasal cannula.   Ventilator:    - No ventilator support    Rehab Therapies:SN,PT,OT  Weight Bearing Status/Restrictions: No weight bearing restirctions  Other Medical Equipment (for information only, NOT a DME order):  walker  Other Treatments:     Patient's personal belongings

## 2021-12-02 LAB
A/G RATIO: 1.1 (ref 1.1–2.2)
ALBUMIN SERPL-MCNC: 3.3 G/DL (ref 3.4–5)
ALP BLD-CCNC: 62 U/L (ref 40–129)
ALT SERPL-CCNC: 29 U/L (ref 10–40)
ANION GAP SERPL CALCULATED.3IONS-SCNC: 7 MMOL/L (ref 3–16)
AST SERPL-CCNC: 31 U/L (ref 15–37)
BASOPHILS ABSOLUTE: 0 K/UL (ref 0–0.2)
BASOPHILS RELATIVE PERCENT: 0.3 %
BILIRUB SERPL-MCNC: <0.2 MG/DL (ref 0–1)
BUN BLDV-MCNC: 16 MG/DL (ref 7–20)
CALCIUM SERPL-MCNC: 8.1 MG/DL (ref 8.3–10.6)
CHLORIDE BLD-SCNC: 96 MMOL/L (ref 99–110)
CO2: 34 MMOL/L (ref 21–32)
CREAT SERPL-MCNC: 0.8 MG/DL (ref 0.6–1.2)
EOSINOPHILS ABSOLUTE: 0 K/UL (ref 0–0.6)
EOSINOPHILS RELATIVE PERCENT: 0.3 %
GFR AFRICAN AMERICAN: >60
GFR NON-AFRICAN AMERICAN: >60
GLUCOSE BLD-MCNC: 139 MG/DL (ref 70–99)
GRAM STAIN RESULT: ABNORMAL
HCT VFR BLD CALC: 30.8 % (ref 36–48)
HEMOGLOBIN: 10.3 G/DL (ref 12–16)
LYMPHOCYTES ABSOLUTE: 2.9 K/UL (ref 1–5.1)
LYMPHOCYTES RELATIVE PERCENT: 28.5 %
MCH RBC QN AUTO: 29.3 PG (ref 26–34)
MCHC RBC AUTO-ENTMCNC: 33.4 G/DL (ref 31–36)
MCV RBC AUTO: 87.7 FL (ref 80–100)
MONOCYTES ABSOLUTE: 1.3 K/UL (ref 0–1.3)
MONOCYTES RELATIVE PERCENT: 12.2 %
NEUTROPHILS ABSOLUTE: 6 K/UL (ref 1.7–7.7)
NEUTROPHILS RELATIVE PERCENT: 58.7 %
ORGANISM: ABNORMAL
PDW BLD-RTO: 13 % (ref 12.4–15.4)
PLATELET # BLD: 212 K/UL (ref 135–450)
PMV BLD AUTO: 8.8 FL (ref 5–10.5)
POTASSIUM REFLEX MAGNESIUM: 4 MMOL/L (ref 3.5–5.1)
RBC # BLD: 3.51 M/UL (ref 4–5.2)
SODIUM BLD-SCNC: 137 MMOL/L (ref 136–145)
TOTAL PROTEIN: 6.3 G/DL (ref 6.4–8.2)
WBC # BLD: 10.3 K/UL (ref 4–11)
WOUND/ABSCESS: ABNORMAL

## 2021-12-02 PROCEDURE — 36415 COLL VENOUS BLD VENIPUNCTURE: CPT

## 2021-12-02 PROCEDURE — 99233 SBSQ HOSP IP/OBS HIGH 50: CPT | Performed by: INTERNAL MEDICINE

## 2021-12-02 PROCEDURE — 6370000000 HC RX 637 (ALT 250 FOR IP): Performed by: INTERNAL MEDICINE

## 2021-12-02 PROCEDURE — 94761 N-INVAS EAR/PLS OXIMETRY MLT: CPT

## 2021-12-02 PROCEDURE — 80053 COMPREHEN METABOLIC PANEL: CPT

## 2021-12-02 PROCEDURE — 2580000003 HC RX 258: Performed by: INTERNAL MEDICINE

## 2021-12-02 PROCEDURE — 6360000002 HC RX W HCPCS: Performed by: INTERNAL MEDICINE

## 2021-12-02 PROCEDURE — 1200000000 HC SEMI PRIVATE

## 2021-12-02 PROCEDURE — 94640 AIRWAY INHALATION TREATMENT: CPT

## 2021-12-02 PROCEDURE — 2700000000 HC OXYGEN THERAPY PER DAY

## 2021-12-02 PROCEDURE — 85025 COMPLETE CBC W/AUTO DIFF WBC: CPT

## 2021-12-02 RX ORDER — CEFUROXIME AXETIL 500 MG/1
500 TABLET ORAL EVERY 12 HOURS SCHEDULED
Qty: 20 TABLET | Refills: 0 | Status: SHIPPED | OUTPATIENT
Start: 2021-12-02 | End: 2021-12-12

## 2021-12-02 RX ORDER — CEFUROXIME AXETIL 250 MG/1
500 TABLET ORAL EVERY 12 HOURS SCHEDULED
Status: DISCONTINUED | OUTPATIENT
Start: 2021-12-02 | End: 2021-12-03 | Stop reason: HOSPADM

## 2021-12-02 RX ORDER — METRONIDAZOLE 500 MG/1
500 TABLET ORAL EVERY 8 HOURS SCHEDULED
Qty: 30 TABLET | Refills: 0 | Status: SHIPPED | OUTPATIENT
Start: 2021-12-02 | End: 2021-12-12

## 2021-12-02 RX ORDER — METRONIDAZOLE 250 MG/1
500 TABLET ORAL EVERY 8 HOURS SCHEDULED
Status: DISCONTINUED | OUTPATIENT
Start: 2021-12-02 | End: 2021-12-03 | Stop reason: HOSPADM

## 2021-12-02 RX ADMIN — ARFORMOTEROL TARTRATE 15 MCG: 15 SOLUTION RESPIRATORY (INHALATION) at 20:44

## 2021-12-02 RX ADMIN — Medication 10 ML: at 20:15

## 2021-12-02 RX ADMIN — ENOXAPARIN SODIUM 40 MG: 100 INJECTION SUBCUTANEOUS at 09:30

## 2021-12-02 RX ADMIN — LINEZOLID 600 MG: 600 INJECTION, SOLUTION INTRAVENOUS at 05:24

## 2021-12-02 RX ADMIN — LEVOTHYROXINE SODIUM 75 MCG: 0.07 TABLET ORAL at 08:52

## 2021-12-02 RX ADMIN — CIPROFLOXACIN 500 MG: 500 TABLET, FILM COATED ORAL at 08:51

## 2021-12-02 RX ADMIN — METRONIDAZOLE 500 MG: 250 TABLET, FILM COATED ORAL at 22:07

## 2021-12-02 RX ADMIN — POTASSIUM CHLORIDE 20 MEQ: 20 TABLET, EXTENDED RELEASE ORAL at 08:51

## 2021-12-02 RX ADMIN — ATORVASTATIN CALCIUM 10 MG: 10 TABLET, FILM COATED ORAL at 20:13

## 2021-12-02 RX ADMIN — OXYBUTYNIN CHLORIDE 2.5 MG: 5 TABLET ORAL at 08:52

## 2021-12-02 RX ADMIN — OXYBUTYNIN CHLORIDE 2.5 MG: 5 TABLET ORAL at 20:14

## 2021-12-02 RX ADMIN — BUDESONIDE 500 MCG: 0.5 SUSPENSION RESPIRATORY (INHALATION) at 20:44

## 2021-12-02 RX ADMIN — IPRATROPIUM BROMIDE AND ALBUTEROL SULFATE 1 AMPULE: .5; 3 SOLUTION RESPIRATORY (INHALATION) at 20:44

## 2021-12-02 RX ADMIN — OXYBUTYNIN CHLORIDE 2.5 MG: 5 TABLET ORAL at 15:08

## 2021-12-02 RX ADMIN — ARFORMOTEROL TARTRATE 15 MCG: 15 SOLUTION RESPIRATORY (INHALATION) at 08:50

## 2021-12-02 RX ADMIN — CEFUROXIME AXETIL 500 MG: 250 TABLET ORAL at 20:13

## 2021-12-02 RX ADMIN — FUROSEMIDE 40 MG: 40 TABLET ORAL at 18:02

## 2021-12-02 RX ADMIN — BUDESONIDE 500 MCG: 0.5 SUSPENSION RESPIRATORY (INHALATION) at 08:50

## 2021-12-02 RX ADMIN — PANTOPRAZOLE SODIUM 40 MG: 40 TABLET, DELAYED RELEASE ORAL at 05:23

## 2021-12-02 RX ADMIN — Medication 10 ML: at 10:36

## 2021-12-02 RX ADMIN — METHOCARBAMOL TABLETS 500 MG: 500 TABLET, COATED ORAL at 15:09

## 2021-12-02 RX ADMIN — METRONIDAZOLE 500 MG: 250 TABLET, FILM COATED ORAL at 15:55

## 2021-12-02 RX ADMIN — IPRATROPIUM BROMIDE AND ALBUTEROL SULFATE 1 AMPULE: .5; 3 SOLUTION RESPIRATORY (INHALATION) at 08:50

## 2021-12-02 RX ADMIN — SPIRONOLACTONE 12.5 MG: 25 TABLET ORAL at 10:36

## 2021-12-02 RX ADMIN — DOCUSATE SODIUM 100 MG: 100 CAPSULE ORAL at 08:51

## 2021-12-02 RX ADMIN — METHOCARBAMOL TABLETS 500 MG: 500 TABLET, COATED ORAL at 20:14

## 2021-12-02 RX ADMIN — METHOCARBAMOL TABLETS 500 MG: 500 TABLET, COATED ORAL at 18:03

## 2021-12-02 RX ADMIN — CETIRIZINE HYDROCHLORIDE 5 MG: 10 TABLET, FILM COATED ORAL at 08:52

## 2021-12-02 RX ADMIN — ENOXAPARIN SODIUM 40 MG: 100 INJECTION SUBCUTANEOUS at 20:13

## 2021-12-02 RX ADMIN — DOCUSATE SODIUM 100 MG: 100 CAPSULE ORAL at 20:13

## 2021-12-02 RX ADMIN — FUROSEMIDE 40 MG: 40 TABLET ORAL at 08:52

## 2021-12-02 RX ADMIN — IPRATROPIUM BROMIDE AND ALBUTEROL SULFATE 1 AMPULE: .5; 3 SOLUTION RESPIRATORY (INHALATION) at 15:45

## 2021-12-02 RX ADMIN — CITALOPRAM HYDROBROMIDE 10 MG: 20 TABLET ORAL at 08:51

## 2021-12-02 RX ADMIN — METHOCARBAMOL TABLETS 500 MG: 500 TABLET, COATED ORAL at 08:52

## 2021-12-02 ASSESSMENT — ENCOUNTER SYMPTOMS
FACIAL SWELLING: 0
COUGH: 0
ABDOMINAL PAIN: 0
RHINORRHEA: 0
EYE REDNESS: 0
COLOR CHANGE: 0
CHEST TIGHTNESS: 0
CHOKING: 0
TROUBLE SWALLOWING: 0
NAUSEA: 0
BLOOD IN STOOL: 0
STRIDOR: 0
EYE DISCHARGE: 0
APNEA: 0
SHORTNESS OF BREATH: 0
PHOTOPHOBIA: 0
DIARRHEA: 0

## 2021-12-02 ASSESSMENT — PAIN SCALES - GENERAL
PAINLEVEL_OUTOF10: 0

## 2021-12-02 NOTE — PROGRESS NOTES
Infectious Diseases   Progress Note      Admission Date: 11/29/2021  Hospital Day: Hospital Day: 4   Attending: Judith Farrar MD  Date of service: 12/2/2021     Chief complaint/ Reason for consult:     · Met sepsis criteria on admission with leukocytosis, tachycardia, tachypnea and hypotension  · Acute respiratory failure with hypoxia   · coccygeal decubitus wound  · Severe aortic stenosis, candidate for TAVR  · Negative COVID-19 PCR on 11/29/2021    Microbiology:        I have reviewed allavailable micro lab data and cultures    · Blood culture (2/2) - collected on 11/29/2021: Negative so far  · Left foot wound culture: Collected on 11/30/2021: E. Coli    Susceptibility     Escherichia coli     BACTERIAL SUSCEPTIBILITY PANEL BY NILS     ampicillin <=2 mcg/mL Sensitive     ceFAZolin <=4 mcg/mL Sensitive     cefepime <=0.12 mcg/mL Sensitive     cefTRIAXone <=0.25 mcg/mL Sensitive     ciprofloxacin <=0.25 mcg/mL Sensitive     ertapenem <=0.12 mcg/mL Sensitive     gentamicin <=1 mcg/mL Sensitive     levofloxacin <=0.12 mcg/mL Sensitive     piperacillin-tazobactam <=4 mcg/mL Sensitive     trimethoprim-sulfamethoxazole <=20 mcg/mL Sensitive          · Urine culture  - collected on 11/29/2021: 25,000 CFU per mL of E. coli    Susceptibility     Escherichia coli     BACTERIAL SUSCEPTIBILITY PANEL BY NILS     ampicillin 8 mcg/mL Sensitive     ceFAZolin >=64 mcg/mL Resistant 1     cefepime <=0.12 mcg/mL Sensitive     cefTRIAXone <=0.25 mcg/mL Sensitive     ciprofloxacin <=0.25 mcg/mL Sensitive     ertapenem <=0.12 mcg/mL Sensitive     gentamicin <=1 mcg/mL Sensitive     levofloxacin <=0.12 mcg/mL Sensitive     nitrofurantoin <=16 mcg/mL Sensitive     piperacillin-tazobactam <=4 mcg/mL Sensitive     trimethoprim-sulfamethoxazole <=20 mcg/mL Sensitive               Antibiotics and immunizations:       Current antibiotics: All antibiotics and their doses were reviewed by me    Recent Abx Admin ciprofloxacin (CIPRO) tablet 500 mg (mg) 500 mg Given 12/02/21 0851     500 mg Given 12/01/21 2106    linezolid (ZYVOX) IVPB 600 mg (mg) 600 mg New Bag 12/02/21 0524     600 mg New Bag 12/1937                  Immunization History: All immunization history was reviewed by me today. Immunization History   Administered Date(s) Administered    COVID-19, Pfizer, PF, 30mcg/0.3mL 01/22/2021, 02/11/2021    Influenza, High Dose (Fluzone 65 yrs and older) 10/11/2018    Influenza, Quadv, adjuvanted, 65 yrs +, IM, PF (Fluad) 11/03/2021    Pneumococcal Conjugate 13-valent (Earney Jeremy) 10/01/2015, 05/21/2021    Pneumococcal Polysaccharide ([id number]) 11/01/2016    Tdap (Boostrix, Adacel) 10/01/2012    Zoster Live (Zostavax) 11/02/2014       Known drug allergies: All allergies were reviewed and updated    No Known Allergies    Social history:     Social History:  All social andepidemiologic history was reviewed and updated by me today as needed. · Tobacco use:   reports that she has never smoked. She has never used smokeless tobacco.  · Alcohol use:   reports no history of alcohol use. · Currently lives in: [address] [place] [address]  ·  reports no history of drug use. COVID VACCINATION AND LAB RESULT RECORDS:     Internal Administration   First Dose COVID-19, Pfizer, PF, 30mcg/0.3mL  01/22/2021   Second Dose COVID-19, Pfizer, PF, 30mcg/0.3mL   02/11/2021       Last COVID Lab SARS-CoV-2 (no units)   Date Value   11/29/2021 Not Detected            Assessment:     The patient is a 82 y.o. old female who  has a past medical history of Allergic rhinitis, Anxiety, Aortic stenosis, CHF (congestive heart failure) ([address] COPD (chronic obstructive pulmonary disease) ([facility] [address] Depression, Diabetes mellitus ([facility] [address] Hypertension, Hypothyroidism, MARIAA treated with BiPAP (02/14/2018), and Urinary incontinence.  with following problems:    · Met sepsis criteria on admission with leukocytosis, tachycardia, tachypnea and hypotension-resolved  · Acute respiratory failure with hypoxia-remains on 3 L oxygen  · coccygeal decubitus wound-wound culture positive for pansensitive E. coli  · Severe aortic stenosis, candidate for TAVR  · Negative COVID-19 PCR on 11/29/2021  · Elevated D-dimer of 619  · Elevated BNP of 684 on admission  · Elevated CRP of 73.4  · COPD  · Essential hypertension-running hypotensive, may be chronic secondary to her severe aortic stenosis  · History of depression        Discussion:      The patient is afebrile. He is on empiric IV linezolid. Left buttock wound culture is growing E. coli. I had started empiric ciprofloxacin yesterday. Blood cultures from admission are negative so far. The patient has stage I left buttock wound culture. E. coli sensitivities are now available. It is pansensitive. Plan:     Diagnostic Workup:      · Continue to follow  fever curve, WBC count and blood cultures. · Continue to monitor blood counts, liver and renal function. Antimicrobials:    · No evidence of MRSA. We will stop linezolid at discharge. · Patient is 82year-old. Would like to avoid long-term ciprofloxacin  · We will order p.o. Ceftin 500 mg every 12 hour for E. coli coverage  · Due to the particular location of the wound, there is risk for fecal contamination. · We will order p.o. Flagyl 500 mg every 8 hour for empiric anaerobic coverage  · Recommend a 10-day course of oral Ceftin and Flagyl  · Continue close vitals monitoring. · Maintain good glycemic control. · Fall precautions. · Aspiration precautions. · Continue to watch for new fever or diarrhea. · DVT prophylaxis. · Discussed all above with patient and RN. Drug Monitoring:    · Continue monitoring for antibiotic toxicity as follows: CBC, CMP   · Continue to watch for following: new or worsening fever, new hypotension, hives, lip swelling and redness or purulence at vascular access sites.      I/v access Management:    · Continue to monitor i.v access sites for erythema, induration, discharge or tenderness. · As always, continue efforts to minimize tubes/lines/drains as clinically appropriate to reduce chances of line associated infections. Patient education and counseling:        · The patient was educated in detail about the side-effects of various antibiotics and things to watch for like new rashes, lip swelling, severe reaction, worsening diarrhea, break through fever etc.  · Discussed patient's condition and what to expect. All of the patient's questions were addressed in a satisfactory manner and patient verbalized understanding all instructions. TIME SPENT TODAY:     - Spent over 36 minutes on visit (including interval history, physical exam, review of data including labs, cultures, imaging, development and implementation of treatment plan and coordination of complex care). More than 50 percent of this includes face-to-face time spent with the patient for counseling and coordination of care. Thank you for involving me in the care of your patient. I will continue to follow. If you have anyadditional questions, please do not hesitate to contact me. Subjective: Interval history: Interval history was obtained from chart review and patient/ RN. She is afebrile. She is tolerating antibiotics okay. No diarrhea     REVIEW OF SYSTEMS:      Review of Systems   Constitutional: Negative for chills, diaphoresis and unexpected weight change. HENT: Negative for congestion, ear discharge, ear pain, facial swelling, hearing loss, rhinorrhea and trouble swallowing. Eyes: Negative for photophobia, discharge, redness and visual disturbance. Respiratory: Negative for apnea, cough, choking, chest tightness, shortness of breath and stridor. Cardiovascular: Negative for chest pain and palpitations. Gastrointestinal: Negative for abdominal pain, blood in stool, diarrhea and nausea.    Endocrine: Negative for polydipsia, polyphagia and polyuria. Genitourinary: Negative for difficulty urinating, dysuria, frequency, hematuria, menstrual problem and vaginal discharge. Musculoskeletal: Negative for arthralgias, joint swelling, myalgias and neck stiffness. Skin: Negative for color change and rash. Allergic/Immunologic: Negative for immunocompromised state. Neurological: Negative for dizziness, seizures, speech difficulty, light-headedness and headaches. Hematological: Negative for adenopathy. Psychiatric/Behavioral: Negative for agitation, hallucinations and suicidal ideas. Past Medical History: All past medical history reviewed today. Past Medical History:   Diagnosis Date    Allergic rhinitis     Anxiety     Aortic stenosis     [facility]) [facility])     COPD (chronic obstructive pulmonary disease) ([facility])     Depression     Diabetes mellitus ([facility] [address]     Hypertension     Hypothyroidism     MARIAA treated with BiPAP 02/14/2018    Urinary incontinence        Past Surgical History: All past surgical history was reviewed today. Past Surgical History:   Procedure Laterality Date    CARDIAC CATHETERIZATION  10/11/2021    CARPAL TUNNEL RELEASE      bilateral    CHOLECYSTECTOMY      DENTAL SURGERY N/A 11/12/2021    SIMPLE EXTRACTIONS TEETH # 4, 13, 18, 23, 24, 25, 26,  AND SURGICAL EXTRACTION TEETH # 2, 3, 14, 15, 19, 20, 21,  28,  29, 30, 31  AND ALSO ALVEOLOPLASTIES ALL FOUR QUADRANTS; GEL FOAM performed by Miley Mahmood DDS at [address] [place] [address]      bilateral cataracts    HYSTERECTOMY, TOTAL ABDOMINAL      TONSILLECTOMY      UVULECTOMY         Family History: All family history was reviewed today.         Problem Relation Age of Onset    Cancer Mother         breast    Breast Cancer Mother     Cancer Father         prostate    Cancer Sister         breast    Breast Cancer Daughter     Breast Cancer Maternal Grandmother        Objective:       PHYSICAL EXAM:      Vitals:   Vitals:    12/02/21 0515 12/02/21 0853 12/02/21 0952 12/02/21 1135   BP: (!) 90/49   (!) 88/46   Pulse: 87   90   Resp: 18 18  20   Temp: 98.5 °F (36.9 °C)   97.6 °F (36.4 °C)   TempSrc: Oral   Oral   SpO2: 96% 96%  98%   Weight:       Height:   5' (1.524 m)        Physical Exam  Vitals and nursing note reviewed. Constitutional:       General: She is not in acute distress. Appearance: She is well-developed. She is not diaphoretic. HENT:      Head: Normocephalic. Right Ear: External ear normal.      Left Ear: External ear normal.      Nose: Nose normal.   Eyes:      General: No scleral icterus. Right eye: No discharge. Left eye: No discharge. Conjunctiva/sclera: Conjunctivae normal.      Pupils: Pupils are equal, round, and reactive to light. Cardiovascular:      Rate and Rhythm: Normal rate and regular rhythm. Heart sounds: No murmur heard. No friction rub. Pulmonary:      Effort: Pulmonary effort is normal.      Breath sounds: No stridor. No wheezing or rales. Chest:      Chest wall: No tenderness. Abdominal:      Palpations: Abdomen is soft. There is no mass. Tenderness: There is no abdominal tenderness. There is no guarding or rebound. Musculoskeletal:         General: No tenderness. Cervical back: Normal range of motion and neck supple. Lymphadenopathy:      Cervical: No cervical adenopathy. Skin:     General: Skin is warm and dry. Findings: No erythema or rash. Comments: Left buttock area wound culture noted   Neurological:      Mental Status: She is alert and oriented to person, place, and time. Motor: No abnormal muscle tone. Psychiatric:         Judgment: Judgment normal.       Lines and drains: All vascular access sites are healthy with no local erythema, discharge or tenderness. Intake and output:    I/O last 3 completed shifts:   In: 600 [P.O.:600]  Out: -     Lab Data:   All available labs and old records have been reviewed by me. CBC:  Recent Labs     11/30/21  0631 12/01/21  0607 12/02/21 0621   WBC 7.2 11.0 10.3   RBC 3.70* 4.04 3.51*   HGB 10.9* 12.0 10.3*   HCT 32.7* 36.6 30.8*    185 212   MCV 88.4 90.6 87.7   MCH 29.4 29.6 29.3   MCHC 33.3 32.7 33.4   RDW 13.2 13.4 13.0        BMP:  Recent Labs     11/30/21  0631 12/01/21  0607 12/02/21  0621   * 138 137   K 3.7 4.1 4.0   CL 98* 98* 96*   CO2 30 29 34*   BUN 18 19 16   CREATININE 0.7 0.9 0.8   CALCIUM 8.7 8.9 8.1*   GLUCOSE 159* 200* 139*        Hepatic Function Panel:   Lab Results   Component Value Date    ALKPHOS 62 12/02/2021    ALT 29 12/02/2021    AST 31 12/02/2021    PROT 6.3 12/02/2021    BILITOT <0.2 12/02/2021    BILIDIR <0.2 11/22/2021    IBILI see below 11/22/2021    LABALBU 3.3 12/02/2021       CPK: No results found for: CKTOTAL  ESR:   Lab Results   Component Value Date    SEDRATE 52 (H) 11/30/2021     CRP:   Lab Results   Component Value Date    CRP 73.4 (H) 11/29/2021           Imaging: All pertinent images and reports for the current visit were reviewed by me during this visit. XR CHEST (2 VW)   Final Result   1. No acute cardiopulmonary process. 2. Pulmonary vascular congestion and mild cardiomegaly. XR CHEST PORTABLE   Final Result   No acute abnormality. Medications: All current and past medications were reviewed.      ciprofloxacin  500 mg Oral 2 times per day    budesonide  0.5 mg Nebulization BID    ipratropium-albuterol  1 ampule Inhalation Q6H WA    Arformoterol Tartrate  15 mcg Nebulization BID    linezolid  600 mg IntraVENous Q12H    levothyroxine  75 mcg Oral Daily    atorvastatin  10 mg Oral Nightly    oxybutynin  2.5 mg Oral TID    methocarbamol  500 mg Oral 4x Daily    furosemide  40 mg Oral BID    spironolactone  12.5 mg Oral Daily    docusate sodium  100 mg Oral BID    potassium chloride  20 mEq Oral Daily    pantoprazole  40 mg Oral QAM AC    cetirizine  5 mg Oral Daily    citalopram  10 mg Oral Daily    sodium chloride flush  5-40 mL IntraVENous 2 times per day    enoxaparin  40 mg SubCUTAneous BID        sodium chloride         albuterol sulfate HFA, polyethylene glycol, sodium chloride flush, sodium chloride, ondansetron **OR** ondansetron, magnesium hydroxide, acetaminophen **OR** acetaminophen, hydrALAZINE, potassium chloride **OR** potassium alternative oral replacement **OR** potassium chloride, sodium chloride      Problem list:       Patient Active Problem List   Diagnosis Code    Chronic seasonal allergic rhinitis J30.2    COPD, severe ([facility] [address] J44.9    Idiopathic peripheral neuropathy G60.9    Hypothyroidism E03.9    Depression F32. A    Chronic congestive heart failure ([facility]) I50.9    SOB (shortness of breath) R06.02    Obstructive sleep apnea (adult) (pediatric) G47.33    Bilateral lower extremity edema R60.0    Multifocal pneumonia J18.9    Centrilobular emphysema ([facility]) J43.2    Acute on chronic diastolic heart failure ([facility]) I50.33    Pitting edema R60.9    BASIA (acute kidney injury) ([facility]) N17.9    Chronic respiratory failure with hypoxia ([facility]) J96.11    Essential hypertension I10    Mixed hyperlipidemia E78.2    Acute pulmonary edema ([facility]) J81.0    COPD exacerbation ([facility]) J44.1    Chronic bronchitis ([facility]) J42    Class 3 severe obesity without serious comorbidity with body mass index (BMI) of 40.0 to 44.9 in adult ([facility]) E66.01, Z68.41    Pulmonary nodule R91.1    Acute suppurative otitis media of right ear with spontaneous rupture of tympanic membrane H66.011    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 H72.01    Bilateral hearing loss H91.93    Bilateral sensorineural hearing loss H90.3    Senile osteoporosis M81.0    Acute respiratory failure ([facility]) J96.00    Coronary artery disease due to lipid rich plaque I25.10, I25.83    Nonrheumatic aortic valve stenosis I35.0    Hypercholesteremia E78.00    Aspiration into airway T17.908A    Acute respiratory failure with hypoxemia (HCC) J96.01    Severe sepsis (HCC) A41.9, R65.20    Elevated brain natriuretic peptide (BNP) level R79.89    Elevated C-reactive protein (CRP) R79.82    History of depression Z86.59    Decubitus ulcer of coccyx L89.159    Weight loss counseling, encounter for Z71.3    E coli infection A49.8       Please note that this chart was generated using Dragon dictation software. Although every effort was made to ensure the accuracy of this automated transcription, some errors in transcription may have occurred inadvertently. If you may need any clarification, please do not hesitate to contact me through EPIC or at the phone number provided below with my electronic signature. Any pictures or media included in this note were obtained after taking informed verbal consent from the patient and with their approval to include those in the patient's medical record.       Yan Galvan MD, MPH, FACP, [facility]  12/2/2021, 2:17 PM  [facility] Infectious Disease   [address] [place] [address], [address] [place] [address]  Office: [phone]  Fax: [phone]  Clinic days:  Tuesday & Thursday

## 2021-12-02 NOTE — PROGRESS NOTES
PULMONARY AND CRITICAL CARE MEDICINE PROGRESS NOTE      SUBJECTIVE: No acute events overnight. Patient denies any worsening shortness of breath or cough or wheezing. REVIEW OF SYSTEMS:   CONSTITUTIONAL SYMPTOMS: The patient denies fever, fatigue, night sweats, weight loss or weight gain. HEENT: No vision changes. No tinnitus, Denies sinus pain. No hoarseness, or dysphagia. CARDIOVASCULAR: Denies chest pain, palpitation, syncope. RESPIRATORY: Denies shortness of breath or cough. GASTROINTESTINAL: Denies nausea, abdominal pain or change in bowel function. SKIN: No rashes or itching. MUSCULOSKELETAL: Denies weakness or bone pain. NEUROLOGICAL: No headaches or seizures. MEDICATIONS:      ciprofloxacin  500 mg Oral 2 times per day    budesonide  0.5 mg Nebulization BID    ipratropium-albuterol  1 ampule Inhalation Q6H WA    Arformoterol Tartrate  15 mcg Nebulization BID    linezolid  600 mg IntraVENous Q12H    levothyroxine  75 mcg Oral Daily    atorvastatin  10 mg Oral Nightly    oxybutynin  2.5 mg Oral TID    methocarbamol  500 mg Oral 4x Daily    furosemide  40 mg Oral BID    spironolactone  12.5 mg Oral Daily    docusate sodium  100 mg Oral BID    potassium chloride  20 mEq Oral Daily    pantoprazole  40 mg Oral QAM AC    cetirizine  5 mg Oral Daily    citalopram  10 mg Oral Daily    sodium chloride flush  5-40 mL IntraVENous 2 times per day    enoxaparin  40 mg SubCUTAneous BID      sodium chloride       albuterol sulfate HFA, polyethylene glycol, sodium chloride flush, sodium chloride, ondansetron **OR** ondansetron, magnesium hydroxide, acetaminophen **OR** acetaminophen, hydrALAZINE, potassium chloride **OR** potassium alternative oral replacement **OR** potassium chloride, sodium chloride     ALLERGIES:   Allergies as of 11/29/2021    (No Known Allergies)        OBJECTIVE:   height is 5' (1.524 m) and weight is 182 lb 8 oz (82.8 kg).  Her oral temperature is 97.6 °F CL 96 (L) 12/02/2021    BUN 16 12/02/2021    CREATININE 0.8 12/02/2021          IMAGING:  I reviewed the chest x-ray and my interpretation is as follows.  No infiltrates noted.        IMPRESSION:   Chronic hypoxic respiratory failure  COPD with acute exacerbation  Heart failure with preserved ejection fraction  Severe aortic stenosis, plan for TAVR  Obstructive sleep apnea on auto BiPAP        RECOMMENDATION:   Patient presents with worsening shortness of breath.  Being treated for acute exacerbation of COPD. Currently she is not wheezing.    Continue home bronchodilator regimen, budesonide and duo nebs twice a day.  Continue duo nebs every 6 hours. On 3 L/min oxygen which is not far off.  Titrate FiO2 for saturation of 88 to 92%. Patient wears auto BiPAP at home. Continue BiPAP overnight for MARIAA. Plan for TAVR for severe aortic stenosis has been postponed for next week due to coccygeal wound. Patient can be discharged from pulmonary perspective. Pulmonary will sign off. Patient can follow-up with pulmonary as outpatient. Eric Lucas MD  Pulmonary Critical Care and Sleep Medicine  12/2/2021, 1:41 PM    This note was completed using dragon medical speech recognition software. Grammatical errors, random word insertions, pronoun errors and incomplete sentences are occasional consequences of this technology due to software limitations. If there are questions or concerns about the content of this note of information contained within the body of this dictation they should be addressed with the provider for clarification.

## 2021-12-02 NOTE — CONSULTS
Comprehensive Nutrition Assessment    Type and Reason for Visit:  Consult (Pt has had multiple teeth pulled prior to admit in preparation for dentures. Mouth is sore and cannot chew meats and tough foods. Please see client to recommend diet consistency)    Nutrition Recommendations/Plan:   1. Trial dysphagia minced and moist diet  2. Consider SLP evaluation  3. Offer Ensure High Protein BID    Nutrition Assessment:  Pt is at risk for nutrition compromise as evidenced by difficulty chewing s/p having multiple teeth pulled in preparation for dentures. Pt currently on an easy to chew diet, but is still having difficulty with foods being too \"crunchy. \" PO intake has been 26-75% of meals. Pt is agreeable to trial dysphagia minced and moist diet. Consider SLP evaluation if pt continues to have difficulty. Pt with increased nutrient needs related to stage II pressure ulcer to coccyx. Will offer Ensure High Protein BID. Malnutrition Assessment:  Malnutrition Status:  No malnutrition      Estimated Daily Nutrient Needs:  Energy (kcal):  6638-2046; Weight Used for Energy Requirements:  Current (83 kg)     Protein (g):  59-72 grams; Weight Used for Protein Requirements:  Ideal (45 kg; 1.3-1.6 grams per kg)        Fluid (ml/day):  1494 mL; Method Used for Fluid Requirements:  1 ml/kcal      Nutrition Related Findings:  +1 BLE edema. LBM 12/1.       Wounds:  Stage II, Pressure Injury       Current Nutrition Therapies:    ADULT DIET; Easy to Chew    Anthropometric Measures:  · Height: 5' (152.4 cm)  · Current Body Weight: 182 lb 8.7 oz (82.8 kg)   · Ideal Body Weight: 100 lbs; % Ideal Body Weight 182.5 %   · BMI: 35.7  · BMI Categories: Obese Class 2 (BMI 35.0 -39.9)       Nutrition Diagnosis:   · Inadequate oral intake related to biting/chewing (masticatory) difficulty as evidenced by intake 26-50%, intake 51-75%    · Increased nutrient needs related to increase demand for energy/nutrients as evidenced by wounds      Nutrition Interventions:   Food and/or Nutrient Delivery:  Modify Current Diet, Start Oral Nutrition Supplement  Nutrition Education/Counseling:  Education not indicated   Coordination of Nutrition Care:  Continue to monitor while inpatient    Goals:  Pt will consume at least 50% of meals and supplements       Nutrition Monitoring and Evaluation:   Behavioral-Environmental Outcomes:  None Identified   Food/Nutrient Intake Outcomes:  Food and Nutrient Intake, Supplement Intake  Physical Signs/Symptoms Outcomes:  Skin, Chewing or Swallowing     Discharge Planning:     Too soon to determine     Electronically signed by Anna Kumari RD, LD on 12/2/21 at 9:50 AM EST    Contact: [phone]

## 2021-12-02 NOTE — PROGRESS NOTES
Progress Note - Dr. Roel Alford - Internal Medicine  PCP: Maynor Lowery [id number] [facility] [place] / [facility] [address] Day: 3  Code Status: Full Code  Current Diet: ADULT DIET; Dysphagia - Minced and Moist  ADULT ORAL NUTRITION SUPPLEMENT; Lunch, Dinner; Low Calorie/High Protein Oral Supplement        CC: follow up on medical issues    Subjective:   Sloane Hayes is a 82 y.o. female. She denies problems    Doing ok  No new issues  tavr postponed until next week    Now on zyvox and cipro for decub ulcer  Appreciate id eval  Still on iv abx - will see if ID is ready to convert to po  This will help us with discharge planning to know whether pt is going to be on IV or PO meds on d/c    She denies chest pain, denies shortness of breath, denies nausea,  denies emesis. 10 system Review of Systems is reviewed with patient, and pertinent positives are noted in HPI above . Otherwise, Review of systems is negative. I have reviewed the patient's medical and social history in detail and updated the computerized patient record. To recap: She  has a past medical history of Allergic rhinitis, Anxiety, Aortic stenosis, CHF (congestive heart failure) ([address] COPD (chronic obstructive pulmonary disease) ([address] Depression, Diabetes mellitus ([address] Hypertension, Hypothyroidism, MARIAA treated with BiPAP, and Urinary incontinence. . She  has a past surgical history that includes Hysterectomy, total abdominal; Carpal tunnel release; Cholecystectomy; eye surgery; Tonsillectomy; Uvulectomy; Cardiac catheterization (10/11/2021); and Dental surgery (N/A, 11/12/2021). . She  reports that she has never smoked. She has never used smokeless tobacco. She reports that she does not drink alcohol and does not use drugs. .        Active Hospital Problems    Diagnosis Date Noted    Decubitus ulcer of coccyx [L89.159] 12/01/2021    Weight loss counseling, encounter for [Z71.3]     E coli infection [A49.8]     Severe sepsis ([address] [A41.9, R65.20]     Elevated brain natriuretic peptide (BNP) level [R79.89]     Elevated C-reactive protein (CRP) [R79.82]     History of depression [Z86.59]     Acute respiratory failure with hypoxemia (HCC) [J96.01] 11/29/2021    Hypercholesteremia [E78.00] 08/06/2021    Nonrheumatic aortic valve stenosis [I35.0]     COPD exacerbation (HCC) [J44.1]     Essential hypertension [I10] 06/28/2018    COPD, severe ([facility] [J44.9] 01/12/2018    Hypothyroidism [E03.9] 01/12/2018       Current Facility-Administered Medications: ciprofloxacin (CIPRO) tablet 500 mg, 500 mg, Oral, 2 times per day  budesonide (PULMICORT) nebulizer suspension 500 mcg, 0.5 mg, Nebulization, BID  ipratropium-albuterol (DUONEB) nebulizer solution 1 ampule, 1 ampule, Inhalation, Q6H WA  Arformoterol Tartrate (BROVANA) nebulizer solution 15 mcg, 15 mcg, Nebulization, BID  linezolid (ZYVOX) IVPB 600 mg, 600 mg, IntraVENous, Q12H  levothyroxine (SYNTHROID) tablet 75 mcg, 75 mcg, Oral, Daily  albuterol sulfate  (90 Base) MCG/ACT inhaler 2 puff, 2 puff, Inhalation, Q6H PRN  atorvastatin (LIPITOR) tablet 10 mg, 10 mg, Oral, Nightly  oxybutynin (DITROPAN) tablet 2.5 mg, 2.5 mg, Oral, TID  methocarbamol (ROBAXIN) tablet 500 mg, 500 mg, Oral, 4x Daily  furosemide (LASIX) tablet 40 mg, 40 mg, Oral, BID  spironolactone (ALDACTONE) tablet 12.5 mg, 12.5 mg, Oral, Daily  docusate sodium (COLACE) capsule 100 mg, 100 mg, Oral, BID  potassium chloride (KLOR-CON M) extended release tablet 20 mEq, 20 mEq, Oral, Daily  polyethylene glycol (GLYCOLAX) packet 17 g, 17 g, Oral, Daily PRN  pantoprazole (PROTONIX) tablet 40 mg, 40 mg, Oral, QAM AC  cetirizine (ZYRTEC) tablet 5 mg, 5 mg, Oral, Daily  citalopram (CELEXA) tablet 10 mg, 10 mg, Oral, Daily  sodium chloride flush 0.9 % injection 5-40 mL, 5-40 mL, IntraVENous, 2 times per day  sodium chloride flush 0.9 % injection 5-40 mL, 5-40 mL, IntraVENous, PRN  0.9 % sodium chloride infusion, 25 mL, IntraVENous, PRN  ondansetron (ZOFRAN-ODT) disintegrating tablet 4 mg, 4 mg, Oral, Q8H PRN **OR** ondansetron (ZOFRAN) injection 4 mg, 4 mg, IntraVENous, Q6H PRN  magnesium hydroxide (MILK OF MAGNESIA) 400 MG/5ML suspension 30 mL, 30 mL, Oral, Daily PRN  enoxaparin (LOVENOX) injection 40 mg, 40 mg, SubCUTAneous, BID  acetaminophen (TYLENOL) tablet 650 mg, 650 mg, Oral, Q6H PRN **OR** acetaminophen (TYLENOL) suppository 650 mg, 650 mg, Rectal, Q6H PRN  hydrALAZINE (APRESOLINE) injection 10 mg, 10 mg, IntraVENous, Q6H PRN  potassium chloride (KLOR-CON M) extended release tablet 40 mEq, 40 mEq, Oral, PRN **OR** potassium bicarb-citric acid (EFFER-K) effervescent tablet 40 mEq, 40 mEq, Oral, PRN **OR** potassium chloride 10 mEq/100 mL IVPB (Peripheral Line), 10 mEq, IntraVENous, PRN  0.9 % sodium chloride bolus, 500 mL, IntraVENous, PRN         Objective:  BP (!) 90/49   Pulse 87   Temp 98.5 °F (36.9 °C) (Oral)   Resp 18   Ht 5' (1.524 m)   Wt 182 lb 8 oz (82.8 kg)   SpO2 96%   BMI 35.64 kg/m²      Patient Vitals for the past 24 hrs:   BP Temp Temp src Pulse Resp SpO2 Height   12/02/21 0952       5' (1.524 m)   12/02/21 0853     18 96 %    12/02/21 0515 (!) 90/49 98.5 °F (36.9 °C) Oral 87 18 96 %    12/01/21 2330 (!) 94/58 99.1 °F (37.3 °C) Oral 109 18 94 %    12/01/21 2000 96/62 98 °F (36.7 °C) Oral 99 18 96 %    12/01/21 1619      96 %    12/01/21 1545 109/68 97.7 °F (36.5 °C) Oral 88 18 96 %      Patient Vitals for the past 96 hrs (Last 3 readings):   Weight   11/29/21 1711 182 lb 8 oz (82.8 kg)           Intake/Output Summary (Last 24 hours) at 12/2/2021 1024  Last data filed at 12/2/2021 0916  Gross per 24 hour   Intake 600 ml   Output    Net 600 ml         Physical Exam:   BP (!) 90/49   Pulse 87   Temp 98.5 °F (36.9 °C) (Oral)   Resp 18   Ht 5' (1.524 m)   Wt 182 lb 8 oz (82.8 kg)   SpO2 96%   BMI 35.64 kg/m²   General appearance: alert, appears stated age and cooperative  Head: Normocephalic, without obvious abnormality, atraumatic  Lungs: clear to auscultation bilaterally  Heart: regular rate and rhythm, S1, S2 normal, no murmur, click, rub or gallop  Abdomen: soft, non-tender; bowel sounds normal; no masses,  no organomegaly  Extremities: extremities normal, atraumatic, no cyanosis or edema  Skin: wound to coccyx    Labs:  Lab Results   Component Value Date    WBC 10.3 12/02/2021    HGB 10.3 (L) 12/02/2021    HCT 30.8 (L) 12/02/2021     12/02/2021    CHOL 167 06/25/2021    TRIG 125 06/25/2021    HDL 54 06/25/2021    ALT 29 12/02/2021    AST 31 12/02/2021     12/02/2021    K 4.0 12/02/2021    CL 96 (L) 12/02/2021    CREATININE 0.8 12/02/2021    BUN 16 12/02/2021    CO2 34 (H) 12/02/2021    TSH 1.85 06/10/2021    INR 1.10 11/29/2021    LABA1C 6.0 07/20/2021    LABMICR YES 11/29/2021     Lab Results   Component Value Date    TROPONINI <0.01 11/29/2021           Recent Imaging Results are Reviewed:  XR CHEST (2 VW)    Result Date: 11/30/2021  EXAMINATION: TWO XRAY VIEWS OF THE CHEST 11/30/2021 5:46 pm COMPARISON: Chest radiograph 11/29/2021; chest, abdomen, and pelvis CT 10/06/2021 HISTORY: ORDERING SYSTEM PROVIDED HISTORY: cough TECHNOLOGIST PROVIDED HISTORY: Reason for exam:->cough Reason for Exam: cough Acuity: Unknown Type of Exam: Unknown FINDINGS: 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 Clear lungs. Diffuse interstitial prominence with indistinct pulmonary vasculature but no definite interlobular septal thickening. No findings of pneumothorax or pleural effusion. Normal mediastinal contour. Mildly prominent cardiac contour. 1. No acute cardiopulmonary process. 2. Pulmonary vascular congestion and mild cardiomegaly. XR CHEST (2 VW)    Result Date: 11/22/2021  EXAMINATION: TWO XRAY VIEWS OF THE CHEST 11/22/2021 1:24 pm COMPARISON: Chest x-ray dated 07/18/2021.  HISTORY: ORDERING SYSTEM PROVIDED HISTORY: SOB (shortness of breath) TECHNOLOGIST PROVIDED HISTORY: Reason for exam:->sob FINDINGS: HEART/MEDIASTINUM: The cardiomediastinal silhouette is stable. PLEURA/LUNGS: There are no focal consolidations or pleural effusions. There is no appreciable pneumothorax. BONES/SOFT TISSUE: No acute abnormality. No radiographic evidence of acute pulmonary disease. CT HEAD WO CONTRAST    Result Date: 11/22/2021  EXAMINATION: CT OF THE HEAD WITHOUT CONTRAST  11/22/2021 1:21 pm TECHNIQUE: CT of the head was performed without the administration of intravenous contrast. Dose modulation, iterative reconstruction, and/or weight based adjustment of the mA/kV was utilized to reduce the radiation dose to as low as reasonably achievable. COMPARISON: None. HISTORY: ORDERING SYSTEM PROVIDED HISTORY: Dysarthria TECHNOLOGIST PROVIDED HISTORY: Reason for exam:->speech issue FINDINGS: BRAIN/VENTRICLES: There is no acute infarct or acute intracranial hemorrhage present. There is no mass effect or midline shift present. There is no ventriculomegaly or abnormal extra-axial fluid collection present. There is mild hypoattenuation within the periventricular and deep white matter of both hemispheres. ORBITS: Limited evaluation of the orbits is unremarkable. SINUSES: There is mild mucosal thickening within the maxillary sinuses. The paranasal sinuses and mastoid air cells are otherwise clear. SOFT TISSUES/SKULL:  No lytic or blastic osseous lesions are identified. 1. No acute intracranial process identified. 2. Mild chronic small vessel ischemic changes. XR CHEST PORTABLE    Result Date: 11/29/2021  EXAMINATION: ONE XRAY VIEW OF THE CHEST 11/29/2021 7:08 am COMPARISON: 11/22/2021 HISTORY: ORDERING SYSTEM PROVIDED HISTORY: Eval for infiltrate TECHNOLOGIST PROVIDED HISTORY: Reason for exam:->Eval for infiltrate Reason for Exam: eval fo infiltrate Acuity: Unknown Type of Exam: Unknown FINDINGS: No lung infiltrate or consolidation. No pneumothorax or pleural effusion.  Heart size is normal.     No acute abnormality. Lab Results   Component Value Date    GLUCOSE 139 12/02/2021     Lab Results   Component Value Date    POCGLU 149 07/22/2021       Assessment and Plan:    COPD, severe ([address] -Established problem. Stable. No wheezing, still on 3L  Plan: cont steroids, inhaled tx - appreciate pulm eval  Active Problems:    Hypothyroidism -Established problem. Stable. Plan: cont on synthroid    Essential hypertension -Established problem. A little soft this am 90/49  Plan: stay on same meds - though hold parameters d/w RN    COPD exacerbation ([address]    Nonrheumatic aortic valve stenosis  Plan: dr Shiela Arriola consulted. Await decision on tavr    Hypercholesteremia    Acute respiratory failure with hypoxemia ([address]  Plan: cont o2 for now. Severe sepsis ([address]  Plan: cont abx per ID    Decubitus ulcer of coccyx -Established problem. Stable. Wound cx, +e coli  Plan: on zyvox and cipro. WBC 10.3    Disp - await ID plan for abx. Home vs SNF depending on whether iv abx needed.   Defer timing of TAVR to cards (Depending on infection status)  HOpefully able to d/c today/tomorrow            Amadeo Waters MD  12/2/2021

## 2021-12-02 NOTE — PLAN OF CARE
Nutrition Problem #1: Inadequate oral intake  Intervention: Food and/or Nutrient Delivery: Modify Current Diet, Start Oral Nutrition Supplement  Nutritional Goals: Pt will consume at least 50% of meals and supplements

## 2021-12-02 NOTE — CARE COORDINATION
Orion Peña and [employer] from [facility] on unit and saw pt with CM who agrees to return to facility skilled. They are starting pre cert today. Pt had spoken to son and agreed to go.     Chyna Colon RN, BSN  [phone]

## 2021-12-03 ENCOUNTER — TELEPHONE (OUTPATIENT)
Dept: CARDIOLOGY CLINIC | Age: 86
End: 2021-12-03

## 2021-12-03 VITALS
RESPIRATION RATE: 16 BRPM | DIASTOLIC BLOOD PRESSURE: 64 MMHG | HEART RATE: 89 BPM | OXYGEN SATURATION: 94 % | SYSTOLIC BLOOD PRESSURE: 104 MMHG | HEIGHT: 60 IN | WEIGHT: 182.5 LBS | BODY MASS INDEX: 35.83 KG/M2 | TEMPERATURE: 98.2 F

## 2021-12-03 LAB
A/G RATIO: 1.1 (ref 1.1–2.2)
ALBUMIN SERPL-MCNC: 3.3 G/DL (ref 3.4–5)
ALP BLD-CCNC: 63 U/L (ref 40–129)
ALT SERPL-CCNC: 23 U/L (ref 10–40)
ANION GAP SERPL CALCULATED.3IONS-SCNC: 6 MMOL/L (ref 3–16)
AST SERPL-CCNC: 18 U/L (ref 15–37)
BASOPHILS ABSOLUTE: 0 K/UL (ref 0–0.2)
BASOPHILS RELATIVE PERCENT: 0.3 %
BILIRUB SERPL-MCNC: 0.3 MG/DL (ref 0–1)
BUN BLDV-MCNC: 18 MG/DL (ref 7–20)
C-REACTIVE PROTEIN: 16.9 MG/L (ref 0–5.1)
CALCIUM SERPL-MCNC: 8.5 MG/DL (ref 8.3–10.6)
CHLORIDE BLD-SCNC: 95 MMOL/L (ref 99–110)
CO2: 36 MMOL/L (ref 21–32)
CREAT SERPL-MCNC: 0.7 MG/DL (ref 0.6–1.2)
EOSINOPHILS ABSOLUTE: 0.2 K/UL (ref 0–0.6)
EOSINOPHILS RELATIVE PERCENT: 2.9 %
GFR AFRICAN AMERICAN: >60
GFR NON-AFRICAN AMERICAN: >60
GLUCOSE BLD-MCNC: 114 MG/DL (ref 70–99)
HCT VFR BLD CALC: 32.4 % (ref 36–48)
HEMOGLOBIN: 10.7 G/DL (ref 12–16)
LYMPHOCYTES ABSOLUTE: 2.8 K/UL (ref 1–5.1)
LYMPHOCYTES RELATIVE PERCENT: 34.4 %
MCH RBC QN AUTO: 29.3 PG (ref 26–34)
MCHC RBC AUTO-ENTMCNC: 33 G/DL (ref 31–36)
MCV RBC AUTO: 88.6 FL (ref 80–100)
MONOCYTES ABSOLUTE: 1.2 K/UL (ref 0–1.3)
MONOCYTES RELATIVE PERCENT: 14.6 %
NEUTROPHILS ABSOLUTE: 3.9 K/UL (ref 1.7–7.7)
NEUTROPHILS RELATIVE PERCENT: 47.8 %
PDW BLD-RTO: 13 % (ref 12.4–15.4)
PLATELET # BLD: 208 K/UL (ref 135–450)
PMV BLD AUTO: 9.1 FL (ref 5–10.5)
POTASSIUM REFLEX MAGNESIUM: 4.2 MMOL/L (ref 3.5–5.1)
RBC # BLD: 3.66 M/UL (ref 4–5.2)
SODIUM BLD-SCNC: 137 MMOL/L (ref 136–145)
TOTAL PROTEIN: 6.3 G/DL (ref 6.4–8.2)
WBC # BLD: 8.1 K/UL (ref 4–11)

## 2021-12-03 PROCEDURE — 80053 COMPREHEN METABOLIC PANEL: CPT

## 2021-12-03 PROCEDURE — 86140 C-REACTIVE PROTEIN: CPT

## 2021-12-03 PROCEDURE — 6360000002 HC RX W HCPCS

## 2021-12-03 PROCEDURE — 2700000000 HC OXYGEN THERAPY PER DAY

## 2021-12-03 PROCEDURE — 6370000000 HC RX 637 (ALT 250 FOR IP): Performed by: INTERNAL MEDICINE

## 2021-12-03 PROCEDURE — 2580000003 HC RX 258: Performed by: INTERNAL MEDICINE

## 2021-12-03 PROCEDURE — 97530 THERAPEUTIC ACTIVITIES: CPT

## 2021-12-03 PROCEDURE — 94640 AIRWAY INHALATION TREATMENT: CPT

## 2021-12-03 PROCEDURE — 94761 N-INVAS EAR/PLS OXIMETRY MLT: CPT

## 2021-12-03 PROCEDURE — 85025 COMPLETE CBC W/AUTO DIFF WBC: CPT

## 2021-12-03 PROCEDURE — 36415 COLL VENOUS BLD VENIPUNCTURE: CPT

## 2021-12-03 PROCEDURE — 99232 SBSQ HOSP IP/OBS MODERATE 35: CPT | Performed by: INTERNAL MEDICINE

## 2021-12-03 PROCEDURE — 94660 CPAP INITIATION&MGMT: CPT

## 2021-12-03 PROCEDURE — 6360000002 HC RX W HCPCS: Performed by: INTERNAL MEDICINE

## 2021-12-03 RX ORDER — ARFORMOTEROL TARTRATE 15 UG/2ML
SOLUTION RESPIRATORY (INHALATION)
Status: COMPLETED
Start: 2021-12-03 | End: 2021-12-03

## 2021-12-03 RX ORDER — BUDESONIDE 0.5 MG/2ML
INHALANT ORAL
Status: COMPLETED
Start: 2021-12-03 | End: 2021-12-03

## 2021-12-03 RX ORDER — PREDNISONE 10 MG/1
TABLET ORAL
Qty: 40 TABLET | Refills: 0
Start: 2021-12-03 | End: 2021-12-13

## 2021-12-03 RX ADMIN — OXYBUTYNIN CHLORIDE 2.5 MG: 5 TABLET ORAL at 08:47

## 2021-12-03 RX ADMIN — POTASSIUM CHLORIDE 20 MEQ: 20 TABLET, EXTENDED RELEASE ORAL at 11:06

## 2021-12-03 RX ADMIN — METHOCARBAMOL TABLETS 500 MG: 500 TABLET, COATED ORAL at 08:48

## 2021-12-03 RX ADMIN — PANTOPRAZOLE SODIUM 40 MG: 40 TABLET, DELAYED RELEASE ORAL at 06:23

## 2021-12-03 RX ADMIN — ARFORMOTEROL TARTRATE 15 MCG: 15 SOLUTION RESPIRATORY (INHALATION) at 11:54

## 2021-12-03 RX ADMIN — BUDESONIDE 500 MCG: 0.5 SUSPENSION RESPIRATORY (INHALATION) at 08:10

## 2021-12-03 RX ADMIN — CETIRIZINE HYDROCHLORIDE 5 MG: 10 TABLET, FILM COATED ORAL at 08:48

## 2021-12-03 RX ADMIN — LEVOTHYROXINE SODIUM 75 MCG: 0.07 TABLET ORAL at 08:47

## 2021-12-03 RX ADMIN — ACETAMINOPHEN 650 MG: 325 TABLET ORAL at 03:14

## 2021-12-03 RX ADMIN — ENOXAPARIN SODIUM 40 MG: 100 INJECTION SUBCUTANEOUS at 08:48

## 2021-12-03 RX ADMIN — METRONIDAZOLE 500 MG: 250 TABLET, FILM COATED ORAL at 06:23

## 2021-12-03 RX ADMIN — OXYBUTYNIN CHLORIDE 2.5 MG: 5 TABLET ORAL at 13:45

## 2021-12-03 RX ADMIN — METHOCARBAMOL TABLETS 500 MG: 500 TABLET, COATED ORAL at 13:45

## 2021-12-03 RX ADMIN — IPRATROPIUM BROMIDE AND ALBUTEROL SULFATE 1 AMPULE: .5; 3 SOLUTION RESPIRATORY (INHALATION) at 08:10

## 2021-12-03 RX ADMIN — DOCUSATE SODIUM 100 MG: 100 CAPSULE ORAL at 08:47

## 2021-12-03 RX ADMIN — CITALOPRAM HYDROBROMIDE 10 MG: 20 TABLET ORAL at 08:47

## 2021-12-03 RX ADMIN — Medication 10 ML: at 08:48

## 2021-12-03 RX ADMIN — METRONIDAZOLE 500 MG: 250 TABLET, FILM COATED ORAL at 13:45

## 2021-12-03 RX ADMIN — FUROSEMIDE 40 MG: 40 TABLET ORAL at 08:47

## 2021-12-03 RX ADMIN — CEFUROXIME AXETIL 500 MG: 250 TABLET ORAL at 08:47

## 2021-12-03 RX ADMIN — SPIRONOLACTONE 12.5 MG: 25 TABLET ORAL at 08:48

## 2021-12-03 ASSESSMENT — ENCOUNTER SYMPTOMS
SHORTNESS OF BREATH: 0
COLOR CHANGE: 0
ABDOMINAL PAIN: 0
NAUSEA: 0
STRIDOR: 0
COUGH: 0
DIARRHEA: 0
EYE REDNESS: 0
TROUBLE SWALLOWING: 0
EYE DISCHARGE: 0
PHOTOPHOBIA: 0
BLOOD IN STOOL: 0
APNEA: 0
CHOKING: 0
RHINORRHEA: 0
FACIAL SWELLING: 0
CHEST TIGHTNESS: 0

## 2021-12-03 ASSESSMENT — PAIN DESCRIPTION - LOCATION: LOCATION: BUTTOCKS

## 2021-12-03 ASSESSMENT — PAIN DESCRIPTION - PAIN TYPE: TYPE: ACUTE PAIN

## 2021-12-03 ASSESSMENT — PAIN SCALES - GENERAL
PAINLEVEL_OUTOF10: 0
PAINLEVEL_OUTOF10: 8

## 2021-12-03 NOTE — PROGRESS NOTES
VSS. Patient awake, resting in bed. Respirations easy and unlabored. Scheduled medications given. Brief changed, darren care provided. Patient denies being in pain. No other needs expressed at this time.

## 2021-12-03 NOTE — PROGRESS NOTES
Physical Therapy  Facility/Department: [address] ONCOLOGY  Daily Treatment Note  NAME: Michelle Murrieta  : 1935  MRN: [id number]    Date of Service: 12/3/2021    Discharge Recommendations: Michelle Murrieta scored a 15/24 on the AM-PAC short mobility form. Current research shows that an AM-PAC score of 17 or less is typically not associated with a discharge to the patient's home setting. Based on the patient's AM-PAC score and their current functional mobility deficits, it is recommended that the patient have 3-5 sessions per week of Physical Therapy at d/c to increase the patient's independence. Please see assessment section for further patient specific details. If patient discharges prior to next session this note will serve as a discharge summary. Please see below for the latest assessment towards goals. PT Equipment Recommendations  Equipment Needed: No  Other: Defer to next level of care    Assessment   Body structures, Functions, Activity limitations: Decreased balance; Decreased functional mobility ; Decreased endurance; Decreased strength; Increased pain  Assessment: Pt continues to present with decreased activity tolerance and generalized weakness with all OOB acitivity. Pt shows improvement with bed mobility as she is able to complete supine>sit with min A x1. Pt show improvement with transfers, completing bed>chair with CGA and RW. Pt continues to fatigue quickly and move very slowly. Pt would continue to benefit from skilled PT to safely progress functional mobility back to baseline.   Treatment Diagnosis: Generalized weakness and decreased tolerance to activity  Prognosis: Fair  Decision Making: Medium Complexity  Clinical Presentation: Evolving  PT Education: Goals; Plan of Care; PT Role; Transfer Training; General Safety; Gait Training  Patient Education: Pt verbalized understanding, reinforcement for carry over  Barriers to Learning: None  REQUIRES PT FOLLOW UP: Yes  Activity Tolerance  Activity Tolerance: Patient limited by endurance; Patient limited by fatigue  Activity Tolerance: Upon supine>sit pt O2 dropped to 88%, after seated rest break O2 continued to linger at 89%. O2 increased to 3 L and pt recovered to 94%. Pt left on 3 L for remainder of session. At EOS pt weened back to 2 L O2 at 94%. Nurse notified. Pt very tired throughout session. Patient Diagnosis(es): The encounter diagnosis was COPD exacerbation ([facility] has a past medical history of Allergic rhinitis, Anxiety, Aortic stenosis, CHF (congestive heart failure) ([address] COPD (chronic obstructive pulmonary disease) ([facility] Depression, Diabetes mellitus ([facility] Hypertension, Hypothyroidism, MARIAA treated with BiPAP, and Urinary incontinence. has a past surgical history that includes Hysterectomy, total abdominal; Carpal tunnel release; Cholecystectomy; eye surgery; Tonsillectomy; Uvulectomy; Cardiac catheterization (10/11/2021); and Dental surgery (N/A, 11/12/2021). Restrictions  Restrictions/Precautions  Restrictions/Precautions: Fall Risk, Modified Diet (high fall risk, easy to chew)  Position Activity Restriction  Other position/activity restrictions: Per H&P on 11/29 \"86 y.o. female who presents from SNF for dyspnea. From [address] (Covered by [employer] there). Today, presents with with dyspnea and wheezing. C/o waking up SOB - she has a hx of COPD, CHF. No edema, weight gain, no n/v/d. She is On spironolactone, lasix. Also, she is slated for [facility] TAVR tomorrow per dr Jean Carlos Mera" 11/30 - TAVR deferred until SOB improved per cardiology  Subjective   General  Chart Reviewed: Yes  Response To Previous Treatment: Patient with no complaints from previous session. Family / Caregiver Present: No  Subjective  Subjective: Pt reports no pain at this time.  She reports feeling very \"groggy\" and tired from medication given last night  General Comment  Comments: Pt supine in bed upon arrival. Pt agreeable to PT treatment. Pt on 2 L O2. [place]. Pt asssited with hearing aide application  Pain Screening  Patient Currently in Pain: No  Vital Signs  Patient Currently in Pain: No       Orientation  Orientation  Overall Orientation Status: Impaired  Orientation Level: Oriented to situation; Oriented to person; Oriented to place; Disoriented to time  Cognition   Cognition  Overall Cognitive Status: [facility]  Cognition Comment: flat affect  Objective   Bed mobility  Supine to Sit: Minimal assistance (HHA for pt to pull up trunk)  Scooting: Maximal assistance; Minimal assistance (Min A scooting EOB, max A scooting in recliner)  Comment: HOB elevated, handrails  Transfers  Sit to Stand: Contact guard assistance  Stand to sit: Contact guard assistance  Bed to Chair: Contact guard assistance  Comment: Increased time to complete. Stand step to transfer complete from EOB to recliner with CGA and RW  Ambulation  Ambulation?: Yes  Ambulation 1  Surface: level tile  Device: Rolling Walker  Other Apparatus: O2 (3 L)  Assistance: Contact guard assistance  Quality of Gait: Slow lala, decreased stance time on (R), decreased step length on (L), lack heel strike (B). Slow shuffle steps  Gait Deviations: Slow Lala; Decreased step length; Decreased step height; Shuffles  Distance: 5 ft  Comments: Pt required increased time and fatigued quickly. Stairs/Curb  Stairs?: No     Balance  Posture: Fair  Sitting - Static: Good  Sitting - Dynamic: Good; -  Standing - Static: Fair; +  Standing - Dynamic: Fair  Comments: CGA for dynamic balance with RW.  Pt agreeable to eating breakfast sitting up in recliner                           G-Code     OutComes Score                                                     AM-PAC Score  AM-PAC Inpatient Mobility Raw Score : 15 (12/03/21 1045)  AM-PAC Inpatient T-Scale Score : 39.45 (12/03/21 1045)  Mobility Inpatient CMS 0-100% Score: 57.7 (12/03/21 1045)  Mobility Inpatient CMS G-Code Modifier : CK (12/03/21 1045) Goals  Short term goals  Time Frame for Short term goals: Before discharge  Short term goal 1: Pt will complete bed mobility with HOB elevated mod I  Short term goal 2: Pt will complete sit<>stand Mod I  Short term goal 3: Pt will ambulate 50 ft with RW Mod I  Short term goal 4: Pt will tolerate 5 minutes of standing with unilateral UE support Mod I in order to complete ADLs  Patient Goals   Patient goals : Go home  NO GOALS MET THIS DATE    Plan    Plan  Times per week: 3-5x  Times per day: Daily  Current Treatment Recommendations: Strengthening, Endurance Training, Transfer Training, Patient/Caregiver Education & Training, Safety Education & Training, Gait Training, Balance Training, Functional Mobility Training  Safety Devices  Type of devices:  All fall risk precautions in place, Gait belt, Patient at risk for falls, Left in chair, Chair alarm in place, Call light within reach, Nurse notified  Restraints  Initially in place: No     Therapy Time   Individual Concurrent Group Co-treatment   Time In 0959         Time Out 1031         Minutes 32         Timed Code Treatment Minutes: [address] [facility], [address] [facility], [address] [facility] 18

## 2021-12-03 NOTE — PROGRESS NOTES
Infectious Diseases   Progress Note      Admission Date: 11/29/2021  Hospital Day: Hospital Day: 5   Attending: Eleazar Radford MD  Date of service: 12/3/2021     Chief complaint/ Reason for consult:     · Met sepsis criteria on admission with leukocytosis, tachycardia, tachypnea and hypotension  · Acute respiratory failure with hypoxia   · coccygeal decubitus wound  · Severe aortic stenosis, candidate for TAVR  · Negative COVID-19 PCR on 11/29/2021    Microbiology:        I have reviewed allavailable micro lab data and cultures    · Blood culture (2/2) - collected on 11/29/2021: Negative so far  · Left foot wound culture: Collected on 11/30/2021: E. Coli    Susceptibility     Escherichia coli     BACTERIAL SUSCEPTIBILITY PANEL BY NILS     ampicillin <=2 mcg/mL Sensitive     ceFAZolin <=4 mcg/mL Sensitive     cefepime <=0.12 mcg/mL Sensitive     cefTRIAXone <=0.25 mcg/mL Sensitive     ciprofloxacin <=0.25 mcg/mL Sensitive     ertapenem <=0.12 mcg/mL Sensitive     gentamicin <=1 mcg/mL Sensitive     levofloxacin <=0.12 mcg/mL Sensitive     piperacillin-tazobactam <=4 mcg/mL Sensitive     trimethoprim-sulfamethoxazole <=20 mcg/mL Sensitive          · Urine culture  - collected on 11/29/2021: 25,000 CFU per mL of E. coli    Susceptibility     Escherichia coli     BACTERIAL SUSCEPTIBILITY PANEL BY NILS     ampicillin 8 mcg/mL Sensitive     ceFAZolin >=64 mcg/mL Resistant 1     cefepime <=0.12 mcg/mL Sensitive     cefTRIAXone <=0.25 mcg/mL Sensitive     ciprofloxacin <=0.25 mcg/mL Sensitive     ertapenem <=0.12 mcg/mL Sensitive     gentamicin <=1 mcg/mL Sensitive     levofloxacin <=0.12 mcg/mL Sensitive     nitrofurantoin <=16 mcg/mL Sensitive     piperacillin-tazobactam <=4 mcg/mL Sensitive     trimethoprim-sulfamethoxazole <=20 mcg/mL Sensitive               Antibiotics and immunizations:       Current antibiotics: All antibiotics and their doses were reviewed by me    Recent Abx Admin                   cefUROXime (CEFTIN) tablet 500 mg (mg) 500 mg Given 12/03/21 0847     500 mg Given 12/02/21 2013    metroNIDAZOLE (FLAGYL) tablet 500 mg (mg) 500 mg Given 12/03/21 0623     500 mg Given 12/02/21 2207     500 mg Given  0172                  Immunization History: All immunization history was reviewed by me today. Immunization History   Administered Date(s) Administered    COVID-19, Pfizer, PF, 30mcg/0.3mL 01/22/2021, 02/11/2021    Influenza, High Dose (Fluzone 65 yrs and older) 10/11/2018    Influenza, Quadv, adjuvanted, 65 yrs +, IM, PF (Fluad) 11/03/2021    Pneumococcal Conjugate 13-valent ([place]) 10/01/2015, 05/21/2021    Pneumococcal Polysaccharide ([id number]) 11/01/2016    Tdap (Boostrix, Adacel) 10/01/2012    Zoster Live (Zostavax) 11/02/2014       Known drug allergies: All allergies were reviewed and updated    No Known Allergies    Social history:     Social History:  All social andepidemiologic history was reviewed and updated by me today as needed. · Tobacco use:   reports that she has never smoked. She has never used smokeless tobacco.  · Alcohol use:   reports no history of alcohol use. · Currently lives in: [address] [place] [address]  ·  reports no history of drug use. COVID VACCINATION AND LAB RESULT RECORDS:     Internal Administration   First Dose COVID-19, Pfizer, PF, 30mcg/0.3mL  01/22/2021   Second Dose COVID-19, Pfizer, PF, 30mcg/0.3mL   02/11/2021       Last COVID Lab SARS-CoV-2 (no units)   Date Value   11/29/2021 Not Detected            Assessment:     The patient is a 82 y.o. old female who  has a past medical history of Allergic rhinitis, Anxiety, Aortic stenosis, CHF (congestive heart failure) ([address] COPD (chronic obstructive pulmonary disease) ([facility] [address] Depression, Diabetes mellitus ([facility] [address] Hypertension, Hypothyroidism, MARIAA treated with BiPAP (02/14/2018), and Urinary incontinence.  with following problems:    · Met sepsis criteria on admission with leukocytosis, tachycardia, tachypnea and hypotension-this is resolved  · Acute respiratory failure with hypoxia-currently on 2 L oxygen  · coccygeal decubitus wound-wound culture positive for pansensitive E. coli-covered with Ceftin  · Severe aortic stenosis, candidate for TAVR  · Negative COVID-19 PCR on 11/29/2021  · Elevated D-dimer of 619  · Elevated BNP of 684 on admission  · Elevated CRP of 73.4  · COPD  · Essential hypertension-has chronic hypotension in setting of aortic stenosis now  · History of depression        Discussion:      She is afebrile. I had switched her antibiotics to oral Ceftin and Flagyl yesterday. She is tolerating antibiotics okay. Serum creatinine 0.7. White cell count is 8100. Buttock wound culture reviewed again from 11/30/2021. No new organisms isolated. Blood cultures from 11/30/2021 remain negative. This is reassuring. Plan:     Diagnostic Workup:      · Continue to follow  fever curve, WBC count and blood cultures. · Continue to monitor blood counts, liver and renal function. Antimicrobials:    · Continue p.o. Ceftin 500 mg every 12 hour  · Continue p.o. Flagyl 500 mg every 8 hour  · I have recommended 10 days of both antibiotics  · Once the left buttock area wound is healed, okay to proceed with the TAVR from ID standpoint  · Continue close vitals monitoring. · Maintain good glycemic control. · Fall precautions. · Aspiration precautions. · Continue to watch for new fever or diarrhea. · DVT prophylaxis. · Discussed all above with patient and RN. Drug Monitoring:    · Continue monitoring for antibiotic toxicity as follows: CBC, CMP   · Continue to watch for following: new or worsening fever, new hypotension, hives, lip swelling and redness or purulence at vascular access sites. I/v access Management:    · Continue to monitor i.v access sites for erythema, induration, discharge or tenderness.    · As always, continue efforts to minimize tubes/lines/drains as clinically appropriate to reduce chances of line associated infections. Patient education and counseling:        · The patient was educated in detail about the side-effects of various antibiotics and things to watch for like new rashes, lip swelling, severe reaction, worsening diarrhea, break through fever etc.  · Discussed patient's condition and what to expect. All of the patient's questions were addressed in a satisfactory manner and patient verbalized understanding all instructions. TIME SPENT TODAY:     - Spent over  26 minutes on visit (including interval history, physical exam, review of data including labs, cultures, imaging, development and implementation of treatment plan and coordination of complex care). More than 50 percent of this includes face-to-face time spent with the patient for counseling and coordination of care. Thank you for involving me in the care of your patient. I will continue to follow. If you have anyadditional questions, please do not hesitate to contact me. Subjective: Interval history: Interval history was obtained from chart review and patient/ RN. She is afebrile. She is tolerating antibiotic okay. No diarrhea     REVIEW OF SYSTEMS:      Review of Systems   Constitutional: Negative for chills, diaphoresis and unexpected weight change. HENT: Negative for congestion, ear discharge, ear pain, facial swelling, hearing loss, rhinorrhea and trouble swallowing. Eyes: Negative for photophobia, discharge, redness and visual disturbance. Respiratory: Negative for apnea, cough, choking, chest tightness, shortness of breath and stridor. Cardiovascular: Negative for chest pain and palpitations. Gastrointestinal: Negative for abdominal pain, blood in stool, diarrhea and nausea. Endocrine: Negative for polydipsia, polyphagia and polyuria. Genitourinary: Negative for difficulty urinating, dysuria, frequency, hematuria, menstrual problem and vaginal discharge.    Musculoskeletal: Negative for arthralgias, joint swelling, myalgias and neck stiffness. Skin: Negative for color change and rash. Allergic/Immunologic: Negative for immunocompromised state. Neurological: Negative for dizziness, seizures, speech difficulty, light-headedness and headaches. Hematological: Negative for adenopathy. Psychiatric/Behavioral: Negative for agitation, hallucinations and suicidal ideas. Past Medical History: All past medical history reviewed today. Past Medical History:   Diagnosis Date    Allergic rhinitis     Anxiety     Aortic stenosis     [facility] failure) [facility])     COPD (chronic obstructive pulmonary disease) ([facility])     Depression     Diabetes mellitus ([facility] [address]     Hypertension     Hypothyroidism     MARIAA treated with BiPAP 02/14/2018    Urinary incontinence        Past Surgical History: All past surgical history was reviewed today. Past Surgical History:   Procedure Laterality Date    CARDIAC CATHETERIZATION  10/11/2021    CARPAL TUNNEL RELEASE      bilateral    CHOLECYSTECTOMY      DENTAL SURGERY N/A 11/12/2021    SIMPLE EXTRACTIONS TEETH # 4, 13, 18, 23, 24, 25, 26,  AND SURGICAL EXTRACTION TEETH # 2, 3, 14, 15, 19, 20, 21,  28,  29, 30, 31  AND ALSO ALVEOLOPLASTIES ALL FOUR QUADRANTS; GEL FOAM performed by Karen Banks DDS at [address] [place] [address]      bilateral cataracts    HYSTERECTOMY, TOTAL ABDOMINAL      TONSILLECTOMY      UVULECTOMY         Family History: All family history was reviewed today.         Problem Relation Age of Onset    Cancer Mother         breast    Breast Cancer Mother     Cancer Father         prostate    Cancer Sister         breast    Breast Cancer Daughter     Breast Cancer Maternal Grandmother        Objective:       PHYSICAL EXAM:      Vitals:   Vitals:    12/03/21 0815 12/03/21 0845 12/03/21 1141 12/03/21 1155   BP:  109/61 104/64    Pulse:  81 89    Resp: 18 18 16    Temp:  98 °F (36.7 °C) 98.2 °F (36.8 °C) TempSrc:  Temporal Oral    SpO2: 95% 95% 91% 94%   Weight:       Height:           Physical Exam  Vitals and nursing note reviewed. Constitutional:       General: She is not in acute distress. Appearance: She is well-developed. She is not diaphoretic. HENT:      Head: Normocephalic. Right Ear: External ear normal.      Left Ear: External ear normal.      Nose: Nose normal.   Eyes:      General: No scleral icterus. Right eye: No discharge. Left eye: No discharge. Conjunctiva/sclera: Conjunctivae normal.      Pupils: Pupils are equal, round, and reactive to light. Cardiovascular:      Rate and Rhythm: Normal rate and regular rhythm. Heart sounds: No murmur heard. No friction rub. Pulmonary:      Effort: Pulmonary effort is normal.      Breath sounds: No stridor. No wheezing or rales. Chest:      Chest wall: No tenderness. Abdominal:      Palpations: Abdomen is soft. There is no mass. Tenderness: There is no abdominal tenderness. There is no guarding or rebound. Musculoskeletal:         General: No tenderness. Cervical back: Normal range of motion and neck supple. Lymphadenopathy:      Cervical: No cervical adenopathy. Skin:     General: Skin is warm and dry. Findings: No erythema or rash. Comments: Left buttock wound noted again. Stage I. Neurological:      Mental Status: She is alert and oriented to person, place, and time. Motor: No abnormal muscle tone. Psychiatric:         Judgment: Judgment normal.       Lines and drains: All vascular access sites are healthy with no local erythema, discharge or tenderness. Intake and output:    I/O last 3 completed shifts: In: 200 [P.O.:200]  Out: -     Lab Data:   All available labs and old records have been reviewed by me.     CBC:  Recent Labs     12/01/21  0607 12/02/21  0621 12/03/21  0624   WBC 11.0 10.3 8.1   RBC 4.04 3.51* 3.66*   HGB 12.0 10.3* 10.7*   HCT 36.6 30.8* 32.4*    212 208   MCV 90.6 87.7 88.6   MCH 29.6 29.3 29.3   MCHC 32.7 33.4 33.0   RDW 13.4 13.0 13.0        BMP:  Recent Labs     12/01/21  0607 12/02/21  0621 12/03/21  0624    137 137   K 4.1 4.0 4.2   CL 98* 96* 95*   CO2 29 34* 36*   BUN 19 16 18   CREATININE 0.9 0.8 0.7   CALCIUM 8.9 8.1* 8.5   GLUCOSE 200* 139* 114*        Hepatic Function Panel:   Lab Results   Component Value Date    ALKPHOS 63 12/03/2021    ALT 23 12/03/2021    AST 18 12/03/2021    PROT 6.3 12/03/2021    BILITOT 0.3 12/03/2021    BILIDIR <0.2 11/22/2021    IBILI see below 11/22/2021    LABALBU 3.3 12/03/2021       CPK: No results found for: CKTOTAL  ESR:   Lab Results   Component Value Date    SEDRATE 52 (H) 11/30/2021     CRP:   Lab Results   Component Value Date    CRP 16.9 (H) 12/03/2021           Imaging: All pertinent images and reports for the current visit were reviewed by me during this visit. XR CHEST (2 VW)   Final Result   1. No acute cardiopulmonary process. 2. Pulmonary vascular congestion and mild cardiomegaly. XR CHEST PORTABLE   Final Result   No acute abnormality. Medications: All current and past medications were reviewed.      cefUROXime  500 mg Oral 2 times per day    metroNIDAZOLE  500 mg Oral 3 times per day    budesonide  0.5 mg Nebulization BID    ipratropium-albuterol  1 ampule Inhalation Q6H WA    Arformoterol Tartrate  15 mcg Nebulization BID    levothyroxine  75 mcg Oral Daily    atorvastatin  10 mg Oral Nightly    oxybutynin  2.5 mg Oral TID    methocarbamol  500 mg Oral 4x Daily    furosemide  40 mg Oral BID    spironolactone  12.5 mg Oral Daily    docusate sodium  100 mg Oral BID    potassium chloride  20 mEq Oral Daily    pantoprazole  40 mg Oral QAM AC    cetirizine  5 mg Oral Daily    citalopram  10 mg Oral Daily    sodium chloride flush  5-40 mL IntraVENous 2 times per day    enoxaparin  40 mg SubCUTAneous BID        sodium chloride         albuterol sulfate HFA, polyethylene glycol, sodium chloride flush, sodium chloride, ondansetron **OR** ondansetron, magnesium hydroxide, acetaminophen **OR** acetaminophen, hydrALAZINE, potassium chloride **OR** potassium alternative oral replacement **OR** potassium chloride, sodium chloride      Problem list:       Patient Active Problem List   Diagnosis Code    Chronic seasonal allergic rhinitis J30.2    COPD, severe ([facility] [address] J44.9    Idiopathic peripheral neuropathy G60.9    Hypothyroidism E03.9    Depression F32. A    Chronic congestive heart failure (HCC) I50.9    SOB (shortness of breath) R06.02    Obstructive sleep apnea (adult) (pediatric) G47.33    Bilateral lower extremity edema R60.0    Multifocal pneumonia J18.9    Centrilobular emphysema ([facility]) J43.2    Acute on chronic diastolic heart failure ([facility]) I50.33    Pitting edema R60.9    BASIA (acute kidney injury) ([facility]) N17.9    Chronic respiratory failure with hypoxia ([facility]) J96.11    Essential hypertension I10    Mixed hyperlipidemia E78.2    Acute pulmonary edema ([facility]) J81.0    Chronic obstructive pulmonary disease ([facility]) J44.9    Chronic bronchitis ([facility]) J42    Class 3 severe obesity without serious comorbidity with body mass index (BMI) of 40.0 to 44.9 in adult ([facility]) E66.01, Z68.41    Pulmonary nodule R91.1    Acute suppurative otitis media of right ear with spontaneous rupture of tympanic membrane H66.011    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 H72.01    Bilateral hearing loss H91.93    Bilateral sensorineural hearing loss H90.3    Senile osteoporosis M81.0    Acute respiratory failure ([facility]) J96.00    Coronary artery disease due to lipid rich plaque I25.10, I25.83    Severe aortic stenosis I35.0    Hypercholesteremia E78.00    Aspiration into airway T17.908A    Acute respiratory failure with hypoxemia ([facility]) J96.01    Severe sepsis ([facility]) A41.9, R65.20    Elevated brain natriuretic peptide (BNP) level R79.89    Elevated C-reactive protein (CRP) R79.82    History of depression Z86.59    Decubitus ulcer of coccyx L89.159    Weight loss counseling, encounter for Z71.3    E coli infection A49.8       Please note that this chart was generated using Dragon dictation software. Although every effort was made to ensure the accuracy of this automated transcription, some errors in transcription may have occurred inadvertently. If you may need any clarification, please do not hesitate to contact me through EPIC or at the phone number provided below with my electronic signature. Any pictures or media included in this note were obtained after taking informed verbal consent from the patient and with their approval to include those in the patient's medical record.       Flor Garcia MD, MPH, FACP, [facility]  12/3/2021, 1:32 PM  [facility] Infectious Disease   [address] [place] [address], [address] [place] [address]  Office: [phone]  Fax: [phone]  Clinic days:  Tuesday & Thursday

## 2021-12-03 NOTE — CARE COORDINATION
Authorization received. Pt returning to [facility] skilled today at 4:30 via transport. CM notified son and facility of transport time. CM called Mariola Winslow with [facility] and informed pt returning to facility skilled     Discharge Plan:     Patient discharged to:[facility]   SW/DC Planner faxed, [address] and [place]:[phone]  Narcotic Prescriptions faxed were:n/a   RN: Vicente Lizama will call report to:     [phone]  Medical Transport with: [address] transport [phone]   time:4:30 pm   Family advised of discharge?:yes   HENS Submitted?:  Yes   All discharge needs met per case management.     Yolis Anderson RN, BSN  [phone]

## 2021-12-03 NOTE — DISCHARGE SUMMARY
[facility] -- Physician Discharge Summary     [facility]  1935  MRN: [id number]    Admit Date: 11/29/2021  Discharge Date: No discharge date for patient encounter. Attending MD: Kenn Farrar MD  Discharging MD: Kenn Farrar MD  PCP: Maynor Ortiz [address] / [place] [address] [phone]    Admission Diagnosis: COPD exacerbation ([facility] [J44.1]  DISCHARGE DIAGNOSIS: same    Full Hospital Problem List:  [address] Problems    Diagnosis Date Noted    Decubitus ulcer of coccyx [L89.159] 12/01/2021    Weight loss counseling, encounter for [Z71.3]     E coli infection [A49.8]     Severe sepsis ([facility] [A41.9, R65.20]     Elevated brain natriuretic peptide (BNP) level [R79.89]     Elevated C-reactive protein (CRP) [R79.82]     History of depression [Z86.59]     Acute respiratory failure with hypoxemia ([facility] [J96.01] 11/29/2021    Hypercholesteremia [E78.00] 08/06/2021    Severe aortic stenosis [I35.0]     Chronic obstructive pulmonary disease ([facility] [J44.9]     Essential hypertension [I10] 06/28/2018    COPD, severe ([facility] [J44.9] 01/12/2018    Hypothyroidism [E03.9] 01/12/2018           Hospital Course:  Ilana Gold a 82 y. o. female who presents from SNF for dyspnea. From [facility] (Covered by Migdalia Peabody there). Today, presents with with dyspnea and wheezing. C/o waking up SOB - she has a hx of COPD, CHF. No edema, weight gain, no n/v/d. She is On spironolactone, lasix.   Found to be in COPD exac  Treated with steroids, responds well to this  By time of d/c, she is no longer wheezing, breathing easily on RA    While here, she does have covid test done which is negative     she is slated for femoral-approach TAVR per dr Denning Mcdonald this week - but it is put on hold as pt is found to have coccyx wound    ID asked to see - their recs  · We will order p.o.  Ceftin 500 mg every 12 hour for E. coli coverage  · Due to the particular location of the wound, there is risk for fecal contamination. · We will order p.o. Flagyl 5 mg every 8 hour for empiric anaerobic coverage  · Recommend a 10-day course of oral Ceftin and Flagyl      Pt to discharge to SNF for recovery  TAVR likely to be rescheduled when coccyx wound better treated    Consults made during Hospitalization:  IP CONSULT TO INTERNAL MEDICINE  IP CONSULT TO CARDIOLOGY  IP CONSULT TO CARDIOLOGY  IP CONSULT TO INFECTIOUS DISEASES  IP CONSULT TO PULMONOLOGY  IP CONSULT TO DIETITIAN    Treatment team at time of Discharge: Treatment Team: Attending Provider: Fartun Lester MD; Consulting Physician: Fartun Lester MD; Cardiologist: Yary Singleton MD; Consulting Physician: Yary Singleton MD; Surgeon: Yary Singleton MD; Consulting Physician: Anuradha Hall MD; Respiratory Therapist (Day): Darryle Bower, RCP; Registered Nurse: Deidra Carrasquillo RN    Imaging Results:  XR CHEST (2 VW)    Result Date: 11/30/2021  EXAMINATION: TWO XRAY VIEWS OF THE CHEST 11/30/2021 5:46 pm COMPARISON: Chest radiograph 11/29/2021; chest, abdomen, and pelvis CT 10/06/2021 HISTORY: ORDERING SYSTEM PROVIDED HISTORY: cough TECHNOLOGIST PROVIDED HISTORY: Reason for exam:->cough Reason for Exam: cough Acuity: Unknown Type of Exam: Unknown FINDINGS: 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 Clear lungs. Diffuse interstitial prominence with indistinct pulmonary vasculature but no definite interlobular septal thickening. No findings of pneumothorax or pleural effusion. Normal mediastinal contour. Mildly prominent cardiac contour. 1. No acute cardiopulmonary process. 2. Pulmonary vascular congestion and mild cardiomegaly. XR CHEST (2 VW)    Result Date: 11/22/2021  EXAMINATION: TWO XRAY VIEWS OF THE CHEST 11/22/2021 1:24 pm COMPARISON: Chest x-ray dated 07/18/2021.  HISTORY: ORDERING SYSTEM PROVIDED HISTORY: SOB (shortness of breath) TECHNOLOGIST PROVIDED HISTORY: Reason for exam:->sob FINDINGS: HEART/MEDIASTINUM: The cardiomediastinal silhouette is stable. PLEURA/LUNGS: There are no focal consolidations or pleural effusions. There is no appreciable pneumothorax. BONES/SOFT TISSUE: No acute abnormality. No radiographic evidence of acute pulmonary disease. CT HEAD WO CONTRAST    Result Date: 11/22/2021  EXAMINATION: CT OF THE HEAD WITHOUT CONTRAST  11/22/2021 1:21 pm TECHNIQUE: CT of the head was performed without the administration of intravenous contrast. Dose modulation, iterative reconstruction, and/or weight based adjustment of the mA/kV was utilized to reduce the radiation dose to as low as reasonably achievable. COMPARISON: None. HISTORY: ORDERING SYSTEM PROVIDED HISTORY: Dysarthria TECHNOLOGIST PROVIDED HISTORY: Reason for exam:->speech issue FINDINGS: BRAIN/VENTRICLES: There is no acute infarct or acute intracranial hemorrhage present. There is no mass effect or midline shift present. There is no ventriculomegaly or abnormal extra-axial fluid collection present. There is mild hypoattenuation within the periventricular and deep white matter of both hemispheres. ORBITS: Limited evaluation of the orbits is unremarkable. SINUSES: There is mild mucosal thickening within the maxillary sinuses. The paranasal sinuses and mastoid air cells are otherwise clear. SOFT TISSUES/SKULL:  No lytic or blastic osseous lesions are identified. 1. No acute intracranial process identified. 2. Mild chronic small vessel ischemic changes. XR CHEST PORTABLE    Result Date: 11/29/2021  EXAMINATION: ONE XRAY VIEW OF THE CHEST 11/29/2021 7:08 am COMPARISON: 11/22/2021 HISTORY: ORDERING SYSTEM PROVIDED HISTORY: Eval for infiltrate TECHNOLOGIST PROVIDED HISTORY: Reason for exam:->Eval for infiltrate Reason for Exam: eval fo infiltrate Acuity: Unknown Type of Exam: Unknown FINDINGS: No lung infiltrate or consolidation. No pneumothorax or pleural effusion. Heart size is normal.     No acute abnormality. Discharge Exam:  /67   Pulse 78   Temp 97 °F (36.1 °C) (Temporal)   Resp 18   Ht 5' (1.524 m)   Wt 182 lb 8 oz (82.8 kg)   SpO2 95%   BMI 35.64 kg/m²   General appearance: alert, appears stated age and cooperative  Head: Normocephalic, without obvious abnormality, atraumatic  Lungs: clear to auscultation bilaterally  Heart: regular rate and rhythm, S1, S2 normal, no murmur, click, rub or gallop  Abdomen: soft, non-tender; bowel sounds normal; no masses,  no organomegaly  Extremities: extremities normal, atraumatic, no cyanosis or edema  Skin: dressing to coccyx cdi    Disposition: SNF    Condition: stable    Discharge Medications:     Medication List      START taking these medications    cefUROXime 500 MG tablet  Commonly known as: CEFTIN  Take 1 tablet by mouth every 12 hours for 10 days     metroNIDAZOLE 500 MG tablet  Commonly known as: FLAGYL  Take 1 tablet by mouth every 8 hours for 10 days     predniSONE 10 MG tablet  Commonly known as: DELTASONE  4 tab daily x 4d, then 3 tab daily x 4d, then 2 tab daily x 4d, then 1 tab daily x 4d        CHANGE how you take these medications    atorvastatin 10 MG tablet  Commonly known as: LIPITOR  TAKE 1 TABLET BY MOUTH DAILY AT BEDTIME.   What changed:   · how much to take  · how to take this  · when to take this     furosemide 20 MG tablet  Commonly known as: LASIX  Take 3 tablets by mouth 2 times daily  What changed: how much to take        CONTINUE taking these medications    acetaminophen 500 MG tablet  Commonly known as: TYLENOL     albuterol sulfate  (90 Base) MCG/ACT inhaler  Commonly known as: Ventolin HFA  Inhale 2 puffs into the lungs every 6 hours as needed for Wheezing     budesonide 0.5 MG/2ML nebulizer suspension  Commonly known as: Pulmicort  Take 2 mLs by nebulization 2 times daily DX:COPD J44.9     calcium carbonate 600 MG Tabs tablet  Commonly known as: Calcium 600  Take 1 tablet by mouth 2 times daily     citalopram 10 MG tablet  Commonly known as: CELEXA  Take 1 tablet by mouth daily     docusate 100 MG Caps  Commonly known as: COLACE, DULCOLAX  Take 100 mg by mouth 2 times daily     formoterol 20 MCG/2ML nebulizer solution  Commonly known as: Perforomist  Take 2 mLs by nebulization every 12 hours DX:COPD J44.9     ipratropium-albuterol 0.5-2.5 (3) MG/3ML Soln nebulizer solution  Commonly known as: DUONEB  Inhale 3 mLs into the lungs every 4 hours     levocetirizine 5 MG tablet  Commonly known as: XYZAL     levothyroxine 75 MCG tablet  Commonly known as: SYNTHROID  TAKE 1 TABLET BY MOUTH DAILY     methocarbamol 500 MG tablet  Commonly known as: ROBAXIN     omeprazole 20 MG delayed release capsule  Commonly known as: PRILOSEC     ondansetron 4 MG tablet  Commonly known as: ZOFRAN  Take 1 tablet by mouth daily as needed for Nausea or Vomiting     oxybutynin 5 MG tablet  Commonly known as: DITROPAN  TAKE ONE TABLET BY MOUTH THREE TIMES A DAY     OXYGEN  Use 3L of oxygen all the time     polyethylene glycol 17 g packet  Commonly known as: GLYCOLAX     potassium chloride 20 MEQ extended release tablet  Commonly known as: KLOR-CON M  Take 1 tablet by mouth daily     spironolactone 25 MG tablet  Commonly known as: ALDACTONE  Take 0.5 tablets by mouth daily           Where to Get Your Medications      These medications were sent to [address], [address] [place] [address] Road    Phone: [phone]   · cefUROXime 500 MG tablet  · metroNIDAZOLE 500 MG tablet     Information about where to get these medications is not yet available    Ask your nurse or doctor about these medications  · predniSONE 10 MG tablet         Allergies:  No Known Allergies    Follow up Instructions: Follow-up with PCP: ELISE Valencia in 2 wk .       Total time spent on day of discharge including face-to-face visit, examination, documentation, counseling, preparation of discharge plans and followup, and discharge medicine reconciliation and presciptions is 37 minutes    Signed:  Yue Araujo MD  12/3/2021

## 2021-12-03 NOTE — PROGRESS NOTES
Patient taken off [facility] catheter for transport to facility, report given to transport team. IV discontinued without complications. Patient transported back to facility. No additional needs at this time.

## 2021-12-03 NOTE — PLAN OF CARE
Problem: Falls - Risk of:  Goal: Will remain free from falls  Description: Will remain free from falls  Outcome: Ongoing  Goal: Absence of physical injury  Description: Absence of physical injury  Outcome: Ongoing     Problem: Skin Integrity:  Goal: Will show no infection signs and symptoms  Description: Will show no infection signs and symptoms  Outcome: Ongoing  Goal: Absence of new skin breakdown  Description: Absence of new skin breakdown  Outcome: Ongoing     Problem: Pain:  Goal: Pain level will decrease  Description: Pain level will decrease  Outcome: Ongoing  Goal: Control of acute pain  Description: Control of acute pain  Outcome: Ongoing  Goal: Control of chronic pain  Description: Control of chronic pain  Outcome: Ongoing     Problem: Musculor/Skeletal Functional Status  Goal: Highest potential functional level  Outcome: Ongoing  Goal: Absence of falls  Outcome: Ongoing     Problem: Nutrition  Goal: Optimal nutrition therapy  12/2/2021 2204 by Amrik Hitchcock RN  Outcome: Ongoing  12/2/2021 0958 by Bennie Pires RD, LD  Outcome: Ongoing

## 2021-12-03 NOTE — PROGRESS NOTES
12/03/21 0022   NIV Type   NIV Started/Stopped On   Equipment Type v60   Mode Bilevel   Mask Type Full face mask   Mask Size Medium   Settings/Measurements   IPAP 10 cmH20   CPAP/EPAP 5 cmH2O   pt on bipap now and tolerating it well, has call bell within reach.

## 2021-12-04 LAB
BLOOD CULTURE, ROUTINE: NORMAL
CULTURE, BLOOD 2: NORMAL
CULTURE, BLOOD 3: NORMAL

## 2021-12-04 NOTE — CARE COORDINATION
[facility] Wound Ostomy Continence Nurse  Consult Note       NAME:  [id number] Schneider RECORD NUMBER:  [id number]  AGE: 82 y.o.    GENDER: female  : 1935  TODAY'S DATE:  12/3/2021    Subjective   Reason for WOCN Evaluation and Assessment: stage 2 buttocks      Bonny Hsieh is a 82 y.o. female referred by:   [x] Physician  [x] Nursing  [] Other:     Wound Identification:  Wound Type: pressure  Contributing Factors: diabetes, chronic pressure, decreased mobility, shear force and incontinence of urine    Wound History: present on admission  Current Wound Care Treatment:  Foam dressing with zinc    Patient Goal of Care:  [x] Wound Healing  [] Odor Control  [] Palliative Care  [] Pain Control   [] Other:         PAST MEDICAL HISTORY        Diagnosis Date    Allergic rhinitis     Anxiety     Aortic stenosis     [facility]     COPD [facility] obstructive pulmonary disease) [facility]     Depression     [facility]     Hypertension     Hypothyroidism     MARIAA treated with BiPAP 2018    Urinary incontinence        PAST SURGICAL HISTORY    Past Surgical History:   Procedure Laterality Date    CARDIAC CATHETERIZATION  10/11/2021    CARPAL TUNNEL RELEASE      bilateral    CHOLECYSTECTOMY      DENTAL SURGERY N/A 2021    SIMPLE EXTRACTIONS TEETH # 4, 13, 18, 23, 24, 25, 26,  AND SURGICAL EXTRACTION TEETH # 2, 3, 14, 15, 19, 20, 21,  28,  29, 30, 31  AND ALSO ALVEOLOPLASTIES ALL FOUR QUADRANTS; GEL FOAM performed by Adonay Gupta DDS at [address] [place] [address]      bilateral cataracts    HYSTERECTOMY, TOTAL ABDOMINAL      TONSILLECTOMY      UVULECTOMY         FAMILY HISTORY    Family History   Problem Relation Age of Onset    Cancer Mother         breast    Breast Cancer Mother     Cancer Father         prostate    Cancer Sister         breast    Breast Cancer Daughter     Breast Cancer Maternal Grandmother        SOCIAL HISTORY    Social History Tobacco Use    Smoking status: Never Smoker    Smokeless tobacco: Never Used   Vaping Use    Vaping Use: Never used   Substance Use Topics    Alcohol use: No    Drug use: No       ALLERGIES    No Known Allergies    MEDICATIONS    No current facility-administered medications on file prior to encounter.      Current Outpatient Medications on File Prior to Encounter   Medication Sig Dispense Refill    citalopram (CELEXA) 10 MG tablet Take 1 tablet by mouth daily 30 tablet 5    polyethylene glycol (GLYCOLAX) 17 g packet Take 17 g by mouth daily as needed for Constipation      omeprazole (PRILOSEC) 20 MG delayed release capsule Take 20 mg by mouth daily      levocetirizine (XYZAL) 5 MG tablet Take 5 mg by mouth nightly      potassium chloride (KLOR-CON M) 20 MEQ extended release tablet Take 1 tablet by mouth daily 30 tablet 5    calcium carbonate (CALCIUM 600) 600 MG TABS tablet Take 1 tablet by mouth 2 times daily 60 tablet 5    furosemide (LASIX) 20 MG tablet Take 3 tablets by mouth 2 times daily (Patient taking differently: Take 40 mg by mouth 2 times daily ) 180 tablet 0    spironolactone (ALDACTONE) 25 MG tablet Take 0.5 tablets by mouth daily 30 tablet 0    docusate (COLACE, DULCOLAX) 100 MG CAPS Take 100 mg by mouth 2 times daily 120 capsule 0    methocarbamol (ROBAXIN) 500 MG tablet Take 500 mg by mouth 4 times daily      OXYGEN Use 3L of oxygen all the time 3 L 3    budesonide (PULMICORT) 0.5 MG/2ML nebulizer suspension Take 2 mLs by nebulization 2 times daily DX:COPD J44.9 60 ampule 6    oxybutynin (DITROPAN) 5 MG tablet TAKE ONE TABLET BY MOUTH THREE TIMES A DAY 90 tablet 1    atorvastatin (LIPITOR) 10 MG tablet TAKE 1 TABLET BY MOUTH DAILY AT BEDTIME. (Patient taking differently: Take 10 mg by mouth nightly TAKE 1 TABLET BY MOUTH DAILY AT BEDTIME.) 30 tablet 5    levothyroxine (SYNTHROID) 75 MCG tablet TAKE 1 TABLET BY MOUTH DAILY 30 tablet 5    acetaminophen (TYLENOL) 500 MG tablet Take 500 mg by mouth every 6 hours as needed for Pain      formoterol (PERFOROMIST) 20 MCG/2ML nebulizer solution Take 2 mLs by nebulization every 12 hours DX:COPD J44.9 120 mL 6    ondansetron (ZOFRAN) 4 MG tablet Take 1 tablet by mouth daily as needed for Nausea or Vomiting 30 tablet 0    albuterol sulfate HFA (VENTOLIN HFA) 108 (90 Base) MCG/ACT inhaler Inhale 2 puffs into the lungs every 6 hours as needed for Wheezing 1 Inhaler 3    ipratropium-albuterol (DUONEB) 0.5-2.5 (3) MG/3ML SOLN nebulizer solution Inhale 3 mLs into the lungs every 4 hours 360 mL 0       Objective    /64   Pulse 89   Temp 98.2 °F (36.8 °C) (Oral)   Resp 16   Ht 5' (1.524 m)   Wt 182 lb 8 oz (82.8 kg)   SpO2 94%   BMI 35.64 kg/m²     LABS:  WBC:    Lab Results   Component Value Date    WBC 8.1 12/03/2021     H/H:    Lab Results   Component Value Date    HGB 10.7 12/03/2021    HCT 32.4 12/03/2021     PTT:    Lab Results   Component Value Date    APTT 29.5 11/29/2021   [APTT}  PT/INR:    Lab Results   Component Value Date    PROTIME 12.5 11/29/2021    INR 1.10 11/29/2021     HgBA1c:    Lab Results   Component Value Date    LABA1C 6.0 07/20/2021       Assessment   John Risk Score: John Scale Score: 15    Patient Active Problem List   Diagnosis Code    Chronic seasonal allergic rhinitis J30.2    COPD, severe (HCC) J44.9    Idiopathic peripheral neuropathy G60.9    Hypothyroidism E03.9    Depression F32. A    Chronic congestive heart failure (HCC) I50.9    SOB (shortness of breath) R06.02    Obstructive sleep apnea (adult) (pediatric) G47.33    Bilateral lower extremity edema R60.0    Multifocal pneumonia J18.9    Centrilobular emphysema ([facility]) J43.2    Acute on chronic diastolic heart failure (HCC) I50.33    Pitting edema R60.9    BASIA (acute kidney injury) ([facility] N17.9    Chronic respiratory failure with hypoxia ([facility]) J96.11    Essential hypertension I10    Mixed hyperlipidemia E78.2    Acute pulmonary edema injury noted on admission. Stage 2 pressure injury present with granulation tissue and non-blanchable erythema surrounding injury. Will continue with zinc paste and foam dressing. Patient has Marvia Gentleman in place for urinary incontinence. Response to treatment:  Well tolerated by patient.      Pain Assessment:  Severity:  0 / 10  Quality of pain: N/A  Wound Pain Timing/Severity: none  Premedicated: No    Plan   Plan of Care: Wound 11/29/21 Coccyx Mid stage 2 & DTI-Dressing/Treatment: Foam, Zinc paste    Specialty Bed Required : No   [] Low Air Loss   [] Pressure Redistribution  [] Fluid Immersion  [] Bariatric  [] Total Pressure Relief  [] Other:     Current Diet: No diet orders on file  Dietician consult:  N/A    Discharge Plan:  Placement for patient upon discharge: skilled nursing    Patient appropriate for Outpatient [address]: N/A    Referrals:  [x]   [] [address] [place] [address]  [] Supplies  [] Other    Patient/Caregiver Teaching:  Level of patient/caregiver understanding able to:   [x] Indicates understanding       [x] Needs reinforcement  [] Unsuccessful      [] Verbal Understanding  [] Demonstrated understanding       [] No evidence of learning  [] Refused teaching         [] N/A       Electronically signed by Dene Ganser, RN, [id number] [facility] nurse on 12/3/2021 at 7:10 PM

## 2021-12-14 NOTE — PROGRESS NOTES
[address]  H+P // Carmelo Ellen // OP VISIT // FU VISIT    REFERRING ELISE Centeno  ENC TYPE FU    CC HX HPI   GEN  Doing fairly well. Ulcer healed. No f/c/s. AS  Denies cp, dizzy, syncope, palps. SOB with exertion and fatigued. HTN  Amb bp in good range, no ha or dizzy. CHOL  Last chol reviewed, on statin w/out sa   MED  Compliant with CV meds listed below w/out reported sa. HISTORY/ALLLERGY/ROS   MedHx  has a past medical history of Allergic rhinitis, Anxiety, Aortic stenosis, CHF (congestive heart failure) ([facility] COPD (chronic obstructive pulmonary disease) ([facility] Depression, Diabetes mellitus ([facility] Hypertension, Hypothyroidism, MARIAA treated with BiPAP, and Urinary incontinence. SurgHx  has a past surgical history that includes Hysterectomy, total abdominal; Carpal tunnel release; Cholecystectomy; eye surgery; Tonsillectomy; Uvulectomy; Cardiac catheterization (10/11/2021); and Dental surgery (N/A, 11/12/2021). SocHx  reports that she has never smoked. She has never used smokeless tobacco. She reports that she does not drink alcohol and does not use drugs. FamHx family history includes Breast Cancer in her daughter, maternal grandmother, and mother; Cancer in her father, mother, and sister. Allerg Patient has no known allergies.    ROS [x]Full ROS obtained and negative except as mentioned in HPI      MEDICATIONS      Current Outpatient Medications   Medication Sig Dispense Refill    citalopram (CELEXA) 10 MG tablet Take 1 tablet by mouth daily 30 tablet 5    polyethylene glycol (GLYCOLAX) 17 g packet Take 17 g by mouth daily as needed for Constipation      omeprazole (PRILOSEC) 20 MG delayed release capsule Take 20 mg by mouth daily      levocetirizine (XYZAL) 5 MG tablet Take 5 mg by mouth nightly      potassium chloride (KLOR-CON M) 20 MEQ extended release tablet Take 1 tablet by mouth daily 30 tablet 5    calcium carbonate (CALCIUM 600) 600 MG TABS tablet Take 1 tablet by mouth 2 times daily 60 tablet 5    furosemide (LASIX) 20 MG tablet Take 3 tablets by mouth 2 times daily (Patient taking differently: Take 40 mg by mouth 2 times daily ) 180 tablet 0    spironolactone (ALDACTONE) 25 MG tablet Take 0.5 tablets by mouth daily 30 tablet 0    docusate (COLACE, DULCOLAX) 100 MG CAPS Take 100 mg by mouth 2 times daily 120 capsule 0    acetaminophen (TYLENOL) 500 MG tablet Take 500 mg by mouth every 6 hours as needed for Pain      methocarbamol (ROBAXIN) 500 MG tablet Take 500 mg by mouth 4 times daily      OXYGEN Use 3L of oxygen all the time 3 L 3    budesonide (PULMICORT) 0.5 MG/2ML nebulizer suspension Take 2 mLs by nebulization 2 times daily DX:COPD J44.9 60 ampule 6    formoterol (PERFOROMIST) 20 MCG/2ML nebulizer solution Take 2 mLs by nebulization every 12 hours DX:COPD J44.9 120 mL 6    ondansetron (ZOFRAN) 4 MG tablet Take 1 tablet by mouth daily as needed for Nausea or Vomiting 30 tablet 0    oxybutynin (DITROPAN) 5 MG tablet TAKE ONE TABLET BY MOUTH THREE TIMES A DAY 90 tablet 1    atorvastatin (LIPITOR) 10 MG tablet TAKE 1 TABLET BY MOUTH DAILY AT BEDTIME. (Patient taking differently: Take 10 mg by mouth nightly TAKE 1 TABLET BY MOUTH DAILY AT BEDTIME.) 30 tablet 5    albuterol sulfate HFA (VENTOLIN HFA) 108 (90 Base) MCG/ACT inhaler Inhale 2 puffs into the lungs every 6 hours as needed for Wheezing 1 Inhaler 3    levothyroxine (SYNTHROID) 75 MCG tablet TAKE 1 TABLET BY MOUTH DAILY 30 tablet 5    ipratropium-albuterol (DUONEB) 0.5-2.5 (3) MG/3ML SOLN nebulizer solution Inhale 3 mLs into the lungs every 4 hours 360 mL 0     No current facility-administered medications for this visit.      [x]Reviewed with patient and will remain unchanged except as mentioned in A/P    PHYSICAL EXAM   Vitals:    12/16/21 0954   BP: 118/70   Pulse: 87   SpO2: 94%        Gen Alert, coop, no distress Heart  Rrr, 3/6   Head NC, AT, no abnorm Abd  Soft, NT, +BS, no mass, no OM   Eyes  of Tx  ([id number] - 1 or more)    Time 30-39 minutes spent preparing to see patient including reviewing patient history/prior tests/prior consults, performing a medical exam, counseling and educating patient/family/caregiver, ordering medications/tests/procedures, referring and communicating with PCPs and other pertinent consultants, documenting information in the EMR, independently interpreting results and communicating to family and coordination of patient care.

## 2021-12-16 ENCOUNTER — OFFICE VISIT (OUTPATIENT)
Dept: CARDIOLOGY CLINIC | Age: 86
End: 2021-12-16
Payer: MEDICARE

## 2021-12-16 ENCOUNTER — HOSPITAL ENCOUNTER (OUTPATIENT)
Age: 86
Discharge: HOME | End: 2021-12-16
Payer: MEDICARE

## 2021-12-16 VITALS
HEIGHT: 60 IN | DIASTOLIC BLOOD PRESSURE: 70 MMHG | HEART RATE: 87 BPM | BODY MASS INDEX: 35.73 KG/M2 | OXYGEN SATURATION: 94 % | WEIGHT: 182 LBS | SYSTOLIC BLOOD PRESSURE: 118 MMHG

## 2021-12-16 DIAGNOSIS — I10 ESSENTIAL HYPERTENSION: ICD-10-CM

## 2021-12-16 DIAGNOSIS — I35.0 NONRHEUMATIC AORTIC VALVE STENOSIS: Primary | ICD-10-CM

## 2021-12-16 DIAGNOSIS — I35.0 NONRHEUMATIC AORTIC VALVE STENOSIS: ICD-10-CM

## 2021-12-16 DIAGNOSIS — E78.00 HYPERCHOLESTEREMIA: ICD-10-CM

## 2021-12-16 LAB
ALBUMIN SERPL-MCNC: 4 G/DL (ref 3.4–5)
ALP BLD-CCNC: 111 U/L (ref 40–129)
ALT SERPL-CCNC: 18 U/L (ref 10–40)
ANION GAP SERPL CALCULATED.3IONS-SCNC: 11 MMOL/L (ref 3–16)
AST SERPL-CCNC: 20 U/L (ref 15–37)
BILIRUB SERPL-MCNC: 0.3 MG/DL (ref 0–1)
BILIRUBIN DIRECT: <0.2 MG/DL (ref 0–0.3)
BILIRUBIN URINE: NEGATIVE
BILIRUBIN, INDIRECT: NORMAL MG/DL (ref 0–1)
BLOOD, URINE: NEGATIVE
BUN BLDV-MCNC: 17 MG/DL (ref 7–20)
CALCIUM SERPL-MCNC: 9.3 MG/DL (ref 8.3–10.6)
CHLORIDE BLD-SCNC: 88 MMOL/L (ref 99–110)
CLARITY: CLEAR
CO2: 36 MMOL/L (ref 21–32)
COLOR: YELLOW
CREAT SERPL-MCNC: 0.7 MG/DL (ref 0.6–1.2)
GFR AFRICAN AMERICAN: >60
GFR NON-AFRICAN AMERICAN: >60
GLUCOSE BLD-MCNC: 134 MG/DL (ref 70–99)
GLUCOSE URINE: NEGATIVE MG/DL
HCT VFR BLD CALC: 41 % (ref 36–48)
HEMOGLOBIN: 12.9 G/DL (ref 12–16)
INR BLD: 0.93 (ref 0.88–1.12)
KETONES, URINE: NEGATIVE MG/DL
LEUKOCYTE ESTERASE, URINE: NEGATIVE
MAGNESIUM: 2.7 MG/DL (ref 1.8–2.4)
MCH RBC QN AUTO: 28.1 PG (ref 26–34)
MCHC RBC AUTO-ENTMCNC: 31.5 G/DL (ref 31–36)
MCV RBC AUTO: 89.2 FL (ref 80–100)
MICROSCOPIC EXAMINATION: NORMAL
NITRITE, URINE: NEGATIVE
PDW BLD-RTO: 13.7 % (ref 12.4–15.4)
PH UA: 7 (ref 5–8)
PLATELET # BLD: 172 K/UL (ref 135–450)
PMV BLD AUTO: 9.1 FL (ref 5–10.5)
POTASSIUM SERPL-SCNC: 3.8 MMOL/L (ref 3.5–5.1)
PROTEIN UA: NEGATIVE MG/DL
PROTHROMBIN TIME: 10.5 SEC (ref 9.9–12.7)
RBC # BLD: 4.6 M/UL (ref 4–5.2)
SODIUM BLD-SCNC: 135 MMOL/L (ref 136–145)
SPECIFIC GRAVITY UA: 1.01 (ref 1–1.03)
TOTAL PROTEIN: 7.7 G/DL (ref 6.4–8.2)
URINE REFLEX TO CULTURE: NORMAL
URINE TYPE: NORMAL
UROBILINOGEN, URINE: 0.2 E.U./DL
WBC # BLD: 11.6 K/UL (ref 4–11)

## 2021-12-16 PROCEDURE — 85027 COMPLETE CBC AUTOMATED: CPT

## 2021-12-16 PROCEDURE — 80048 BASIC METABOLIC PNL TOTAL CA: CPT

## 2021-12-16 PROCEDURE — 36415 COLL VENOUS BLD VENIPUNCTURE: CPT

## 2021-12-16 PROCEDURE — 99214 OFFICE O/P EST MOD 30 MIN: CPT | Performed by: INTERNAL MEDICINE

## 2021-12-16 PROCEDURE — 85610 PROTHROMBIN TIME: CPT

## 2021-12-16 PROCEDURE — 81003 URINALYSIS AUTO W/O SCOPE: CPT

## 2021-12-16 PROCEDURE — 80076 HEPATIC FUNCTION PANEL: CPT

## 2021-12-16 PROCEDURE — 83735 ASSAY OF MAGNESIUM: CPT

## 2021-12-21 ENCOUNTER — TELEPHONE (OUTPATIENT)
Dept: CARDIOLOGY | Age: 86
End: 2021-12-21

## 2021-12-21 NOTE — TELEPHONE ENCOUNTER
Spoke to pts son, Jonathan Aguirre (OK per HIPPA) and instructed him to have pt at [facility] on 12/28/21 at 10:30am for TAVR procedure. Aware of NPO after MN and to have pt take her thyroid med and duo neb the morning of TAVR. Verbalized understanding of all.  Robert NESS

## 2021-12-24 ENCOUNTER — ANESTHESIA EVENT (OUTPATIENT)
Dept: OPERATING ROOM | Age: 86
DRG: 267 | End: 2021-12-24
Payer: MEDICARE

## 2021-12-27 ENCOUNTER — TELEPHONE (OUTPATIENT)
Dept: CARDIOLOGY CLINIC | Age: 86
End: 2021-12-27

## 2021-12-27 ASSESSMENT — COPD QUESTIONNAIRES: CAT_SEVERITY: MODERATE

## 2021-12-27 ASSESSMENT — ENCOUNTER SYMPTOMS: SHORTNESS OF BREATH: 1

## 2021-12-27 NOTE — ANESTHESIA PRE PROCEDURE
Department of Anesthesiology  Preprocedure Note       Name:  Toño Draper   Age:  82 y.o.  :  1935                                          MRN:  [id number]         Date:  2021      Surgeon: Lawrence Rubio):  Lauraine Hodgkin, MD Eugenie Casa, MD    Procedure: Procedure(s):  TRANSCATHETER AORTIC VALVE REPLACEMENT FEMORAL APPROACH  TRANSCATHETER AORTIC VALVE REPLACEMENT FEMORAL APPROACH    Medications prior to admission:   Prior to Admission medications    Medication Sig Start Date End Date Taking?  Authorizing Provider   citalopram (CELEXA) 10 MG tablet Take 1 tablet by mouth daily 11/3/21   ELISE Wallis   polyethylene glycol (GLYCOLAX) 17 g packet Take 17 g by mouth daily as needed for Constipation    Historical Provider, MD   omeprazole (PRILOSEC) 20 MG delayed release capsule Take 20 mg by mouth daily    Historical Provider, MD   levocetirizine (XYZAL) 5 MG tablet Take 5 mg by mouth nightly    Historical Provider, MD   potassium chloride (KLOR-CON M) 20 MEQ extended release tablet Take 1 tablet by mouth daily 21   ELISE Wallis   calcium carbonate (CALCIUM 600) 600 MG TABS tablet Take 1 tablet by mouth 2 times daily 21   ELISE Wallis   furosemide (LASIX) 20 MG tablet Take 3 tablets by mouth 2 times daily  Patient taking differently: Take 40 mg by mouth 2 times daily  21   JOSE ALFREDO Rajan - CNP   spironolactone (ALDACTONE) 25 MG tablet Take 0.5 tablets by mouth daily 21   JOSE ALFREDO Rajan - CNP   docusate (COLACE, DULCOLAX) 100 MG CAPS Take 100 mg by mouth 2 times daily 21   JOSE ALFREDO Rajan CNP   acetaminophen (TYLENOL) 500 MG tablet Take 500 mg by mouth every 6 hours as needed for Pain    Historical Provider, MD   methocarbamol (ROBAXIN) 500 MG tablet Take 500 mg by mouth 4 times daily    Historical Provider, MD   OXYGEN Use 3L of oxygen all the time 21   ELISE Wallis   budesonide (PULMICORT) 0.5 MG/2ML nebulizer suspension Take 2 mLs by nebulization 2 times daily DX:COPD J44.9 7/28/20 11/29/21  Madai Martinez MD   formoterol (PERFOROMIST) 20 MCG/2ML nebulizer solution Take 2 mLs by nebulization every 12 hours DX:COPD J44.9 7/28/20 10/22/21  Madai Martinez MD   ondansetron [facility]) 4 MG tablet Take 1 tablet by mouth daily as needed for Nausea or Vomiting 7/68/02   Vernie Dubin, MD   oxybutynin (DITROPAN) 5 MG tablet TAKE ONE TABLET BY MOUTH THREE TIMES A DAY 8/20/19   Philip Ibarra MD   atorvastatin (LIPITOR) 10 MG tablet TAKE 1 TABLET BY MOUTH DAILY AT BEDTIME.   Patient taking differently: Take 10 mg by mouth nightly TAKE 1 TABLET BY MOUTH DAILY AT BEDTIME. 7/12/19   Yanna Arredondo MD   albuterol sulfate HFA (VENTOLIN HFA) 108 (90 Base) MCG/ACT inhaler Inhale 2 puffs into the lungs every 6 hours as needed for Wheezing 7/9/19   Madai Martinez MD   levothyroxine (SYNTHROID) 75 MCG tablet TAKE 1 TABLET BY MOUTH DAILY 6/11/19   Philip Ibarra MD   ipratropium-albuterol (DUONEB) 0.5-2.5 (3) MG/3ML SOLN nebulizer solution Inhale 3 mLs into the lungs every 4 hours 3/13/18   Philip Ibarra MD       Current medications:    Current Outpatient Medications   Medication Sig Dispense Refill    citalopram (CELEXA) 10 MG tablet Take 1 tablet by mouth daily 30 tablet 5    polyethylene glycol (GLYCOLAX) 17 g packet Take 17 g by mouth daily as needed for Constipation      omeprazole (PRILOSEC) 20 MG delayed release capsule Take 20 mg by mouth daily      levocetirizine (XYZAL) 5 MG tablet Take 5 mg by mouth nightly      potassium chloride (KLOR-CON M) 20 MEQ extended release tablet Take 1 tablet by mouth daily 30 tablet 5    calcium carbonate (CALCIUM 600) 600 MG TABS tablet Take 1 tablet by mouth 2 times daily 60 tablet 5    furosemide (LASIX) 20 MG tablet Take 3 tablets by mouth 2 times daily (Patient taking differently: Take 40 mg by mouth 2 times daily ) 180 tablet 0    spironolactone (ALDACTONE) 25 MG tablet Take 0.5 tablets by mouth daily 30 tablet 0    docusate (COLACE, DULCOLAX) 100 MG CAPS Take 100 mg by mouth 2 times daily 120 capsule 0    acetaminophen (TYLENOL) 500 MG tablet Take 500 mg by mouth every 6 hours as needed for Pain      methocarbamol (ROBAXIN) 500 MG tablet Take 500 mg by mouth 4 times daily      OXYGEN Use 3L of oxygen all the time 3 L 3    budesonide (PULMICORT) 0.5 MG/2ML nebulizer suspension Take 2 mLs by nebulization 2 times daily DX:COPD J44.9 60 ampule 6    formoterol (PERFOROMIST) 20 MCG/2ML nebulizer solution Take 2 mLs by nebulization every 12 hours DX:COPD J44.9 120 mL 6    ondansetron (ZOFRAN) 4 MG tablet Take 1 tablet by mouth daily as needed for Nausea or Vomiting 30 tablet 0    oxybutynin (DITROPAN) 5 MG tablet TAKE ONE TABLET BY MOUTH THREE TIMES A DAY 90 tablet 1    atorvastatin (LIPITOR) 10 MG tablet TAKE 1 TABLET BY MOUTH DAILY AT BEDTIME. (Patient taking differently: Take 10 mg by mouth nightly TAKE 1 TABLET BY MOUTH DAILY AT BEDTIME.) 30 tablet 5    albuterol sulfate HFA (VENTOLIN HFA) 108 (90 Base) MCG/ACT inhaler Inhale 2 puffs into the lungs every 6 hours as needed for Wheezing 1 Inhaler 3    levothyroxine (SYNTHROID) 75 MCG tablet TAKE 1 TABLET BY MOUTH DAILY 30 tablet 5    ipratropium-albuterol (DUONEB) 0.5-2.5 (3) MG/3ML SOLN nebulizer solution Inhale 3 mLs into the lungs every 4 hours 360 mL 0     No current facility-administered medications for this visit. Allergies:  No Known Allergies    Problem List:    Patient Active Problem List   Diagnosis Code    Chronic seasonal allergic rhinitis J30.2    COPD, severe ([address] J44.9    Idiopathic peripheral neuropathy G60.9    Hypothyroidism E03.9    Depression F32. A    Chronic congestive heart failure (HCC) I50.9    SOB (shortness of breath) R06.02    Obstructive sleep apnea (adult) (pediatric) G47.33    Bilateral lower extremity edema R60.0    Multifocal pneumonia J18.9    Centrilobular emphysema ([facility]) J43.2    Acute on chronic diastolic heart failure ([facility]) I50.33    Pitting edema R60.9    BASIA (acute kidney injury) ([facility] N17.9    Chronic respiratory failure with hypoxia ([facility]) J96.11    Essential hypertension I10    Mixed hyperlipidemia E78.2    Acute pulmonary edema ([facility]) J81.0    COPD exacerbation ([facility]) J44.1    Chronic bronchitis ([facility]) J42    Class 3 severe obesity without serious comorbidity with body mass index (BMI) of 40.0 to 44.9 in adult ([facility]) E66.01, Z68.41    Pulmonary nodule R91.1    Acute suppurative otitis media of right ear with spontaneous rupture of tympanic membrane H66.011    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 H72.01    Bilateral hearing loss H91.93    Bilateral sensorineural hearing loss H90.3    Senile osteoporosis M81.0    Acute respiratory failure ([facility]) J96.00    Coronary artery disease due to lipid rich plaque I25.10, I25.83    Nonrheumatic aortic valve stenosis I35.0    Hypercholesteremia E78.00    Aspiration into airway T17.908A    Acute respiratory failure with hypoxemia ([facility]) J96.01    Severe sepsis ([facility]) A41.9, R65.20    Elevated brain natriuretic peptide (BNP) level R79.89    Elevated C-reactive protein (CRP) R79.82    History of depression Z86.59    Decubitus ulcer of coccyx L89.159    Weight loss counseling, encounter for Z71.3    E coli infection A49.8       Past Medical History:        Diagnosis Date    Allergic rhinitis     Anxiety     Aortic stenosis     CHF (congestive heart failure) ([facility])     COPD (chronic obstructive pulmonary disease) [facility]     Depression     [facility]     Hypertension     Hypothyroidism     MARIAA treated with BiPAP 02/14/2018    Urinary incontinence        Past Surgical History:        Procedure Laterality Date    CARDIAC CATHETERIZATION  10/11/2021    CARPAL TUNNEL RELEASE      bilateral    CHOLECYSTECTOMY      DENTAL SURGERY N/A 11/12/2021    SIMPLE EXTRACTIONS TEETH # 4, 13, 18, 23, 24, 25, 26,  AND SURGICAL EXTRACTION TEETH # 2, 3, 14, 15, 19, 20, 21,  28,  29, 30, 31  AND ALSO ALVEOLOPLASTIES ALL FOUR QUADRANTS; GEL FOAM performed by Pam Monroe DDS at [address]      bilateral cataracts    HYSTERECTOMY, TOTAL ABDOMINAL      TONSILLECTOMY      UVULECTOMY         Social History:    Social History     Tobacco Use    Smoking status: Never Smoker    Smokeless tobacco: Never Used   Substance Use Topics    Alcohol use: No                                Counseling given: Not Answered      Vital Signs (Current): There were no vitals filed for this visit. BP Readings from Last 3 Encounters:   12/16/21 118/70   12/03/21 104/64   11/22/21 (!) 115/58       NPO Status:                                                                                 BMI:   Wt Readings from Last 3 Encounters:   12/16/21 182 lb (82.6 kg)   11/29/21 182 lb 8 oz (82.8 kg)   11/22/21 179 lb (81.2 kg)     There is no height or weight on file to calculate BMI.    CBC:   Lab Results   Component Value Date    WBC 11.6 12/16/2021    RBC 4.60 12/16/2021    HGB 12.9 12/16/2021    HCT 41.0 12/16/2021    MCV 89.2 12/16/2021    RDW 13.7 12/16/2021     12/16/2021       CMP:   Lab Results   Component Value Date     12/16/2021    K 3.8 12/16/2021    K 4.2 12/03/2021    CL 88 12/16/2021    CO2 36 12/16/2021    BUN 17 12/16/2021    CREATININE 0.7 12/16/2021    GFRAA >60 12/16/2021    AGRATIO 1.1 12/03/2021    LABGLOM >60 12/16/2021    GLUCOSE 134 12/16/2021    PROT 7.7 12/16/2021    CALCIUM 9.3 12/16/2021    BILITOT 0.3 12/16/2021    ALKPHOS 111 12/16/2021    AST 20 12/16/2021    ALT 18 12/16/2021       POC Tests: No results for input(s): POCGLU, POCNA, POCK, POCCL, POCBUN, POCHEMO, POCHCT in the last 72 hours.     Coags:   Lab Results   Component Value Date    PROTIME 10.5 12/16/2021    INR 0.93 12/16/2021    APTT 29.5 11/29/2021 HCG (If Applicable): No results found for: PREGTESTUR, PREGSERUM, HCG, HCGQUANT     ABGs: No results found for: PHART, PO2ART, ETP8KCX, XQW9DHC, BEART, E4BHQKXY     Type & Screen (If Applicable):  No results found for: LABABO, LABRH    Drug/Infectious Status (If Applicable):  No results found for: HIV, HEPCAB    COVID-19 Screening (If Applicable):   Lab Results   Component Value Date    COVID19 Not Detected 11/29/2021           Anesthesia Evaluation  Patient summary reviewed and Nursing notes reviewed  Airway: Mallampati: II  TM distance: >3 FB   Neck ROM: full  Mouth opening: > = 3 FB Dental:          Pulmonary:   (+) COPD: moderate,  shortness of breath:  sleep apnea:                            ROS comment: On home oxygen 2 lit/min   Cardiovascular:  Exercise tolerance: poor (<4 METS),   (+) hypertension: moderate, CAD: non-obstructive, CHF:,         Rhythm: regular  Rate: normal                 ROS comment: Echo 10/21   -Normal left ventricle size, wall thickness and systolic function with an   estimated ejection fraction of 55-60%. -No regional wall motion abnormalities are noted. -Grade I diastolic dysfunction with normal LV filling pressures. E/e' =   18.6.   -Severe aortic stenosis with a peak velocity of 4.1 m/s and a mean pressure   gradient of 39mmHg. HOLDEN (VTI) is 2.47 cm2. (gradients showed with definity). -No evidence of aortic valve regurgitation.   -There is mild mitral stenosis with a mean gradient of 3 mmHG, P1/2 is 89   ms., Vmax is 1.4 cm/s, HOLDEN (P1/2) is 2.47 cm2.   -Mild mitral regurgitation.   -Mild tricuspid regurgitation. -IVC size is dilated (>2.1 cm) but collapses > 50% with respiration   consistent with elevated RA pressure (8 mmHg). -Estimated pulmonary artery systolic pressure is elevated at 41 mmHg   assuming a right atrial pressure of 8 mmHg.         Neuro/Psych:   (+) neuromuscular disease:, psychiatric history:             ROS comment: Pt has slurred speech GI/Hepatic/Renal:             Endo/Other:    (+) Diabetes, hypothyroidism::., .                 Abdominal:             Vascular: Other Findings:               Anesthesia Plan      MAC     ASA 4       Induction: intravenous. arterial line    Anesthetic plan and risks discussed with patient. I saw and examined patient. CV is RRR. Lungs are CTA but has pursed lip breathing. There has been no material change in her medical history since last seen by her PMD.  She has chronic, severe BOSE.R/b/A discussed with patient and son; they agree to proceed. This may be used as an H&P.       Heidi Welch MD   12/27/2021

## 2021-12-27 NOTE — TELEPHONE ENCOUNTER
Spoke to pts son, Dulce Pereira, to let him know time change for arrival in the AM for TAVR; arrive at 8:45am. Verbalized understanding of all.  Robert NESS

## 2021-12-28 ENCOUNTER — ANESTHESIA (OUTPATIENT)
Dept: OPERATING ROOM | Age: 86
DRG: 267 | End: 2021-12-28
Payer: MEDICARE

## 2021-12-28 ENCOUNTER — HOSPITAL ENCOUNTER (INPATIENT)
Age: 86
LOS: 3 days | Discharge: HOME HEALTH | DRG: 267 | End: 2021-12-31
Attending: INTERNAL MEDICINE | Admitting: INTERNAL MEDICINE
Payer: MEDICARE

## 2021-12-28 VITALS — RESPIRATION RATE: 1 BRPM | OXYGEN SATURATION: 98 %

## 2021-12-28 PROBLEM — I35.0 AORTIC STENOSIS, SEVERE: Status: ACTIVE | Noted: 2021-12-28

## 2021-12-28 LAB
ABO/RH: NORMAL
ANION GAP SERPL CALCULATED.3IONS-SCNC: 9 MMOL/L (ref 3–16)
ANTIBODY SCREEN: NORMAL
BASE EXCESS ARTERIAL: 6.4 MMOL/L (ref -3–3)
BASE EXCESS ARTERIAL: 9 (ref -3–3)
BASOPHILS ABSOLUTE: 0 K/UL (ref 0–0.2)
BASOPHILS RELATIVE PERCENT: 0.1 %
BUN BLDV-MCNC: 16 MG/DL (ref 7–20)
CALCIUM IONIZED: 1.05 MMOL/L (ref 1.12–1.32)
CALCIUM IONIZED: 1.18 MMOL/L (ref 1.12–1.32)
CALCIUM SERPL-MCNC: 8.3 MG/DL (ref 8.3–10.6)
CARBOXYHEMOGLOBIN ARTERIAL: 0.9 % (ref 0–1.5)
CHLORIDE BLD-SCNC: 97 MMOL/L (ref 99–110)
CO2: 28 MMOL/L (ref 21–32)
CREAT SERPL-MCNC: 0.9 MG/DL (ref 0.6–1.2)
EKG ATRIAL RATE: 107 BPM
EKG DIAGNOSIS: NORMAL
EKG P AXIS: 76 DEGREES
EKG P-R INTERVAL: 198 MS
EKG Q-T INTERVAL: 340 MS
EKG QRS DURATION: 78 MS
EKG QTC CALCULATION (BAZETT): 453 MS
EKG R AXIS: -29 DEGREES
EKG T AXIS: 53 DEGREES
EKG VENTRICULAR RATE: 107 BPM
EOSINOPHILS ABSOLUTE: 0 K/UL (ref 0–0.6)
EOSINOPHILS RELATIVE PERCENT: 0 %
GFR AFRICAN AMERICAN: >60
GFR NON-AFRICAN AMERICAN: 60
GLUCOSE BLD-MCNC: 108 MG/DL (ref 70–99)
GLUCOSE BLD-MCNC: 254 MG/DL (ref 70–99)
HCO3 ARTERIAL: 32.3 MMOL/L (ref 21–29)
HCO3 ARTERIAL: 33.9 MMOL/L (ref 21–29)
HCT VFR BLD CALC: 31.7 % (ref 36–48)
HEMOGLOBIN, ART, EXTENDED: 10.4 G/DL (ref 12–16)
HEMOGLOBIN: 10.2 G/DL (ref 12–16)
HEMOGLOBIN: 10.9 GM/DL (ref 12–16)
LACTATE: 1.32 MMOL/L (ref 0.4–2)
LYMPHOCYTES ABSOLUTE: 0.3 K/UL (ref 1–5.1)
LYMPHOCYTES RELATIVE PERCENT: 2.9 %
MCH RBC QN AUTO: 28.5 PG (ref 26–34)
MCHC RBC AUTO-ENTMCNC: 32.2 G/DL (ref 31–36)
MCV RBC AUTO: 88.4 FL (ref 80–100)
METHEMOGLOBIN ARTERIAL: 0.2 %
MONOCYTES ABSOLUTE: 0.4 K/UL (ref 0–1.3)
MONOCYTES RELATIVE PERCENT: 3.5 %
NEUTROPHILS ABSOLUTE: 10.9 K/UL (ref 1.7–7.7)
NEUTROPHILS RELATIVE PERCENT: 93.5 %
O2 SAT, ARTERIAL: 100 % (ref 93–100)
O2 SAT, ARTERIAL: 98.1 %
O2 THERAPY: ABNORMAL
PCO2 ARTERIAL: 52.2 MMHG (ref 35–45)
PCO2 ARTERIAL: 58.9 MM HG (ref 35–45)
PDW BLD-RTO: 14.3 % (ref 12.4–15.4)
PERFORMED ON: ABNORMAL
PH ARTERIAL: 7.37 (ref 7.35–7.45)
PH ARTERIAL: 7.4 (ref 7.35–7.45)
PH VENOUS: 7.43 (ref 7.35–7.45)
PLATELET # BLD: 126 K/UL (ref 135–450)
PMV BLD AUTO: 8.3 FL (ref 5–10.5)
PO2 ARTERIAL: 102 MMHG (ref 75–108)
PO2 ARTERIAL: 337.5 MM HG (ref 75–108)
POC HEMATOCRIT: 32 % (ref 36–48)
POC POTASSIUM: 3.6 MMOL/L (ref 3.5–5.1)
POC SAMPLE TYPE: ABNORMAL
POC SODIUM: 142 MMOL/L (ref 136–145)
POTASSIUM SERPL-SCNC: 4.5 MMOL/L (ref 3.5–5.1)
PRO-BNP: 390 PG/ML (ref 0–449)
RBC # BLD: 3.58 M/UL (ref 4–5.2)
SARS-COV-2, NAAT: NOT DETECTED
SODIUM BLD-SCNC: 134 MMOL/L (ref 136–145)
TCO2 ARTERIAL: 36 MMOL/L
TCO2 ARTERIAL: 75.9 MMOL/L
TROPONIN: <0.01 NG/ML
WBC # BLD: 11.7 K/UL (ref 4–11)

## 2021-12-28 PROCEDURE — 6360000004 HC RX CONTRAST MEDICATION

## 2021-12-28 PROCEDURE — 2580000003 HC RX 258: Performed by: INTERNAL MEDICINE

## 2021-12-28 PROCEDURE — 6360000002 HC RX W HCPCS

## 2021-12-28 PROCEDURE — 86850 RBC ANTIBODY SCREEN: CPT

## 2021-12-28 PROCEDURE — 2500000003 HC RX 250 WO HCPCS: Performed by: INTERNAL MEDICINE

## 2021-12-28 PROCEDURE — 83880 ASSAY OF NATRIURETIC PEPTIDE: CPT

## 2021-12-28 PROCEDURE — 82803 BLOOD GASES ANY COMBINATION: CPT

## 2021-12-28 PROCEDURE — 6370000000 HC RX 637 (ALT 250 FOR IP): Performed by: INTERNAL MEDICINE

## 2021-12-28 PROCEDURE — 84132 ASSAY OF SERUM POTASSIUM: CPT

## 2021-12-28 PROCEDURE — 84484 ASSAY OF TROPONIN QUANT: CPT

## 2021-12-28 PROCEDURE — 3700000001 HC ADD 15 MINUTES (ANESTHESIA): Performed by: INTERNAL MEDICINE

## 2021-12-28 PROCEDURE — C1894 INTRO/SHEATH, NON-LASER: HCPCS

## 2021-12-28 PROCEDURE — 82330 ASSAY OF CALCIUM: CPT

## 2021-12-28 PROCEDURE — 2720000010 HC SURG SUPPLY STERILE

## 2021-12-28 PROCEDURE — 83605 ASSAY OF LACTIC ACID: CPT

## 2021-12-28 PROCEDURE — 2780000006 HC MISC HEART VALVE

## 2021-12-28 PROCEDURE — 94761 N-INVAS EAR/PLS OXIMETRY MLT: CPT

## 2021-12-28 PROCEDURE — 2700000000 HC OXYGEN THERAPY PER DAY

## 2021-12-28 PROCEDURE — 6360000002 HC RX W HCPCS: Performed by: INTERNAL MEDICINE

## 2021-12-28 PROCEDURE — 94660 CPAP INITIATION&MGMT: CPT

## 2021-12-28 PROCEDURE — 85014 HEMATOCRIT: CPT

## 2021-12-28 PROCEDURE — 36415 COLL VENOUS BLD VENIPUNCTURE: CPT

## 2021-12-28 PROCEDURE — 3700000000 HC ANESTHESIA ATTENDED CARE: Performed by: INTERNAL MEDICINE

## 2021-12-28 PROCEDURE — 2709999900 HC NON-CHARGEABLE SUPPLY

## 2021-12-28 PROCEDURE — 51702 INSERT TEMP BLADDER CATH: CPT

## 2021-12-28 PROCEDURE — C1760 CLOSURE DEV, VASC: HCPCS

## 2021-12-28 PROCEDURE — 86900 BLOOD TYPING SEROLOGIC ABO: CPT

## 2021-12-28 PROCEDURE — 87635 SARS-COV-2 COVID-19 AMP PRB: CPT

## 2021-12-28 PROCEDURE — 6370000000 HC RX 637 (ALT 250 FOR IP): Performed by: ANESTHESIOLOGY

## 2021-12-28 PROCEDURE — 33361 REPLACE AORTIC VALVE PERQ: CPT | Performed by: THORACIC SURGERY (CARDIOTHORACIC VASCULAR SURGERY)

## 2021-12-28 PROCEDURE — 84295 ASSAY OF SERUM SODIUM: CPT

## 2021-12-28 PROCEDURE — 85025 COMPLETE CBC W/AUTO DIFF WBC: CPT

## 2021-12-28 PROCEDURE — 02RF38Z REPLACEMENT OF AORTIC VALVE WITH ZOOPLASTIC TISSUE, PERCUTANEOUS APPROACH: ICD-10-PCS | Performed by: THORACIC SURGERY (CARDIOTHORACIC VASCULAR SURGERY)

## 2021-12-28 PROCEDURE — 94640 AIRWAY INHALATION TREATMENT: CPT

## 2021-12-28 PROCEDURE — 2500000003 HC RX 250 WO HCPCS: Performed by: ANESTHESIOLOGY

## 2021-12-28 PROCEDURE — 93010 ELECTROCARDIOGRAM REPORT: CPT | Performed by: INTERNAL MEDICINE

## 2021-12-28 PROCEDURE — 2709999900 HC NON-CHARGEABLE SUPPLY: Performed by: INTERNAL MEDICINE

## 2021-12-28 PROCEDURE — 2500000003 HC RX 250 WO HCPCS

## 2021-12-28 PROCEDURE — 82947 ASSAY GLUCOSE BLOOD QUANT: CPT

## 2021-12-28 PROCEDURE — 80048 BASIC METABOLIC PNL TOTAL CA: CPT

## 2021-12-28 PROCEDURE — 93005 ELECTROCARDIOGRAM TRACING: CPT | Performed by: INTERNAL MEDICINE

## 2021-12-28 PROCEDURE — 6360000002 HC RX W HCPCS: Performed by: ANESTHESIOLOGY

## 2021-12-28 PROCEDURE — C1769 GUIDE WIRE: HCPCS

## 2021-12-28 PROCEDURE — 86901 BLOOD TYPING SEROLOGIC RH(D): CPT

## 2021-12-28 PROCEDURE — 2580000003 HC RX 258: Performed by: ANESTHESIOLOGY

## 2021-12-28 PROCEDURE — 86923 COMPATIBILITY TEST ELECTRIC: CPT

## 2021-12-28 PROCEDURE — 2000000000 HC ICU R&B

## 2021-12-28 RX ORDER — ACETAMINOPHEN 325 MG/1
650 TABLET ORAL EVERY 4 HOURS PRN
Status: DISCONTINUED | OUTPATIENT
Start: 2021-12-28 | End: 2021-12-31 | Stop reason: HOSPADM

## 2021-12-28 RX ORDER — SODIUM CHLORIDE 0.9 % (FLUSH) 0.9 %
5-40 SYRINGE (ML) INJECTION PRN
Status: DISCONTINUED | OUTPATIENT
Start: 2021-12-28 | End: 2021-12-31 | Stop reason: HOSPADM

## 2021-12-28 RX ORDER — ONDANSETRON 2 MG/ML
INJECTION INTRAMUSCULAR; INTRAVENOUS PRN
Status: DISCONTINUED | OUTPATIENT
Start: 2021-12-28 | End: 2021-12-28 | Stop reason: SDUPTHER

## 2021-12-28 RX ORDER — IPRATROPIUM BROMIDE AND ALBUTEROL SULFATE 2.5; .5 MG/3ML; MG/3ML
1 SOLUTION RESPIRATORY (INHALATION) EVERY 4 HOURS
Status: DISCONTINUED | OUTPATIENT
Start: 2021-12-29 | End: 2021-12-29

## 2021-12-28 RX ORDER — PROTAMINE SULFATE 10 MG/ML
INJECTION, SOLUTION INTRAVENOUS PRN
Status: DISCONTINUED | OUTPATIENT
Start: 2021-12-28 | End: 2021-12-28 | Stop reason: SDUPTHER

## 2021-12-28 RX ORDER — IPRATROPIUM BROMIDE AND ALBUTEROL SULFATE 2.5; .5 MG/3ML; MG/3ML
1 SOLUTION RESPIRATORY (INHALATION) EVERY 4 HOURS
Status: DISCONTINUED | OUTPATIENT
Start: 2021-12-28 | End: 2021-12-28

## 2021-12-28 RX ORDER — SODIUM CHLORIDE 0.9 % (FLUSH) 0.9 %
5-40 SYRINGE (ML) INJECTION EVERY 12 HOURS SCHEDULED
Status: DISCONTINUED | OUTPATIENT
Start: 2021-12-28 | End: 2021-12-31 | Stop reason: HOSPADM

## 2021-12-28 RX ORDER — ATORVASTATIN CALCIUM 10 MG/1
10 TABLET, FILM COATED ORAL NIGHTLY
Status: DISCONTINUED | OUTPATIENT
Start: 2021-12-28 | End: 2021-12-31 | Stop reason: HOSPADM

## 2021-12-28 RX ORDER — ATROPINE SULFATE 0.4 MG/ML
0.5 AMPUL (ML) INJECTION
Status: DISPENSED | OUTPATIENT
Start: 2021-12-28 | End: 2021-12-28

## 2021-12-28 RX ORDER — HEPARIN SODIUM 1000 [USP'U]/ML
INJECTION, SOLUTION INTRAVENOUS; SUBCUTANEOUS PRN
Status: DISCONTINUED | OUTPATIENT
Start: 2021-12-28 | End: 2021-12-28 | Stop reason: SDUPTHER

## 2021-12-28 RX ORDER — SODIUM CHLORIDE, SODIUM LACTATE, POTASSIUM CHLORIDE, CALCIUM CHLORIDE 600; 310; 30; 20 MG/100ML; MG/100ML; MG/100ML; MG/100ML
INJECTION, SOLUTION INTRAVENOUS CONTINUOUS PRN
Status: DISCONTINUED | OUTPATIENT
Start: 2021-12-28 | End: 2021-12-28 | Stop reason: SDUPTHER

## 2021-12-28 RX ORDER — PROPOFOL 10 MG/ML
INJECTION, EMULSION INTRAVENOUS CONTINUOUS PRN
Status: DISCONTINUED | OUTPATIENT
Start: 2021-12-28 | End: 2021-12-28 | Stop reason: SDUPTHER

## 2021-12-28 RX ORDER — POTASSIUM CHLORIDE 20 MEQ/1
20 TABLET, EXTENDED RELEASE ORAL DAILY
Status: DISCONTINUED | OUTPATIENT
Start: 2021-12-28 | End: 2021-12-31 | Stop reason: HOSPADM

## 2021-12-28 RX ORDER — SODIUM CHLORIDE FOR INHALATION 0.9 %
3 VIAL, NEBULIZER (ML) INHALATION EVERY 4 HOURS PRN
Status: DISCONTINUED | OUTPATIENT
Start: 2021-12-28 | End: 2021-12-31 | Stop reason: HOSPADM

## 2021-12-28 RX ORDER — BUDESONIDE 0.5 MG/2ML
500 INHALANT ORAL 2 TIMES DAILY
Status: DISCONTINUED | OUTPATIENT
Start: 2021-12-28 | End: 2021-12-31 | Stop reason: HOSPADM

## 2021-12-28 RX ORDER — SPIRONOLACTONE 25 MG/1
12.5 TABLET ORAL DAILY
Status: DISCONTINUED | OUTPATIENT
Start: 2021-12-28 | End: 2021-12-31 | Stop reason: HOSPADM

## 2021-12-28 RX ORDER — CLOPIDOGREL BISULFATE 75 MG/1
75 TABLET ORAL DAILY
Status: DISCONTINUED | OUTPATIENT
Start: 2021-12-28 | End: 2021-12-31 | Stop reason: HOSPADM

## 2021-12-28 RX ORDER — ONDANSETRON 2 MG/ML
4 INJECTION INTRAMUSCULAR; INTRAVENOUS EVERY 6 HOURS PRN
Status: DISCONTINUED | OUTPATIENT
Start: 2021-12-28 | End: 2021-12-31 | Stop reason: HOSPADM

## 2021-12-28 RX ORDER — GLYCOPYRROLATE 1 MG/5 ML
SYRINGE (ML) INTRAVENOUS PRN
Status: DISCONTINUED | OUTPATIENT
Start: 2021-12-28 | End: 2021-12-28 | Stop reason: SDUPTHER

## 2021-12-28 RX ORDER — 0.9 % SODIUM CHLORIDE 0.9 %
500 INTRAVENOUS SOLUTION INTRAVENOUS PRN
Status: DISCONTINUED | OUTPATIENT
Start: 2021-12-28 | End: 2021-12-31 | Stop reason: HOSPADM

## 2021-12-28 RX ORDER — HYDRALAZINE HYDROCHLORIDE 20 MG/ML
10 INJECTION INTRAMUSCULAR; INTRAVENOUS EVERY 6 HOURS PRN
Status: DISCONTINUED | OUTPATIENT
Start: 2021-12-28 | End: 2021-12-31 | Stop reason: HOSPADM

## 2021-12-28 RX ORDER — FUROSEMIDE 10 MG/ML
20 INJECTION INTRAMUSCULAR; INTRAVENOUS ONCE
Status: DISCONTINUED | OUTPATIENT
Start: 2021-12-28 | End: 2021-12-31 | Stop reason: HOSPADM

## 2021-12-28 RX ORDER — LIDOCAINE 4 G/G
1 PATCH TOPICAL ONCE
Status: COMPLETED | OUTPATIENT
Start: 2021-12-28 | End: 2021-12-28

## 2021-12-28 RX ORDER — NITROGLYCERIN 20 MG/100ML
5 INJECTION INTRAVENOUS CONTINUOUS
Status: DISCONTINUED | OUTPATIENT
Start: 2021-12-28 | End: 2021-12-28 | Stop reason: SDUPTHER

## 2021-12-28 RX ORDER — NITROGLYCERIN 20 MG/100ML
5-200 INJECTION INTRAVENOUS CONTINUOUS
Status: DISCONTINUED | OUTPATIENT
Start: 2021-12-28 | End: 2021-12-31 | Stop reason: HOSPADM

## 2021-12-28 RX ORDER — FUROSEMIDE 40 MG/1
40 TABLET ORAL 2 TIMES DAILY
Status: DISCONTINUED | OUTPATIENT
Start: 2021-12-28 | End: 2021-12-29

## 2021-12-28 RX ORDER — METOCLOPRAMIDE HYDROCHLORIDE 5 MG/ML
INJECTION INTRAMUSCULAR; INTRAVENOUS PRN
Status: DISCONTINUED | OUTPATIENT
Start: 2021-12-28 | End: 2021-12-28 | Stop reason: SDUPTHER

## 2021-12-28 RX ORDER — POLYETHYLENE GLYCOL 3350 17 G/17G
17 POWDER, FOR SOLUTION ORAL DAILY PRN
Status: DISCONTINUED | OUTPATIENT
Start: 2021-12-28 | End: 2021-12-31 | Stop reason: HOSPADM

## 2021-12-28 RX ORDER — ALBUTEROL SULFATE 90 UG/1
2 AEROSOL, METERED RESPIRATORY (INHALATION) EVERY 6 HOURS PRN
Status: DISCONTINUED | OUTPATIENT
Start: 2021-12-28 | End: 2021-12-31 | Stop reason: HOSPADM

## 2021-12-28 RX ORDER — LEVOTHYROXINE SODIUM 0.07 MG/1
75 TABLET ORAL DAILY
Status: DISCONTINUED | OUTPATIENT
Start: 2021-12-29 | End: 2021-12-31 | Stop reason: HOSPADM

## 2021-12-28 RX ORDER — NITROGLYCERIN 20 MG/100ML
INJECTION INTRAVENOUS
Status: COMPLETED
Start: 2021-12-28 | End: 2021-12-28

## 2021-12-28 RX ORDER — PSEUDOEPHEDRINE HCL 30 MG
100 TABLET ORAL 2 TIMES DAILY
Status: DISCONTINUED | OUTPATIENT
Start: 2021-12-28 | End: 2021-12-28

## 2021-12-28 RX ORDER — OXYBUTYNIN CHLORIDE 5 MG/1
5 TABLET ORAL 3 TIMES DAILY
Status: DISCONTINUED | OUTPATIENT
Start: 2021-12-28 | End: 2021-12-31 | Stop reason: HOSPADM

## 2021-12-28 RX ORDER — SODIUM CHLORIDE 9 MG/ML
25 INJECTION, SOLUTION INTRAVENOUS PRN
Status: DISCONTINUED | OUTPATIENT
Start: 2021-12-28 | End: 2021-12-31 | Stop reason: HOSPADM

## 2021-12-28 RX ORDER — ASPIRIN 81 MG/1
81 TABLET ORAL DAILY
Status: DISCONTINUED | OUTPATIENT
Start: 2021-12-28 | End: 2021-12-31 | Stop reason: HOSPADM

## 2021-12-28 RX ORDER — FORMOTEROL FUMARATE 20 UG/2ML
20 SOLUTION RESPIRATORY (INHALATION) EVERY 12 HOURS
Status: DISCONTINUED | OUTPATIENT
Start: 2021-12-28 | End: 2021-12-28

## 2021-12-28 RX ORDER — ATROPINE SULFATE 0.1 MG/ML
INJECTION INTRAVENOUS
Status: DISCONTINUED
Start: 2021-12-28 | End: 2021-12-28 | Stop reason: WASHOUT

## 2021-12-28 RX ORDER — METHOCARBAMOL 500 MG/1
500 TABLET, FILM COATED ORAL 4 TIMES DAILY
Status: DISCONTINUED | OUTPATIENT
Start: 2021-12-28 | End: 2021-12-28

## 2021-12-28 RX ORDER — KETAMINE HCL IN NACL, ISO-OSM 100MG/10ML
SYRINGE (ML) INJECTION PRN
Status: DISCONTINUED | OUTPATIENT
Start: 2021-12-28 | End: 2021-12-28 | Stop reason: SDUPTHER

## 2021-12-28 RX ORDER — CITALOPRAM 20 MG/1
10 TABLET ORAL DAILY
Status: DISCONTINUED | OUTPATIENT
Start: 2021-12-28 | End: 2021-12-31 | Stop reason: HOSPADM

## 2021-12-28 RX ADMIN — BUDESONIDE 500 MCG: 0.5 SUSPENSION RESPIRATORY (INHALATION) at 20:38

## 2021-12-28 RX ADMIN — ATORVASTATIN CALCIUM 10 MG: 10 TABLET, FILM COATED ORAL at 21:56

## 2021-12-28 RX ADMIN — FAMOTIDINE 20 MG: 10 INJECTION, SOLUTION INTRAVENOUS at 11:11

## 2021-12-28 RX ADMIN — CITALOPRAM HYDROBROMIDE 10 MG: 20 TABLET ORAL at 21:56

## 2021-12-28 RX ADMIN — NITROGLYCERIN 3 MCG/MIN: 20 INJECTION INTRAVENOUS at 13:08

## 2021-12-28 RX ADMIN — PROPOFOL 50 MCG/KG/MIN: 10 INJECTION, EMULSION INTRAVENOUS at 10:58

## 2021-12-28 RX ADMIN — PHENYLEPHRINE HYDROCHLORIDE 25 MCG/MIN: 10 INJECTION INTRAVENOUS at 20:08

## 2021-12-28 RX ADMIN — ONDANSETRON 4 MG: 2 INJECTION INTRAMUSCULAR; INTRAVENOUS at 11:11

## 2021-12-28 RX ADMIN — PROTAMINE SULFATE 100 MG: 10 INJECTION, SOLUTION INTRAVENOUS at 12:02

## 2021-12-28 RX ADMIN — OXYBUTYNIN CHLORIDE 5 MG: 5 TABLET ORAL at 21:56

## 2021-12-28 RX ADMIN — Medication 0.5 MCG/KG/MIN: at 10:58

## 2021-12-28 RX ADMIN — RACEPINEPHRINE HYDROCHLORIDE 11.25 MG: 11.25 SOLUTION RESPIRATORY (INHALATION) at 15:50

## 2021-12-28 RX ADMIN — HEPARIN SODIUM 10000 UNITS: 1000 INJECTION INTRAVENOUS; SUBCUTANEOUS at 11:45

## 2021-12-28 RX ADMIN — SODIUM CHLORIDE, POTASSIUM CHLORIDE, SODIUM LACTATE AND CALCIUM CHLORIDE: 600; 310; 30; 20 INJECTION, SOLUTION INTRAVENOUS at 10:52

## 2021-12-28 RX ADMIN — HYDROCORTISONE SODIUM SUCCINATE 500 MG: 100 INJECTION, POWDER, FOR SOLUTION INTRAMUSCULAR; INTRAVENOUS at 11:11

## 2021-12-28 RX ADMIN — SODIUM CHLORIDE, PRESERVATIVE FREE 10 ML: 5 INJECTION INTRAVENOUS at 22:06

## 2021-12-28 RX ADMIN — ASPIRIN 81 MG: 81 TABLET, COATED ORAL at 22:05

## 2021-12-28 RX ADMIN — IPRATROPIUM BROMIDE AND ALBUTEROL SULFATE 3 ML: .5; 3 SOLUTION RESPIRATORY (INHALATION) at 14:51

## 2021-12-28 RX ADMIN — IPRATROPIUM BROMIDE AND ALBUTEROL SULFATE 3 ML: .5; 3 SOLUTION RESPIRATORY (INHALATION) at 20:38

## 2021-12-28 RX ADMIN — METOCLOPRAMIDE 10 MG: 5 INJECTION, SOLUTION INTRAMUSCULAR; INTRAVENOUS at 11:11

## 2021-12-28 RX ADMIN — CLOPIDOGREL BISULFATE 75 MG: 75 TABLET ORAL at 21:56

## 2021-12-28 RX ADMIN — Medication 0.4 MG: at 11:11

## 2021-12-28 RX ADMIN — BUDESONIDE 500 MCG: 0.5 SUSPENSION RESPIRATORY (INHALATION) at 14:50

## 2021-12-28 RX ADMIN — CEFAZOLIN 2000 MG: 10 INJECTION, POWDER, FOR SOLUTION INTRAVENOUS at 11:04

## 2021-12-28 RX ADMIN — Medication 60 MG: at 11:11

## 2021-12-28 RX ADMIN — POTASSIUM CHLORIDE 20 MEQ: 1500 TABLET, EXTENDED RELEASE ORAL at 21:56

## 2021-12-28 RX ADMIN — Medication 60 MG: at 11:01

## 2021-12-28 ASSESSMENT — PULMONARY FUNCTION TESTS
PIF_VALUE: 1
PIF_VALUE: 21
PIF_VALUE: 1
PIF_VALUE: 20
PIF_VALUE: 1
PIF_VALUE: 21
PIF_VALUE: 1
PIF_VALUE: 1
PIF_VALUE: 0
PIF_VALUE: 1
PIF_VALUE: 20
PIF_VALUE: 1
PIF_VALUE: 0
PIF_VALUE: 0
PIF_VALUE: 1
PIF_VALUE: 20
PIF_VALUE: 1
PIF_VALUE: 0
PIF_VALUE: 1
PIF_VALUE: 2
PIF_VALUE: 1
PIF_VALUE: 0
PIF_VALUE: 1
PIF_VALUE: 37
PIF_VALUE: 1

## 2021-12-28 ASSESSMENT — PAIN - FUNCTIONAL ASSESSMENT: PAIN_FUNCTIONAL_ASSESSMENT: 0-10

## 2021-12-28 ASSESSMENT — PAIN SCALES - GENERAL
PAINLEVEL_OUTOF10: 0

## 2021-12-28 NOTE — ANESTHESIA POSTPROCEDURE EVALUATION
Department of Anesthesiology  Postprocedure Note    Patient: Sarah El  MRN: [id number]  YOB: 1935  Date of evaluation: 12/28/2021  Time:  12:46 PM     Procedure Summary     Date: 12/28/21 Room / Location: [address] [place] [address]    Anesthesia Start: 6465 Anesthesia Stop: 6253    Procedures:       TRANSCATHETER AORTIC VALVE REPLACEMENT FEMORAL APPROACH (N/A )      TRANSCATHETER AORTIC VALVE REPLACEMENT FEMORAL APPROACH (N/A ) Diagnosis: (AORTIC STENOSIS)    Surgeons: Chiquita Lagos MD; Tara Dawn MD Responsible Provider: Roxy Connelly MD    Anesthesia Type: MAC ASA Status: 4          Anesthesia Type: MAC    Cristopher Phase I: Cristopher Score: 10    Cristopher Phase II:      Last vitals: Reviewed and per EMR flowsheets.        Anesthesia Post Evaluation    Patient location during evaluation: PACU  Patient participation: complete - patient participated  Level of consciousness: awake and awake and alert  Pain score: 2  Airway patency: patent  Nausea & Vomiting: no vomiting  Complications: no  Cardiovascular status: blood pressure returned to baseline  Respiratory status: acceptable  Hydration status: euvolemic  Multimodal analgesia pain management approach

## 2021-12-28 NOTE — PROGRESS NOTES
Pt verified information regarding surgery, name, birth date, surgeon, procedure and allergies: nka. Patient transferred to [address] [place] [address] for surgery. Appropriate antibiotics ordered: ancef. DVT prophyaxis: scd/mecca's Lab work within normal limits, 4units of RBC's on call to OR, vital signs stable, Mepilex sacral border applied and 2% chlorhexidine gluconate skin prep given. Patient and family educated about surgery and pain management. Arterial line placed in left radial by Dr. Miguel Lozada.

## 2021-12-28 NOTE — H&P
[address]  H+P // Pati Hard // OP VISIT // FU VISIT    REFERRING ELISE Cook  ENC TYPE H+P    CC HX HPI   GEN  Doing fairly well. Ulcer healed. No f/c/s. AS  Denies cp, dizzy, syncope, palps. SOB with exertion and fatigued. HTN  Amb bp in good range, no ha or dizzy. CHOL  Last chol reviewed, on statin w/out sa   MED  Compliant with CV meds listed below w/out reported sa. HISTORY/ALLLERGY/ROS   MedHx  has a past medical history of Allergic rhinitis, Anxiety, Aortic stenosis, CHF (congestive heart failure) ([facility] COPD (chronic obstructive pulmonary disease) ([facility] Depression, Diabetes mellitus ([facility] Hypertension, Hypothyroidism, MARAIA treated with BiPAP, and Urinary incontinence. SurgHx  has a past surgical history that includes Hysterectomy, total abdominal; Carpal tunnel release; Cholecystectomy; eye surgery; Tonsillectomy; Uvulectomy; Cardiac catheterization (10/11/2021); and Dental surgery (N/A, 11/12/2021). SocHx  reports that she has never smoked. She has never used smokeless tobacco. She reports that she does not drink alcohol and does not use drugs. FamHx family history includes Breast Cancer in her daughter, maternal grandmother, and mother; Cancer in her father, mother, and sister. Allerg Patient has no known allergies.    ROS [x]Full ROS obtained and negative except as mentioned in HPI      MEDICATIONS      Current Facility-Administered Medications   Medication Dose Route Frequency Provider Last Rate Last Admin    lidocaine 4 % external patch 1 patch  1 patch TransDERmal Once Uyen May MD        ceFAZolin (ANCEF) 2000 mg in dextrose 5 % 50 mL IVPB  2,000 mg IntraVENous Once Uyen May MD         [x]Reviewed with patient and will remain unchanged except as mentioned in A/P    PHYSICAL EXAM   Vitals:    12/28/21 1003   BP: (!) 122/59   Pulse: 102   Resp: 20   Temp: 98.6 °F (37 °C)   SpO2: 96%        Gen Alert, coop, no distress Heart  Rrr, 3/6 Head NC, AT, no abnorm Abd  Soft, NT, +BS, no mass, no OM   Eyes PER, conj/corn clear Ext  Ext nl, AT, no C/C/E   Nose Nares nl, no drain, NT Pulse 2+ and symmetric   Throat Lips, mucosa, tongue nl Skin Col/text/turg nl, no vis rash/les   Neck S/S, TM, NT, no bruit/JVD Psych Nl mood and affect   Lung CTA-B, unlabored, no DTP Lymph   No cervical or axillary LA   Ch wall NT, no deform Neuro  Nl gross M/S exam     ASSESSMENT AND PLAN     *AS    Date EF Detail   Sx     Ruth, sob   DATA   Most recent TTE, LHC reviewed personally   NYHA   III   TTE 5/18 7/21 60%  60% Mild AS MG 24  Severe AS MG 39, , mild MS MG 3, IVC dilated elevated RA pressure   LHC 10/21  Normal Cors Chary Hammond)   Plan     TAVR today   *HTN  Status Controlled, vitals and available ambulatory monitoring logs reviewed personally  Plan Counseled on diet/salt/exercise/weight, continue meds at doses above  *CHOL  Status  Uncontrolled with last LDL of 88 (goal <70) and HDL of 54 (6/21), labs reviewed personally  Plan Counseled on diet/exercise/weight, continue statin, lipid/liver surveillance per PCP

## 2021-12-28 NOTE — PROGRESS NOTES
ACT drawn and resulted 112. Patient instructed on removal procedure. Right and Left femoral Arterial sheaths site without hematoma or oozing. Arterial sheath removed per policy without difficulty. Integrity of sheath inspected upon removal and no abnormalities noted. Manual pressure held X 20 minutes. Dry, sterile tegaderm applied. Pressure dressing applied. Patient tolerated well. Vital signs, groin checks, and pedal pulses will be completed per protocol (every 15 minutes X 4, every 30 minutes X 2, and every hour X 2 per protocol). Right femoral Sheath removed by Mason Guerrero RN.   Left femoral Sheath removed by Yoni Shipley RN

## 2021-12-28 NOTE — RT PROTOCOL NOTE
RT Inhaler-Nebulizer Bronchodilator Protocol Note    There is a bronchodilator order in the chart from a provider indicating to follow the RT Bronchodilator Protocol and there is an Initiate RT Inhaler-Nebulizer Bronchodilator Protocol order as well (see protocol at bottom of note). CXR Findings:  No results found. The findings from the last RT Protocol Assessment were as follows:   History Pulmonary Disease: None or smoker <15 pack years  Respiratory Pattern: Use of accessory muscles, prolonged exhalation, or RR 26-30 bpm  Breath Sounds: Intermittent or unilateral wheezes  Cough: Strong, productive  Indication for Bronchodilator Therapy:    Bronchodilator Assessment Score: 11    Aerosolized bronchodilator medication orders have been revised according to the RT Inhaler-Nebulizer Bronchodilator Protocol below. Respiratory Therapist to perform RT Therapy Protocol Assessment initially then follow the protocol. Repeat RT Therapy Protocol Assessment PRN for score 0-3 or on second treatment, BID, and PRN for scores above 3. No Indications - adjust the frequency to every 6 hours PRN wheezing or bronchospasm, if no treatments needed after 48 hours then discontinue using Per Protocol order mode. If indication present, adjust the RT bronchodilator orders based on the Bronchodilator Assessment Score as indicated below. Use Inhaler orders unless patient has one or more of the following: on home nebulizer, not able to hold breath for 10 seconds, is not alert and oriented, cannot activate and use MDI correctly, or respiratory rate 25 breaths per minute or more, then use the equivalent nebulizer order(s) with same Frequency and PRN reasons based on the score. If a patient is on this medication at home then do not decrease Frequency below that used at home.     0-3 - enter or revise RT bronchodilator order(s) to equivalent RT Bronchodilator order with Frequency of every 4 hours PRN for wheezing or increased work of breathing using Per Protocol order mode. 4-6 - enter or revise RT Bronchodilator order(s) to two equivalent RT bronchodilator orders with one order with BID Frequency and one order with Frequency of every 4 hours PRN wheezing or increased work of breathing using Per Protocol order mode. 7-10 - enter or revise RT Bronchodilator order(s) to two equivalent RT bronchodilator orders with one order with TID Frequency and one order with Frequency of every 4 hours PRN wheezing or increased work of breathing using Per Protocol order mode. 11-13 - enter or revise RT Bronchodilator order(s) to one equivalent RT bronchodilator order with QID Frequency and an Albuterol order with Frequency of every 4 hours PRN wheezing or increased work of breathing using Per Protocol order mode. Greater than 13 - enter or revise RT Bronchodilator order(s) to one equivalent RT bronchodilator order with every 4 hours Frequency and an Albuterol order with Frequency of every 2 hours PRN wheezing or increased work of breathing using Per Protocol order mode. RT to enter RT Home Evaluation for COPD & MDI Assessment order using Per Protocol order mode.     Electronically signed by Jenna Hodge RCP on 12/28/2021 at 5:26 PM

## 2021-12-29 LAB
ANION GAP SERPL CALCULATED.3IONS-SCNC: 6 MMOL/L (ref 3–16)
BUN BLDV-MCNC: 15 MG/DL (ref 7–20)
CALCIUM SERPL-MCNC: 8.4 MG/DL (ref 8.3–10.6)
CHLORIDE BLD-SCNC: 100 MMOL/L (ref 99–110)
CO2: 32 MMOL/L (ref 21–32)
CREAT SERPL-MCNC: 0.7 MG/DL (ref 0.6–1.2)
GFR AFRICAN AMERICAN: >60
GFR NON-AFRICAN AMERICAN: >60
GLUCOSE BLD-MCNC: 82 MG/DL (ref 70–99)
HCT VFR BLD CALC: 31.8 % (ref 36–48)
HEMOGLOBIN: 10.3 G/DL (ref 12–16)
LV EF: 58 %
LVEF MODALITY: NORMAL
MCH RBC QN AUTO: 28.8 PG (ref 26–34)
MCHC RBC AUTO-ENTMCNC: 32.5 G/DL (ref 31–36)
MCV RBC AUTO: 88.7 FL (ref 80–100)
PDW BLD-RTO: 14.3 % (ref 12.4–15.4)
PLATELET # BLD: 144 K/UL (ref 135–450)
PMV BLD AUTO: 8.3 FL (ref 5–10.5)
POTASSIUM REFLEX MAGNESIUM: 4.1 MMOL/L (ref 3.5–5.1)
PRO-BNP: 1772 PG/ML (ref 0–449)
RBC # BLD: 3.58 M/UL (ref 4–5.2)
SODIUM BLD-SCNC: 138 MMOL/L (ref 136–145)
TROPONIN: 0.04 NG/ML
WBC # BLD: 11.8 K/UL (ref 4–11)

## 2021-12-29 PROCEDURE — 2000000000 HC ICU R&B

## 2021-12-29 PROCEDURE — 2700000000 HC OXYGEN THERAPY PER DAY

## 2021-12-29 PROCEDURE — 99233 SBSQ HOSP IP/OBS HIGH 50: CPT | Performed by: INTERNAL MEDICINE

## 2021-12-29 PROCEDURE — 6370000000 HC RX 637 (ALT 250 FOR IP): Performed by: INTERNAL MEDICINE

## 2021-12-29 PROCEDURE — 84484 ASSAY OF TROPONIN QUANT: CPT

## 2021-12-29 PROCEDURE — 94640 AIRWAY INHALATION TREATMENT: CPT

## 2021-12-29 PROCEDURE — 93306 TTE W/DOPPLER COMPLETE: CPT

## 2021-12-29 PROCEDURE — 2580000003 HC RX 258: Performed by: INTERNAL MEDICINE

## 2021-12-29 PROCEDURE — 85027 COMPLETE CBC AUTOMATED: CPT

## 2021-12-29 PROCEDURE — 6360000002 HC RX W HCPCS: Performed by: INTERNAL MEDICINE

## 2021-12-29 PROCEDURE — 94761 N-INVAS EAR/PLS OXIMETRY MLT: CPT

## 2021-12-29 PROCEDURE — 80048 BASIC METABOLIC PNL TOTAL CA: CPT

## 2021-12-29 PROCEDURE — 83880 ASSAY OF NATRIURETIC PEPTIDE: CPT

## 2021-12-29 RX ORDER — IPRATROPIUM BROMIDE AND ALBUTEROL SULFATE 2.5; .5 MG/3ML; MG/3ML
1 SOLUTION RESPIRATORY (INHALATION) 4 TIMES DAILY
Status: DISCONTINUED | OUTPATIENT
Start: 2021-12-29 | End: 2021-12-31 | Stop reason: HOSPADM

## 2021-12-29 RX ORDER — IPRATROPIUM BROMIDE AND ALBUTEROL SULFATE 2.5; .5 MG/3ML; MG/3ML
1 SOLUTION RESPIRATORY (INHALATION) 2 TIMES DAILY
Status: DISCONTINUED | OUTPATIENT
Start: 2021-12-29 | End: 2021-12-29

## 2021-12-29 RX ADMIN — CLOPIDOGREL BISULFATE 75 MG: 75 TABLET ORAL at 09:32

## 2021-12-29 RX ADMIN — OXYBUTYNIN CHLORIDE 5 MG: 5 TABLET ORAL at 20:33

## 2021-12-29 RX ADMIN — IPRATROPIUM BROMIDE AND ALBUTEROL SULFATE 3 ML: .5; 3 SOLUTION RESPIRATORY (INHALATION) at 19:53

## 2021-12-29 RX ADMIN — IPRATROPIUM BROMIDE AND ALBUTEROL SULFATE 3 ML: .5; 3 SOLUTION RESPIRATORY (INHALATION) at 03:17

## 2021-12-29 RX ADMIN — OXYBUTYNIN CHLORIDE 5 MG: 5 TABLET ORAL at 09:32

## 2021-12-29 RX ADMIN — SODIUM CHLORIDE, PRESERVATIVE FREE 10 ML: 5 INJECTION INTRAVENOUS at 20:33

## 2021-12-29 RX ADMIN — IPRATROPIUM BROMIDE AND ALBUTEROL SULFATE 3 ML: .5; 3 SOLUTION RESPIRATORY (INHALATION) at 09:38

## 2021-12-29 RX ADMIN — CITALOPRAM HYDROBROMIDE 10 MG: 20 TABLET ORAL at 09:32

## 2021-12-29 RX ADMIN — ASPIRIN 81 MG: 81 TABLET, COATED ORAL at 09:32

## 2021-12-29 RX ADMIN — POTASSIUM CHLORIDE 20 MEQ: 1500 TABLET, EXTENDED RELEASE ORAL at 09:32

## 2021-12-29 RX ADMIN — BUDESONIDE 500 MCG: 0.5 SUSPENSION RESPIRATORY (INHALATION) at 09:39

## 2021-12-29 RX ADMIN — ATORVASTATIN CALCIUM 10 MG: 10 TABLET, FILM COATED ORAL at 20:33

## 2021-12-29 RX ADMIN — IPRATROPIUM BROMIDE AND ALBUTEROL SULFATE 3 ML: .5; 3 SOLUTION RESPIRATORY (INHALATION) at 15:01

## 2021-12-29 RX ADMIN — FUROSEMIDE 40 MG: 40 TABLET ORAL at 07:27

## 2021-12-29 RX ADMIN — BUDESONIDE 500 MCG: 0.5 SUSPENSION RESPIRATORY (INHALATION) at 19:53

## 2021-12-29 RX ADMIN — LEVOTHYROXINE SODIUM 75 MCG: 0.07 TABLET ORAL at 07:27

## 2021-12-29 RX ADMIN — OXYBUTYNIN CHLORIDE 5 MG: 5 TABLET ORAL at 14:54

## 2021-12-29 ASSESSMENT — PAIN SCALES - GENERAL
PAINLEVEL_OUTOF10: 0

## 2021-12-29 NOTE — PROGRESS NOTES
Left radial arterial line removed per verbal order. Procedure explained to patient. Manual pressure held for 10 minutes. Pt tolerated well. Will continue to monitor.

## 2021-12-29 NOTE — FLOWSHEET NOTE
Spoke with Dr. Cordoba Catching regarding persistent hypotension, even now while awake. Pt asymptomatic. Excited about eating dinner. 80/40. Springer correlates w/ NIBP cuff. Was 's on arrival-> now SR 80's w/ PAC's. U/o 1300cc in 6hrs  NC 3L (home dose). Has Bipap at home but since machine recalled has not used for past few months. Expiratory wheezes remain audible, especially COLTON. Bilateral   Orders received for lab work & Neosynephrine gtt.    Cont to monitor closely

## 2021-12-29 NOTE — PROGRESS NOTES
[address]   Progress Note  Cardiology    CC: severe AS  HPI:SP TAVR, doing well except for some persistent hypotension which has required jaime through the night. In SR without ectopy. She has severe underlying lung disease and is a bit wheezy but comfortable on a nasal cannula. Medications/Labs all Reviewed    Recent Labs     12/29/21  0544   WBC 11.8*   HGB 10.3*   HCT 31.8*   MCV 88.7        Recent Labs     12/29/21  0544   CREATININE 0.7   BUN 15      K 4.1      CO2 32     No results for input(s): INR, PROTIME in the last 72 hours. MEDICATIONS:    sodium chloride flush  5-40 mL IntraVENous 2 times per day    aspirin  81 mg Oral Daily    clopidogrel  75 mg Oral Daily    atorvastatin  10 mg Oral Nightly    budesonide  500 mcg Nebulization BID    citalopram  10 mg Oral Daily    levothyroxine  75 mcg Oral Daily    oxybutynin  5 mg Oral TID    potassium chloride  20 mEq Oral Daily    spironolactone  12.5 mg Oral Daily    furosemide  20 mg IntraVENous Once    ipratropium-albuterol  1 vial Inhalation Q4H      sodium chloride      nitroGLYCERIN Stopped (12/28/21 1354)    phenylephrine (JAIME-SYNEPHRINE) 50mg/250mL infusion 40 mcg/min (12/29/21 0742)       Physical Examination:    BP (!) 106/44   Pulse 69   Temp 99.2 °F (37.3 °C) (Temporal)   Resp 24   Ht 5' (1.524 m)   Wt 187 lb 6.3 oz (85 kg)   SpO2 100%   BMI 36.60 kg/m²     Respiratory:  · Resp Assessment: Normal respiratory effort  · Resp Auscultation:late expiratory wheezes  Cardiovascular:  · Auscultation: regular rate and rhythm, normal S1S2, no murmur, rub or gallop  · Palpation:  Nl PMI  · JVP:  normal  · Extremities: No Edema, in FRANCIS hose  Abdomen:  · Soft, non-tender  · Normal bowel sounds  Extremities:  ·  No Cyanosis or Clubbing  Neurological/Psychiatric:  · Oriented to time, place, and person  · Non-anxious  Skin Warm and dry    Assessment:    Active Problems:     Aortic stenosis, severe - SP TAVR    Severe COPD - on nasal O2 with some wheezing which seems to be baseline for her. Hypotension - remains on some low dose tico which needs to be weaned before she starts walking. Plan; DC lasix, hold aldactone. Wean tico. Walk her later after tico has been weaned off.   She may need some PT eval but needs an adequate BP first.       Jessi Senior MD, 12/29/2021 8:54 AM

## 2021-12-29 NOTE — PROGRESS NOTES
Physical/Occupational Therapy  Jacqueline Rosado  PT/OT orders noted and appreciated. Per RN, Josh Klinefelter, cardiology requesting to HOLD therapy until pt off of vasopressors before attempting ambulation. PT/OT will therefore HOLD at this time.   Thank you,  Jamie García, PT, DPT, [phone]  [facility], OTR/L [id number]

## 2021-12-29 NOTE — CARE COORDINATION
Discharge Planning Assessment  Readmission risk score 18%  RN discharge planner met with patient/ (and family member) to discuss reason for admission, current living situation, and potential needs at the time of discharge    Demographics/Insurance verified Yes    Current type of dwelling: assisted living at [facility]    Patient from ECF/SW confirmed with: patient and son    Living arrangements:ALU    Level of function/Support: assisted, has recently completed home health with [facility]    PCP: Jaime Lin    Last Visit to PCP: few months    DME: oxygen from [facility] baseline 3 LNC, lift chair, power wheel chair, hearing aide (not with patient)    Active with any community resources/agencies/skilled home care: recently had [facility]    Medication compliance issues: not identified    Financial issues that could impact healthcare: not identified      Tentative discharge plan: to return to assisted living    Discussed and provided facilities of choice if transition to a skilled nursing facility is required at the time of discharge      Discussed with patient and/or family that on the day of discharge home tentative time of discharge will be between 10 AM and noon.     Transportation at the time of discharge: family    ENEIDA Hines, CCM, RN  [facility]  [phone]

## 2021-12-29 NOTE — PROGRESS NOTES
Pt remains up in chair, eating lunch presently. Color pink, voiding well. Denies SOB, O2 sat stable on 3-4 liters (home O2 3l). MAP stable on low dose tico. Shoaib Tate, THI, concerned positional AL waveform. Cuff pressure correlates with AL. Per Dr. Sue Chambers, [facility] to DC AL.  Discussed with pt and son that we will keep pt in hospital overnight due to lowered BP and reevaluate in AM for DC. [facility] RN TAVR Coordinator

## 2021-12-29 NOTE — OP NOTE
[address]                     [facility], [facility]                                OPERATIVE REPORT    PATIENT NAME: Rajinder Vásquez                      :        1935  MED REC NO:   [id number]                          ROOM:       2906  ACCOUNT NO:   [de-identified]                           ADMIT DATE: 2021  PROVIDER:     Arabella Combs MD    DATE OF PROCEDURE:  2021    PREOPERATIVE DIAGNOSIS:  Severe aortic valve stenosis. POSTOPERATIVE DIAGNOSIS:  Severe aortic valve stenosis. OPERATION PERFORMED:  Transcatheter aortic valve replacement with a  23-mm Ho Ultra bovine pericardial bioprosthesis. CARDIOTHORACIC SURGEON:  Hallie Stoner MD    INTERVENTIONAL CARDIOLOGY:  Cameron Washington MD    ANESTHESIA:  Local with intravenous sedation and monitored anesthesia  care. INDICATIONS:  The patient is an 72-year-old woman with multiple  comorbidities. She has known severe aortic valve stenosis. She was  evaluated via the TAVR protocol and having completed this, she is now  brought to the hybrid suite for transcatheter aortic valve replacement. OPERATIVE PROCEDURE:  After obtaining adequate intravenous sedation and  administration of appropriate preoperative prophylactic antibiotics, the  patient's anterior chest, abdomen, and legs were all meticulously  prepped and draped in a sterile manner. A standard time-out procedure  was completed. Both groin regions were then infiltrated with 1% plain  Xylocaine and bilateral common femoral arterial and right common femoral  venous access was achieved. Perclose devices were deployed in the right  common femoral artery. An aortic root shot was then completed to make  sure we had the appropriate deployment angle. Thereafter, a guidewire  was advanced across the aortic valve.   Using this guidewire, introducer  sheath was then advanced into the aorta and through this introducer  sheath, the transcatheter valve was advanced. Once the valve was  appropriately placed across the aortic annulus and all present were in  agreement, the valve was appropriately located, rapid ventricular pacing  was performed and the valve was deployed. The valve seated ideally from  the outset. Immediate postprocedure transthoracic echocardiography  showed a well-functioning bioprosthesis with zero paravalvular leakage. The guidewire was then captured and removed. The introducer sheath was  then removed from the right common femoral artery under angiographic  guidance. The Perclose devices were then snug down and secured. It  took holding pressure for an additional 10 to 15 minutes to get good  hemostasis in the right groin. Completion angiography showed a patent  common femoral artery on the right with normal flow. The patient was  then prepared for transfer to the ICU for ongoing care.     EBL ~20 cc's        Terrance Chang MD    D: 12/28/2021 17:58:29       T: 12/28/2021 22:21:17     [id number]  Job#: [id number]     Doc#: [id number]    CC:

## 2021-12-29 NOTE — PROGRESS NOTES
12/29/21 1200   RT Protocol   History Pulmonary Disease 0   Respiratory pattern 2   Breath sounds 2   Cough 1   Bronchodilator Assessment Score 5

## 2021-12-30 LAB
ANION GAP SERPL CALCULATED.3IONS-SCNC: 7 MMOL/L (ref 3–16)
BUN BLDV-MCNC: 12 MG/DL (ref 7–20)
CALCIUM SERPL-MCNC: 8.8 MG/DL (ref 8.3–10.6)
CHLORIDE BLD-SCNC: 96 MMOL/L (ref 99–110)
CO2: 33 MMOL/L (ref 21–32)
CREAT SERPL-MCNC: 0.7 MG/DL (ref 0.6–1.2)
GFR AFRICAN AMERICAN: >60
GFR NON-AFRICAN AMERICAN: >60
GLUCOSE BLD-MCNC: 110 MG/DL (ref 70–99)
HCT VFR BLD CALC: 33.4 % (ref 36–48)
HEMOGLOBIN: 10.7 G/DL (ref 12–16)
MCH RBC QN AUTO: 28.3 PG (ref 26–34)
MCHC RBC AUTO-ENTMCNC: 32.1 G/DL (ref 31–36)
MCV RBC AUTO: 88.2 FL (ref 80–100)
PDW BLD-RTO: 13.9 % (ref 12.4–15.4)
PLATELET # BLD: 131 K/UL (ref 135–450)
PMV BLD AUTO: 8.6 FL (ref 5–10.5)
POTASSIUM REFLEX MAGNESIUM: 4.1 MMOL/L (ref 3.5–5.1)
RBC # BLD: 3.78 M/UL (ref 4–5.2)
SODIUM BLD-SCNC: 136 MMOL/L (ref 136–145)
WBC # BLD: 12.3 K/UL (ref 4–11)

## 2021-12-30 PROCEDURE — 94761 N-INVAS EAR/PLS OXIMETRY MLT: CPT

## 2021-12-30 PROCEDURE — 6360000002 HC RX W HCPCS: Performed by: INTERNAL MEDICINE

## 2021-12-30 PROCEDURE — 97530 THERAPEUTIC ACTIVITIES: CPT

## 2021-12-30 PROCEDURE — 2700000000 HC OXYGEN THERAPY PER DAY

## 2021-12-30 PROCEDURE — 6370000000 HC RX 637 (ALT 250 FOR IP): Performed by: INTERNAL MEDICINE

## 2021-12-30 PROCEDURE — 85027 COMPLETE CBC AUTOMATED: CPT

## 2021-12-30 PROCEDURE — 99233 SBSQ HOSP IP/OBS HIGH 50: CPT | Performed by: INTERNAL MEDICINE

## 2021-12-30 PROCEDURE — 80048 BASIC METABOLIC PNL TOTAL CA: CPT

## 2021-12-30 PROCEDURE — 94640 AIRWAY INHALATION TREATMENT: CPT

## 2021-12-30 PROCEDURE — 2580000003 HC RX 258: Performed by: INTERNAL MEDICINE

## 2021-12-30 PROCEDURE — 97116 GAIT TRAINING THERAPY: CPT

## 2021-12-30 PROCEDURE — 97162 PT EVAL MOD COMPLEX 30 MIN: CPT

## 2021-12-30 PROCEDURE — 7100000010 HC PHASE II RECOVERY - FIRST 15 MIN

## 2021-12-30 PROCEDURE — 3600000007 HC SURGERY HYBRID BASE

## 2021-12-30 PROCEDURE — 97165 OT EVAL LOW COMPLEX 30 MIN: CPT

## 2021-12-30 PROCEDURE — 3600000017 HC SURGERY HYBRID ADDL 15MIN

## 2021-12-30 PROCEDURE — 7100000011 HC PHASE II RECOVERY - ADDTL 15 MIN

## 2021-12-30 PROCEDURE — 2000000000 HC ICU R&B

## 2021-12-30 RX ADMIN — BUDESONIDE 500 MCG: 0.5 SUSPENSION RESPIRATORY (INHALATION) at 20:53

## 2021-12-30 RX ADMIN — IPRATROPIUM BROMIDE AND ALBUTEROL SULFATE 3 ML: .5; 3 SOLUTION RESPIRATORY (INHALATION) at 12:07

## 2021-12-30 RX ADMIN — OXYBUTYNIN CHLORIDE 5 MG: 5 TABLET ORAL at 08:44

## 2021-12-30 RX ADMIN — IPRATROPIUM BROMIDE AND ALBUTEROL SULFATE 3 ML: .5; 3 SOLUTION RESPIRATORY (INHALATION) at 16:32

## 2021-12-30 RX ADMIN — IPRATROPIUM BROMIDE AND ALBUTEROL SULFATE 3 ML: .5; 3 SOLUTION RESPIRATORY (INHALATION) at 20:53

## 2021-12-30 RX ADMIN — PHENYLEPHRINE HYDROCHLORIDE 35 MCG/MIN: 10 INJECTION INTRAVENOUS at 06:07

## 2021-12-30 RX ADMIN — ASPIRIN 81 MG: 81 TABLET, COATED ORAL at 08:44

## 2021-12-30 RX ADMIN — BUDESONIDE 500 MCG: 0.5 SUSPENSION RESPIRATORY (INHALATION) at 07:57

## 2021-12-30 RX ADMIN — OXYBUTYNIN CHLORIDE 5 MG: 5 TABLET ORAL at 22:47

## 2021-12-30 RX ADMIN — LEVOTHYROXINE SODIUM 75 MCG: 0.07 TABLET ORAL at 06:43

## 2021-12-30 RX ADMIN — CLOPIDOGREL BISULFATE 75 MG: 75 TABLET ORAL at 08:44

## 2021-12-30 RX ADMIN — IPRATROPIUM BROMIDE AND ALBUTEROL SULFATE 3 ML: .5; 3 SOLUTION RESPIRATORY (INHALATION) at 07:57

## 2021-12-30 RX ADMIN — CITALOPRAM HYDROBROMIDE 10 MG: 20 TABLET ORAL at 08:45

## 2021-12-30 RX ADMIN — SODIUM CHLORIDE, PRESERVATIVE FREE 10 ML: 5 INJECTION INTRAVENOUS at 22:47

## 2021-12-30 RX ADMIN — ATORVASTATIN CALCIUM 10 MG: 10 TABLET, FILM COATED ORAL at 22:47

## 2021-12-30 RX ADMIN — POTASSIUM CHLORIDE 20 MEQ: 1500 TABLET, EXTENDED RELEASE ORAL at 08:44

## 2021-12-30 RX ADMIN — POLYETHYLENE GLYCOL 3350 17 G: 17 POWDER, FOR SOLUTION ORAL at 22:49

## 2021-12-30 ASSESSMENT — PAIN SCALES - GENERAL
PAINLEVEL_OUTOF10: 0

## 2021-12-30 NOTE — CARE COORDINATION
PM&R referral received. Chart reviewed and evaluating. Patient is willing to come to ARU. Discussed with CM/SW. However, patient will require a pre certification with payor. Historically, [employer] has denied authorization requests  For Debility, pending PT/OT assessments. Conferred with Dr. Yvette Muir and his recommendation was to refer patient to SNF. Awaiting Dr. Yvette Muir recommendations. ARU will continue to follow progress and update discharge plan as needed.     LYN CottonN, .[phone]

## 2021-12-30 NOTE — DISCHARGE INSTR - COC
Continuity of Care Form    Patient Name: Jacqueline Rosado   :  1935  MRN:  [id number]    Admit date:  2021  Discharge date:  2021    Code Status Order: Full Code   Advance Directives:   [facility] Documentation       Date/Time Healthcare Directive Type of Healthcare Directive Copy in [address] [facility] [address] Agent's Name Healthcare Agent's Phone Number    21 1013 No, patient does not have an advance directive for healthcare treatment -- -- -- -- --            Admitting Physician:  Lily Hall MD  PCP: ELISE Tavarez    Discharging Nurse: Electronically signed by Soco Castellanos RN on 2021 at 10:08 AM    [address] [place] [address] Drive Unit/Room#: CVU-2906/2906-01  Discharging Unit Phone Number: [phone]    Emergency Contact:   Extended Emergency Contact Information  Primary Emergency Contact: Cyndy Bang Phone: [phone]  Relation: Child  Secondary Emergency Contact: marii novoa  [place] Phone: [phone]  Relation: Child    Past Surgical History:  Past Surgical History:   Procedure Laterality Date    AORTIC VALVE REPLACEMENT N/A 2021    TRANSCATHETER AORTIC VALVE REPLACEMENT FEMORAL APPROACH performed by Lily Hall MD at [address] [facility] [address] 2021    TRANSCATHETER AORTIC VALVE REPLACEMENT FEMORAL APPROACH performed by Kisha Husain MD at [facility]  10/11/2021    CARPAL TUNNEL RELEASE      bilateral    CHOLECYSTECTOMY      DENTAL SURGERY N/A 2021    SIMPLE EXTRACTIONS TEETH # 4, 13, 18, 23, 24, 25, 26,  AND SURGICAL EXTRACTION TEETH # 2, 3, 14, 15, 19, 20, 21,  28,  29, 30, 31  AND ALSO ALVEOLOPLASTIES ALL FOUR QUADRANTS; GEL FOAM performed by Koby Jacobo DDS at [address] [place] [address]      bilateral cataracts    HYSTERECTOMY, TOTAL ABDOMINAL      TONSILLECTOMY      UVULECTOMY         Immunization History:   Immunization History   Administered Date(s) Administered    COVID-19, Pfizer, PF, 30mcg/0.3mL 01/22/2021, 02/11/2021, 10/12/2021    Influenza, High Dose (Fluzone 65 yrs and older) 10/11/2018    Influenza, Quadv, adjuvanted, 65 yrs +, IM, PF (Fluad) 11/03/2021    Pneumococcal Conjugate 13-valent (Oleksandr Aver) 10/01/2015, 05/21/2021    Pneumococcal Polysaccharide (Safepyusw04) 11/01/2016    Tdap (Boostrix, Adacel) 10/01/2012    Zoster Live (Zostavax) 11/02/2014       Active Problems:  Patient Active Problem List   Diagnosis Code    Chronic seasonal allergic rhinitis J30.2    COPD, severe ([facility] J44.9    Idiopathic peripheral neuropathy G60.9    Hypothyroidism E03.9    Depression F32. A    Chronic congestive heart failure (HCC) I50.9    SOB (shortness of breath) R06.02    Obstructive sleep apnea (adult) (pediatric) G47.33    Bilateral lower extremity edema R60.0    Multifocal pneumonia J18.9    Centrilobular emphysema (HCC) J43.2    Acute on chronic diastolic heart failure ([facility]) I50.33    Pitting edema R60.9    BASIA (acute kidney injury) ([facility] N17.9    Chronic respiratory failure with hypoxia ([facility]) J96.11    Essential hypertension I10    Mixed hyperlipidemia E78.2    Acute pulmonary edema ([facility]) J81.0    COPD exacerbation ([facility]) J44.1    Chronic bronchitis ([facility]) J42    Class 3 severe obesity without serious comorbidity with body mass index (BMI) of 40.0 to 44.9 in adult ([facility]) E66.01, Z68.41    Pulmonary nodule R91.1    Acute suppurative otitis media of right ear with spontaneous rupture of tympanic membrane H66.011    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 H72.01    Bilateral hearing loss H91.93    Bilateral sensorineural hearing loss H90.3    Senile osteoporosis M81.0    Acute respiratory failure (HCC) J96.00    Coronary artery disease due to lipid rich plaque I25.10, I25.83    Nonrheumatic aortic valve stenosis I35.0    Hypercholesteremia E78.00    Aspiration into airway T17.908A    Acute respiratory failure with hypoxemia (HCC) J96.01    Severe sepsis ([facility] A41.9, R65.20    Elevated brain natriuretic peptide (BNP) level R79.89    Elevated C-reactive protein (CRP) R79.82    History of depression Z86.59    Decubitus ulcer of coccyx L89.159    Weight loss counseling, encounter for Z71.3    E coli infection A49.8    Aortic stenosis, severe I35.0       Isolation/Infection:   Isolation            No Isolation          Patient Infection Status       Infection Onset Added Last Indicated Last Indicated By Review Planned Expiration Resolved Resolved By    None active    Resolved    COVID-19 (Rule Out) 12/28/21 12/28/21 12/28/21 COVID-19, Rapid (Ordered)   12/28/21 Rule-Out Test Resulted    COVID-19 (Rule Out) 11/29/21 11/29/21 11/29/21 COVID-19 (Ordered)   11/30/21 Rule-Out Test Resulted            Nurse Assessment:  Last Vital Signs: BP (!) 103/38   Pulse 93   Temp 98 °F (36.7 °C) (Temporal)   Resp 26   Ht 5' (1.524 m)   Wt 179 lb 14.3 oz (81.6 kg)   SpO2 95%   BMI 35.13 kg/m²     Last documented pain score (0-10 scale): Pain Level: 0  Last Weight:   Wt Readings from Last 1 Encounters:   12/30/21 179 lb 14.3 oz (81.6 kg)     Mental Status:  oriented, alert, coherent, logical, thought processes intact, and able to concentrate and follow conversation    IV Access:  - None    Nursing Mobility/ADLs:  Walking   Independent  Transfer  Independent  Bathing  Assisted  Dressing  Independent  [address] Delivery   whole    Wound Care Documentation and Therapy:  Wound 11/29/21 Coccyx Mid stage 2 & DTI (Active)   Wound Image   12/03/21 1407   Wound Etiology Deep tissue/Injury 12/30/21 0844   Dressing Status Clean;Dry; Intact 12/03/21 1407   Wound Cleansed Cleansed with saline 12/03/21 1407   Dressing/Treatment Foam;Zinc paste 12/03/21 1407   Dressing Change Due 12/05/21 12/03/21 1407   Wound Length (cm) 3.5 cm 12/03/21 1407   Wound Width (cm) 1 cm 12/03/21 1407   Wound Depth (cm) 0.1 cm 12/03/21 1407   Wound Surface Area (cm^2) 3.5 cm^2 12/03/21 1407   Wound Volume (cm^3) 0.35 cm^3 12/03/21 1407   Wound Assessment Granulation tissue;Pink/red 12/03/21 1407   Drainage Amount None 12/03/21 1407   Odor None 12/03/21 1407   Gaby-wound Assessment Non-blanchable erythema 12/03/21 1407   Margins Attached edges; Defined edges 12/03/21 1407   Number of days: 30        Elimination:  Continence: Bowel: Yes  Bladder: At times  Urinary Catheter: None   Colostomy/Ileostomy/Ileal Conduit: No       Date of Last BM: 12/31/2021    Intake/Output Summary (Last 24 hours) at 12/30/2021 1459  Last data filed at 12/30/2021 1305  Gross per 24 hour   Intake 978.9 ml   Output 2200 ml   Net -1221.1 ml     I/O last 3 completed shifts: In: 1496.5 [P.O.:1200; I.V.:296.5]  Out: 2525 [Urine:2525]    Safety Concerns: At Risk for Falls    Impairments/Disabilities:      Hearing    Nutrition Therapy:  Current Nutrition Therapy:   - Oral Diet:  Cardiac    Routes of Feeding: Oral  Liquids: No Liquids  Daily Fluid Restriction: no  Last Modified Barium Swallow with Video (Video Swallowing Test): not done    Treatments at the Time of Hospital Discharge:   Respiratory Treatments: inhalers and nebulizers  Oxygen Therapy:  is on oxygen at 2 or 3 L/min per nasal cannula.   Ventilator:    - No ventilator support    Rehab Therapies: Physical Therapy and Occupational Therapy  Weight Bearing Status/Restrictions: No weight bearing restirctions  Other Medical Equipment (for information only, NOT a DME order):  wheelchair and walker  Other Treatments:     Patient's personal belongings (please select all that are sent with patient):  Hearing Aides bilateral    RN SIGNATURE:  Electronically signed by Moody Byrd RN on 12/31/21 at 10:10 AM EST    CASE MANAGEMENT/SOCIAL WORK SECTION    Inpatient Status Date: 12/28/2021    Readmission Risk Assessment Score:  Readmission Risk              Risk of Unplanned Readmission:  16           Discharging to Facility/ Agency   Name: [facility] [id number]  PHONE; [phone]  FAX: [phone]         / signature: ENEIDA Thompson, CCM, RN  [facility]  [phone]     PHYSICIAN SECTION    Prognosis: Good    Condition at Discharge: Stable    Rehab Potential (if transferring to Rehab): Good    Recommended Labs or Other Treatments After Discharge: physical and occupational therapy     Physician Certification: I certify the above information and transfer of Antolin Toscano  is necessary for the continuing treatment of the diagnosis listed and that she requires Home Care for greater 30 days.      Update Admission H&P: No change in H&P    PHYSICIAN SIGNATURE:  Electronically signed by Omid Waters MD on 12/31/21 at 8:36 AM EST

## 2021-12-30 NOTE — PROGRESS NOTES
[facility] HEART [place]  Daily Progress Note    Admit: 12/28/2021      CC: AS, HTN, CHOL  Subj: Today, doing fair. Remains weak.       Obj:  BP (!) 114/54   Pulse 88   Temp 98 °F (36.7 °C) (Temporal)   Resp 18   Ht 5' (1.524 m)   Wt 179 lb 14.3 oz (81.6 kg)   SpO2 97%   BMI 35.13 kg/m²     Intake/Output Summary (Last 24 hours) at 12/30/2021 1337  Last data filed at 12/30/2021 1305  Gross per 24 hour   Intake 991.4 ml   Output 2400 ml   Net -1408.6 ml     Gen Alert, coop, no distress Heart Rrr, no mrg   Head NC, AT, no abnorm Abd Soft, NT, ND, +BS, no mass, no OM   Eyes PERRLA, conj/corn clear Ext Ext nl, AT, no c/c/e   Nose Nares nl, no drain, NT Pulse 2+ symm ra, dp, pt   Throat Lips, mucosa, tongue nl Skin Color/tect/turg nl, no rash/lesion   Neck S/S, TM, NT, no bruit/JVD Psych Nl mood and affect   Lung cta bilat Lymph No cervical or ax LA   Ch Wall NT, no deform Neuro Nl gross M/S exam     Medications:    ipratropium-albuterol  1 vial Inhalation 4x daily    sodium chloride flush  5-40 mL IntraVENous 2 times per day    aspirin  81 mg Oral Daily    clopidogrel  75 mg Oral Daily    atorvastatin  10 mg Oral Nightly    budesonide  500 mcg Nebulization BID    citalopram  10 mg Oral Daily    levothyroxine  75 mcg Oral Daily    oxybutynin  5 mg Oral TID    potassium chloride  20 mEq Oral Daily    spironolactone  12.5 mg Oral Daily    furosemide  20 mg IntraVENous Once      sodium chloride      nitroGLYCERIN Stopped (12/28/21 1354)    phenylephrine (JAIME-SYNEPHRINE) 50mg/250mL infusion 5 mcg/min (12/30/21 1305)     Lab Data: Relevant and available CV data reviewed    ASSESSMENT AND PLAN     *AS    Date EF Detail   Sx     No concerning   DATA   Most recent TTE, LHC reviewed personally   TTE 5/18 7/21 60%  60% Mild AS MG 24  Severe AS MG 39, , mild MS MG 3, IVC dilated elevated RA pressure   LHC 10/21  Normal [place])   Plan     Doing fairly well  Debilitated state, will need rehab  Postop hyper and hypotension, seems to be stabilizing.    *HTN  Status Controlled, vitals and available ambulatory monitoring logs reviewed personally  Plan Counseled on diet/salt/exercise/weight, continue meds at doses above  *CHOL  Status  Uncontrolled with last LDL of 88 (goal <70) and HDL of 54 (6/21), labs reviewed personally  Plan Counseled on diet/exercise/weight, continue statin, lipid/liver surveillance per PCP

## 2021-12-30 NOTE — CARE COORDINATION
Referral to [facility]. Accepted by LYN NunezN, CCM, RN  [facility]  [phone]

## 2021-12-30 NOTE — PROGRESS NOTES
Occupational Therapy   Occupational Therapy Initial Assessment  Date: 2021   Patient Name: Teresa Knutson  MRN: [id number]     : 1935    Date of Service: 2021    Discharge Recommendations: Teresa Knutson scored a 18/24 on the AM-PAC ADL Inpatient form. Current research shows that an AM-PAC score of 18 or greater is typically associated with a discharge to the patient's home setting. Based on the patient's AM-PAC score, and their current ADL deficits, it is recommended that the patient have 2-3 sessions per week of Occupational Therapy at d/c to increase the patient's independence. At this time, this patient demonstrates the endurance and safety to discharge home with home services (home vs OP services) and a follow up treatment frequency of 2-3x/wk. Please see assessment section for further patient specific details. If patient discharges prior to next session this note will serve as a discharge summary. Please see below for the latest assessment towards goals. HOME HEALTH CARE: LEVEL 1 STANDARD    - Initial home health evaluation to occur within 24-48 hours, in patient home   - Therapy to evaluate with goal of regaining prior level of functioning   - Therapy to evaluate if patient has [address] needs for personal care       OT Equipment Recommendations  Equipment Needed: No    Assessment   Performance deficits / Impairments: Decreased functional mobility ; Decreased balance;Decreased ADL status; Decreased endurance;Decreased high-level IADLs  Assessment: Patient presents slightly below baseline level of function--pt normally mod I with 4WW/electric w/c in home and able to complete ADL on her own. Pt and family report accomodations for increased assist with transfers and ADL will be in place at pts AL apt--anticipate pt safe to d/c to AL with 24 hour assist as needed from staff.  However, if pt were not to have assist for transfers as needed from facility staff, she would benefit from additonal rehab stay. Continued OT indicated in order to maximize safety/independence with ADL and decrease caregiver burden  Treatment Diagnosis: Above deficits associated with aortic stenosis  Prognosis: Good  Decision Making: Low Complexity  Exam: as above  OT Education: OT Role;Plan of Care  Patient Education: eval, discharge--pt v/u  REQUIRES OT FOLLOW UP: Yes  Activity Tolerance  Activity Tolerance: Patient Tolerated treatment well  Safety Devices  Safety Devices in place: Yes  Type of devices: All fall risk precautions in place;Nurse notified;Call light within reach; Left in chair;Gait belt (no alarm engaged upon arrival)           Patient Diagnosis(es): There were no encounter diagnoses. has a past medical history of Allergic rhinitis, Anxiety, Aortic stenosis, CHF (congestive heart failure) ([facility] COPD (chronic obstructive pulmonary disease) ([facility] Depression, Diabetes mellitus ([facility] [facility] Hypertension, Hypothyroidism, MARIAA treated with BiPAP, and Urinary incontinence. has a past surgical history that includes Hysterectomy, total abdominal; Carpal tunnel release; Cholecystectomy; eye surgery; Tonsillectomy; Uvulectomy; Cardiac catheterization (10/11/2021); Dental surgery (N/A, 11/12/2021); Aortic valve replacement (N/A, 12/28/2021); and Aortic valve replacement (N/A, 12/28/2021). Treatment Diagnosis: Above deficits associated with aortic stenosis      Restrictions  Restrictions/Precautions  Restrictions/Precautions: Fall Risk (high fall risk)  Required Braces or Orthoses?: No  Position Activity Restriction  Other position/activity restrictions: admitted for TAVR    Subjective   General  Chart Reviewed: Yes  Family / Caregiver Present: Yes (Family at end of session)  Diagnosis: Aortic stenosis  Subjective  Subjective: Patient lying in recliner upon arrival, pleasant and agreeable to evaluation.  Denies pain  Patient Currently in Pain: Denies  Pre Treatment Pain Screening  Intervention List: Patient able to continue with treatment;Nurse/Physician notified  Vital Signs  Pulse: 94  Resp: 28  BP: (!) 107/38  MAP (mmHg): (!) 54  Patient Currently in Pain: Denies  Oxygen Therapy  SpO2: 98 %     Social/Functional History  Social/Functional History  Lives With: Alone  Type of Home: Assisted living ([address] [place] [address],[address])  Home Layout: One level  Home Access: Level entry  Bathroom Shower/Tub: Walk-in shower  Bathroom Equipment: Shower chair,Grab bars in [address]: [address] (stand up walker)  ADL Assistance: Independent (supervision for transfers to shower)  Homemaking Responsibilities: No (goes to dining room for meals (uses scooter), does laundry)  Ambulation Assistance: Independent (with walker in apartment, power chair outside of room)  Transfer Assistance: Independent (supervision for shower transfers)  Additional Comments: states she can't stand up straight when she walks, has not been sleeping in bed recently as she has been having difficulty getting in it and is afraid to fall out. Reports 2 recent falls. Currently in rehab center. States she has been there 3-4 weeks.  States she would like to return to AL if possible       Objective   Vision: Impaired  Vision Exceptions: Wears glasses for reading  Hearing: Exceptions to [facility]  Hearing Exceptions: Hard of hearing/hearing concerns;Bilateral hearing aid    Orientation  Overall Orientation Status: Within Functional Limits     Balance  Sitting Balance: Stand by assistance  Standing Balance: Contact guard assistance  Functional Mobility  Functional - Mobility Device: 4-Wheeled Walker  Activity: Other  Assist Level: Contact guard assistance (CGA with occasional min A for turns)  Functional Mobility Comments: 15' in room, min VC for navigation/4WW mgmt  ADL  LE Dressing: Dependent/Total (assisted this date to don depends, however pt uses AE at home to assist with dressing)  Tone RUE  RUE Tone: Normotonic  Tone LUE  LUE Tone: Normotonic  Coordination  Movements Are Fluid And Coordinated: Yes        Transfers  Sit to stand: Minimal assistance  Stand to sit: Minimal assistance     Cognition  Overall Cognitive Status: WFL  Perception  Overall Perceptual Status: WFL                                     Plan   Plan  Times per week: 3-5  Times per day: Daily  Current Treatment Recommendations: Strengthening,Endurance Training,Balance Training,Functional Mobility Training,Safety Education & Training,Home Management Training,Equipment Evaluation, Education, & procurement,Self-Care / ADL,Patient/Caregiver Education & Training    G-Code     OutComes Score                                                  AM-PAC Score        AM-PAC Inpatient Daily Activity Raw Score: 18 (12/30/21 1508)  AM-PAC Inpatient ADL T-Scale Score : 38.66 (12/30/21 1508)  ADL Inpatient CMS 0-100% Score: 46.65 (12/30/21 1508)  ADL Inpatient CMS G-Code Modifier : CK (12/30/21 1508)    Goals  Short term goals  Time Frame for Short term goals: discharge  Short term goal 1: UB ADL supervision  Short term goal 2: LB ADL supervision  Short term goal 3: Toileting supervision  Short term goal 4: Fxl mob SBA  Short term goal 5: Fxl transfers SBA  Long term goals  Time Frame for Long term goals : LTG=STG       Therapy Time   Individual Concurrent Group Co-treatment   Time In 1404         Time Out 1448         Minutes 44            Timed Code Treatment Minutes:   29 minutes    Total Treatment Minutes:  44 minutes    ALEXYS Burciaga OTR/L [id number]    Cailin Chisholm OT

## 2021-12-30 NOTE — PROGRESS NOTES
Dr. Sulaiman Flower at bedside. Wean Camacho gtt as tolerated. Per MD titrate for a SBP 95 or greater. PT/OT to see pt today per Dr. Sulaiman Flower. Will continue to monitor.

## 2021-12-30 NOTE — PROGRESS NOTES
Physical Therapy    [facility]  Initial Assessment    NAME: Indira Bose  : 1935  MRN: [id number]    Date of Service: 2021    Discharge Recommendations:  Indira Bose scored a 13/24 on the AM-PAC short mobility form. Current research shows that an AM-PAC score of 18 or greater is typically associated with a discharge to the patient's home setting. Based on the patient's AM-PAC score and their current functional mobility deficits, it is recommended that the patient have 2-3 sessions per week of Physical Therapy at d/c to increase the patient's independence. At this time, this patient demonstrates the endurance and safety to discharge home with home services and a follow up treatment frequency of 2-3x/wk. Please see assessment section for further patient specific details. HOME HEALTH CARE: LEVEL 1 STANDARD    - Initial home health evaluation to occur within 24-48 hours, in patient home   - Therapy to evaluate with goal of regaining prior level of functioning   - Therapy to evaluate if patient has [address] needs for personal care     If AL able to provide assistance for transfers. If AL unable to provide assistance for transfers recommend:   Indira Bose scored a 13/24 on the AM-PAC short mobility form. Current research shows that an AM-PAC score of 17 or less is typically not associated with a discharge to the patient's home setting. Based on the patient's AM-PAC score and their current functional mobility deficits, it is recommended that the patient have 3-5 sessions per week of Physical Therapy at d/c to increase the patient's independence. Please see assessment section for further patient specific details. If patient discharges prior to next session this note will serve as a discharge summary. Please see below for the latest assessment towards goals. If patient discharges prior to next session this note will serve as a discharge summary.   Please see below for the latest assessment towards goals. S Level 3,Home with assist PRN   PT Equipment Recommendations  Equipment Needed: No    Assessment   Body structures, Functions, Activity limitations: Decreased functional mobility ; Decreased strength;Decreased endurance;Decreased balance  Assessment: Paitent not at baseline function and would benefit from skilled PT to address above deficits and facilitate return to baseline function. Patient currently in SNF recieving therapy (has been there 3-4 weeks). Per chart review, patient presents very similarly to evaluation performed 11/30/2021. Patient continues to need minimal assistance for transfers and CGA for safe ambulation. Family and patient report patient can call for assistance in AL. If AL able to provide physical assistance for all transfers, patient safe to return to [address] [place] [address] with assistance and have home therapy. If AL unable to provide physical assistance for transfers recommend SNF discharge for safety. Treatment Diagnosis: decreased functional mobility, impaired gait, decreased endurance  Prognosis: Fair  Decision Making: Medium Complexity  Clinical Presentation: evolving  PT Education: Goals;Plan of Care;PT Role  Patient Education: d/c recommendations - verbalized understanding  Barriers to Learning: none  REQUIRES PT FOLLOW UP: Yes  Activity Tolerance  Activity Tolerance: Patient Tolerated treatment well  Activity Tolerance: SOB noted with ambulation. SpO2 94% on 3L O2. Reports SOB at baseline       Patient Diagnosis(es): There were no encounter diagnoses. has a past medical history of Allergic rhinitis, Anxiety, Aortic stenosis, CHF (congestive heart failure) ([address] COPD (chronic obstructive pulmonary disease) ([address] Depression, Diabetes mellitus ([address] Hypertension, Hypothyroidism, MARIAA treated with BiPAP, and Urinary incontinence. has a past surgical history that includes Hysterectomy, total abdominal; Carpal tunnel release;  Cholecystectomy; eye discharge  Short term goal 1: Bed mobility with min A  Short term goal 2: Sit to/from stand with CGA  Short term goal 3: Ambulate 22' with rollator and CGA  Short term goal 4: Bed to/from chair with CGA  Patient Goals   Patient goals : to return to AL       Therapy Time   Individual Concurrent Group Co-treatment   Time In 1404         Time Out 1448         Minutes 44         Timed Code Treatment Minutes: [address], PT    Thanks, Addy Koehler PT, DPT [id number]

## 2021-12-31 VITALS
HEIGHT: 60 IN | HEART RATE: 88 BPM | RESPIRATION RATE: 26 BRPM | BODY MASS INDEX: 34.89 KG/M2 | OXYGEN SATURATION: 94 % | TEMPERATURE: 98.4 F | WEIGHT: 177.69 LBS | SYSTOLIC BLOOD PRESSURE: 112 MMHG | DIASTOLIC BLOOD PRESSURE: 51 MMHG

## 2021-12-31 LAB
ANION GAP SERPL CALCULATED.3IONS-SCNC: 7 MMOL/L (ref 3–16)
BUN BLDV-MCNC: 12 MG/DL (ref 7–20)
CALCIUM SERPL-MCNC: 8.7 MG/DL (ref 8.3–10.6)
CHLORIDE BLD-SCNC: 99 MMOL/L (ref 99–110)
CO2: 33 MMOL/L (ref 21–32)
CREAT SERPL-MCNC: 0.6 MG/DL (ref 0.6–1.2)
GFR AFRICAN AMERICAN: >60
GFR NON-AFRICAN AMERICAN: >60
GLUCOSE BLD-MCNC: 111 MG/DL (ref 70–99)
HCT VFR BLD CALC: 32.4 % (ref 36–48)
HEMOGLOBIN: 10.6 G/DL (ref 12–16)
MCH RBC QN AUTO: 29 PG (ref 26–34)
MCHC RBC AUTO-ENTMCNC: 32.9 G/DL (ref 31–36)
MCV RBC AUTO: 88.3 FL (ref 80–100)
PDW BLD-RTO: 14 % (ref 12.4–15.4)
PLATELET # BLD: 119 K/UL (ref 135–450)
PMV BLD AUTO: 8.2 FL (ref 5–10.5)
POTASSIUM REFLEX MAGNESIUM: 4.1 MMOL/L (ref 3.5–5.1)
RBC # BLD: 3.67 M/UL (ref 4–5.2)
SODIUM BLD-SCNC: 139 MMOL/L (ref 136–145)
WBC # BLD: 7.7 K/UL (ref 4–11)

## 2021-12-31 PROCEDURE — 2580000003 HC RX 258: Performed by: INTERNAL MEDICINE

## 2021-12-31 PROCEDURE — 6360000002 HC RX W HCPCS: Performed by: INTERNAL MEDICINE

## 2021-12-31 PROCEDURE — 6370000000 HC RX 637 (ALT 250 FOR IP): Performed by: INTERNAL MEDICINE

## 2021-12-31 PROCEDURE — 94640 AIRWAY INHALATION TREATMENT: CPT

## 2021-12-31 PROCEDURE — 2700000000 HC OXYGEN THERAPY PER DAY

## 2021-12-31 PROCEDURE — 80048 BASIC METABOLIC PNL TOTAL CA: CPT

## 2021-12-31 PROCEDURE — 99239 HOSP IP/OBS DSCHRG MGMT >30: CPT | Performed by: INTERNAL MEDICINE

## 2021-12-31 PROCEDURE — 94761 N-INVAS EAR/PLS OXIMETRY MLT: CPT

## 2021-12-31 PROCEDURE — 85027 COMPLETE CBC AUTOMATED: CPT

## 2021-12-31 RX ORDER — ASPIRIN 81 MG/1
81 TABLET ORAL DAILY
Qty: 30 TABLET | Refills: 3 | COMMUNITY
Start: 2021-12-31

## 2021-12-31 RX ORDER — CLOPIDOGREL BISULFATE 75 MG/1
75 TABLET ORAL DAILY
Qty: 30 TABLET | Refills: 6 | Status: SHIPPED | OUTPATIENT
Start: 2021-12-31 | End: 2022-10-11 | Stop reason: ALTCHOICE

## 2021-12-31 RX ORDER — FUROSEMIDE 20 MG/1
20 TABLET ORAL DAILY
Qty: 30 TABLET | Refills: 6 | Status: SHIPPED | OUTPATIENT
Start: 2021-12-31

## 2021-12-31 RX ADMIN — CITALOPRAM HYDROBROMIDE 10 MG: 20 TABLET ORAL at 09:08

## 2021-12-31 RX ADMIN — POTASSIUM CHLORIDE 20 MEQ: 1500 TABLET, EXTENDED RELEASE ORAL at 09:08

## 2021-12-31 RX ADMIN — LEVOTHYROXINE SODIUM 75 MCG: 0.07 TABLET ORAL at 05:40

## 2021-12-31 RX ADMIN — IPRATROPIUM BROMIDE AND ALBUTEROL SULFATE 3 ML: .5; 3 SOLUTION RESPIRATORY (INHALATION) at 09:35

## 2021-12-31 RX ADMIN — SODIUM CHLORIDE, PRESERVATIVE FREE 10 ML: 5 INJECTION INTRAVENOUS at 09:08

## 2021-12-31 RX ADMIN — OXYBUTYNIN CHLORIDE 5 MG: 5 TABLET ORAL at 09:08

## 2021-12-31 RX ADMIN — ASPIRIN 81 MG: 81 TABLET, COATED ORAL at 09:08

## 2021-12-31 RX ADMIN — BUDESONIDE 500 MCG: 0.5 SUSPENSION RESPIRATORY (INHALATION) at 09:35

## 2021-12-31 RX ADMIN — CLOPIDOGREL BISULFATE 75 MG: 75 TABLET ORAL at 09:08

## 2021-12-31 ASSESSMENT — PAIN SCALES - GENERAL
PAINLEVEL_OUTOF10: 0
PAINLEVEL_OUTOF10: 0

## 2021-12-31 NOTE — DISCHARGE SUMMARY
[address]   TAVR DISCHARGE SUMMARY     Patient ID:  Rex Jackson [id number]  87 y.o.  1935    Admit Date: 12/28/2021  D/C Date:  12/31/21  Admit MD:  Osorio Mendoza MD   Admit Dx: Aortic stenosis, severe [I35.0]  D/C Dx:   Patient Active Problem List   Diagnosis    Chronic seasonal allergic rhinitis    COPD, severe ([facility]    Idiopathic peripheral neuropathy    Hypothyroidism    Depression    Chronic congestive heart failure (HCC)    SOB (shortness of breath)    Obstructive sleep apnea (adult) (pediatric)    Bilateral lower extremity edema    Multifocal pneumonia    Centrilobular emphysema (HCC)    Acute on chronic diastolic heart failure (HCC)    Pitting edema    BASIA (acute kidney injury) ([address]    Chronic respiratory failure with hypoxia (HCC)    Essential hypertension    Mixed hyperlipidemia    Acute pulmonary edema (HCC)    COPD exacerbation (HCC)    Chronic bronchitis (HCC)    Class 3 severe obesity without serious comorbidity with body mass index (BMI) of 40.0 to 44.9 in adult [facility])    Pulmonary nodule    Acute suppurative otitis media of right ear with spontaneous rupture of tympanic membrane    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    Bilateral hearing loss    Bilateral sensorineural hearing loss    Senile osteoporosis    Acute respiratory failure ([address]    Coronary artery disease due to lipid rich plaque    Nonrheumatic aortic valve stenosis    Hypercholesteremia    Aspiration into airway    Acute respiratory failure with hypoxemia (HCC)    Severe sepsis (HCC)    Elevated brain natriuretic peptide (BNP) level    Elevated C-reactive protein (CRP)    History of depression    Decubitus ulcer of coccyx    Weight loss counseling, encounter for    E coli infection    Aortic stenosis, severe      D/C Cond:  good  Course:  Admitted for TAVR for severe AS. No apparent complications. Access site(s) stable. Patient denies cp, sob. Patient maintained in hospital due to low bp and generalized weakness. Evaluated by PT and felt safe for discharge home. On day of discharge, feels much better and ambulating independently. Consults:  IP CONSULT TO CARDIAC REHAB  IP CONSULT TO PHYSICAL MEDICINE REHAB  Subjective: Patient was seen and examined. Notes, labs, and recent testing reviewed. No acute issues overnight and patient without concern prior to discharge. Exam:   Gen Alert, coop, no distress Heart  RRR, no MRG, nl apical impulse   Head NC, AT, no abnorm Abd  Soft, NT, +BS, no mass, no OM   Eyes PERRLA, conj/corn clear Ext  Ext nl, AT, no C/C/E   Nose Nares nl, no drain, NT Pulse 2+ and symmetric   Throat Lips, mucosa, tongue nl Skin Color/text/turg nl, no rash/lesions   Neck S/S, TM, NT, no bruit/JVD Psych Nl mood and affect   Lung CTA-B, unlabored, no DTP Lymph   No cervical or axillary LA   Ch wall NT, no deform Neuro  Nl gross M/S exam     Studies/Labs:  Reviewed, please see Epic for specific details  Disposition: home  Discharge Medications:      Medication List      START taking these medications    aspirin 81 MG EC tablet  Take 1 tablet by mouth daily     clopidogrel 75 MG tablet  Commonly known as: PLAVIX  Take 1 tablet by mouth daily        CHANGE how you take these medications    atorvastatin 10 MG tablet  Commonly known as: LIPITOR  TAKE 1 TABLET BY MOUTH DAILY AT BEDTIME.   What changed:   · how much to take  · how to take this  · when to take this     furosemide 20 MG tablet  Commonly known as: LASIX  Take 1 tablet by mouth daily  What changed:   · how much to take  · when to take this        CONTINUE taking these medications    acetaminophen 500 MG tablet  Commonly known as: TYLENOL     albuterol sulfate  (90 Base) MCG/ACT inhaler  Commonly known as: Ventolin HFA  Inhale 2 puffs into the lungs every 6 hours as needed for Wheezing     budesonide 0.5 MG/2ML nebulizer suspension  Commonly known as: Pulmicort  Take 2 mLs by nebulization 2 times daily DX:COPD J44.9     calcium carbonate 600 MG Tabs tablet  Commonly known as: Calcium 600  Take 1 tablet by mouth 2 times daily     citalopram 10 MG tablet  Commonly known as: CELEXA  Take 1 tablet by mouth daily     docusate 100 MG Caps  Commonly known as: COLACE, DULCOLAX  Take 100 mg by mouth 2 times daily     formoterol 20 MCG/2ML nebulizer solution  Commonly known as: Perforomist  Take 2 mLs by nebulization every 12 hours DX:COPD J44.9     ipratropium-albuterol 0.5-2.5 (3) MG/3ML Soln nebulizer solution  Commonly known as: DUONEB  Inhale 3 mLs into the lungs every 4 hours     levocetirizine 5 MG tablet  Commonly known as: XYZAL     levothyroxine 75 MCG tablet  Commonly known as: SYNTHROID  TAKE 1 TABLET BY MOUTH DAILY     omeprazole 20 MG delayed release capsule  Commonly known as: PRILOSEC     ondansetron 4 MG tablet  Commonly known as: ZOFRAN  Take 1 tablet by mouth daily as needed for Nausea or Vomiting     oxybutynin 5 MG tablet  Commonly known as: DITROPAN  TAKE ONE TABLET BY MOUTH THREE TIMES A DAY     OXYGEN  Use 3L of oxygen all the time     polyethylene glycol 17 g packet  Commonly known as: GLYCOLAX     potassium chloride 20 MEQ extended release tablet  Commonly known as: KLOR-CON M  Take 1 tablet by mouth daily        STOP taking these medications    methocarbamol 500 MG tablet  Commonly known as: ROBAXIN     spironolactone 25 MG tablet  Commonly known as: ALDACTONE           Where to Get Your Medications      You can get these medications from any pharmacy    Bring a paper prescription for each of these medications  · clopidogrel 75 MG tablet  · furosemide 20 MG tablet  You don't need a prescription for these medications  · aspirin 81 MG EC tablet         Summary:  ~Patient is stable from CV standpoint  ~Tobacco, diet, salt, activity restrictions discussed in detail  [facility] not indicated for AS or for TAVR procedure. Resume only with other indications.      Followup:  ~I TAVR Clinic:  1 weeks    Signed:  Marj Holland MD, MD, 12/31/2021, 8:08 AM  Time spent on discharge of patient: >31 minutes

## 2021-12-31 NOTE — PROGRESS NOTES
A&Ox4, VSS, on 2LNC, denies any pain. Bilateral groin puncture site dressing changed, CDI w/o complication. Bilateral pedal pulses palpable. Bathed, complete bed linen, gown and diaper changed. Glycolax given for constipation.

## 2021-12-31 NOTE — CARE COORDINATION
Patient discharged 12/31/2021 to home with [address]  PHONE; [phone]: [phone]. Order/ AVS faxed and agency notifed. No other CM needs at this time.     All discharge needs met per case management    LYN ClaireN, CCM, RN  [facility]  [phone]

## 2022-01-01 LAB
BLOOD BANK DISPENSE STATUS: NORMAL
BLOOD BANK PRODUCT CODE: NORMAL
BPU ID: NORMAL
DESCRIPTION BLOOD BANK: NORMAL

## 2022-01-04 ENCOUNTER — TELEPHONE (OUTPATIENT)
Dept: FAMILY MEDICINE CLINIC | Age: 87
End: 2022-01-04

## 2022-01-04 NOTE — TELEPHONE ENCOUNTER
Grace called and would like a verbal order for PT frequency      2x a week for 3 weeks and 1x a week for 2 weeks

## 2022-01-06 ENCOUNTER — OFFICE VISIT (OUTPATIENT)
Dept: CARDIOLOGY CLINIC | Age: 87
End: 2022-01-06
Payer: MEDICARE

## 2022-01-06 ENCOUNTER — HOSPITAL ENCOUNTER (OUTPATIENT)
Age: 87
Discharge: HOME | End: 2022-01-06
Payer: MEDICARE

## 2022-01-06 VITALS
OXYGEN SATURATION: 94 % | HEIGHT: 60 IN | DIASTOLIC BLOOD PRESSURE: 68 MMHG | SYSTOLIC BLOOD PRESSURE: 128 MMHG | BODY MASS INDEX: 35.5 KG/M2 | HEART RATE: 85 BPM | WEIGHT: 180.8 LBS

## 2022-01-06 DIAGNOSIS — J44.9 CHRONIC OBSTRUCTIVE PULMONARY DISEASE, UNSPECIFIED COPD TYPE (HCC): ICD-10-CM

## 2022-01-06 DIAGNOSIS — Z95.2 S/P TAVR (TRANSCATHETER AORTIC VALVE REPLACEMENT): Primary | ICD-10-CM

## 2022-01-06 DIAGNOSIS — Z95.2 S/P TAVR (TRANSCATHETER AORTIC VALVE REPLACEMENT): ICD-10-CM

## 2022-01-06 DIAGNOSIS — I50.32 CHRONIC DIASTOLIC HEART FAILURE (HCC): ICD-10-CM

## 2022-01-06 DIAGNOSIS — I10 ESSENTIAL HYPERTENSION: ICD-10-CM

## 2022-01-06 LAB
ANION GAP SERPL CALCULATED.3IONS-SCNC: 10 MMOL/L (ref 3–16)
BUN BLDV-MCNC: 12 MG/DL (ref 7–20)
CALCIUM SERPL-MCNC: 9.2 MG/DL (ref 8.3–10.6)
CHLORIDE BLD-SCNC: 101 MMOL/L (ref 99–110)
CO2: 30 MMOL/L (ref 21–32)
CREAT SERPL-MCNC: 0.8 MG/DL (ref 0.6–1.2)
GFR AFRICAN AMERICAN: >60
GFR NON-AFRICAN AMERICAN: >60
GLUCOSE BLD-MCNC: 106 MG/DL (ref 70–99)
POTASSIUM SERPL-SCNC: 4.8 MMOL/L (ref 3.5–5.1)
PRO-BNP: 569 PG/ML (ref 0–449)
SODIUM BLD-SCNC: 141 MMOL/L (ref 136–145)

## 2022-01-06 PROCEDURE — 99214 OFFICE O/P EST MOD 30 MIN: CPT | Performed by: CLINICAL NURSE SPECIALIST

## 2022-01-06 PROCEDURE — 83880 ASSAY OF NATRIURETIC PEPTIDE: CPT

## 2022-01-06 PROCEDURE — 36415 COLL VENOUS BLD VENIPUNCTURE: CPT

## 2022-01-06 PROCEDURE — 80048 BASIC METABOLIC PNL TOTAL CA: CPT

## 2022-01-06 RX ORDER — OYSTER SHELL CALCIUM WITH VITAMIN D 500; 200 MG/1; [IU]/1
1 TABLET, FILM COATED ORAL DAILY
COMMUNITY

## 2022-01-06 RX ORDER — M-VIT,TX,IRON,MINS/CALC/FOLIC 27MG-0.4MG
1 TABLET ORAL DAILY
COMMUNITY

## 2022-01-06 NOTE — PATIENT INSTRUCTIONS
1.  Blood work today at [facility]  2. Speak to sleep regarding bipap  3. Fluids <64 ounces and low sodium  4. Continue current medications  5. Echo and appt with Dr Cookie Porter in 4-6 weeks  6. Me in 3 months  7.   Get working with physical therapy

## 2022-01-06 NOTE — PROGRESS NOTES
[facility]  Progress Note    Primary Care Doctor:  Gallo Nunn    Chief Complaint   Patient presents with    Congestive Heart Failure    Shortness of Breath        History of Present Illness:  82 y.o. female with history of severe aortic valve stenosis with TAVR 12/28/2021, hypertension, hyperlipidemia, COPD, mariaa with bipap      I had the pleasure of seeing Go Tsai in follow up for status post TAVR 12/28-31/2021 with Dr Maya Sprague. She stayed in the hospital an extra day due to hypotension and requiring tico. She is in a wheel chair from [facility] with her son, Jennifer Flores. Her oxygen was not on when I entered the room. Turned on and placed on 3 L. sats 95-96%. She is not using her bipap due to the recall of the mask. She denies any chest pain, palpitations, edema or lightheadedness. Her weight is improved 180 and lasix down from 80->20 daily. She is not doing much rehab, using the walker in her room but not out walking in the halls. She follows with Dr Jax Carlos    Past Medical History:   has a past medical history of Allergic rhinitis, Anxiety, Aortic stenosis, CHF (congestive heart failure) ([address] COPD (chronic obstructive pulmonary disease) ([address] Depression, Diabetes mellitus ([address] Hypertension, Hypothyroidism, MARIAA treated with BiPAP, and Urinary incontinence. Surgical History:   has a past surgical history that includes Hysterectomy, total abdominal; Carpal tunnel release; Cholecystectomy; eye surgery; Tonsillectomy; Uvulectomy; Cardiac catheterization (10/11/2021); Dental surgery (N/A, 11/12/2021); Aortic valve replacement (N/A, 12/28/2021); and Aortic valve replacement (N/A, 12/28/2021). Social History:   reports that she has never smoked. She has never used smokeless tobacco. She reports that she does not drink alcohol and does not use drugs.    Family History:   Family History   Problem Relation Age of Onset    Cancer Mother         breast    Breast Cancer Mother    [facility] Cancer Father         prostate    Cancer Sister         breast    Breast Cancer Daughter     Breast Cancer Maternal Grandmother        Home Medications:  Prior to Admission medications    Medication Sig Start Date End Date Taking?  Authorizing Provider   calcium-vitamin D (OSCAL-500) 500-200 MG-UNIT per tablet Take 1 tablet by mouth daily   Yes Historical Provider, MD   Multiple Vitamins-Minerals (THERAPEUTIC MULTIVITAMIN-MINERALS) tablet Take 1 tablet by mouth daily   Yes Historical Provider, MD   aspirin 81 MG EC tablet Take 1 tablet by mouth daily 12/31/21  Yes Mike Comer MD   clopidogrel (PLAVIX) 75 MG tablet Take 1 tablet by mouth daily 12/31/21  Yes Mike Comer MD   furosemide (LASIX) 20 MG tablet Take 1 tablet by mouth daily 12/31/21  Yes Mike Comer MD   citalopram (CELEXA) 10 MG tablet Take 1 tablet by mouth daily 11/3/21  Yes ELISE Noyola   polyethylene glycol (GLYCOLAX) 17 g packet Take 17 g by mouth daily as needed for Constipation   Yes Historical Provider, MD   omeprazole (PRILOSEC) 20 MG delayed release capsule Take 20 mg by mouth daily   Yes Historical Provider, MD   levocetirizine (XYZAL) 5 MG tablet Take 5 mg by mouth nightly   Yes Historical Provider, MD   potassium chloride (KLOR-CON M) 20 MEQ extended release tablet Take 1 tablet by mouth daily 8/31/21  Yes ELISE Noyola   docusate (COLACE, DULCOLAX) 100 MG CAPS Take 100 mg by mouth 2 times daily 7/22/21  Yes JOSE ALFREDO Peralta CNP   acetaminophen (TYLENOL) 500 MG tablet Take 500 mg by mouth every 6 hours as needed for Pain   Yes Historical Provider, MD   OXYGEN Use 3L of oxygen all the time 5/21/21  Yes ELISE Noyola   budesonide (PULMICORT) 0.5 MG/2ML nebulizer suspension Take 2 mLs by nebulization 2 times daily DX:COPD J44.9 7/28/20 1/6/22 Yes Maryam Stubbs MD   formoterol (PERFOROMIST) 20 MCG/2ML nebulizer solution Take 2 mLs by nebulization every 12 hours DX:COPD J44.9 7/28/20 1/6/22 Yes Mia Hernandez MD   ondansetron [facility]) 4 MG tablet Take 1 tablet by mouth daily as needed for Nausea or Vomiting 9/78/60  Yes Sedrick Kamara MD   oxybutynin (DITROPAN) 5 MG tablet TAKE ONE TABLET BY MOUTH THREE TIMES A DAY 8/20/19  Yes Cassidy Rick MD   atorvastatin (LIPITOR) 10 MG tablet TAKE 1 TABLET BY MOUTH DAILY AT BEDTIME. Patient taking differently: Take 10 mg by mouth nightly TAKE 1 TABLET BY MOUTH DAILY AT BEDTIME. 7/12/19  Yes Abimbola Patel MD   albuterol sulfate HFA (VENTOLIN HFA) 108 (90 Base) MCG/ACT inhaler Inhale 2 puffs into the lungs every 6 hours as needed for Wheezing 7/9/19  Yes Mia Hernandez MD   levothyroxine (SYNTHROID) 75 MCG tablet TAKE 1 TABLET BY MOUTH DAILY 6/11/19  Yes Cassidy Rick MD   ipratropium-albuterol (DUONEB) 0.5-2.5 (3) MG/3ML SOLN nebulizer solution Inhale 3 mLs into the lungs every 4 hours 3/13/18  Yes Cassidy Rick MD        Allergies:  Patient has no known allergies. Review of Systems:   · Constitutional: there has been no unanticipated weight loss. There's been no change in energy level, sleep pattern, or activity level. · Eyes: No visual changes or diplopia. No scleral icterus. · ENT: No Headaches, hearing loss or vertigo. No mouth sores or sore throat. · Cardiovascular: Reviewed in HPI  · Respiratory: No cough or wheezing, no sputum production. No hematemesis. · Gastrointestinal: No abdominal pain, appetite loss, blood in stools. No change in bowel or bladder habits. · Genitourinary: No dysuria, trouble voiding, or hematuria. · Musculoskeletal:  No gait disturbance, weakness or joint complaints. · Integumentary: No rash or pruritis. · Neurological: No headache, diplopia, change in muscle strength, numbness or tingling. No change in gait, balance, coordination, mood, affect, memory, mentation, behavior. · Psychiatric: No anxiety, no depression.   · Endocrine: No malaise, fatigue or temperature intolerance. No excessive thirst, fluid intake, or urination. No tremor. · Hematologic/Lymphatic: No abnormal bruising or bleeding, blood clots or swollen lymph nodes. · Allergic/Immunologic: No nasal congestion or hives. Physical Examination:    Vitals:    01/06/22 1058   BP: 128/68   Pulse: 85   SpO2: 94%   Weight: 180 lb 12.8 oz (82 kg)   Height: 5' (1.524 m)        Constitutional and General Appearance: Warm and dry, no apparent distress, normal coloration  HEENT:  Normocephalic, atraumatic  Respiratory:  · Normal excursion and expansion without use of accessory muscles  · Resp Auscultation: Normal breath sounds without dullness  Cardiovascular:  · The apical impulses not displaced  · Heart tones are crisp and normal  · JVP normal cm H2O  · Regular rate and rhythm  · Peripheral pulses are symmetrical and full  · There is no clubbing, cyanosis of the extremities.   · no edema  · Pedal Pulses: 2+ and equal   Abdomen: obese  · No masses or tenderness  · Liver/Spleen: No Abnormalities Noted  Neurological/Psychiatric:  · Alert and oriented in all spheres  · Moves all extremities well  · Exhibits normal gait balance and coordination  · No abnormalities of mood, affect, memory, mentation, or behavior are noted    Lab Data:    CBC:   Lab Results   Component Value Date    WBC 7.7 12/31/2021    WBC 12.3 12/30/2021    WBC 11.8 12/29/2021    RBC 3.67 12/31/2021    RBC 3.78 12/30/2021    RBC 3.58 12/29/2021    HGB 10.6 12/31/2021    HGB 10.7 12/30/2021    HGB 10.3 12/29/2021    HCT 32.4 12/31/2021    HCT 33.4 12/30/2021    HCT 31.8 12/29/2021    MCV 88.3 12/31/2021    MCV 88.2 12/30/2021    MCV 88.7 12/29/2021    RDW 14.0 12/31/2021    RDW 13.9 12/30/2021    RDW 14.3 12/29/2021     12/31/2021     12/30/2021     12/29/2021     BMP:  Lab Results   Component Value Date     12/31/2021     12/30/2021     12/29/2021    K 4.1 12/31/2021    K 4.1 12/30/2021    K 4.1 12/29/2021    CL 99 12/31/2021    CL 96 12/30/2021     12/29/2021    CO2 33 12/31/2021    CO2 33 12/30/2021    CO2 32 12/29/2021    PHOS 2.4 11/22/2021    PHOS 3.1 07/02/2018    PHOS 2.8 07/01/2018    BUN 12 12/31/2021    BUN 12 12/30/2021    BUN 15 12/29/2021    CREATININE 0.6 12/31/2021    CREATININE 0.7 12/30/2021    CREATININE 0.7 12/29/2021     BNP:   Lab Results   Component Value Date    PROBNP 1,772 12/29/2021    PROBNP 390 12/28/2021    PROBNP 684 11/29/2021     Cardiac Imaging:  Echo 12/29/2021  Summary   Normal left ventricle size, wall thickness, and systolic function with an   estimated ejection fraction of 55-60%. No regional wall motion abnormalities   are seen. Grade II diastolic dysfunction with elevated LV filling pressures. The mitral valve leaflets appear myxomatous. [address] mitral   regurgitation. Mild mitral annular calcification. There is a 23 mm [address] 3 Ultra TAVR valve well seated with a peak   velocity of 2.3 m/s, a mean gradient of 13 mmHg and an EOA of 2.37 cm^2. No   paravalvular leak noted. No intravalvular regurgitation noted    [facility] Dr Jose Armstrong 10/11/2021  Findings:  Artery Findings/Result   LM Normal   LAD Normal   Cx Normal   RI N/A   RCA Normal   LVEDP NA   LVG NA      Intervention:         None     Post Cath Dx:       Normal coronaries  Continued TAVR workup    Echo 7/19/2021  Summary   -Normal left ventricle size, wall thickness and systolic function with an   estimated ejection fraction of 55-60%. -No regional wall motion abnormalities are noted. -Grade I diastolic dysfunction with normal LV filling pressures. E/e' =   18.6.   -Severe aortic stenosis with a peak velocity of 4.1 m/s and a mean pressure   gradient of 39mmHg. HOLDEN (VTI) is 2.47 cm2. (gradients showed with definity).    -No evidence of aortic valve regurgitation.   -There is mild mitral stenosis with a mean gradient of 3 mmHG, P1/2 is 89   ms., Vmax is 1.4 cm/s, HOLDEN (P1/2) is 2.47 cm2.   -Mild mitral regurgitation.   -Mild tricuspid regurgitation. -IVC size is dilated (>2.1 cm) but collapses > 50% with respiration   consistent with elevated RA pressure (8 mmHg). -Estimated pulmonary artery systolic pressure is elevated at 41 mmHg   assuming a right atrial pressure of 8 mmHg. Echo 5/25/2018:  Summary   -Normal left ventricle size and systolic function with an estimated ejection fraction of 55-60%.  -There is mild concentric left ventricular hypertrophy.   -No regional wall motion abnormalities are seen.   -Normal diastolic function.   -Mild mitral regurgitation.   -Mild aortic stenosis.   -Mild tricuspid regurgitation with a RVSP of 58 mmHg. Assessment:    1. S/P TAVR (transcatheter aortic valve replacement)    2. Chronic diastolic heart failure ([facility]    3. Chronic obstructive pulmonary disease, unspecified COPD type ([facility] Dr Gustabo Rubio   4. Essential hypertension        Plan:   Patient Instructions   1. Blood work today at [facility]  2. Speak to sleep regarding bipap  3. Fluids <64 ounces and low sodium  4. Continue current medications  5. Echo and appt with Dr Asha Reyes in 4-6 weeks  6. Me in 3 months  7.   Get working with physical therapy      I appreciate the opportunity of cooperating in the care of this individual.    JOSE ALFREDO Duong - CNS, CNS, 1/6/2022, 11:45 AM

## 2022-01-07 ENCOUNTER — TELEPHONE (OUTPATIENT)
Dept: FAMILY MEDICINE CLINIC | Age: 87
End: 2022-01-07

## 2022-01-07 ENCOUNTER — TELEPHONE (OUTPATIENT)
Dept: CARDIOLOGY CLINIC | Age: 87
End: 2022-01-07

## 2022-01-07 ENCOUNTER — OFFICE VISIT (OUTPATIENT)
Dept: PULMONOLOGY | Age: 87
End: 2022-01-07
Payer: MEDICARE

## 2022-01-07 VITALS — SYSTOLIC BLOOD PRESSURE: 110 MMHG | HEART RATE: 72 BPM | OXYGEN SATURATION: 98 % | DIASTOLIC BLOOD PRESSURE: 60 MMHG

## 2022-01-07 DIAGNOSIS — J44.9 COPD, SEVERE (HCC): Chronic | ICD-10-CM

## 2022-01-07 DIAGNOSIS — I50.32 CHRONIC DIASTOLIC CONGESTIVE HEART FAILURE (HCC): Chronic | ICD-10-CM

## 2022-01-07 DIAGNOSIS — E03.9 ACQUIRED HYPOTHYROIDISM: Chronic | ICD-10-CM

## 2022-01-07 DIAGNOSIS — E66.01 CLASS 3 SEVERE OBESITY WITHOUT SERIOUS COMORBIDITY WITH BODY MASS INDEX (BMI) OF 40.0 TO 44.9 IN ADULT, UNSPECIFIED OBESITY TYPE (HCC): ICD-10-CM

## 2022-01-07 DIAGNOSIS — I25.10 CORONARY ARTERY DISEASE DUE TO LIPID RICH PLAQUE: ICD-10-CM

## 2022-01-07 DIAGNOSIS — I10 ESSENTIAL HYPERTENSION: Chronic | ICD-10-CM

## 2022-01-07 DIAGNOSIS — I25.83 CORONARY ARTERY DISEASE DUE TO LIPID RICH PLAQUE: ICD-10-CM

## 2022-01-07 DIAGNOSIS — G47.33 OBSTRUCTIVE SLEEP APNEA (ADULT) (PEDIATRIC): Primary | Chronic | ICD-10-CM

## 2022-01-07 PROCEDURE — 99214 OFFICE O/P EST MOD 30 MIN: CPT | Performed by: NURSE PRACTITIONER

## 2022-01-07 ASSESSMENT — SLEEP AND FATIGUE QUESTIONNAIRES
HOW LIKELY ARE YOU TO NOD OFF OR FALL ASLEEP WHILE WATCHING TV: 1
ESS TOTAL SCORE: 5
HOW LIKELY ARE YOU TO NOD OFF OR FALL ASLEEP IN A CAR, WHILE STOPPED FOR A FEW MINUTES IN TRAFFIC: 0
HOW LIKELY ARE YOU TO NOD OFF OR FALL ASLEEP WHILE SITTING AND READING: 0
HOW LIKELY ARE YOU TO NOD OFF OR FALL ASLEEP WHILE SITTING INACTIVE IN A PUBLIC PLACE: 1
HOW LIKELY ARE YOU TO NOD OFF OR FALL ASLEEP WHILE SITTING AND TALKING TO SOMEONE: 0
HOW LIKELY ARE YOU TO NOD OFF OR FALL ASLEEP WHILE SITTING QUIETLY AFTER LUNCH WITHOUT ALCOHOL: 1
HOW LIKELY ARE YOU TO NOD OFF OR FALL ASLEEP WHEN YOU ARE A PASSENGER IN A CAR FOR AN HOUR WITHOUT A BREAK: 0
HOW LIKELY ARE YOU TO NOD OFF OR FALL ASLEEP WHILE LYING DOWN TO REST IN THE AFTERNOON WHEN CIRCUMSTANCES PERMIT: 2

## 2022-01-07 NOTE — PROGRESS NOTES
Cesilia Miramontes MD, [facility], [facility]  [address]  [address] [facility], [address] [phone]   [facility] [address]. [address] [place] [address]. [address] [phone]     [address] [facility]  [address] [place] [id number] [phone]      Assessment/Plan:      1. Obstructive sleep apnea (adult) (pediatric)  Assessment & Plan:  Chronic-with progression/exacerbation: Reviewed and analyzed results of physiologic download from patient's machine and reviewed with patient. Supplies and parts as needed for her machine. These are medically necessary. Limit caffeine use after 3pm. Based on the analyzed data will continue with current settings. Discussed with patient and son the importance of restarting her machine and the severity of her Obstructive sleep apnea. Grace RT to room to show patient how to bleed her oxygen into her machine. Order sent to [facility] for a mask more similar to her oxygen cannula. Patient likely needs a FFM but is unable to tolerate. Order for a chin strap was sent. Verbal and written instruction on PAP equipment cleaning and disinfection schedule provided. Instructed not to drive unless had 4 hrs of effective therapy for her MARIAA the night before. Did review the risks of under or untreated MARIAA including, but not limited to, higher risks of motor vehicle accidents, stroke, heart attacks, and death. She understands and accepts all these risks. Follow up in 3 months or sooner if issues arise. Discussed the recall of Repironics devices with patient and the severity of her sleep apnea. Discussed the risks of untreated sleep apnea including but not limited to car accidents, heart attacks, strokes, and death. Alternative therapy may not exist or may be severely limited.   Felt the benefits of continued usage of these devices may outweigh the risks identified in the recall notification. Advised to avoid use of unproven cleaning methods, such as ozone. Due to the unknown variables each patient will have to make a determination in his/her own. Please contact your equipment company for further instruction until an alternative is found. Encouraged patient to register her machine for the recall and provided phone number. 2. Coronary artery disease due to lipid rich plaque  Assessment & Plan:   Chronic- Stable. Discussed the importance of treating obstructive sleep apnea as part of the management of this disorder. Cont any meds per PCP and other physicians. 3. Chronic diastolic congestive heart failure ([address]  Assessment & Plan:  Chronic- Stable. Discussed the importance of treating obstructive sleep apnea as part of the management of this disorder. Cont any meds per PCP and other physicians. 4. Essential hypertension  Assessment & Plan:   Chronic- Stable. Discussed the importance of treating obstructive sleep apnea as part of the management of this disorder. Cont any meds per PCP and other physicians. 5. COPD, severe ([address]  Assessment & Plan:   Chronic- Stable. Discussed the importance of treating obstructive sleep apnea as part of the management of this disorder. Cont any meds per PCP and other physicians. 6. Acquired hypothyroidism  Assessment & Plan:   Chronic- Stable. Discussed the importance of treating obstructive sleep apnea as part of the management of this disorder. Cont any meds per PCP and other physicians. 7. Class 3 severe obesity without serious comorbidity with body mass index (BMI) of 40.0 to 44.9 in adult, unspecified obesity type [facility])  Assessment & Plan:   Chronic-not stable:  Discussed importance of treating obstructive sleep apnea and getting sufficient sleep to assist with weight control. Encouraged her to work on weight loss through diet and exercise. Recommended DASH or Mediterranean diets.       Reviewed, analyzed, and documented Patient thinks it is too big and wants to use a nasal mask like her oxygen. she denies headaches, congestion, nosebleeds, dryness, aerophagia, or drowsiness while driving. Patient has registered her machine for the  recall and is currently awaiting replacement. Would like to see patient back in 3  Months but transportation is difficult so son is requesting 6 month follow up. [address]    Clark - Total score: 5    Social History     Socioeconomic History    Marital status:      Spouse name: Not on file    Number of children: Not on file    Years of education: Not on file    Highest education level: Not on file   Occupational History    Not on file   Tobacco Use    Smoking status: Never Smoker    Smokeless tobacco: Never Used   Vaping Use    Vaping Use: Never used   Substance and Sexual Activity    Alcohol use: No    Drug use: No    Sexual activity: Never   Other Topics Concern    Not on file   Social History Narrative    Not on file     Social Determinants of Health     Financial Resource Strain: Low Risk     Difficulty of Paying Living Expenses: Not hard at all   Food Insecurity: No Food Insecurity    Worried About Running Out of Food in the Last Year: Never true    [place] of Food in the Last Year: Never true   Transportation Needs:     Lack of Transportation (Medical): Not on file    Lack of Transportation (Non-Medical):  Not on file   Physical Activity:     Days of Exercise per Week: Not on file    Minutes of Exercise per Session: Not on file   Stress:     Feeling of Stress : Not on file   Social Connections:     Frequency of Communication with Friends and Family: Not on file    Frequency of Social Gatherings with Friends and Family: Not on file    Attends Church Services: Not on file    Active Member of Clubs or Organizations: Not on file    Attends Club or Organization Meetings: Not on file    Marital Status: Not on file Intimate Partner Violence:     Fear of Current or Ex-Partner: Not on file    Emotionally Abused: Not on file    Physically Abused: Not on file    Sexually Abused: Not on file   Housing Stability:     Unable to Pay for Housing in the Last Year: Not on file    Number of Tres in the Last Year: Not on file    Unstable Housing in the Last Year: Not on file       Current Outpatient Medications   Medication Instructions    acetaminophen (TYLENOL) 500 mg, Oral, EVERY 6 HOURS PRN    albuterol sulfate HFA (VENTOLIN HFA) 108 (90 Base) MCG/ACT inhaler 2 puffs, Inhalation, EVERY 6 HOURS PRN    aspirin 81 mg, Oral, DAILY    atorvastatin (LIPITOR) 10 MG tablet TAKE 1 TABLET BY MOUTH DAILY AT BEDTIME.     budesonide (PULMICORT) 500 mcg, Nebulization, 2 TIMES DAILY, DX:COPD J44.9    calcium-vitamin D (OSCAL-500) 500-200 MG-UNIT per tablet 1 tablet, Oral, DAILY    citalopram (CELEXA) 10 mg, Oral, DAILY    clopidogrel (PLAVIX) 75 mg, Oral, DAILY    docusate (COLACE, DULCOLAX) 100 mg, Oral, 2 TIMES DAILY    formoterol (PERFOROMIST) 20 mcg, Nebulization, EVERY 12 HOURS, DX:COPD J44.9    furosemide (LASIX) 20 mg, Oral, DAILY    ipratropium-albuterol (DUONEB) 0.5-2.5 (3) MG/3ML SOLN nebulizer solution 3 mLs, Inhalation, EVERY 4 HOURS    levocetirizine (XYZAL) 5 mg, Oral, NIGHTLY    levothyroxine (SYNTHROID) 75 mcg, Oral, DAILY    Multiple Vitamins-Minerals (THERAPEUTIC MULTIVITAMIN-MINERALS) tablet 1 tablet, Oral, DAILY    omeprazole (PRILOSEC) 20 mg, Oral, DAILY    ondansetron (ZOFRAN) 4 mg, Oral, DAILY PRN    oxybutynin (DITROPAN) 5 MG tablet TAKE ONE TABLET BY MOUTH THREE TIMES A DAY    OXYGEN Use 3L of oxygen all the time    polyethylene glycol (GLYCOLAX) 17 g, Oral, DAILY PRN    potassium chloride (KLOR-CON M) 20 MEQ extended release tablet 20 mEq, Oral, DAILY          Vitals:  Weight BMI   Wt Readings from Last 3 Encounters:   01/06/22 180 lb 12.8 oz (82 kg)   12/31/21 177 lb 11.1 oz (80.6 kg)

## 2022-01-07 NOTE — PROGRESS NOTES
Diagnosis: [x] MARIAA (G47.33) [] CSA (G47.31) [] Apnea (G47.30)   Length of Need: [x] 15 Months [] 99 Months [] Other:   Machine (BERTRAND!): [] Respironics Dream Station      Auto [] ResMed AirSense     Auto [] Other:     []  CPAP () [] Bilevel ()   Mode: [] Auto [] Spontaneous    Mode: [] Auto [] Spontaneous              Comfort Settings:      Humidifier: [] Heated ()        [x] Water chamber replacement ()/ 1 per 6 months        Mask:   [x] Nasal () /1 per 3 months  ResMed Starr FX Liya CPAP Mask With Headgear   [] Full Face () /1 per 3 months   [x] Patient choice -Size and fit mask [] Patient Choice - Size and fit mask   [] Dispense: [] Dispense:   [x] Headgear () / 1 per 3 months [] Headgear () / 1 per 3 months   [] Replacement Nasal Cushion ()/2 per month [] Interface Replacement ()/1 per month   [x] Replacement Nasal Pillows ()/2 per month         Tubing: [x] Heated ()/1 per 3 months    [] Standard ()/1 per 3 months [] Other:           Filters: [x] Non-disposable ()/1 per 6 months     [x] Ultra-Fine, Disposable ()/2 per month        Miscellaneous: [x] Chin Strap ()/ 1 per 6 months [] O2 bleed-in:        LPM   [] Oxymetry on CPAP/Bilevel []  Other:         Start Order Date: 01/07/22    MEDICAL JUSTIFICATION:  I, the undersigned, certify that the above prescribed supplies are medically necessary for this patients wellbeing. In my opinion, the supplies are both reasonable and necessary in reference to accepted standards of medicalpractice in treatment of this patients condition. Nimisha Robins NP    NPI: [id number]       Order Signed Date: 01/07/22  [address] [place] [address]  Pulmonary, Sleep, and Critical Care    Pulmonary, Sleep, and Critical Care  [address] [place] [address], [address] [facility] , [address]                                    Meri Scale [address]  Phone: [phone]    Fax: [phone]    Wilber Zhao  1935  [address] [place] [address]  [phone] (home)   [phone] (mobile)      Insurance Info (confirm with patient if correct):  Payor/Plan Subscr  Sex Relation Sub.  Ins. ID Effective Group Num

## 2022-01-07 NOTE — TELEPHONE ENCOUNTER
[place] with [employer] [facility] [phone] is calling to get verbal orders for OT one time a week for 5 weeks.      Please advise

## 2022-01-07 NOTE — ASSESSMENT & PLAN NOTE
Chronic-with progression/exacerbation: Reviewed and analyzed results of physiologic download from patient's machine and reviewed with patient. Supplies and parts as needed for her machine. These are medically necessary. Limit caffeine use after 3pm. Based on the analyzed data will continue with current settings. Discussed with patient and son the importance of restarting her machine and the severity of her Obstructive sleep apnea. Grace RT to room to show patient how to bleed her oxygen into her machine. Order sent to [facility] for a mask more similar to her oxygen cannula. Patient likely needs a FFM but is unable to tolerate. Order for a chin strap was sent. Verbal and written instruction on PAP equipment cleaning and disinfection schedule provided. Instructed not to drive unless had 4 hrs of effective therapy for her MARIAA the night before. Did review the risks of under or untreated MARIAA including, but not limited to, higher risks of motor vehicle accidents, stroke, heart attacks, and death. She understands and accepts all these risks. Follow up in 3 months or sooner if issues arise. Discussed the recall of Repironics devices with patient and the severity of her sleep apnea. Discussed the risks of untreated sleep apnea including but not limited to car accidents, heart attacks, strokes, and death. Alternative therapy may not exist or may be severely limited. Felt the benefits of continued usage of these devices may outweigh the risks identified in the recall notification. Advised to avoid use of unproven cleaning methods, such as ozone. Due to the unknown variables each patient will have to make a determination in his/her own. Please contact your equipment company for further instruction until an alternative is found. Encouraged patient to register her machine for the recall and provided phone number.

## 2022-01-07 NOTE — LETTER
[facility]  [address] [facility]  [address] [address]  Phone: [phone]  Fax: [phone]    2022       Patient: Evelia Bosch   MR Number: [id number]   YOB: 1935   Date of Visit: 2022       Farnaz Quiros was seen for a follow up visit today. Here is my assessment and plan as well as an attached copy of her visit today:    Chronic congestive heart failure ([address]  Chronic- Stable. Discussed the importance of treating obstructive sleep apnea as part of the management of this disorder. Cont any meds per PCP and other physicians. Essential hypertension   Chronic- Stable. Discussed the importance of treating obstructive sleep apnea as part of the management of this disorder. Cont any meds per PCP and other physicians. Coronary artery disease due to lipid rich plaque   Chronic- Stable. Discussed the importance of treating obstructive sleep apnea as part of the management of this disorder. Cont any meds per PCP and other physicians. Class 3 severe obesity without serious comorbidity with body mass index (BMI) of 40.0 to 44.9 in adult [facility])   Chronic-not stable:  Discussed importance of treating obstructive sleep apnea and getting sufficient sleep to assist with weight control. Encouraged her to work on weight loss through diet and exercise. Recommended DASH or Mediterranean diets. Hypothyroidism   Chronic- Stable. Discussed the importance of treating obstructive sleep apnea as part of the management of this disorder. Cont any meds per PCP and other physicians. COPD, severe ([address]   Chronic- Stable. Discussed the importance of treating obstructive sleep apnea as part of the management of this disorder. Cont any meds per PCP and other physicians.       Obstructive sleep apnea (adult) (pediatric)  Chronic-with progression/exacerbation: Reviewed and analyzed results of physiologic download from patient's machine and reviewed with patient. Supplies and parts as needed for her machine. These are medically necessary. Limit caffeine use after 3pm. Based on the analyzed data will continue with current settings. Discussed with patient and son the importance of restarting her machine and the severity of her Obstructive sleep apnea. Grace RT to room to show patient how to bleed her oxygen into her machine. Order sent to [address] [place] [address] for a mask more similar to her oxygen cannula. Patient likely needs a FFM but is unable to tolerate. Order for a chin strap was sent. Verbal and written instruction on PAP equipment cleaning and disinfection schedule provided. Instructed not to drive unless had 4 hrs of effective therapy for her MARIAA the night before. Did review the risks of under or untreated MARIAA including, but not limited to, higher risks of motor vehicle accidents, stroke, heart attacks, and death. She understands and accepts all these risks. Follow up in 3 months or sooner if issues arise. Discussed the recall of [employer] devices with patient and the severity of her sleep apnea. Discussed the risks of untreated sleep apnea including but not limited to car accidents, heart attacks, strokes, and death. Alternative therapy may not exist or may be severely limited. Felt the benefits of continued usage of these devices may outweigh the risks identified in the recall notification. Advised to avoid use of unproven cleaning methods, such as ozone. Due to the unknown variables each patient will have to make a determination in his/her own. Please contact your equipment company for further instruction until an alternative is found. Encouraged patient to register her machine for the recall and provided phone number. If you have questions or concerns, please do not hesitate to call me. I look forward to following Maryellen Mcintyre along with you.     Sincerely,    JOSE ALFREDO Perez    CC providers:  Catherine Celestin, [address] [facility] [facility] [address]  [employer] [employer]

## 2022-01-07 NOTE — TELEPHONE ENCOUNTER
----- Message from JOSE ALFREDO Garnica - CNS sent at 1/7/2022  8:23 AM EST -----  Labs are great  Please let her son, Concepción Gaffney know   Continue current medications  thanks

## 2022-01-10 ENCOUNTER — TELEPHONE (OUTPATIENT)
Dept: PULMONOLOGY | Age: 87
End: 2022-01-10

## 2022-01-10 NOTE — OP NOTE
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1   Cerebral ischemia I67.82    Atrial flutter I48.92   Other cerebrovascular dz I67.89    Combined S/D CHF I50.40        LV failure I50.1  X WEIGHT     Unspecified systolic CHF D26.52   Morbid obesity w/ hypovent E66.2    Chronic systolic CHF Z18.81   BMI </= 19 Z68.1    Unspecified diastolic CHF Z81.35   BMI 40-44.9 Z68.41   X Chronic diastolic CHF I01.14   BMI 45-49.9 Z68.42    Chronic comb S/D CHF I50.42   BMI 50-59.9 Z68.43    ASD Q21.1   BMI 60-69.9 Z68.44        BMI >70 Z68.45        Protein/calorie malnutrition E46        Hypo-osmolality, hyponatremia E87.1        ARF, unsp N17.9

## 2022-01-26 ENCOUNTER — PROCEDURE VISIT (OUTPATIENT)
Dept: CARDIOLOGY CLINIC | Age: 87
End: 2022-01-26
Payer: MEDICARE

## 2022-01-26 DIAGNOSIS — Z95.2 S/P TAVR (TRANSCATHETER AORTIC VALVE REPLACEMENT): ICD-10-CM

## 2022-01-26 LAB
LV EF: 55 %
LVEF MODALITY: NORMAL

## 2022-01-26 PROCEDURE — 93306 TTE W/DOPPLER COMPLETE: CPT | Performed by: INTERNAL MEDICINE

## 2022-01-31 PROBLEM — Z95.2 S/P TAVR (TRANSCATHETER AORTIC VALVE REPLACEMENT): Status: ACTIVE | Noted: 2022-01-31

## 2022-02-02 ENCOUNTER — TELEPHONE (OUTPATIENT)
Dept: CARDIOLOGY | Age: 87
End: 2022-02-02

## 2022-03-08 ENCOUNTER — TELEPHONE (OUTPATIENT)
Dept: FAMILY MEDICINE CLINIC | Age: 87
End: 2022-03-08

## 2022-03-08 NOTE — TELEPHONE ENCOUNTER
[facility] Provider signed orders and has been faxed to home care agency.

## 2022-03-16 NOTE — PROGRESS NOTES
[address]  H+P // Sandie Johnson // OP VISIT // FU VISIT    REFERRING Unknown ELISE Loving  ENC TYPE FU    CC HX HPI   GEN  Doing well. SOB much improved. AS TAVR Denies cp, dizzy, syncope, palps. HTN  Amb bp in good range, no ha or dizzy. CHOL  Last chol reviewed, on statin w/out sa   MED  Compliant with CV meds listed below w/out reported sa. HISTORY/ALLLERGY/ROS   MedHx  has a past medical history of Allergic rhinitis, Anxiety, Aortic stenosis, CHF (congestive heart failure) ([facility] COPD (chronic obstructive pulmonary disease) ([facility] Depression, Diabetes mellitus ([facility] Hypertension, Hypothyroidism, MARIAA treated with BiPAP, and Urinary incontinence. SurgHx  has a past surgical history that includes Hysterectomy, total abdominal; Carpal tunnel release; Cholecystectomy; eye surgery; Tonsillectomy; Uvulectomy; Cardiac catheterization (10/11/2021); Dental surgery (N/A, 11/12/2021); Aortic valve replacement (N/A, 12/28/2021); and Aortic valve replacement (N/A, 12/28/2021). SocHx  reports that she has never smoked. She has never used smokeless tobacco. She reports that she does not drink alcohol and does not use drugs. FamHx family history includes Breast Cancer in her daughter, maternal grandmother, and mother; Cancer in her father, mother, and sister. Allerg Patient has no known allergies.    ROS [x]Full ROS obtained and negative except as mentioned in HPI      MEDICATIONS      Current Outpatient Medications   Medication Sig Dispense Refill    calcium-vitamin D (OSCAL-500) 500-200 MG-UNIT per tablet Take 1 tablet by mouth daily      Multiple Vitamins-Minerals (THERAPEUTIC MULTIVITAMIN-MINERALS) tablet Take 1 tablet by mouth daily      aspirin 81 MG EC tablet Take 1 tablet by mouth daily 30 tablet 3    clopidogrel (PLAVIX) 75 MG tablet Take 1 tablet by mouth daily 30 tablet 6    furosemide (LASIX) 20 MG tablet Take 1 tablet by mouth daily 30 tablet 6    citalopram (CELEXA) 10 MG tablet Take 1 tablet by mouth daily 30 tablet 5    polyethylene glycol (GLYCOLAX) 17 g packet Take 17 g by mouth daily as needed for Constipation      omeprazole (PRILOSEC) 20 MG delayed release capsule Take 20 mg by mouth daily      levocetirizine (XYZAL) 5 MG tablet Take 5 mg by mouth nightly      potassium chloride (KLOR-CON M) 20 MEQ extended release tablet Take 1 tablet by mouth daily 30 tablet 5    docusate (COLACE, DULCOLAX) 100 MG CAPS Take 100 mg by mouth 2 times daily 120 capsule 0    acetaminophen (TYLENOL) 500 MG tablet Take 500 mg by mouth every 6 hours as needed for Pain      OXYGEN Use 3L of oxygen all the time 3 L 3    budesonide (PULMICORT) 0.5 MG/2ML nebulizer suspension Take 2 mLs by nebulization 2 times daily DX:COPD J44.9 60 ampule 6    formoterol (PERFOROMIST) 20 MCG/2ML nebulizer solution Take 2 mLs by nebulization every 12 hours DX:COPD J44.9 120 mL 6    ondansetron (ZOFRAN) 4 MG tablet Take 1 tablet by mouth daily as needed for Nausea or Vomiting 30 tablet 0    oxybutynin (DITROPAN) 5 MG tablet TAKE ONE TABLET BY MOUTH THREE TIMES A DAY 90 tablet 1    atorvastatin (LIPITOR) 10 MG tablet TAKE 1 TABLET BY MOUTH DAILY AT BEDTIME. (Patient taking differently: Take 10 mg by mouth nightly TAKE 1 TABLET BY MOUTH DAILY AT BEDTIME.) 30 tablet 5    albuterol sulfate HFA (VENTOLIN HFA) 108 (90 Base) MCG/ACT inhaler Inhale 2 puffs into the lungs every 6 hours as needed for Wheezing 1 Inhaler 3    levothyroxine (SYNTHROID) 75 MCG tablet TAKE 1 TABLET BY MOUTH DAILY 30 tablet 5    ipratropium-albuterol (DUONEB) 0.5-2.5 (3) MG/3ML SOLN nebulizer solution Inhale 3 mLs into the lungs every 4 hours 360 mL 0     No current facility-administered medications for this visit.      [x]Reviewed with patient and will remain unchanged except as mentioned in A/P    PHYSICAL EXAM   Vitals:    03/17/22 1043   BP: 112/80   Pulse: 70   SpO2: 95%        Gen Alert, coop, no distress Heart  Rrr, no mrg   Head NC, AT, no abnorm Abd  Soft, NT, +BS, no mass, no OM   Eyes PER, conj/corn clear Ext  Ext nl, AT, no C/C/E   Nose Nares nl, no drain, NT Pulse 2+ and symmetric   Throat Lips, mucosa, tongue nl Skin Col/text/turg nl, no vis rash/les   Neck S/S, TM, NT, no bruit/JVD Psych Nl mood and affect   Lung CTA-B, unlabored, no DTP Lymph   No cervical or axillary LA   Ch wall NT, no deform Neuro  Nl gross M/S exam     ASSESSMENT AND PLAN     *AS    Date EF Detail   Sx     No concerning   DATA   Most recent TTE, LHC reviewed personally   NYHA   I   Hx 12/21  TAVR 23 mm Ho ultra   TTE 5/18 7/21 12/21 1/22 60%  60%  60%  55% Mild AS MG 24  Severe AS MG 39, , mild MS MG 3, IVC dilated elevated RA pressure  TAVR well seated MG 13, no AI  TAVR well seated MG 13   LHC 10/21  Normal Cors Lonzell Perch)   Plan     Echo yearly   *HTN  Status Controlled, vitals and available ambulatory monitoring logs reviewed personally  Plan Counseled on diet/salt/exercise/weight, continue meds at doses above  *CHOL  Status  Uncontrolled with last LDL of 88 (goal <70) and HDL of 54 (6/21), labs reviewed personally  Plan Counseled on diet/exercise/weight, continue statin, lipid/liver surveillance per PCP  *COMPLIANCE  Status Compliant  Plan Discussed importance of compliance with meds/diet/salt/exercise; avoid tob/alc/drugs; patient verbalized understanding  *FOLLOWUP  6 months    [address] Taurus Molina, am scribing for and in the presence of Shant Braun MD.   SignedTaurus 12/14/21 11:26 AM   Provider Milton Garrett is working as a scribe for and in the presence of me (Shant Braun MD). Working as a scribe, Taurus Johnson may have prepopulated components of this note with my historical  intellectual property under my direct supervision. Any additions to this intellectual property were performed in my presence and at my direction.   Furthermore, the content and accuracy of this note have been reviewed by me Jennifer Armendariz MD).  3/17/2022 11:01 AM  CODING   Category Diagnosis   Stable chronic illness  (35810/17352 - 2 or more) AS, HTN, CHOL   Chronic illness w/: Exac, progr or SA of Tx  (95103/75416 - 1 or more)    Time 30-39 minutes spent preparing to see patient including reviewing patient history/prior tests/prior consults, performing a medical exam, counseling and educating patient/family/caregiver, ordering medications/tests/procedures, referring and communicating with PCPs and other pertinent consultants, documenting information in the EMR, independently interpreting results and communicating to family and coordination of patient care.

## 2022-03-17 ENCOUNTER — OFFICE VISIT (OUTPATIENT)
Dept: CARDIOLOGY CLINIC | Age: 87
End: 2022-03-17
Payer: MEDICARE

## 2022-03-17 VITALS
HEIGHT: 60 IN | SYSTOLIC BLOOD PRESSURE: 112 MMHG | HEART RATE: 70 BPM | WEIGHT: 171 LBS | OXYGEN SATURATION: 95 % | BODY MASS INDEX: 33.57 KG/M2 | DIASTOLIC BLOOD PRESSURE: 80 MMHG

## 2022-03-17 DIAGNOSIS — Z95.2 S/P TAVR (TRANSCATHETER AORTIC VALVE REPLACEMENT): ICD-10-CM

## 2022-03-17 DIAGNOSIS — I10 ESSENTIAL HYPERTENSION: ICD-10-CM

## 2022-03-17 DIAGNOSIS — I35.0 NONRHEUMATIC AORTIC VALVE STENOSIS: Primary | ICD-10-CM

## 2022-03-17 DIAGNOSIS — E78.00 HYPERCHOLESTEREMIA: ICD-10-CM

## 2022-03-17 PROCEDURE — 99214 OFFICE O/P EST MOD 30 MIN: CPT | Performed by: INTERNAL MEDICINE

## 2022-04-12 ENCOUNTER — OFFICE VISIT (OUTPATIENT)
Dept: PULMONOLOGY | Age: 87
End: 2022-04-12
Payer: MEDICARE

## 2022-04-12 VITALS — OXYGEN SATURATION: 91 % | HEART RATE: 82 BPM

## 2022-04-12 DIAGNOSIS — G47.33 OBSTRUCTIVE SLEEP APNEA (ADULT) (PEDIATRIC): Chronic | ICD-10-CM

## 2022-04-12 DIAGNOSIS — J43.2 CENTRILOBULAR EMPHYSEMA (HCC): ICD-10-CM

## 2022-04-12 DIAGNOSIS — J44.9 COPD, SEVERE (HCC): Primary | Chronic | ICD-10-CM

## 2022-04-12 DIAGNOSIS — J96.11 CHRONIC RESPIRATORY FAILURE WITH HYPOXIA (HCC): ICD-10-CM

## 2022-04-12 DIAGNOSIS — I50.32 CHRONIC DIASTOLIC CONGESTIVE HEART FAILURE (HCC): Chronic | ICD-10-CM

## 2022-04-12 PROCEDURE — 99214 OFFICE O/P EST MOD 30 MIN: CPT | Performed by: INTERNAL MEDICINE

## 2022-04-12 NOTE — PROGRESS NOTES
[facility]  Progress Note  Lore Becker MD      Matthieu Joiner   YOB: 1935    Date of Visit:  4/12/2022    Assessment/Plan:  1. SOB (shortness of breath)  Stable  HHN helps her cough up the mcuus    2. COPD, severe ([address]  Worse  PFT 2018:  INTERPRETATION:  Spirometry reveals decreased FVC at 1.65 liters, which is  78% predicted. FEV1 is decreased at 0.81 liters, which is 52% predicted. FEV1/FVC ratio is reduced at 49%. There is no significant change after  inhaled bronchodilators. Lungs volumes reveal normal total lung capacity  and vital capacity. Residual volume has increased at 142% predicted. Diffusion capacity is reduced at 39% predicted.     IMPRESSION:  Moderate to severe obstructive lung disease with air trapping.     Medication Management:  The patient benefits from the medical regimen and reports no complications. Breo ==> hoarseness  Anoro ==> cough  Bevespi ==> expense + she couldn't get a good treatment    Budesonide BID  Perforomist BID   Duoneb  Albuterol HFA prn      3. Aspiration into Airway  Had MBS in hospital showing she is an aspiration risk  Continue to work with Speech therapy      4. Obstructive sleep apnea on BiPAP/Chronic Respiratory Failure. She is back to using her BiPAP but it gives her \"fits\"    5. Chronic diastolic congestive heart failure,  Echocardiogram was reviewed  LVEF is normal  RVSP is elevated  Recently hospitalized with decompensation  Better now  Follow-up with cardiology      FOLLOW UP: 6 months      Chief Complaint   Patient presents with    Shortness of Breath     6 month    Discuss Medications     pt states that the ECF she is at combines her Pulmicort and Perforomist together twice a day    Sleep Apnea     wearing Bipap at night       HPI  The patient presents with a chief complaint of moderate Shortness of breath related to severe COPD of many years duration.  She has moderate associated cough with clear sputum. She had some wheezing yesterday. Exertion is a modifying factor. She also has chronic diastolic heart failure with an RVSP near 60. She also has history of A. fib. She has obstructive sleep apnea and now uses BiPAP. She is at [facility]. .    Review of Systems  No Chest pain, Nausea or vomiting reported      Physical Exam:  Well developed, well nourished  Alert and oriented  Sclera is clear  No cervical adenopathy  No JVD. Chest examination is clear. Cardiac examination reveals regular rate and rhythm without murmur, gallop or rub. The abdomen is soft, nontender and nondistended. There is no clubbing, cyanosis or edema of the extremities. There is no obvious skin rash. No focal neuro deficicts  Normal mood and affect      No Known Allergies  Prior to Visit Medications    Medication Sig Taking?  Authorizing Provider   calcium-vitamin D (OSCAL-500) 500-200 MG-UNIT per tablet Take 1 tablet by mouth daily  Historical Provider, MD   Multiple Vitamins-Minerals (THERAPEUTIC MULTIVITAMIN-MINERALS) tablet Take 1 tablet by mouth daily  Historical Provider, MD   aspirin 81 MG EC tablet Take 1 tablet by mouth daily  Michael Kent MD   clopidogrel (PLAVIX) 75 MG tablet Take 1 tablet by mouth daily  Michael Kent MD   furosemide (LASIX) 20 MG tablet Take 1 tablet by mouth daily  Michael Kent MD   citalopram (CELEXA) 10 MG tablet Take 1 tablet by mouth daily  ELISE Oliva   polyethylene glycol (GLYCOLAX) 17 g packet Take 17 g by mouth daily as needed for Constipation  Historical Provider, MD   omeprazole (PRILOSEC) 20 MG delayed release capsule Take 20 mg by mouth daily  Historical Provider, MD   levocetirizine (XYZAL) 5 MG tablet Take 5 mg by mouth nightly  Historical Provider, MD   potassium chloride (KLOR-CON M) 20 MEQ extended release tablet Take 1 tablet by mouth daily  ELISE Oliva   docusate (COLACE, DULCOLAX) 100 MG CAPS Take 100 mg by mouth 2 times daily  JOSE ALFREDO Meyer - CNP   acetaminophen (TYLENOL) 500 MG tablet Take 500 mg by mouth every 6 hours as needed for Pain  Historical Provider, MD   OXYGEN Use 3L of oxygen all the time  ELISE Ji   budesonide (PULMICORT) 0.5 MG/2ML nebulizer suspension Take 2 mLs by nebulization 2 times daily DX:COPD J44.9  Lydia Morse MD   formoterol (PERFOROMIST) 20 MCG/2ML nebulizer solution Take 2 mLs by nebulization every 12 hours DX:COPD J44.9  Lydia Morse MD   ondansetron (ZOFRAN) 4 MG tablet Take 1 tablet by mouth daily as needed for Nausea or Vomiting  Roxanne Glass MD   oxybutynin (DITROPAN) 5 MG tablet TAKE ONE TABLET BY MOUTH THREE TIMES A DAY  Yvette Richey MD   atorvastatin (LIPITOR) 10 MG tablet TAKE 1 TABLET BY MOUTH DAILY AT BEDTIME. Patient taking differently: Take 10 mg by mouth nightly TAKE 1 TABLET BY MOUTH DAILY AT BEDTIME. Austin Tubbs MD   albuterol sulfate HFA (VENTOLIN HFA) 108 (90 Base) MCG/ACT inhaler Inhale 2 puffs into the lungs every 6 hours as needed for Wheezing  Lydia Morse MD   levothyroxine (SYNTHROID) 75 MCG tablet TAKE 1 TABLET BY MOUTH DAILY  Yvette Richey MD   ipratropium-albuterol (DUONEB) 0.5-2.5 (3) MG/3ML SOLN nebulizer solution Inhale 3 mLs into the lungs every 4 hours  Yvette Richey MD       Vitals:    04/12/22 1557   Pulse: 82   SpO2: 91%     There is no height or weight on file to calculate BMI.      Wt Readings from Last 3 Encounters:   03/17/22 171 lb (77.6 kg)   01/06/22 180 lb 12.8 oz (82 kg)   12/31/21 177 lb 11.1 oz (80.6 kg)     BP Readings from Last 3 Encounters:   03/17/22 112/80   01/07/22 110/60   01/06/22 128/68        Social History     Tobacco Use   Smoking Status Never Smoker   Smokeless Tobacco Never Used

## 2022-04-13 ENCOUNTER — TELEPHONE (OUTPATIENT)
Dept: PULMONOLOGY | Age: 87
End: 2022-04-13

## 2022-04-13 RX ORDER — ALBUTEROL SULFATE 90 UG/1
2 AEROSOL, METERED RESPIRATORY (INHALATION) EVERY 6 HOURS PRN
Qty: 18 G | Refills: 11 | Status: SHIPPED | OUTPATIENT
Start: 2022-04-13 | End: 2023-04-13

## 2022-06-02 ENCOUNTER — TELEPHONE (OUTPATIENT)
Dept: PULMONOLOGY | Age: 87
End: 2022-06-02

## 2022-06-02 NOTE — TELEPHONE ENCOUNTER
Working on [employer]; no data for patient after March 2022. Called only number on file and reached pt's son, Keyana Arriola. He said he would check and see if she has been using the machine, but states that she has been having trouble with the mask again. He was advised of her upcoming appointment, he said he'll be there with her for the VV.

## 2022-06-07 NOTE — TELEPHONE ENCOUNTER
Called George Petit and asked him if he can actived her modem he said the patient needs turn off and waiting an hour and then turn back on, it may will work

## 2022-06-08 NOTE — TELEPHONE ENCOUNTER
Talked with Ethan(son) of patient and he said he will asked to turn off and on, then if it doesn't work he will contact the dme to see if they can fix the modem

## 2022-06-09 ENCOUNTER — TELEMEDICINE (OUTPATIENT)
Dept: PULMONOLOGY | Age: 87
End: 2022-06-09
Payer: MEDICARE

## 2022-06-09 DIAGNOSIS — I10 ESSENTIAL HYPERTENSION: Chronic | ICD-10-CM

## 2022-06-09 DIAGNOSIS — J44.9 COPD, SEVERE (HCC): Chronic | ICD-10-CM

## 2022-06-09 DIAGNOSIS — I25.83 CORONARY ARTERY DISEASE DUE TO LIPID RICH PLAQUE: ICD-10-CM

## 2022-06-09 DIAGNOSIS — E03.9 ACQUIRED HYPOTHYROIDISM: Chronic | ICD-10-CM

## 2022-06-09 DIAGNOSIS — J43.2 CENTRILOBULAR EMPHYSEMA (HCC): ICD-10-CM

## 2022-06-09 DIAGNOSIS — G47.33 OBSTRUCTIVE SLEEP APNEA (ADULT) (PEDIATRIC): Primary | Chronic | ICD-10-CM

## 2022-06-09 DIAGNOSIS — I50.32 CHRONIC DIASTOLIC CONGESTIVE HEART FAILURE (HCC): Chronic | ICD-10-CM

## 2022-06-09 DIAGNOSIS — E66.01 CLASS 3 SEVERE OBESITY WITHOUT SERIOUS COMORBIDITY WITH BODY MASS INDEX (BMI) OF 40.0 TO 44.9 IN ADULT, UNSPECIFIED OBESITY TYPE (HCC): ICD-10-CM

## 2022-06-09 DIAGNOSIS — I25.10 CORONARY ARTERY DISEASE DUE TO LIPID RICH PLAQUE: ICD-10-CM

## 2022-06-09 PROCEDURE — 99214 OFFICE O/P EST MOD 30 MIN: CPT | Performed by: NURSE PRACTITIONER

## 2022-06-09 PROCEDURE — 1123F ACP DISCUSS/DSCN MKR DOCD: CPT | Performed by: NURSE PRACTITIONER

## 2022-06-09 ASSESSMENT — SLEEP AND FATIGUE QUESTIONNAIRES
HOW LIKELY ARE YOU TO NOD OFF OR FALL ASLEEP WHILE WATCHING TV: 3
HOW LIKELY ARE YOU TO NOD OFF OR FALL ASLEEP WHILE SITTING AND READING: 3
HOW LIKELY ARE YOU TO NOD OFF OR FALL ASLEEP WHILE LYING DOWN TO REST IN THE AFTERNOON WHEN CIRCUMSTANCES PERMIT: 2
HOW LIKELY ARE YOU TO NOD OFF OR FALL ASLEEP IN A CAR, WHILE STOPPED FOR A FEW MINUTES IN TRAFFIC: 0
HOW LIKELY ARE YOU TO NOD OFF OR FALL ASLEEP WHILE SITTING QUIETLY AFTER LUNCH WITHOUT ALCOHOL: 2
HOW LIKELY ARE YOU TO NOD OFF OR FALL ASLEEP WHEN YOU ARE A PASSENGER IN A CAR FOR AN HOUR WITHOUT A BREAK: 2
HOW LIKELY ARE YOU TO NOD OFF OR FALL ASLEEP WHILE SITTING AND TALKING TO SOMEONE: 1
HOW LIKELY ARE YOU TO NOD OFF OR FALL ASLEEP WHILE SITTING INACTIVE IN A PUBLIC PLACE: 2
ESS TOTAL SCORE: 15

## 2022-06-09 NOTE — PROGRESS NOTES
Diagnosis: [x] MARIAA (G47.33) [] CSA (G47.31) [] Apnea (G47.30)   Length of Need: [x] 15 Months [] 99 Months [] Other:   Machine (BERTRAND!): [] Respironics Dream Station      Auto [] ResMed AirSense     Auto [] Other:     []  CPAP () [] Bilevel ()   Mode: [] Auto [] Spontaneous    Mode: [] Auto [] Spontaneous             Comfort Settings:      Humidifier: [] Heated ()        [x] Water chamber replacement ()/ 1 per 6 months        Mask:   [] Nasal () /1 per 3 months [x] Full Face () /1 per 3 months   [] Patient choice -Size and fit mask [x] Patient Choice - Size and fit mask   [] Dispense: [] Dispense:   [] Headgear () / 1 per 3 months [x] Headgear () / 1 per 3 months   [] Replacement Nasal Cushion ()/2 per month [x] Interface Replacement ()/1 per month   [] Replacement Nasal Pillows ()/2 per month         Tubing: [x] Heated ()/1 per 3 months    [] Standard ()/1 per 3 months [] Other:           Filters: [x] Non-disposable ()/1 per 6 months     [x] Ultra-Fine, Disposable ()/2 per month        Miscellaneous: [] Chin Strap ()/ 1 per 6 months [] O2 bleed-in:        LPM   [] Oxymetry on CPAP/Bilevel []  Other:         Start Order Date: 06/09/22    MEDICAL JUSTIFICATION:  I, the undersigned, certify that the above prescribed supplies are medically necessary for this patients wellbeing. In my opinion, the supplies are both reasonable and necessary in reference to accepted standards of medicalpractice in treatment of this patients condition. Stacie Boo NP    NPI: [id number]       Order Signed Date: 06/09/22  [facility]  Pulmonary, Sleep, and Critical Care    Pulmonary, Sleep, and Critical Care  [facility] [address] [place] [address].  [facility], [address] [facility] , [address] [facility] [address], [place]  Phone: [phone]    Fax: [address]  1935  [facility]  [address]  [phone] (home)   [phone] (mobile)      Insurance Info (confirm with patient if correct):  Payor/Plan Subscr  Sex Relation Sub.  Ins. ID Effective Group Num

## 2022-06-09 NOTE — PROGRESS NOTES
Samella Lennox MD Henreitta Gores [facility] [address]  [address] [facility]  [address], [address] [facility]- [phone]   [facility] Dr Eliza Parsons. [address] [place] [address]. Vipul Sanabria [address] [phone]     Video Visit- Follow up    Jose Coulter, was evaluated through a synchronous (real-time) audio-video  encounter. The patient (or guardian if applicable) is aware that this is a billable  service, which includes applicable co-pays. This Virtual Visit was conducted with  patient's (and/or legal guardian's) consent. The visit was conducted pursuant to  the emergency declaration under the [address] [place] [address], [address] [place] [address] [facility] authority and the [employer] General Act. Patient identification was verified,  and a caregiver was present when appropriate. The patient was located in a  state where the provider was licensed to provide care. Assessment/Plan:      1. Obstructive sleep apnea (adult) (pediatric)  Assessment & Plan:   Chronic-Stable: Reviewed and analyzed results of physiologic download from patient's machine and reviewed with patient. Supplies and parts as needed for her machine. These are medically necessary. Limit caffeine use after 3pm. Based on the analyzed data will continue with current settings. Stable on her machine at current settings, getting benefit from the use, and having minimal side effects. Encouraged her to contact her equipment company to obtain a replacement modem for her machine. The modem from her previous machine was sent back with the  recall. Her machine settings are most likely factory settings which is quite a bit lower than the pressure she requires. Contact information provided for medical services company. Will place order for overnight oximetry due to 3 L bleed in oxygen with her machine each night.   Encouraged her to obtain updated supplies and try mask liner to help control high mask leak before completing overnight oximetry. Provided resources to obtain mask liners. Discussed controlling mask leak will also help with oral dryness. Discussed adjusting the moisture settings on her machine. Will check machine data in 1 month and call once overnight oximetry results are obtained. Will have a follow-up visit in 6 months or sooner if issues arise. 2. Coronary artery disease due to lipid rich plaque  Assessment & Plan:   Chronic- Stable. Discussed the importance of treating obstructive sleep apnea as part of the management of this disorder. Cont any meds per PCP and other physicians. 3. Chronic diastolic congestive heart failure ([address]  Assessment & Plan:  Chronic- Stable. Discussed the importance of treating obstructive sleep apnea as part of the management of this disorder. Cont any meds per PCP and other physicians. 4. Essential hypertension  Assessment & Plan:   Chronic- Stable. Discussed the importance of treating obstructive sleep apnea as part of the management of this disorder. Cont any meds per PCP and other physicians. 5. COPD, severe ([address]  Assessment & Plan:   Chronic- Stable. Discussed the importance of treating obstructive sleep apnea as part of the management of this disorder. Cont any meds per PCP and other physicians. 6. Centrilobular emphysema ([address]  Assessment & Plan:   Chronic- Stable. Discussed the importance of treating obstructive sleep apnea as part of the management of this disorder. Cont any meds per PCP and other physicians. 7. Acquired hypothyroidism  Assessment & Plan:   Chronic- Stable. Discussed the importance of treating obstructive sleep apnea as part of the management of this disorder. Cont any meds per PCP and other physicians.     8. Class 3 severe obesity without serious comorbidity with body mass index (BMI) of 40.0 to 44.9 in adult, unspecified obesity type [facility])  Assessment & Plan:   Chronic-not stable:  Discussed importance of treating obstructive sleep apnea and getting sufficient sleep to assist with weight control. Encouraged her to work on weight loss through diet and exercise. Recommended DASH or Mediterranean diets. Reviewed, analyzed, and documented physiologic data from patient's PAP machine. This information was analyzed to assess complexity and medical decision making in regards to further testing and management. The primary encounter diagnosis was Obstructive sleep apnea (adult) (pediatric). Diagnoses of Coronary artery disease due to lipid rich plaque, Chronic diastolic congestive heart failure ([facility] Essential hypertension, COPD, severe (HCC), Centrilobular emphysema ([facility] Acquired hypothyroidism, and Class 3 severe obesity without serious comorbidity with body mass index (BMI) of 40.0 to 44.9 in adult, unspecified obesity type [facility]) were also pertinent to this visit. The chronic medical conditions listed are directly related to the primary diagnosis listed above. The management of the primary diagnosis affects the secondary diagnosis and vice versa. Subjective:     Patient ID: Kenya Christine is a 82 y.o. female. Chief Complaint   Patient presents with    Sleep Apnea     Subjective   HPI:  2/25/22- 3/26/22 Modem stopped   Machine Modem/Download Info:  Compliance (hours/night): 7.78 hrs/night  % of nights >= 4 hrs: 90 %  Download AHI (/hour): 1.8 /HR   Average IPAP Pressure: 24 cmH2O  Average EPAP Pressure: 18 cmH2O         AUTO BIPAP - Settings (Yasir)  IPAP Max: 25 cmH2O  EPAP Min: 18 cmH2O  Pressure Support Min: 6  Pressure Support Max: 7             Comfort Settings  Humidity Level (0-8): 0  Flex/EPR (0-3): 3 PAP Mask  Mask Type: Full Face mask  Mask Model: Airtouch F20   Mask Size: Small  Clinically Relevant Leak: Yes     Kenya Christine reports she is doing well with her machine. She received her replacement machine for the  recall.   The modem was not transferred from her older machine to her replacement machine so it is no longer transmitting data. Machine settings are most likely the factory settings. She is using 3L bleed in oxygen with her machine each night. The pressure on her machine is comfortable and she is waking rested. She reports constant oral dryness. She is using Biotene spray, rinse and lozenges. She feels that he did air makes the dryness worse. She has not tried adjusting settings on her machine she denies headaches, congestion, nosebleeds,  aerophagia, or drowsiness while driving. [address]    [place] - Total score: 15    Current Outpatient Medications   Medication Instructions    acetaminophen (TYLENOL) 500 mg, Oral, EVERY 6 HOURS PRN    albuterol sulfate HFA (VENTOLIN HFA) 108 (90 Base) MCG/ACT inhaler 2 puffs, Inhalation, EVERY 6 HOURS PRN    albuterol sulfate  (90 Base) MCG/ACT inhaler 2 puffs, Inhalation, EVERY 6 HOURS PRN    aspirin 81 mg, Oral, DAILY    atorvastatin (LIPITOR) 10 MG tablet TAKE 1 TABLET BY MOUTH DAILY AT BEDTIME.     budesonide (PULMICORT) 500 mcg, Nebulization, 2 TIMES DAILY, DX:COPD J44.9    calcium-vitamin D (OSCAL-500) 500-200 MG-UNIT per tablet 1 tablet, Oral, DAILY    citalopram (CELEXA) 10 mg, Oral, DAILY    clopidogrel (PLAVIX) 75 mg, Oral, DAILY    docusate (COLACE, DULCOLAX) 100 mg, Oral, 2 TIMES DAILY    formoterol (PERFOROMIST) 20 mcg, Nebulization, EVERY 12 HOURS, DX:COPD J44.9    furosemide (LASIX) 20 mg, Oral, DAILY    ipratropium-albuterol (DUONEB) 0.5-2.5 (3) MG/3ML SOLN nebulizer solution 3 mLs, Inhalation, EVERY 4 HOURS    levocetirizine (XYZAL) 5 mg, Oral, NIGHTLY    levothyroxine (SYNTHROID) 75 mcg, Oral, DAILY    Multiple Vitamins-Minerals (THERAPEUTIC MULTIVITAMIN-MINERALS) tablet 1 tablet, Oral, DAILY    omeprazole (PRILOSEC) 20 mg, Oral, DAILY    ondansetron (ZOFRAN) 4 mg, Oral, DAILY PRN    oxybutynin (DITROPAN) 5 MG tablet TAKE ONE TABLET BY MOUTH THREE TIMES A DAY    OXYGEN Use 3L of oxygen all the time    polyethylene glycol (GLYCOLAX) 17 g, Oral, DAILY PRN    potassium chloride (KLOR-CON M) 20 MEQ extended release tablet 20 mEq, Oral, DAILY        Electronically signed by JOSE ALFREDO Reilly on 6/9/2022 at 10:17 AM

## 2022-06-09 NOTE — ASSESSMENT & PLAN NOTE
Chronic-Stable: Reviewed and analyzed results of physiologic download from patient's machine and reviewed with patient. Supplies and parts as needed for her machine. These are medically necessary. Limit caffeine use after 3pm. Based on the analyzed data will continue with current settings. Stable on her machine at current settings, getting benefit from the use, and having minimal side effects. Encouraged her to contact her equipment company to obtain a replacement modem for her machine. The modem from her previous machine was sent back with the  recall. Her machine settings are most likely factory settings which is quite a bit lower than the pressure she requires. Contact information provided for medical services company. Will place order for overnight oximetry due to 3 L bleed in oxygen with her machine each night. Encouraged her to obtain updated supplies and try mask liner to help control high mask leak before completing overnight oximetry. Provided resources to obtain mask liners. Discussed controlling mask leak will also help with oral dryness. Discussed adjusting the moisture settings on her machine. Will check machine data in 1 month and call once overnight oximetry results are obtained. Will have a follow-up visit in 6 months or sooner if issues arise.

## 2022-07-07 ENCOUNTER — TELEPHONE (OUTPATIENT)
Dept: PULMONOLOGY | Age: 87
End: 2022-07-07

## 2022-07-07 NOTE — TELEPHONE ENCOUNTER
Upon further review, patient was to work on getting a different mask to control mask leak before the overnight oximetry was complete. I would encourage her to work with her equipment company on mask fit to control mask leak if she has not already done so.

## 2022-07-07 NOTE — TELEPHONE ENCOUNTER
I checked again and I don't see an overnight ordered at [address] [place] [address]. Did I miss something or is this correct?

## 2022-07-07 NOTE — TELEPHONE ENCOUNTER
Attempted to review machine data. Patients modem is still inactive and her machine is most likely on factory settings due to receiving her replacement machine and not transferring the modem. She will need to bring her machine to our office or DME to have machine evaluated for the correct pressure and downloaded. An overnight oximetry was also ordered during her last visit and it does not appear that she has completed. Please have her schedule a time to bring her machine in to have it downloaded and settings evaluated. Once settings are verified on her machine, would encourage her to complete overnight oximetry.

## 2022-07-07 NOTE — TELEPHONE ENCOUNTER
I called the patient's son and informed him per Talita Jean - he will bring the machine to [place] on Monday. He asked what to do about the modem and I gave him the number to [employer] to replace it. Re: the overnight oximetry, he said that [facility] is incompetent, he can't get a hold of anyone and they can not find their orders. I told him I would reach out to [facility] about the oximetry and let him know.

## 2022-07-07 NOTE — TELEPHONE ENCOUNTER
I spoke w/Jaron @ [address] [place] [address] and he said they do not have a current order in their system for an overnight oximetry. He gave me an alternate fax number, [phone]. I faxed the order there. He said it should take about 1wk for them to process and reach out to the patient.

## 2022-07-11 ENCOUNTER — TELEPHONE (OUTPATIENT)
Dept: PULMONOLOGY | Age: 87
End: 2022-07-11

## 2022-07-11 NOTE — TELEPHONE ENCOUNTER
Pt's son in with pt's replacement unit that was on standard settings. .Pressure was set to ordered settings. Pt does not have a modem in her unit and # was given to her son to order modem.

## 2022-07-21 ENCOUNTER — TELEPHONE (OUTPATIENT)
Dept: PULMONOLOGY | Age: 87
End: 2022-07-21

## 2022-07-21 NOTE — TELEPHONE ENCOUNTER
Patient's son called back and said he got everything squared away. Per Rommel Mendoza advised him to contact [address] [place] [address] and give them the serial number so we can be linked.

## 2022-07-21 NOTE — PROGRESS NOTES
Thalia Villatoro is a 82 y.o. female who presents for follow-up of HTN and to establish with new provider, former pt CHANTELLE. Lives in Assisted living at [address] [place] [address]. Checks BP at home: Yes  BP numbers: good  Taking medication daily: not on any medication at this time  Side Effects of medication: no    Hx osteoporosis. Received reclast last summer. Didn't have any SE with it. Due for another dose. Hx Depression, on citalopram. Pt states she thought she had been taken off it. Reviewing chart looks like it was restarted in November around the time of surgery. States feels like doing well and ok to stay on it. Hx CHF s/p TAVR follows with cards reg. Breathing has been better and no swelling in legs. Gets weighed every Tuesday. Hx COPD on oxygen. Follows with pulm regularly. Also uses bipap. In motorized wheelchair, wasn't strong enough for walker. Uses a lot of depends, has urgency and can't get to bathroom in time, has nocturnal incontinence as well. There is no height or weight on file to calculate BMI.   Vitals:    07/26/22 1311   BP: 134/66   Site: Left Upper Arm   Position: Sitting   Cuff Size: Large Adult   Pulse: 65   Temp: 97.5 °F (36.4 °C)   TempSrc: Infrared   SpO2: 91%     Wt Readings from Last 3 Encounters:   03/17/22 171 lb (77.6 kg)   01/06/22 180 lb 12.8 oz (82 kg)   12/31/21 177 lb 11.1 oz (80.6 kg)     Today's PHQ:    PHQ Scores 7/26/2022 3/9/2018   PHQ2 Score 0 0   PHQ9 Score 0 0     Interpretation of Total Score Depression Severity: 1-4 = Minimal depression, 5-9 = Mild depression, 10-14 = Moderate depression, 15-19 = Moderately severe depression, 20-27 = Severe depression     No data recorded      Interpretation of Total Score Depression Severity: 1-4 = Minimal depression, 5-9 = Mild depression, 10-14 = Moderate depression, 15-19 = Moderately severe depression, 20-27 = Severe depression     GENERAL:Alert and oriented x 4 NAD, affect appropriate and obese, well hydrated, well developed. In motorized wheelchair. LUNG:clear to auscultation bilaterally with normal respiratory effort  CV: Normal heart sounds, regular rate and rhythm without murmurs  EXTREMETY: no edema,        ASSESSMENT AND PLAN:       Satinder Gomez was seen today for medicare awv. Diagnoses and all orders for this visit:    Essential hypertension  -     CBC; Future  -     Comprehensive Metabolic Panel; Future  -     Lipid Panel; Future  -Stable, continue current medications. (Labs ordered contributed to MDM)    Depression, unspecified depression type  -Stable, continue current medications. IGT (impaired glucose tolerance)  -     Hemoglobin A1C; Future  (Labs ordered contributed to MDM)    Anemia, unspecified type  -     CBC; Future  -     Iron and TIBC; Future  -     Vitamin B12 & Folate; Future  (Labs ordered contributed to MDM)      Age-related osteoporosis without current pathological fracture  -     zoledronic acid (RECLAST) 5 MG/100ML SOLN; Infuse 100 mLs intravenously once for 1 dose  Due for reclast will send to infusion center          Return in about 6 months (around 1/26/2023) for AWV, 30 min.          Portions of Note per  Oswaldo Beaulieu CMA AAMA with corrections and edits per Lowell Olmedo MD.  I agree with entirety of note and was present and performed history and physical.  I also confirm that the note above accurately reflects all work, treatment, procedures, and medical decision making performed by me, Lowell Olmedo MD

## 2022-07-26 ENCOUNTER — OFFICE VISIT (OUTPATIENT)
Dept: FAMILY MEDICINE CLINIC | Age: 87
End: 2022-07-26
Payer: MEDICARE

## 2022-07-26 VITALS
DIASTOLIC BLOOD PRESSURE: 66 MMHG | TEMPERATURE: 97.5 F | OXYGEN SATURATION: 91 % | HEART RATE: 65 BPM | SYSTOLIC BLOOD PRESSURE: 134 MMHG

## 2022-07-26 DIAGNOSIS — M81.0 AGE-RELATED OSTEOPOROSIS WITHOUT CURRENT PATHOLOGICAL FRACTURE: ICD-10-CM

## 2022-07-26 DIAGNOSIS — D64.9 ANEMIA, UNSPECIFIED TYPE: ICD-10-CM

## 2022-07-26 DIAGNOSIS — M81.0 SENILE OSTEOPOROSIS: ICD-10-CM

## 2022-07-26 DIAGNOSIS — I10 ESSENTIAL HYPERTENSION: Primary | ICD-10-CM

## 2022-07-26 DIAGNOSIS — R73.02 IGT (IMPAIRED GLUCOSE TOLERANCE): ICD-10-CM

## 2022-07-26 DIAGNOSIS — F32.A DEPRESSION, UNSPECIFIED DEPRESSION TYPE: ICD-10-CM

## 2022-07-26 PROBLEM — E78.00 HYPERCHOLESTEREMIA: Status: RESOLVED | Noted: 2021-08-06 | Resolved: 2022-07-26

## 2022-07-26 PROBLEM — I35.0 AORTIC STENOSIS, SEVERE: Status: RESOLVED | Noted: 2021-12-28 | Resolved: 2022-07-26

## 2022-07-26 PROBLEM — H66.011 ACUTE SUPPURATIVE OTITIS MEDIA OF RIGHT EAR WITH SPONTANEOUS RUPTURE OF TYMPANIC MEMBRANE: Status: RESOLVED | Noted: 2019-06-04 | Resolved: 2022-07-26

## 2022-07-26 PROBLEM — H72.01 CENTRAL PERFORATION OF TYMPANIC MEMBRANE OF RIGHT EAR: Status: RESOLVED | Noted: 2019-06-04 | Resolved: 2022-07-26

## 2022-07-26 PROBLEM — R60.9 PITTING EDEMA: Status: RESOLVED | Noted: 2018-06-28 | Resolved: 2022-07-26

## 2022-07-26 PROBLEM — R06.02 SOB (SHORTNESS OF BREATH): Chronic | Status: RESOLVED | Noted: 2018-02-02 | Resolved: 2022-07-26

## 2022-07-26 PROBLEM — I50.33 ACUTE ON CHRONIC DIASTOLIC HEART FAILURE (HCC): Status: RESOLVED | Noted: 2018-06-21 | Resolved: 2022-07-26

## 2022-07-26 PROBLEM — J18.9 MULTIFOCAL PNEUMONIA: Status: RESOLVED | Noted: 2018-04-04 | Resolved: 2022-07-26

## 2022-07-26 PROBLEM — H91.93 BILATERAL HEARING LOSS: Status: RESOLVED | Noted: 2019-06-18 | Resolved: 2022-07-26

## 2022-07-26 PROBLEM — J96.00 ACUTE RESPIRATORY FAILURE (HCC): Status: RESOLVED | Noted: 2021-07-16 | Resolved: 2022-07-26

## 2022-07-26 PROBLEM — T17.908A ASPIRATION INTO AIRWAY: Status: RESOLVED | Noted: 2021-08-10 | Resolved: 2022-07-26

## 2022-07-26 PROBLEM — N17.9 AKI (ACUTE KIDNEY INJURY) (HCC): Status: RESOLVED | Noted: 2018-06-28 | Resolved: 2022-07-26

## 2022-07-26 PROBLEM — J44.9 COPD, SEVERE (HCC): Chronic | Status: RESOLVED | Noted: 2018-01-12 | Resolved: 2022-07-26

## 2022-07-26 PROBLEM — J96.01 ACUTE RESPIRATORY FAILURE WITH HYPOXEMIA (HCC): Status: RESOLVED | Noted: 2021-11-29 | Resolved: 2022-07-26

## 2022-07-26 PROCEDURE — 1123F ACP DISCUSS/DSCN MKR DOCD: CPT | Performed by: FAMILY MEDICINE

## 2022-07-26 PROCEDURE — 99214 OFFICE O/P EST MOD 30 MIN: CPT | Performed by: FAMILY MEDICINE

## 2022-07-26 RX ORDER — ZOLEDRONIC ACID 5 MG/100ML
5 INJECTION, SOLUTION INTRAVENOUS ONCE
Qty: 100 ML | Refills: 0 | Status: SHIPPED | OUTPATIENT
Start: 2022-07-26 | End: 2022-07-26

## 2022-07-26 RX ORDER — SODIUM CHLORIDE 9 MG/ML
INJECTION, SOLUTION INTRAVENOUS ONCE
Status: CANCELLED | OUTPATIENT
Start: 2022-07-26 | End: 2022-07-26

## 2022-07-26 RX ORDER — ZOLEDRONIC ACID 5 MG/100ML
5 INJECTION, SOLUTION INTRAVENOUS ONCE
Status: CANCELLED | OUTPATIENT
Start: 2022-07-26 | End: 2022-07-26

## 2022-07-26 RX ORDER — SODIUM CHLORIDE 0.9 % (FLUSH) 0.9 %
5-40 SYRINGE (ML) INJECTION PRN
Status: CANCELLED | OUTPATIENT
Start: 2022-07-26

## 2022-07-26 ASSESSMENT — PATIENT HEALTH QUESTIONNAIRE - PHQ9
SUM OF ALL RESPONSES TO PHQ QUESTIONS 1-9: 0
8. MOVING OR SPEAKING SO SLOWLY THAT OTHER PEOPLE COULD HAVE NOTICED. OR THE OPPOSITE, BEING SO FIGETY OR RESTLESS THAT YOU HAVE BEEN MOVING AROUND A LOT MORE THAN USUAL: 0
SUM OF ALL RESPONSES TO PHQ9 QUESTIONS 1 & 2: 0
SUM OF ALL RESPONSES TO PHQ QUESTIONS 1-9: 0
2. FEELING DOWN, DEPRESSED OR HOPELESS: 0
7. TROUBLE CONCENTRATING ON THINGS, SUCH AS READING THE NEWSPAPER OR WATCHING TELEVISION: 0
3. TROUBLE FALLING OR STAYING ASLEEP: 0
6. FEELING BAD ABOUT YOURSELF - OR THAT YOU ARE A FAILURE OR HAVE LET YOURSELF OR YOUR FAMILY DOWN: 0
10. IF YOU CHECKED OFF ANY PROBLEMS, HOW DIFFICULT HAVE THESE PROBLEMS MADE IT FOR YOU TO DO YOUR WORK, TAKE CARE OF THINGS AT HOME, OR GET ALONG WITH OTHER PEOPLE: 0
SUM OF ALL RESPONSES TO PHQ QUESTIONS 1-9: 0
5. POOR APPETITE OR OVEREATING: 0
SUM OF ALL RESPONSES TO PHQ QUESTIONS 1-9: 0
9. THOUGHTS THAT YOU WOULD BE BETTER OFF DEAD, OR OF HURTING YOURSELF: 0
1. LITTLE INTEREST OR PLEASURE IN DOING THINGS: 0
4. FEELING TIRED OR HAVING LITTLE ENERGY: 0

## 2022-07-28 ENCOUNTER — TELEPHONE (OUTPATIENT)
Dept: FAMILY MEDICINE CLINIC | Age: 87
End: 2022-07-28

## 2022-07-28 NOTE — TELEPHONE ENCOUNTER
Called Luly to let her know Dr. Hay Hollins response below. They will call patient's pulmonologist Dr. Kathleen Fairbanks tomorrow.

## 2022-07-28 NOTE — TELEPHONE ENCOUNTER
----- Message from SHARA MCNAIR sent at 7/28/2022  4:25 PM EDT -----  Subject: Message to Provider    QUESTIONS  Information for Provider? Luly from [facility]   ([address]) is trying to reach out to office about a lung   scan. She was told by a nurse by Kat Merritt that she needed one and to schedule   with the scheduling team. They told her that they did not have an order in   the system so she is needing to order in the system so she can schedule   it. Please give her a call back ASAP.  ---------------------------------------------------------------------------  --------------  [employer]  [phone]; OK to leave message on voicemail  ---------------------------------------------------------------------------  --------------  SCRIPT ANSWERS  Relationship to Patient? Third Party  Third Party Type? [address]? Representative Name?  Luly

## 2022-07-29 ENCOUNTER — TELEPHONE (OUTPATIENT)
Dept: CASE MANAGEMENT | Age: 87
End: 2022-07-29

## 2022-07-29 NOTE — TELEPHONE ENCOUNTER
CB from care taker. She s/w PCP and said order would come from Dr David King and has appt next month. Pt not having any issues, she just had a paper she was f/u on.

## 2022-07-29 NOTE — TELEPHONE ENCOUNTER
S/w care taker yesterday and she was going to call me back. I called care taker again today [phone] to call back, she had questions about a lung screen.

## 2022-08-16 ENCOUNTER — HOSPITAL ENCOUNTER (OUTPATIENT)
Dept: ONCOLOGY | Age: 87
Setting detail: INFUSION SERIES
Discharge: HOME | End: 2022-08-16
Payer: MEDICARE

## 2022-08-16 ENCOUNTER — TELEPHONE (OUTPATIENT)
Dept: FAMILY MEDICINE CLINIC | Age: 87
End: 2022-08-16

## 2022-08-16 ENCOUNTER — HOSPITAL ENCOUNTER (OUTPATIENT)
Dept: WOMENS IMAGING | Age: 87
Discharge: HOME | End: 2022-08-16
Payer: MEDICARE

## 2022-08-16 VITALS
HEART RATE: 72 BPM | TEMPERATURE: 98.5 F | RESPIRATION RATE: 16 BRPM | DIASTOLIC BLOOD PRESSURE: 72 MMHG | SYSTOLIC BLOOD PRESSURE: 138 MMHG

## 2022-08-16 VITALS — HEIGHT: 60 IN | WEIGHT: 177 LBS | BODY MASS INDEX: 34.75 KG/M2

## 2022-08-16 DIAGNOSIS — M81.0 SENILE OSTEOPOROSIS: Primary | ICD-10-CM

## 2022-08-16 DIAGNOSIS — Z12.31 BREAST CANCER SCREENING BY MAMMOGRAM: ICD-10-CM

## 2022-08-16 PROCEDURE — 96365 THER/PROPH/DIAG IV INF INIT: CPT

## 2022-08-16 PROCEDURE — 6360000002 HC RX W HCPCS: Performed by: FAMILY MEDICINE

## 2022-08-16 PROCEDURE — 99211 OFF/OP EST MAY X REQ PHY/QHP: CPT

## 2022-08-16 PROCEDURE — 2580000003 HC RX 258: Performed by: FAMILY MEDICINE

## 2022-08-16 PROCEDURE — 77067 SCR MAMMO BI INCL CAD: CPT

## 2022-08-16 RX ORDER — ZOLEDRONIC ACID 5 MG/100ML
5 INJECTION, SOLUTION INTRAVENOUS ONCE
Status: CANCELLED | OUTPATIENT
Start: 2022-08-16 | End: 2022-08-16

## 2022-08-16 RX ORDER — SODIUM CHLORIDE 0.9 % (FLUSH) 0.9 %
5-40 SYRINGE (ML) INJECTION PRN
Status: DISCONTINUED | OUTPATIENT
Start: 2022-08-16 | End: 2022-08-16

## 2022-08-16 RX ORDER — SODIUM CHLORIDE 9 MG/ML
INJECTION, SOLUTION INTRAVENOUS ONCE
Status: COMPLETED | OUTPATIENT
Start: 2022-08-16 | End: 2022-08-16

## 2022-08-16 RX ORDER — ZOLEDRONIC ACID 5 MG/100ML
5 INJECTION, SOLUTION INTRAVENOUS ONCE
Status: COMPLETED | OUTPATIENT
Start: 2022-08-16 | End: 2022-08-16

## 2022-08-16 RX ORDER — SODIUM CHLORIDE 0.9 % (FLUSH) 0.9 %
5-40 SYRINGE (ML) INJECTION PRN
Status: CANCELLED | OUTPATIENT
Start: 2022-08-16

## 2022-08-16 RX ORDER — SODIUM CHLORIDE 9 MG/ML
INJECTION, SOLUTION INTRAVENOUS ONCE
Status: CANCELLED | OUTPATIENT
Start: 2022-08-16 | End: 2022-08-16

## 2022-08-16 RX ADMIN — SODIUM CHLORIDE: 9 INJECTION, SOLUTION INTRAVENOUS at 11:03

## 2022-08-16 RX ADMIN — ZOLEDRONIC ACID 5 MG: 0.05 INJECTION, SOLUTION INTRAVENOUS at 11:04

## 2022-08-16 RX ADMIN — Medication 10 ML: at 11:03

## 2022-08-16 NOTE — TELEPHONE ENCOUNTER
Received plan of care/orders from [facility]. Provider signed orders and has been faxed to home care agency.

## 2022-08-30 ENCOUNTER — HOSPITAL ENCOUNTER (OUTPATIENT)
Dept: ONCOLOGY | Age: 87
Discharge: HOME | End: 2022-08-30

## 2022-09-01 DIAGNOSIS — I35.0 NONRHEUMATIC AORTIC VALVE STENOSIS: Primary | ICD-10-CM

## 2022-10-11 ENCOUNTER — OFFICE VISIT (OUTPATIENT)
Dept: PULMONOLOGY | Age: 87
End: 2022-10-11
Payer: MEDICARE

## 2022-10-11 VITALS — OXYGEN SATURATION: 95 % | HEART RATE: 81 BPM

## 2022-10-11 DIAGNOSIS — J43.2 CENTRILOBULAR EMPHYSEMA (HCC): ICD-10-CM

## 2022-10-11 DIAGNOSIS — G47.33 OBSTRUCTIVE SLEEP APNEA (ADULT) (PEDIATRIC): Chronic | ICD-10-CM

## 2022-10-11 DIAGNOSIS — J96.11 CHRONIC RESPIRATORY FAILURE WITH HYPOXIA (HCC): ICD-10-CM

## 2022-10-11 DIAGNOSIS — J44.9 COPD, SEVERE (HCC): Primary | Chronic | ICD-10-CM

## 2022-10-11 DIAGNOSIS — I50.32 CHRONIC DIASTOLIC CONGESTIVE HEART FAILURE (HCC): Chronic | ICD-10-CM

## 2022-10-11 PROCEDURE — 1123F ACP DISCUSS/DSCN MKR DOCD: CPT | Performed by: INTERNAL MEDICINE

## 2022-10-11 PROCEDURE — 99214 OFFICE O/P EST MOD 30 MIN: CPT | Performed by: INTERNAL MEDICINE

## 2022-10-11 RX ORDER — BUDESONIDE 0.5 MG/2ML
1 INHALANT ORAL 2 TIMES DAILY
COMMUNITY

## 2022-10-11 NOTE — PROGRESS NOTES
[facility]  Progress Note  Varsha Tate MD      Luciana Gil   YOB: 1935    Date of Visit:  10/11/2022    Assessment/Plan:  1. SOB (shortness of breath)  Stable  HHN helps her cough up the mcuus    2. COPD, severe ([address]  Worse  PFT 2018:  INTERPRETATION:  Spirometry reveals decreased FVC at 1.65 liters, which is  78% predicted. FEV1 is decreased at 0.81 liters, which is 52% predicted. FEV1/FVC ratio is reduced at 49%. There is no significant change after  inhaled bronchodilators. Lungs volumes reveal normal total lung capacity  and vital capacity. Residual volume has increased at 142% predicted. Diffusion capacity is reduced at 39% predicted. IMPRESSION:  Moderate to severe obstructive lung disease with air trapping. Medication Management:  The patient benefits from the medical regimen and reports no complications. Breo ==> hoarseness  Anoro ==> cough  Bevespi ==> expense + she couldn't get a good treatment    Budesonide BID  Perforomist BID   Duoneb  Albuterol HFA prn    Seems she is getting her maintenance med only once per day. Ask the NH to gibe it BID as ordered. 3. Aspiration into Airway  Had MBS in hospital showing she is an aspiration risk  Continue to work with Speech therapy      4. Obstructive sleep apnea on BiPAP/Chronic Respiratory Failure. She is back to using her BiPAP but it gives her \"fits\". She should be having O2 bleed into her BiPAP but feels that she is not getting this. Will also have to get the NH to do this. Oxygen saturation on room air with exertion in the office today is 87%. The patient would benefit from supplemental oxygen with exertion and sleep  The patient is independently mobile within the home and would benefit from a portable oxygen concentrator      5.  Chronic diastolic congestive heart failure,  Echocardiogram was reviewed  LVEF is normal  RVSP is elevated  Follows with cardiology      FOLLOW UP: 6 months      Chief Complaint   Patient presents with    Shortness of Breath     6 month        HPI  The patient presents with a chief complaint of moderate Shortness of breath related to severe COPD of many years duration. She has moderate associated cough with clear sputum. She had some wheezing yesterday. Exertion is a modifying factor. She also has chronic diastolic heart failure with an RVSP near 60. She also has history of A. fib. She has obstructive sleep apnea and now uses BiPAP. [facility] is at [facility]. .    Review of Systems  No Chest pain, Nausea or vomiting reported      Physical Exam:  Well developed, well nourished  Alert and oriented  Sclera is clear  No cervical adenopathy  No JVD. Chest examination is clear. Cardiac examination reveals regular rate and rhythm without murmur, gallop or rub. The abdomen is soft, nontender and nondistended. There is no clubbing, cyanosis or edema of the extremities. There is no obvious skin rash. No focal neuro deficicts  Normal mood and affect      No Known Allergies  Prior to Visit Medications    Medication Sig Taking?  Authorizing Provider   budesonide (PULMICORT) 0.5 MG/2ML nebulizer suspension Take 1 ampule by nebulization 2 times daily Yes Historical Provider, MD   albuterol sulfate  (90 Base) MCG/ACT inhaler Inhale 2 puffs into the lungs every 6 hours as needed for Wheezing Yes Edvin Julien MD   calcium-vitamin D (OSCAL-500) 500-200 MG-UNIT per tablet Take 1 tablet by mouth daily Yes Historical Provider, MD   Multiple Vitamins-Minerals (THERAPEUTIC MULTIVITAMIN-MINERALS) tablet Take 1 tablet by mouth daily Yes Historical Provider, MD   aspirin 81 MG EC tablet Take 1 tablet by mouth daily Yes Eamon Campos MD   furosemide (LASIX) 20 MG tablet Take 1 tablet by mouth daily Yes Eamon Campos MD   citalopram (CELEXA) 10 MG tablet Take 1 tablet by mouth daily Yes ELISE Lal   polyethylene glycol (GLYCOLAX) 17 g packet Take 17 g by mouth daily as needed for Constipation Yes Historical Provider, MD   omeprazole (PRILOSEC) 20 MG delayed release capsule Take 20 mg by mouth daily Yes Historical Provider, MD   levocetirizine (XYZAL) 5 MG tablet Take 5 mg by mouth nightly Yes Historical Provider, MD   potassium chloride (KLOR-CON M) 20 MEQ extended release tablet Take 1 tablet by mouth daily Yes ELISE Morillo   docusate (COLACE, DULCOLAX) 100 MG CAPS Take 100 mg by mouth 2 times daily Yes [address] [place] [address]   OXYGEN Use 3L of oxygen all the time Yes ELISE Morillo   formoterol (PERFOROMIST) 20 MCG/2ML nebulizer solution Take 2 mLs by nebulization every 12 hours DX:COPD J44.9 Yes Cameron Lorenz MD   ondansetron (ZOFRAN) 4 MG tablet Take 1 tablet by mouth daily as needed for Nausea or Vomiting Yes Ania Urbina MD   oxybutynin (DITROPAN) 5 MG tablet TAKE ONE TABLET BY MOUTH THREE TIMES A DAY Yes Dave Werner MD   atorvastatin (LIPITOR) 10 MG tablet TAKE 1 TABLET BY MOUTH DAILY AT BEDTIME. Patient taking differently: Take 10 mg by mouth nightly TAKE 1 TABLET BY MOUTH DAILY AT BEDTIME. Yes Inderjit Melissa MD   levothyroxine (SYNTHROID) 75 MCG tablet TAKE 1 TABLET BY MOUTH DAILY Yes Dave Werner MD   ipratropium-albuterol (DUONEB) 0.5-2.5 (3) MG/3ML SOLN nebulizer solution Inhale 3 mLs into the lungs every 4 hours Yes Dave Werner MD   zoledronic acid (RECLAST) 5 MG/100ML SOLN Infuse 100 mLs intravenously once for 1 dose  Daev Werner MD   acetaminophen (TYLENOL) 500 MG tablet Take 500 mg by mouth every 6 hours as needed for Pain  Historical Provider, MD   budesonide (PULMICORT) 0.5 MG/2ML nebulizer suspension Take 2 mLs by nebulization 2 times daily DX:COPD J44.9  Cameron Lorenz MD       Vitals:    10/11/22 5570 10/11/22 0940   Pulse: 81    SpO2: (!) 87% 95%     There is no height or weight on file to calculate BMI.      Wt Readings from Last 3 Encounters: 08/16/22 177 lb (80.3 kg)   03/17/22 171 lb (77.6 kg)   01/06/22 180 lb 12.8 oz (82 kg)     BP Readings from Last 3 Encounters:   08/16/22 138/72   07/26/22 134/66   03/17/22 112/80        Social History     Tobacco Use   Smoking Status Never   Smokeless Tobacco Never

## 2022-12-13 ENCOUNTER — TELEMEDICINE (OUTPATIENT)
Dept: PULMONOLOGY | Age: 87
End: 2022-12-13
Payer: MEDICARE

## 2022-12-13 DIAGNOSIS — I25.83 CORONARY ARTERY DISEASE DUE TO LIPID RICH PLAQUE: ICD-10-CM

## 2022-12-13 DIAGNOSIS — J96.11 CHRONIC RESPIRATORY FAILURE WITH HYPOXIA (HCC): ICD-10-CM

## 2022-12-13 DIAGNOSIS — I50.32 CHRONIC DIASTOLIC CONGESTIVE HEART FAILURE (HCC): Chronic | ICD-10-CM

## 2022-12-13 DIAGNOSIS — G47.33 OBSTRUCTIVE SLEEP APNEA (ADULT) (PEDIATRIC): Primary | Chronic | ICD-10-CM

## 2022-12-13 DIAGNOSIS — I10 ESSENTIAL HYPERTENSION: Chronic | ICD-10-CM

## 2022-12-13 DIAGNOSIS — I25.10 CORONARY ARTERY DISEASE DUE TO LIPID RICH PLAQUE: ICD-10-CM

## 2022-12-13 DIAGNOSIS — E66.9 CLASS 1 OBESITY WITHOUT SERIOUS COMORBIDITY WITH BODY MASS INDEX (BMI) OF 34.0 TO 34.9 IN ADULT, UNSPECIFIED OBESITY TYPE: ICD-10-CM

## 2022-12-13 DIAGNOSIS — J44.9 COPD, SEVERE (HCC): Chronic | ICD-10-CM

## 2022-12-13 PROCEDURE — 1123F ACP DISCUSS/DSCN MKR DOCD: CPT | Performed by: NURSE PRACTITIONER

## 2022-12-13 PROCEDURE — 99214 OFFICE O/P EST MOD 30 MIN: CPT | Performed by: NURSE PRACTITIONER

## 2022-12-13 RX ORDER — FLUTICASONE FUROATE AND VILANTEROL 100; 25 UG/1; UG/1
1 POWDER RESPIRATORY (INHALATION) DAILY
COMMUNITY
Start: 2017-05-31

## 2022-12-13 RX ORDER — CLOPIDOGREL BISULFATE 75 MG/1
TABLET ORAL
COMMUNITY
Start: 2022-11-22

## 2022-12-13 ASSESSMENT — SLEEP AND FATIGUE QUESTIONNAIRES
ESS TOTAL SCORE: 1
HOW LIKELY ARE YOU TO NOD OFF OR FALL ASLEEP WHILE SITTING INACTIVE IN A PUBLIC PLACE: 0
HOW LIKELY ARE YOU TO NOD OFF OR FALL ASLEEP WHILE SITTING QUIETLY AFTER LUNCH WITHOUT ALCOHOL: 0
HOW LIKELY ARE YOU TO NOD OFF OR FALL ASLEEP WHILE WATCHING TV: 1
HOW LIKELY ARE YOU TO NOD OFF OR FALL ASLEEP WHILE SITTING AND TALKING TO SOMEONE: 0
HOW LIKELY ARE YOU TO NOD OFF OR FALL ASLEEP WHEN YOU ARE A PASSENGER IN A CAR FOR AN HOUR WITHOUT A BREAK: 0
HOW LIKELY ARE YOU TO NOD OFF OR FALL ASLEEP IN A CAR, WHILE STOPPED FOR A FEW MINUTES IN TRAFFIC: 0
HOW LIKELY ARE YOU TO NOD OFF OR FALL ASLEEP WHILE LYING DOWN TO REST IN THE AFTERNOON WHEN CIRCUMSTANCES PERMIT: 0
HOW LIKELY ARE YOU TO NOD OFF OR FALL ASLEEP WHILE SITTING AND READING: 0

## 2022-12-13 NOTE — LETTER
[facility]  [address] [facility]  [address] [address]  Phone: [phone]  Fax: [phone]    December 13, 2022       Patient: Cortez Thurman   MR Number: [id number]   YOB: 1935   Date of Visit: 12/13/2022       Gordo Mcleod was seen for a follow up visit today. Here is my assessment and plan as well as an attached copy of her visit today:    Chronic congestive heart failure ([address]   Chronic- Stable. Discussed the importance of treating obstructive sleep apnea as part of the management of this disorder. Cont any meds per PCP and other physicians. Essential hypertension   Chronic- Stable. Discussed the importance of treating obstructive sleep apnea as part of the management of this disorder. Cont any meds per PCP and other physicians. Coronary artery disease due to lipid rich plaque   Chronic- Stable. Discussed the importance of treating obstructive sleep apnea as part of the management of this disorder. Cont any meds per PCP and other physicians. Chronic respiratory failure with hypoxia (HCC)   Chronic- Stable. Discussed the importance of treating obstructive sleep apnea as part of the management of this disorder. Cont any meds per PCP and other physicians. COPD, severe ([address]   Chronic- Stable. Discussed the importance of treating obstructive sleep apnea as part of the management of this disorder. Cont any meds per PCP and other physicians. Class 1 obesity without serious comorbidity with body mass index (BMI) of 34.0 to 34.9 in adult   Chronic-not stable:  Discussed importance of treating obstructive sleep apnea and getting sufficient sleep to assist with weight control. Encouraged her to work on weight loss through diet and exercise. Recommended DASH or Mediterranean diets.       Obstructive sleep apnea (adult) (pediatric)  Chronic-Stable: Reviewed and analyzed results of physiologic download from patient's machine and reviewed with patient. Supplies and parts as needed for her machine. These are medically necessary. Limit caffeine use after 3pm. Based on the analyzed data will change following settings: EPAP min decreased to 16, Humidity increased to 3. Changes sent via machine modem. Will decrease pressure to help with aerophagia. Spoke with Rodriguez from [facility] and he is going to have someone from  His office contact the patient to schedule a mask fitting and get her a humidifier for her oxygen concentrator. Discussed adjusting the moisture settings on her machine. Once mask leak is controlled, she will need an overnight oximetry to assess adequacy of pressure and bleed in oxygen. Will see her back in 3 months. Encouraged her to contact the office with any questions or concerns. If you have questions or concerns, please do not hesitate to call me. I look forward to following Jenny Gutierres along with you.     Sincerely,    JOSE ALFREDO Weaver    CC providers:  Violet Waters MD  [address] [facility] [address] [address]

## 2022-12-13 NOTE — PROGRESS NOTES
Diagnosis: [x] MARIAA (G47.33) [] CSA (G47.31) [] Apnea (G47.30)   Length of Need: [x] 15 Months [] 99 Months [] Other:   Machine (BERTRAND!): [] Respironics Dream Station      Auto [] ResMed AirSense     Auto [] Other:     []  CPAP () [] Bilevel ()   Mode: [] Auto [] Spontaneous    Mode: [] Auto [] Spontaneous             Comfort Settings:      Humidifier: [] Heated ()        [x] Water chamber replacement ()/ 1 per 6 months        Mask:   [] Nasal () /1 per 3 months [x] Full Face () /1 per 3 months   [] Patient choice -Size and fit mask [x] Patient Choice - Size and fit mask   [] Dispense: [] Dispense:   [] Headgear () / 1 per 3 months [x] Headgear () / 1 per 3 months   [] Replacement Nasal Cushion ()/2 per month [x] Interface Replacement ()/1 per month   [] Replacement Nasal Pillows ()/2 per month         Tubing: [x] Heated ()/1 per 3 months    [] Standard ()/1 per 3 months [] Other:           Filters: [x] Non-disposable ()/1 per 6 months     [x] Ultra-Fine, Disposable ()/2 per month        Miscellaneous: [] Chin Strap ()/ 1 per 6 months [] O2 bleed-in:        LPM   [] Oxymetry on CPAP/Bilevel []  Other:         Start Order Date: 12/13/22    MEDICAL JUSTIFICATION:  I, the undersigned, certify that the above prescribed supplies are medically necessary for this patients wellbeing. In my opinion, the supplies are both reasonable and necessary in reference to accepted standards of medicalpractice in treatment of this patients condition. Brenda Gabriel NP    NPI: [id number]       Order Signed Date: 12/13/22  [address] [facility]  Pulmonary, Sleep, and Critical Care    Pulmonary, Sleep, and Critical Care  On license of [facility] [address] [place] [address].  [address] [facility], [address] [facility] , [address] [facility] [address], [place]  Phone: [phone]    Fax: [address] [facility]  1935  [address], [address]  [phone] (home)   [phone] (mobile)      Insurance Info (confirm with patient if correct):  Payer/Plan Subscr  Sex Relation Sub.  Ins. ID Effective Group Num

## 2022-12-13 NOTE — ASSESSMENT & PLAN NOTE
Chronic-Stable: Reviewed and analyzed results of physiologic download from patient's machine and reviewed with patient. Supplies and parts as needed for her machine. These are medically necessary. Limit caffeine use after 3pm. Based on the analyzed data will change following settings: EPAP min decreased to 16, Humidity increased to 3. Changes sent via machine modem. Will decrease pressure to help with aerophagia. Spoke with Rodriguez from [facility] and he is going to have someone from  His office contact the patient to schedule a mask fitting and get her a humidifier for her oxygen concentrator. Discussed adjusting the moisture settings on her machine. Once mask leak is controlled, she will need an overnight oximetry to assess adequacy of pressure and bleed in oxygen. Will see her back in 3 months. Encouraged her to contact the office with any questions or concerns.

## 2022-12-13 NOTE — PROGRESS NOTES
Gustabo Franklin MD  [facility] [address]  [address] [facility]  [address], [address] [facility]- [phone]   [facility] Guido Hernandez. [address] [place] [address]. [address] [phone]     Video Visit- Follow up    Trudy Colón, was evaluated through a synchronous (real-time) audio-video  encounter. The patient (or guardian if applicable) is aware that this is a billable  service, which includes applicable co-pays. This Virtual Visit was conducted with  patient's (and/or legal guardian's) consent. The visit was conducted pursuant to  the emergency declaration under the [address] [facility], [address] [place] [address] waiver authority and the [place] and  [employer] General Act. Patient identification was verified,  and a caregiver was present when appropriate. The patient was located in a  state where the provider was licensed to provide care. Assessment/Plan:      1. Obstructive sleep apnea (adult) (pediatric)  Assessment & Plan:  Chronic-Stable: Reviewed and analyzed results of physiologic download from patient's machine and reviewed with patient. Supplies and parts as needed for her machine. These are medically necessary. Limit caffeine use after 3pm. Based on the analyzed data will change following settings: EPAP min decreased to 16, Humidity increased to 3. Changes sent via machine modem. Will decrease pressure to help with aerophagia. Spoke with Rodriguez from [facility] and he is going to have someone from  His office contact the patient to schedule a mask fitting and get her a humidifier for her oxygen concentrator. Discussed adjusting the moisture settings on her machine. Once mask leak is controlled, she will need an overnight oximetry to assess adequacy of pressure and bleed in oxygen. Will see her back in 3 months. Encouraged her to contact the office with any questions or concerns.       2. Coronary artery disease due to lipid rich plaque  Assessment & Plan:   Chronic- Stable. Discussed the importance of treating obstructive sleep apnea as part of the management of this disorder. Cont any meds per PCP and other physicians. 3. Chronic diastolic congestive heart failure ([address]  Assessment & Plan:   Chronic- Stable. Discussed the importance of treating obstructive sleep apnea as part of the management of this disorder. Cont any meds per PCP and other physicians. 4. Essential hypertension  Assessment & Plan:   Chronic- Stable. Discussed the importance of treating obstructive sleep apnea as part of the management of this disorder. Cont any meds per PCP and other physicians. 5. Chronic respiratory failure with hypoxia (HCC)  Assessment & Plan:   Chronic- Stable. Discussed the importance of treating obstructive sleep apnea as part of the management of this disorder. Cont any meds per PCP and other physicians. 6. COPD, severe ([address]  Assessment & Plan:   Chronic- Stable. Discussed the importance of treating obstructive sleep apnea as part of the management of this disorder. Cont any meds per PCP and other physicians. 7. Class 1 obesity without serious comorbidity with body mass index (BMI) of 34.0 to 34.9 in adult, unspecified obesity type  Assessment & Plan:   Chronic-not stable:  Discussed importance of treating obstructive sleep apnea and getting sufficient sleep to assist with weight control. Encouraged her to work on weight loss through diet and exercise. Recommended DASH or Mediterranean diets. Reviewed, analyzed, and documented physiologic data from patient's PAP machine. This information was analyzed to assess complexity and medical decision making in regards to further testing and management. The primary encounter diagnosis was Obstructive sleep apnea (adult) (pediatric).  Diagnoses of Coronary artery disease due to lipid rich plaque, Chronic diastolic congestive heart failure ([address] Essential hypertension, Chronic respiratory failure with hypoxia ([facility] COPD, severe ([facility] and Class 1 obesity without serious comorbidity with body mass index (BMI) of 34.0 to 34.9 in adult, unspecified obesity type were also pertinent to this visit. The chronic medical conditions listed are directly related to the primary diagnosis listed above. The management of the primary diagnosis affects the secondary diagnosis and vice versa. Subjective:     Patient ID: Brandon Alarcon is a 82 y.o. female. Chief Complaint   Patient presents with    Sleep Apnea     Subjective   HPI:    Machine Modem/Download Info:  Compliance (hours/night): 7.6 hrs/night  % of nights >= 4 hrs: 95.6 %  Download AHI (/hour): 4.7 /HR   Average IPAP Pressure: 24 cmH2O  Average EPAP Pressure: 18 cmH2O         AUTO BIPAP - Settings (Yasir)  IPAP Max: 25 cmH2O  EPAP Min: 18 cmH2O  Pressure Support Min: 6  Pressure Support Max: 7             Comfort Settings  Humidity Level (0-8): 0  Flex/EPR (0-3): 3 PAP Mask  Mask Type: Full Face mask     Brandon Alarcon presents today for follow up for sleep apnea. She has received her replacement machine for the  recall. She reports she is doing well with her machine. She continues to use 3 L bleed in oxygen with her machine each night. She will also use 3 L with exertion and when out of the house. The pressure on her machine feels too high and she is having trouble with aerophagia. She continues to have trouble with her mask leaking and dryness. She has not tried adjusting the moisture settings on her machine. Her concentrator does not have a humidifier. Someone from [facility] came to her house for a mask fitting but did not resolve the mask leak. She attempted to contact [facility] but waited for over 30 minutes and had to hang up. she denies headaches, congestion, nosebleeds, or drowsiness while driving.     [address]    [place] Sleepiness Score: 1    Current Outpatient Medications   Medication Instructions    acetaminophen (TYLENOL) 500 mg, Oral, EVERY 6 HOURS PRN    albuterol sulfate  (90 Base) MCG/ACT inhaler 2 puffs, Inhalation, EVERY 6 HOURS PRN    aspirin 81 mg, Oral, DAILY    atorvastatin (LIPITOR) 10 MG tablet TAKE 1 TABLET BY MOUTH DAILY AT BEDTIME.    budesonide (PULMICORT) 0.5 MG/2ML nebulizer suspension 1 ampule, Nebulization, 2 TIMES DAILY    budesonide (PULMICORT) 500 mcg, Nebulization, 2 TIMES DAILY, DX:COPD J44.9    calcium-vitamin D (OSCAL-500) 500-200 MG-UNIT per tablet 1 tablet, Oral, DAILY    citalopram (CELEXA) 10 mg, Oral, DAILY    clopidogrel (PLAVIX) 75 MG tablet No dose, route, or frequency recorded.     docusate (COLACE, DULCOLAX) 100 mg, Oral, 2 TIMES DAILY    Fluticasone Furoate-Vilanterol 100-25 MCG/ACT AEPB 1 puff, Inhalation, DAILY    formoterol (PERFOROMIST) 20 mcg, Nebulization, EVERY 12 HOURS, DX:COPD J44.9    furosemide (LASIX) 20 mg, Oral, DAILY    ipratropium-albuterol (DUONEB) 0.5-2.5 (3) MG/3ML SOLN nebulizer solution 3 mLs, Inhalation, EVERY 4 HOURS    levocetirizine (XYZAL) 5 mg, Oral, NIGHTLY    levothyroxine (SYNTHROID) 75 mcg, Oral, DAILY    Multiple Vitamins-Minerals (THERAPEUTIC MULTIVITAMIN-MINERALS) tablet 1 tablet, Oral, DAILY    omeprazole (PRILOSEC) 20 mg, Oral, DAILY    ondansetron (ZOFRAN) 4 mg, Oral, DAILY PRN    oxybutynin (DITROPAN) 5 MG tablet TAKE ONE TABLET BY MOUTH THREE TIMES A DAY    OXYGEN Use 3L of oxygen all the time    polyethylene glycol (GLYCOLAX) 17 g, Oral, DAILY PRN    potassium chloride (KLOR-CON M) 20 MEQ extended release tablet 20 mEq, Oral, DAILY    zoledronic acid (RECLAST) 5 mg, IntraVENous, ONCE        Electronically signed by JOSE ALFREDO Weaver on 12/13/2022 at 9:35 AM

## 2022-12-13 NOTE — PROGRESS NOTES
Diagnosis: [x] MARIAA (G47.33) [] CSA (G47.31) [] Apnea (G47.30)   Length of Need: [x] 15 Months [] 99 Months [] Other:   Machine (BERTRAND!): [] Respironics Dream Station      Auto [] ResMed AirSense     Auto [] Other:     []  CPAP () [] Bilevel ()   Mode: [] Auto [] Spontaneous    Mode: [] Auto [] Spontaneous                         Comfort Settings: Ramp Pressure: 5 cmH2O  Ramp time: 15 min  Flex/EPR - 3 full time                                  For ResMed Bileve (TiMax-4 sec   TiMin- 0.2 sec)     Humidifier: [] Heated ()        [] Water chamber replacement ()/ 1 per 6 months        Mask:   [] Nasal () /1 per 3 months [] Full Face () /1 per 3 months   [] Patient choice -Size and fit mask [] Patient Choice - Size and fit mask   [] Dispense: [] Dispense:   [] Headgear () / 1 per 3 months [] Headgear () / 1 per 3 months   [] Replacement Nasal Cushion ()/2 per month [] Interface Replacement ()/1 per month   [] Replacement Nasal Pillows ()/2 per month         Tubing: [] Heated ()/1 per 3 months    [] Standard ()/1 per 3 months [] Other:           Filters: [] Non-disposable ()/1 per 6 months     [] Ultra-Fine, Disposable ()/2 per month        Miscellaneous: [] Chin Strap ()/ 1 per 6 months [] O2 bleed-in:        LPM   [] Oxymetry on CPAP/Bilevel [x]  Other: Mask re-fit        Start Order Date: 12/13/22    MEDICAL JUSTIFICATION:  I, the undersigned, certify that the above prescribed supplies are medically necessary for this patients wellbeing. In my opinion, the supplies are both reasonable and necessary in reference to accepted standards of medicalpractice in treatment of this patients condition. Chrissie Gunderson NP    NPI: [id number]       Order Signed Date: 12/13/22  [address] [place] [address]  Pulmonary, Sleep, and Critical Care    Pulmonary, Sleep, and Critical Care  [address] [facility].  [address], [address] [facility] , [address]                                    THE [facility] AT [facility]  Phone: [phone]    Fax: [phone]    Trudy Eldon  1935  [address] [facility], [address]  [phone] (home)   [phone] (mobile)      Insurance Info (confirm with patient if correct):  Payer/Plan Subscr  Sex Relation Sub.  Ins. ID Effective Group Num

## 2022-12-13 NOTE — PROGRESS NOTES
Diagnosis: [x] MARIAA (G47.33) [] CSA (G47.31) [] Apnea (G47.30)   Length of Need: [x] 15 Months [] 99 Months [] Other:   Machine (BERTRAND!): [] Respironics Dream Station      Auto [] ResMed AirSense     Auto [] Other:     []  CPAP () [] Bilevel ()   Mode: [] Auto [] Spontaneous    Mode: [] Auto [] Spontaneous                         Comfort Settings: Ramp Pressure: 5 cmH2O  Ramp time: 15 min  Flex/EPR - 3 full time                                  For ResMed Bileve (TiMax-4 sec   TiMin- 0.2 sec)     Humidifier: [] Heated ()        [] Water chamber replacement ()/ 1 per 6 months        Mask:   [] Nasal () /1 per 3 months [] Full Face () /1 per 3 months   [] Patient choice -Size and fit mask [] Patient Choice - Size and fit mask   [] Dispense: [] Dispense:   [] Headgear () / 1 per 3 months [] Headgear () / 1 per 3 months   [] Replacement Nasal Cushion ()/2 per month [] Interface Replacement ()/1 per month   [] Replacement Nasal Pillows ()/2 per month         Tubing: [] Heated ()/1 per 3 months    [] Standard ()/1 per 3 months [] Other:           Filters: [] Non-disposable ()/1 per 6 months     [] Ultra-Fine, Disposable ()/2 per month        Miscellaneous: [] Chin Strap ()/ 1 per 6 months [] O2 bleed-in:        LPM   [] Oxymetry on CPAP/Bilevel [x]  Other: Humidifier for oxygen concentrator         Start Order Date: 12/13/22    MEDICAL JUSTIFICATION:  I, the undersigned, certify that the above prescribed supplies are medically necessary for this patients wellbeing. In my opinion, the supplies are both reasonable and necessary in reference to accepted standards of medicalpractice in treatment of this patients condition.     Eric Ledesma NP    NPI: [id number]       Order Signed Date: 12/13/22  [address] [place] [address]  Pulmonary, Sleep, and Critical Care    Pulmonary, Sleep, and Critical Care  [address] [place], [address] [facility] , [address]                                    [place], [facility]  Phone: [phone]    Fax: [phone]    Arnold Rosas  1935  [address] [facility], [address]  [phone] (home)   [phone] (mobile)      Insurance Info (confirm with patient if correct):  Payer/Plan Subscr  Sex Relation Sub.  Ins. ID Effective Group Num

## 2022-12-16 ENCOUNTER — TELEPHONE (OUTPATIENT)
Dept: PULMONOLOGY | Age: 87
End: 2022-12-16

## 2022-12-16 NOTE — TELEPHONE ENCOUNTER
Pt L/M asking for a call back about her humidifier and supplies. Pt left phone number that isn't in her chart. I called her to get further information to look her up in our system so I could send a message to the clinical staff but it went to voicemail. Please call patient back to discuss.     Ph. [phone]

## 2022-12-19 NOTE — TELEPHONE ENCOUNTER
I called [phone] and spoke w/Ethan, Bridgette's son. He already spoke w/[facility] about the humidifier and supplies, he needs to schedule a mask refit for the patient and hadn't heard back from [facility] since last week. I gave him [facility]'s number to do this. He is going to reach out to them again to schedule this.

## 2022-12-21 ENCOUNTER — HOSPITAL ENCOUNTER (OUTPATIENT)
Dept: NON INVASIVE DIAGNOSTICS | Age: 87
Discharge: HOME | End: 2022-12-21
Payer: MEDICARE

## 2022-12-21 DIAGNOSIS — I35.0 NONRHEUMATIC AORTIC VALVE STENOSIS: ICD-10-CM

## 2022-12-21 PROCEDURE — 6360000004 HC RX CONTRAST MEDICATION: Performed by: INTERNAL MEDICINE

## 2022-12-21 PROCEDURE — C8929 TTE W OR WO FOL WCON,DOPPLER: HCPCS

## 2022-12-21 RX ADMIN — PERFLUTREN 1.5 ML: 6.52 INJECTION, SUSPENSION INTRAVENOUS at 15:00

## 2022-12-29 NOTE — PROGRESS NOTES
[address]  H+P  Consult  OP Visit  FU Visit   CC HX HPI   GEN  Doing well. SOB with exertion unchanged   AS TAVR Ø CP. BOSE as above   HTN  Ambulatory BP in good range. Ø HA/dizziness. CHOL  Last lipid reviewed. On max dose/tolerated statin. MED  Compliant with CV meds. Ø reported SA. HISTORY/ALLERGY/ROS   MEDHx  has a past medical history of Allergic rhinitis, Anxiety, Aortic stenosis, Aortic stenosis, severe, CHF (congestive heart failure) ([facility] [address] COPD (chronic obstructive pulmonary disease) ([facility] [address] Depression, Diabetes mellitus ([facility] [address] Hypertension, Hypothyroidism, Nonrheumatic aortic valve stenosis, MARIAA treated with BiPAP, and Urinary incontinence. SURGHx  has a past surgical history that includes Hysterectomy, total abdominal; Carpal tunnel release (Bilateral); Cholecystectomy; Cataract extraction (Bilateral); Tonsillectomy; Uvulectomy; Cardiac catheterization (10/11/2021); Dental surgery (N/A, 11/12/2021); Aortic valve replacement (N/A, 12/28/2021); and Aortic valve replacement (N/A, 12/28/2021). SOCHx  reports that she has never smoked. She has never used smokeless tobacco. She reports that she does not drink alcohol and does not use drugs. FAMHx family history includes Breast Cancer in her daughter, maternal grandmother, mother, and sister; Prostate Cancer in her father. ALLERG Patient has no known allergies.    ROS Full ROS obtained and negative except as mentioned in HPI   MEDICATIONS   Current Outpatient Medications   Medication Sig Dispense Refill    Fluticasone Furoate-Vilanterol 100-25 MCG/ACT AEPB Inhale 1 puff into the lungs daily      clopidogrel (PLAVIX) 75 MG tablet       budesonide (PULMICORT) 0.5 MG/2ML nebulizer suspension Take 1 ampule by nebulization 2 times daily      zoledronic acid (RECLAST) 5 MG/100ML SOLN Infuse 100 mLs intravenously once for 1 dose 100 mL 0    albuterol sulfate  (90 Base) MCG/ACT inhaler Inhale 2 puffs into the lungs every 6 hours as needed for Wheezing 18 g 11    calcium-vitamin D (OSCAL-500) 500-200 MG-UNIT per tablet Take 1 tablet by mouth daily      Multiple Vitamins-Minerals (THERAPEUTIC MULTIVITAMIN-MINERALS) tablet Take 1 tablet by mouth daily      aspirin 81 MG EC tablet Take 1 tablet by mouth daily 30 tablet 3    furosemide (LASIX) 20 MG tablet Take 1 tablet by mouth daily 30 tablet 6    citalopram (CELEXA) 10 MG tablet Take 1 tablet by mouth daily 30 tablet 5    polyethylene glycol (GLYCOLAX) 17 g packet Take 17 g by mouth daily as needed for Constipation      omeprazole (PRILOSEC) 20 MG delayed release capsule Take 20 mg by mouth daily      levocetirizine (XYZAL) 5 MG tablet Take 5 mg by mouth nightly      potassium chloride (KLOR-CON M) 20 MEQ extended release tablet Take 1 tablet by mouth daily 30 tablet 5    docusate (COLACE, DULCOLAX) 100 MG CAPS Take 100 mg by mouth 2 times daily 120 capsule 0    acetaminophen (TYLENOL) 500 MG tablet Take 500 mg by mouth every 6 hours as needed for Pain      OXYGEN Use 3L of oxygen all the time 3 L 3    budesonide (PULMICORT) 0.5 MG/2ML nebulizer suspension Take 2 mLs by nebulization 2 times daily DX:COPD J44.9 60 ampule 6    formoterol (PERFOROMIST) 20 MCG/2ML nebulizer solution Take 2 mLs by nebulization every 12 hours DX:COPD J44.9 120 mL 6    ondansetron (ZOFRAN) 4 MG tablet Take 1 tablet by mouth daily as needed for Nausea or Vomiting 30 tablet 0    oxybutynin (DITROPAN) 5 MG tablet TAKE ONE TABLET BY MOUTH THREE TIMES A DAY 90 tablet 1    atorvastatin (LIPITOR) 10 MG tablet TAKE 1 TABLET BY MOUTH DAILY AT BEDTIME. (Patient taking differently: Take 10 mg by mouth nightly TAKE 1 TABLET BY MOUTH DAILY AT BEDTIME.) 30 tablet 5    levothyroxine (SYNTHROID) 75 MCG tablet TAKE 1 TABLET BY MOUTH DAILY 30 tablet 5    ipratropium-albuterol (DUONEB) 0.5-2.5 (3) MG/3ML SOLN nebulizer solution Inhale 3 mLs into the lungs every 4 hours 360 mL 0     No current facility-administered medications for this visit.      PHYSICAL EXAM   Vitals BP 90/60 (Site: Right Upper Arm, Position: Sitting, Cuff Size: Medium Adult)   Pulse 84   Ht 5' 4\" (1.626 m)   Wt 183 lb (83 kg)   SpO2 (!) 88%   BMI 31.41 kg/m²     Gen Alert, coop, no distress Heart  Rrr, no mrg   Head NC, AT, no abnorm Abd  Soft, NT, +BS, no mass, no OM   Eyes PER, conj/corn clear Ext  Ext nl, AT, no C/C/E   Nose Nares nl, no drain, NT Pulse 2+ and symmetric   Throat Lips, mucosa, tongue nl Skin Col/text/turg nl, no vis rash/les   Neck S/S, TM, NT, no bruit/JVD Psych Nl mood and affect   Lung Bilat wheeze, decr bs Lymph   No cervical or axillary LA   Ch wall NT, no deform Neuro  Nl gross M/S exam      CODING   SCI (46176) - AS, HTN, CHOL, CHOL  30-39 minutes preparing to see pt including review hx, tests, consults, perf exam, , educating pt, fam, caregiver, ordering meds/tests/procedures, referring and comm with pcps and other consultants, documenting info in EMR, interpreting results and communicating to fam and coordination of pt care. SCRIBE   Nurse - Scribe Attestation  YUDY [employer], am scribing for and in the presence of Shey Mckeon MD.   Signed, [employer] RN. Doctor - Provider Patricia Gaspar is working as a scribe for and in the presence of dana Mckeon MD). Working as a scribe, [employer] may have prepopulated components of this note with my historical  intellectual property under my direct supervision. Any additions to this intellectual property were performed in my presence and at my direction.   Furthermore, the content and accuracy of this note have been reviewed by dana Mckeon MD).  1/12/2023 7:12 AM   ASSESSMENT AND PLAN   *AS    Date EF Detail   Sx     Chronic BOSE   DATA   Most recent TTE, [facility] reviewed personally   NYHA   I   Hx 12/21  TAVR 23 mm Ho ultra   TTE 7/21 12/21 1/22 12/22 60%  60%  55%  65% Severe AS MG 39, , mild MS MG 3, IVC dilated elevated RA pressure  TAVR MG 13, no AI  TAVR MG 13  TAVR MG 14,    [facility] 10/21  Normal Cors Chelo Colmenares)   Plan     Echo yearly  SOB related to COPD   *HTN  Status Controlled   Plan Counseled on diet/salt/exercise/weight, continue meds at doses above   *CHOL  LDL 88, 6/21   Plan Counseled on diet/exercise/weight, continue HI/MT statin   *COMPLIANCE  Status Compliant   Plan Discussed compliance with meds/diet/salt/exercise; avoid tob/alc/drugs   *FOLLOWUP  12 months with echo

## 2023-01-12 ENCOUNTER — OFFICE VISIT (OUTPATIENT)
Dept: CARDIOLOGY CLINIC | Age: 88
End: 2023-01-12
Payer: MEDICARE

## 2023-01-12 VITALS
HEIGHT: 64 IN | BODY MASS INDEX: 31.24 KG/M2 | DIASTOLIC BLOOD PRESSURE: 60 MMHG | SYSTOLIC BLOOD PRESSURE: 90 MMHG | HEART RATE: 84 BPM | WEIGHT: 183 LBS | OXYGEN SATURATION: 88 %

## 2023-01-12 DIAGNOSIS — Z95.2 S/P TAVR (TRANSCATHETER AORTIC VALVE REPLACEMENT): ICD-10-CM

## 2023-01-12 DIAGNOSIS — I10 ESSENTIAL HYPERTENSION: ICD-10-CM

## 2023-01-12 DIAGNOSIS — I35.0 AORTIC VALVE STENOSIS, NONRHEUMATIC: Primary | ICD-10-CM

## 2023-01-12 DIAGNOSIS — E78.00 HYPERCHOLESTEROLEMIA: ICD-10-CM

## 2023-01-12 PROCEDURE — 1123F ACP DISCUSS/DSCN MKR DOCD: CPT | Performed by: INTERNAL MEDICINE

## 2023-01-12 PROCEDURE — 99214 OFFICE O/P EST MOD 30 MIN: CPT | Performed by: INTERNAL MEDICINE

## 2023-01-12 PROCEDURE — 3078F DIAST BP <80 MM HG: CPT | Performed by: INTERNAL MEDICINE

## 2023-01-12 PROCEDURE — 3074F SYST BP LT 130 MM HG: CPT | Performed by: INTERNAL MEDICINE

## 2023-01-25 ENCOUNTER — TELEPHONE (OUTPATIENT)
Dept: FAMILY MEDICINE CLINIC | Age: 88
End: 2023-01-25

## 2023-01-25 DIAGNOSIS — J06.9 VIRAL URI: Primary | ICD-10-CM

## 2023-01-25 RX ORDER — CETIRIZINE HYDROCHLORIDE 10 MG/1
10 TABLET ORAL DAILY
Qty: 30 TABLET | Refills: 0 | Status: SHIPPED | OUTPATIENT
Start: 2023-01-25 | End: 2023-02-24

## 2023-01-25 RX ORDER — FLUTICASONE PROPIONATE 50 MCG
2 SPRAY, SUSPENSION (ML) NASAL DAILY
Qty: 16 G | Refills: 0 | Status: SHIPPED | OUTPATIENT
Start: 2023-01-25

## 2023-01-25 RX ORDER — BENZONATATE 100 MG/1
100-200 CAPSULE ORAL 3 TIMES DAILY PRN
Qty: 60 CAPSULE | Refills: 0 | Status: SHIPPED | OUTPATIENT
Start: 2023-01-25 | End: 2023-02-01

## 2023-01-25 NOTE — TELEPHONE ENCOUNTER
Received fax from [facility]. Per note patient has a head cold with congestion and requesting an order for decongestant and cough medicine. Order placed for Zyrtec, Flonase and Tessalon. Recommendations faxed back.

## 2023-01-26 NOTE — TELEPHONE ENCOUNTER
Mia Araiza RN with [address] calling to see if something else can be prescribed for the patient. States patient is not feeling well at all. Has a cold, congestion, and cough. O2 sats are at 93% with 2L of O2. Zyrtec, Flonase, and Tessalon was prescribed for the patient on 1/25. Mia Araiza states patient is doing worse today and wanted to know if maybe an order for a chest xray can also be ordered. Also states patient has denied going to ER. Please advise. Mia Araiza would like a call back at earliest convenience. [phone].

## 2023-01-26 NOTE — TELEPHONE ENCOUNTER
If sats are down and feeling that bad needs to be seen  Not sure she can get here today but could see her tomorrow or if getting worse ED  Probably needs COVID test and Flu test as well  Ok CXR

## 2023-03-17 RX ORDER — FORMOTEROL FUMARATE 20 UG/2ML
20 SOLUTION RESPIRATORY (INHALATION) EVERY 12 HOURS
Qty: 360 ML | Refills: 3 | Status: SHIPPED | OUTPATIENT
Start: 2023-03-17 | End: 2023-06-15

## 2023-03-17 NOTE — TELEPHONE ENCOUNTER
Medication:   Requested Prescriptions     Pending Prescriptions Disp Refills    formoterol (PERFOROMIST) 20 MCG/2ML nebulizer solution 360 mL 3     Sig: Take 2 mLs by nebulization in the morning and 2 mLs in the evening. DX:COPD J44.9.      Requesting 90 day vs 30    Patient Phone Number: [phone] (home)     Last appt: 7/26/2022   Next appt: Visit date not found    Last OARRS:   RX Monitoring 7/17/2020   Periodic Controlled Substance Monitoring Possible medication side effects, risk of tolerance/dependence & alternative treatments discussed.;No signs of potential drug abuse or diversion identified.     PDMP Monitoring:    Last PDMP Jamir as Reviewed ([place]):  Review User Review Instant Review Result          Preferred Pharmacy:   [facility] - [place], [place] - [address] - P [phone] - F [phone]  [address]  [place] [id number]  Phone: [phone] Fax: [phone]    [facility] - [place], [place] - [address] - P [phone] - F [phone]  [address]  [place] [id number]  Phone: [phone] Fax: [phone]    [facility] - [place], [place] - [address] - P [phone] - F [phone]  [address]  [place] [address]  Phone: [phone] Fax: [phone]

## 2023-04-04 ENCOUNTER — TELEMEDICINE (OUTPATIENT)
Dept: PULMONOLOGY | Age: 88
End: 2023-04-04
Payer: MEDICARE

## 2023-04-04 DIAGNOSIS — I50.32 CHRONIC DIASTOLIC CONGESTIVE HEART FAILURE (HCC): Chronic | ICD-10-CM

## 2023-04-04 DIAGNOSIS — J96.11 CHRONIC RESPIRATORY FAILURE WITH HYPOXIA (HCC): ICD-10-CM

## 2023-04-04 DIAGNOSIS — E03.9 ACQUIRED HYPOTHYROIDISM: Chronic | ICD-10-CM

## 2023-04-04 DIAGNOSIS — J44.9 COPD, SEVERE (HCC): Chronic | ICD-10-CM

## 2023-04-04 DIAGNOSIS — I25.83 CORONARY ARTERY DISEASE DUE TO LIPID RICH PLAQUE: ICD-10-CM

## 2023-04-04 DIAGNOSIS — E66.9 CLASS 1 OBESITY WITHOUT SERIOUS COMORBIDITY WITH BODY MASS INDEX (BMI) OF 34.0 TO 34.9 IN ADULT, UNSPECIFIED OBESITY TYPE: ICD-10-CM

## 2023-04-04 DIAGNOSIS — I10 ESSENTIAL HYPERTENSION: Chronic | ICD-10-CM

## 2023-04-04 DIAGNOSIS — G47.33 OBSTRUCTIVE SLEEP APNEA (ADULT) (PEDIATRIC): Primary | Chronic | ICD-10-CM

## 2023-04-04 DIAGNOSIS — I25.10 CORONARY ARTERY DISEASE DUE TO LIPID RICH PLAQUE: ICD-10-CM

## 2023-04-04 PROCEDURE — 99214 OFFICE O/P EST MOD 30 MIN: CPT | Performed by: NURSE PRACTITIONER

## 2023-04-04 PROCEDURE — 1123F ACP DISCUSS/DSCN MKR DOCD: CPT | Performed by: NURSE PRACTITIONER

## 2023-04-04 RX ORDER — MELOXICAM 15 MG/1
15 TABLET ORAL DAILY
COMMUNITY

## 2023-04-04 ASSESSMENT — SLEEP AND FATIGUE QUESTIONNAIRES
HOW LIKELY ARE YOU TO NOD OFF OR FALL ASLEEP IN A CAR, WHILE STOPPED FOR A FEW MINUTES IN TRAFFIC: 1
HOW LIKELY ARE YOU TO NOD OFF OR FALL ASLEEP WHILE SITTING INACTIVE IN A PUBLIC PLACE: 1
HOW LIKELY ARE YOU TO NOD OFF OR FALL ASLEEP WHEN YOU ARE A PASSENGER IN A CAR FOR AN HOUR WITHOUT A BREAK: 1
HOW LIKELY ARE YOU TO NOD OFF OR FALL ASLEEP WHILE SITTING AND READING: 1
ESS TOTAL SCORE: 9
HOW LIKELY ARE YOU TO NOD OFF OR FALL ASLEEP WHILE SITTING QUIETLY AFTER LUNCH WITHOUT ALCOHOL: 1
HOW LIKELY ARE YOU TO NOD OFF OR FALL ASLEEP WHILE SITTING AND TALKING TO SOMEONE: 1
HOW LIKELY ARE YOU TO NOD OFF OR FALL ASLEEP WHILE WATCHING TV: 1
HOW LIKELY ARE YOU TO NOD OFF OR FALL ASLEEP WHILE LYING DOWN TO REST IN THE AFTERNOON WHEN CIRCUMSTANCES PERMIT: 2

## 2023-04-04 NOTE — PROGRESS NOTES
Diagnosis: [x] MARIAA (G47.33) [] CSA (G47.31) [] Apnea (G47.30)   Length of Need: [x] 15 Months [] 99 Months [] Other:   Machine (BERTRAND!): [] Respironics Dream Station      Auto [] ResMed AirSense     Auto [] Other:     []  CPAP () [] Bilevel ()   Mode: [] Auto [] Spontaneous    Mode: [] Auto [] Spontaneous             Comfort Settings:      Humidifier: [] Heated ()        [x] Water chamber replacement ()/ 1 per 6 months        Mask:   [] Nasal () /1 per 3 months [x] Full Face () /1 per 3 months   [] Patient choice -Size and fit mask [x] Patient Choice - Size and fit mask   [] Dispense: [] Dispense:   [] Headgear () / 1 per 3 months [x] Headgear () / 1 per 3 months   [] Replacement Nasal Cushion ()/2 per month [x] Interface Replacement ()/1 per month   [] Replacement Nasal Pillows ()/2 per month         Tubing: [x] Heated ()/1 per 3 months    [] Standard ()/1 per 3 months [] Other:           Filters: [x] Non-disposable ()/1 per 6 months     [x] Ultra-Fine, Disposable ()/2 per month        Miscellaneous: [] Chin Strap ()/ 1 per 6 months [] O2 bleed-in:        LPM   [] Oxymetry on CPAP/Bilevel [x]  Other: Mask Re-fit        Start Order Date: 04/04/23    MEDICAL JUSTIFICATION:  I, the undersigned, certify that the above prescribed supplies are medically necessary for this patients wellbeing. In my opinion, the supplies are both reasonable and necessary in reference to accepted standards of medicalpractice in treatment of this patients condition. Mckay Mejia NP    NPI: [id number]       Order Signed Date: 04/04/23  [address] [place] [address]  Pulmonary, Sleep, and Critical Care    Pulmonary, Sleep, and Critical Care  [facility] [address] [place] [address]  [address] [facility], [address] [facility] , [address] [facility]  Phone: [phone]    Fax:

## 2023-04-04 NOTE — ASSESSMENT & PLAN NOTE
Chronic-with progression/exacerbation: Reviewed and analyzed results of physiologic download from patient's machine and reviewed with patient.  Supplies and parts as needed for her machine.  These are medically necessary.  Limit caffeine use after 3pm. Based on the analyzed data will continue with current settings. Will have her contact her DME to schedule a mask fitting. Discussed she could try the Airfit F30i mask instead of the F30 to see if the headgear will stay in place better as you can have a smaller frame as well. Once she is stable on her machine will need overnight oximetry to assess adequacy of bleed in oxygen flow. Will see her back in 3 months. Encouraged her to contact the office with any questions or concerns.

## 2023-04-04 NOTE — PROGRESS NOTES
acetaminophen (TYLENOL) 500 mg, Oral, EVERY 6 HOURS PRN    albuterol sulfate  (90 Base) MCG/ACT inhaler 2 puffs, Inhalation, EVERY 6 HOURS PRN    aspirin 81 mg, Oral, DAILY    atorvastatin (LIPITOR) 10 MG tablet TAKE 1 TABLET BY MOUTH DAILY AT BEDTIME.    budesonide (PULMICORT) 0.5 MG/2ML nebulizer suspension 1 ampule, Nebulization, 2 TIMES DAILY    budesonide (PULMICORT) 500 mcg, Nebulization, 2 TIMES DAILY, DX:COPD J44.9    calcium-vitamin D (OSCAL-500) 500-200 MG-UNIT per tablet 1 tablet, Oral, DAILY    citalopram (CELEXA) 10 mg, Oral, DAILY    clopidogrel (PLAVIX) 75 MG tablet No dose, route, or frequency recorded. docusate (COLACE, DULCOLAX) 100 mg, Oral, 2 TIMES DAILY    fluticasone (FLONASE) 50 MCG/ACT nasal spray 2 sprays, Each Nostril, DAILY    Fluticasone Furoate-Vilanterol 100-25 MCG/ACT AEPB 1 puff, DAILY    formoterol (PERFOROMIST) 20 mcg, Nebulization, EVERY 12 HOURS, DX:COPD J44.9    furosemide (LASIX) 20 mg, Oral, DAILY    ipratropium-albuterol (DUONEB) 0.5-2.5 (3) MG/3ML SOLN nebulizer solution 3 mLs, Inhalation, EVERY 4 HOURS    levocetirizine (XYZAL) 5 mg, NIGHTLY    levothyroxine (SYNTHROID) 75 mcg, Oral, DAILY    meloxicam (MOBIC) 15 mg, Oral, DAILY    Multiple Vitamins-Minerals (THERAPEUTIC MULTIVITAMIN-MINERALS) tablet 1 tablet, Oral, DAILY    omeprazole (PRILOSEC) 20 mg, Oral, DAILY    ondansetron (ZOFRAN) 4 mg, Oral, DAILY PRN    oxybutynin (DITROPAN) 5 MG tablet TAKE ONE TABLET BY MOUTH THREE TIMES A DAY    OXYGEN Use 3L of oxygen all the time    polyethylene glycol (GLYCOLAX) 17 g, Oral, DAILY PRN    potassium chloride (KLOR-CON M) 20 MEQ extended release tablet 20 mEq, Oral, DAILY    zoledronic acid (RECLAST) 5 mg, IntraVENous, ONCE      Alveda Killings, was evaluated through a synchronous (real-time) audio-video encounter. The patient (or guardian if applicable) is aware that this is a billable service, which includes applicable co-pays.  This Virtual Visit was conducted with

## 2023-09-26 ENCOUNTER — TELEMEDICINE (OUTPATIENT)
Dept: PULMONOLOGY | Age: 88
End: 2023-09-26
Payer: MEDICARE

## 2023-09-26 DIAGNOSIS — I50.32 CHRONIC DIASTOLIC CONGESTIVE HEART FAILURE (HCC): Chronic | ICD-10-CM

## 2023-09-26 DIAGNOSIS — I10 ESSENTIAL HYPERTENSION: Chronic | ICD-10-CM

## 2023-09-26 DIAGNOSIS — J96.11 CHRONIC RESPIRATORY FAILURE WITH HYPOXIA (HCC): ICD-10-CM

## 2023-09-26 DIAGNOSIS — G47.33 OBSTRUCTIVE SLEEP APNEA (ADULT) (PEDIATRIC): Primary | Chronic | ICD-10-CM

## 2023-09-26 PROCEDURE — 1123F ACP DISCUSS/DSCN MKR DOCD: CPT | Performed by: NURSE PRACTITIONER

## 2023-09-26 PROCEDURE — 99214 OFFICE O/P EST MOD 30 MIN: CPT | Performed by: NURSE PRACTITIONER

## 2023-09-26 ASSESSMENT — SLEEP AND FATIGUE QUESTIONNAIRES
HOW LIKELY ARE YOU TO NOD OFF OR FALL ASLEEP WHEN YOU ARE A PASSENGER IN A CAR FOR AN HOUR WITHOUT A BREAK: 1
ESS TOTAL SCORE: 3
HOW LIKELY ARE YOU TO NOD OFF OR FALL ASLEEP WHILE WATCHING TV: 1
HOW LIKELY ARE YOU TO NOD OFF OR FALL ASLEEP WHILE LYING DOWN TO REST IN THE AFTERNOON WHEN CIRCUMSTANCES PERMIT: 0
HOW LIKELY ARE YOU TO NOD OFF OR FALL ASLEEP WHILE SITTING AND TALKING TO SOMEONE: 0
HOW LIKELY ARE YOU TO NOD OFF OR FALL ASLEEP WHILE SITTING QUIETLY AFTER LUNCH WITHOUT ALCOHOL: 0
HOW LIKELY ARE YOU TO NOD OFF OR FALL ASLEEP IN A CAR, WHILE STOPPED FOR A FEW MINUTES IN TRAFFIC: 0
HOW LIKELY ARE YOU TO NOD OFF OR FALL ASLEEP WHILE SITTING INACTIVE IN A PUBLIC PLACE: 0
HOW LIKELY ARE YOU TO NOD OFF OR FALL ASLEEP WHILE SITTING AND READING: 1

## 2023-09-26 NOTE — PROGRESS NOTES
Maurice Marie CNP  Lurdessaeid Patton CNP [facility] [address] [facility] 28, [address] [facility] [phone]   [address] [place] [address] [address] [phone]     Video Visit- Follow up      Assessment/Plan:      1. Obstructive sleep apnea (adult) (pediatric)  Assessment & Plan:  Chronic-Stable: Reviewed and analyzed results of physiologic download from patient's machine and reviewed with patient. Supplies and parts as needed for her machine. These are medically necessary. Limit caffeine use after 3pm. Based on the analyzed data will continue with current settings. Encouraged her to contact her DME to schedule a mask fitting to control large mask leak. Once she has a better fitting mask will need to repeat oximetry with 3L bleed in oxygen. Discussed if oximetry continues to show hypoxia, will need to work towards increasing the pressure on her machine to the recommendations from her titration study in 2021. Discussed use of oxygen alone while sleeping will not control her sleep apnea. Will see her back in 3 months. Encouraged her to contact the office with any questions or concerns. Encouraged consistent use of her machine each night, all night. Discussed the importance of treating MARIAA from a physiological standpoint. Instructed not to drive unless had 4 hrs of effective therapy for her MARIAA the night before. No driving when sleepy. Did review the risks of under or untreated MARIAA including, but not limited to, higher risks of motor vehicle accidents, stroke, heart attacks, and death. She understands and accepts all these risks. 2. Chronic diastolic congestive heart failure (HCC)  Assessment & Plan:  Chronic- Stable. Discussed the importance of treating obstructive sleep apnea as part of the management of this disorder. Cont any meds per PCP and other physicians. 3. Essential hypertension  Assessment & Plan:  Chronic- Stable.   Discussed the

## 2023-09-26 NOTE — ASSESSMENT & PLAN NOTE
Chronic-Stable: Reviewed and analyzed results of physiologic download from patient's machine and reviewed with patient. Supplies and parts as needed for her machine. These are medically necessary. Limit caffeine use after 3pm. Based on the analyzed data will continue with current settings. Encouraged her to contact her DME to schedule a mask fitting to control large mask leak. Once she has a better fitting mask will need to repeat oximetry with 3L bleed in oxygen. Discussed if oximetry continues to show hypoxia, will need to work towards increasing the pressure on her machine to the recommendations from her titration study in 2021. Discussed use of oxygen alone while sleeping will not control her sleep apnea. Will see her back in 3 months. Encouraged her to contact the office with any questions or concerns. Encouraged consistent use of her machine each night, all night. Discussed the importance of treating MARIAA from a physiological standpoint. Instructed not to drive unless had 4 hrs of effective therapy for her MARIAA the night before. No driving when sleepy. Did review the risks of under or untreated MARIAA including, but not limited to, higher risks of motor vehicle accidents, stroke, heart attacks, and death. She understands and accepts all these risks.

## 2023-10-16 NOTE — PATIENT INSTRUCTIONS
GET EYE EXAM, SEEING PULMONOLOGIST IN A MONTH BUT CALL THEM TO [facility] YOUR NEW MEDICINE TO SOMETHING ELSE, SLEEP DOCTOR ([phone]) AND CARDIOLOGIST ([phone]), GET A [facility] EXAM ([phone]), GET ROUTINE LABS, TAKE AMOXICILLIN FOR EAR INFECTION. RETURN HERE IN 6 MONTHS. Make appointment with sleep doctor and cardiologist. Josy Jaeger have an appointment with pulmonologist next month. Consult ENT for chronic right ear problems. Effie Dance was seen today for 6 month follow-up and ear drainage. Diagnoses and all orders for this visit:    Chronic congestive heart failure, unspecified heart failure type ([address]  -     BRAIN NATRIURETIC PEPTIDE (BNP); Future    Depression, unspecified depression type    Essential hypertension  -     Comprehensive Metabolic Panel    Acquired hypothyroidism  -     TSH without Reflex    Mixed hyperlipidemia  -     LIPID PANEL; Future    Morbid obesity, unspecified obesity type ([address]    Obstructive sleep apnea (adult) (pediatric)    Recurrent acute suppurative otitis media of right ear without spontaneous rupture of tympanic membrane  -     amoxicillin (AMOXIL) 500 MG capsule;  Take 1 capsule by mouth 3 times daily for 10 days       ENT Specialists:    Dr. Claudine Jules  [phone]  [address] [facility] [address]    Dr. Teri Modi  [address], [address]    Dr. Clarinda Rathke Dr. Brandy Kawasaki [address] [facility]. [address]   [phone]    Haja Lovell, [address] Noemi Kelly MD  [facility]  [phone] [facility])  [phone] ([facility])    [address] [facility])  [address] [facility] [address] [facility] [address]  [facility], [address]   Phone: [phone] None

## 2023-11-21 NOTE — PROGRESS NOTES
[facility]  H+P  Consult  OP Visit  FU Visit   CC/HPI   CC Followup visit for cardiac conditions detailed in assessment and plan below. Intervention TAVR   General Doing well. No new concerns. Very sedentary   Cardiac Sx -CP, -SOB, -dizziness, -syncope, -edema, -orthopnea, -pnd, -fatigue   HISTORY/ALLERGY/ROS   M/S/S/F Hx Reviewed in Epic and updated as appropriate   ALLERG Patient has no known allergies. ROS Full ROS obtained and negative except as mentioned in HPI   MEDICATIONS   Cardiac medications reviewed in Epic during visit. PHYSICAL EXAM   Vitals /60 (Site: Right Upper Arm, Position: Sitting, Cuff Size: Large Adult)   Pulse 63   SpO2 91%    Gen Alert, coop, no distress Heart  RRR, no MRG   Lung CTA-B, unlabored, no DTP Extrem Edema -Grade 0 (0mm)      COMPLIANCE   Discussed and counseled on diet, exercise, weight loss, smoking, alcohol, drugs. All questions answered. CODING   SCI (29212) - 30-39 mins spent reviewing hx/tests/consults, performing exam, counseling/educating, ordering meds/tests/procedures, referring/communicating w/PCP/consultants, documenting in EMR, interpreting results, communicating medical information and plan with family. SCRIBE ATTESTATION   Nurse Dasia Anaya RN, am scribing for and in the presence of Debbie Zamudio MD. 11/21/2023   Doctor Sophia Mcnulty is working as scribe for and in presence of me and may have prepopulated components of note with my historical intellectual property (IP) under my supervision. Any additions to this IP performed in my presence and at my direction. Content and accuracy of this note reviewed by me Debbie Zamudio MD).    ASSESSMENT AND PLAN   *AS    Date EF Detail   Sx     As above   NYHA     I   Hx 12/21   TAVR 23 mm ES3U   TTE 7/21 12/21 1/22 12/22 11/23 60%  60%  55%  65% Severe AS MG 39, , mild MS MG 3, IVC dilated elevated RA pressure  TAVR MG 13, no AI  TAVR MG 13  TAVR MG 14     [facility] 10/21   Normal Cors

## 2023-11-30 ENCOUNTER — OFFICE VISIT (OUTPATIENT)
Dept: CARDIOLOGY CLINIC | Age: 88
End: 2023-11-30
Payer: MEDICARE

## 2023-11-30 ENCOUNTER — HOSPITAL ENCOUNTER (OUTPATIENT)
Dept: NON INVASIVE DIAGNOSTICS | Age: 88
Discharge: HOME | End: 2023-11-30
Payer: MEDICARE

## 2023-11-30 VITALS — OXYGEN SATURATION: 91 % | HEART RATE: 63 BPM | SYSTOLIC BLOOD PRESSURE: 110 MMHG | DIASTOLIC BLOOD PRESSURE: 60 MMHG

## 2023-11-30 DIAGNOSIS — E78.2 MIXED HYPERLIPIDEMIA: ICD-10-CM

## 2023-11-30 DIAGNOSIS — I35.0 AORTIC VALVE STENOSIS, NONRHEUMATIC: Primary | ICD-10-CM

## 2023-11-30 DIAGNOSIS — Z95.2 S/P TAVR (TRANSCATHETER AORTIC VALVE REPLACEMENT): ICD-10-CM

## 2023-11-30 DIAGNOSIS — I35.0 AORTIC VALVE STENOSIS, NONRHEUMATIC: ICD-10-CM

## 2023-11-30 DIAGNOSIS — I10 ESSENTIAL HYPERTENSION: ICD-10-CM

## 2023-11-30 DIAGNOSIS — E78.00 HYPERCHOLESTEROLEMIA: ICD-10-CM

## 2023-11-30 PROCEDURE — 99214 OFFICE O/P EST MOD 30 MIN: CPT | Performed by: INTERNAL MEDICINE

## 2023-11-30 PROCEDURE — 1123F ACP DISCUSS/DSCN MKR DOCD: CPT | Performed by: INTERNAL MEDICINE

## 2023-11-30 PROCEDURE — 93306 TTE W/DOPPLER COMPLETE: CPT

## 2023-11-30 RX ORDER — FUROSEMIDE 20 MG/1
TABLET ORAL
Qty: 30 TABLET | Refills: 6 | Status: SHIPPED | OUTPATIENT
Start: 2023-11-30

## 2024-03-11 NOTE — TELEPHONE ENCOUNTER
Last ov:3023 DCE  Next ov:24 DCE  Last EK21  Last labs:22  Last filled:   Disp Refills Start End    furosemide (LASIX) 20 MG tablet 30 tablet 6 2023 --    Sig: Take 1 tablet every other day  May take daily if weight gain or swelling increases    Cosign for Ordering: Accepted by Kenny Monroy MD on 2023 10:34 AM

## 2024-03-12 RX ORDER — FUROSEMIDE 20 MG/1
TABLET ORAL
Qty: 3 TABLET | Refills: 10 | Status: SHIPPED | OUTPATIENT
Start: 2024-03-12

## 2024-04-20 ENCOUNTER — APPOINTMENT (OUTPATIENT)
Dept: GENERAL RADIOLOGY | Age: 89
DRG: 190 | End: 2024-04-20
Payer: MEDICARE

## 2024-04-20 ENCOUNTER — HOSPITAL ENCOUNTER (INPATIENT)
Age: 89
LOS: 3 days | Discharge: NURSING, REHAB OR LONG-TERM CARE | DRG: 190 | End: 2024-04-26
Attending: EMERGENCY MEDICINE | Admitting: STUDENT IN AN ORGANIZED HEALTH CARE EDUCATION/TRAINING PROGRAM
Payer: MEDICARE

## 2024-04-20 DIAGNOSIS — J44.1 COPD EXACERBATION (HCC): ICD-10-CM

## 2024-04-20 DIAGNOSIS — J96.02 ACUTE RESPIRATORY FAILURE WITH HYPOXIA AND HYPERCAPNIA (HCC): Primary | ICD-10-CM

## 2024-04-20 DIAGNOSIS — J96.01 ACUTE RESPIRATORY FAILURE WITH HYPOXIA AND HYPERCAPNIA (HCC): Primary | ICD-10-CM

## 2024-04-20 LAB
ALBUMIN SERPL-MCNC: 4.2 G/DL (ref 3.4–5)
ALBUMIN/GLOB SERPL: 1.1 {RATIO} (ref 1.1–2.2)
ALP SERPL-CCNC: 75 U/L (ref 40–129)
ALT SERPL-CCNC: 10 U/L (ref 10–40)
ANION GAP SERPL CALCULATED.3IONS-SCNC: 10 MMOL/L (ref 3–16)
AST SERPL-CCNC: 26 U/L (ref 15–37)
BASE EXCESS BLDV CALC-SCNC: 5.5 MMOL/L (ref -3–3)
BASOPHILS # BLD: 0.1 K/UL (ref 0–0.2)
BASOPHILS NFR BLD: 0.6 %
BILIRUB SERPL-MCNC: 0.4 MG/DL (ref 0–1)
BUN SERPL-MCNC: 14 MG/DL (ref 7–20)
CALCIUM SERPL-MCNC: 9 MG/DL (ref 8.3–10.6)
CHLORIDE SERPL-SCNC: 95 MMOL/L (ref 99–110)
CO2 BLDV-SCNC: 83 MMOL/L
CO2 SERPL-SCNC: 31 MMOL/L (ref 21–32)
COHGB MFR BLDV: 2.8 % (ref 0–1.5)
CREAT SERPL-MCNC: 0.8 MG/DL (ref 0.6–1.2)
DEPRECATED RDW RBC AUTO: 13.9 % (ref 12.4–15.4)
DO-HGB MFR BLDV: 14 %
EOSINOPHIL # BLD: 0.1 K/UL (ref 0–0.6)
EOSINOPHIL NFR BLD: 0.6 %
GFR SERPLBLD CREATININE-BSD FMLA CKD-EPI: 72 ML/MIN/{1.73_M2}
GLUCOSE SERPL-MCNC: 137 MG/DL (ref 70–99)
HCO3 BLDV-SCNC: 34.9 MMOL/L (ref 23–29)
HCT VFR BLD AUTO: 43.6 % (ref 36–48)
HGB BLD-MCNC: 14.6 G/DL (ref 12–16)
LACTATE BLDV-SCNC: 1 MMOL/L (ref 0.4–1.9)
LYMPHOCYTES # BLD: 3.5 K/UL (ref 1–5.1)
LYMPHOCYTES NFR BLD: 41.1 %
MCH RBC QN AUTO: 30.4 PG (ref 26–34)
MCHC RBC AUTO-ENTMCNC: 33.5 G/DL (ref 31–36)
MCV RBC AUTO: 90.9 FL (ref 80–100)
METHGB MFR BLDV: 0.2 %
MONOCYTES # BLD: 1.5 K/UL (ref 0–1.3)
MONOCYTES NFR BLD: 17.8 %
NEUTROPHILS # BLD: 3.4 K/UL (ref 1.7–7.7)
NEUTROPHILS NFR BLD: 39.9 %
NT-PROBNP SERPL-MCNC: 1257 PG/ML (ref 0–449)
O2 CT VFR BLDV CALC: 17 VOL %
O2 THERAPY: ABNORMAL
PCO2 BLDV: 72.3 MMHG (ref 40–50)
PH BLDV: 7.29 [PH] (ref 7.35–7.45)
PLATELET # BLD AUTO: 153 K/UL (ref 135–450)
PMV BLD AUTO: 8.7 FL (ref 5–10.5)
PO2 BLDV: 52.7 MMHG (ref 25–40)
POTASSIUM SERPL-SCNC: 4.6 MMOL/L (ref 3.5–5.1)
PROCALCITONIN SERPL IA-MCNC: 0.21 NG/ML (ref 0–0.15)
PROT SERPL-MCNC: 8 G/DL (ref 6.4–8.2)
RBC # BLD AUTO: 4.8 M/UL (ref 4–5.2)
REASON FOR REJECTION: NORMAL
REJECTED TEST: NORMAL
SAO2 % BLDV: 86 %
SODIUM SERPL-SCNC: 136 MMOL/L (ref 136–145)
TROPONIN, HIGH SENSITIVITY: 30 NG/L (ref 0–14)
WBC # BLD AUTO: 8.6 K/UL (ref 4–11)

## 2024-04-20 PROCEDURE — 84484 ASSAY OF TROPONIN QUANT: CPT

## 2024-04-20 PROCEDURE — 96375 TX/PRO/DX INJ NEW DRUG ADDON: CPT

## 2024-04-20 PROCEDURE — 96374 THER/PROPH/DIAG INJ IV PUSH: CPT

## 2024-04-20 PROCEDURE — 71045 X-RAY EXAM CHEST 1 VIEW: CPT

## 2024-04-20 PROCEDURE — 87040 BLOOD CULTURE FOR BACTERIA: CPT

## 2024-04-20 PROCEDURE — 93005 ELECTROCARDIOGRAM TRACING: CPT | Performed by: EMERGENCY MEDICINE

## 2024-04-20 PROCEDURE — 2580000003 HC RX 258: Performed by: PHYSICIAN ASSISTANT

## 2024-04-20 PROCEDURE — 94640 AIRWAY INHALATION TREATMENT: CPT

## 2024-04-20 PROCEDURE — 6370000000 HC RX 637 (ALT 250 FOR IP): Performed by: PHYSICIAN ASSISTANT

## 2024-04-20 PROCEDURE — 82803 BLOOD GASES ANY COMBINATION: CPT

## 2024-04-20 PROCEDURE — 85025 COMPLETE CBC W/AUTO DIFF WBC: CPT

## 2024-04-20 PROCEDURE — 84145 PROCALCITONIN (PCT): CPT

## 2024-04-20 PROCEDURE — 2700000000 HC OXYGEN THERAPY PER DAY

## 2024-04-20 PROCEDURE — 6360000002 HC RX W HCPCS: Performed by: PHYSICIAN ASSISTANT

## 2024-04-20 PROCEDURE — 83880 ASSAY OF NATRIURETIC PEPTIDE: CPT

## 2024-04-20 PROCEDURE — 94660 CPAP INITIATION&MGMT: CPT

## 2024-04-20 PROCEDURE — 94761 N-INVAS EAR/PLS OXIMETRY MLT: CPT

## 2024-04-20 PROCEDURE — 80053 COMPREHEN METABOLIC PANEL: CPT

## 2024-04-20 PROCEDURE — G0378 HOSPITAL OBSERVATION PER HR: HCPCS

## 2024-04-20 PROCEDURE — 99285 EMERGENCY DEPT VISIT HI MDM: CPT

## 2024-04-20 PROCEDURE — 83605 ASSAY OF LACTIC ACID: CPT

## 2024-04-20 RX ORDER — IPRATROPIUM BROMIDE AND ALBUTEROL SULFATE 2.5; .5 MG/3ML; MG/3ML
1 SOLUTION RESPIRATORY (INHALATION) ONCE
Status: COMPLETED | OUTPATIENT
Start: 2024-04-20 | End: 2024-04-20

## 2024-04-20 RX ADMIN — IPRATROPIUM BROMIDE AND ALBUTEROL SULFATE 1 DOSE: .5; 3 SOLUTION RESPIRATORY (INHALATION) at 21:06

## 2024-04-20 RX ADMIN — WATER 125 MG: 1 INJECTION INTRAMUSCULAR; INTRAVENOUS; SUBCUTANEOUS at 21:09

## 2024-04-21 LAB
BACTERIA URNS QL MICRO: ABNORMAL /HPF
BILIRUB UR QL STRIP.AUTO: NEGATIVE
CLARITY UR: CLEAR
COLOR UR: YELLOW
EKG ATRIAL RATE: 99 BPM
EKG DIAGNOSIS: NORMAL
EKG P AXIS: 93 DEGREES
EKG P-R INTERVAL: 162 MS
EKG Q-T INTERVAL: 358 MS
EKG QRS DURATION: 82 MS
EKG QTC CALCULATION (BAZETT): 459 MS
EKG R AXIS: -48 DEGREES
EKG T AXIS: 76 DEGREES
EKG VENTRICULAR RATE: 99 BPM
EPI CELLS #/AREA URNS AUTO: 3 /HPF (ref 0–5)
GLUCOSE UR STRIP.AUTO-MCNC: NEGATIVE MG/DL
HGB UR QL STRIP.AUTO: NEGATIVE
HYALINE CASTS #/AREA URNS AUTO: 3 /LPF (ref 0–8)
KETONES UR STRIP.AUTO-MCNC: NEGATIVE MG/DL
LEUKOCYTE ESTERASE UR QL STRIP.AUTO: ABNORMAL
NITRITE UR QL STRIP.AUTO: POSITIVE
PH UR STRIP.AUTO: 5.5 [PH] (ref 5–8)
PROT UR STRIP.AUTO-MCNC: ABNORMAL MG/DL
RBC CLUMPS #/AREA URNS AUTO: 2 /HPF (ref 0–4)
SP GR UR STRIP.AUTO: 1.01 (ref 1–1.03)
UA COMPLETE W REFLEX CULTURE PNL UR: ABNORMAL
UA DIPSTICK W REFLEX MICRO PNL UR: YES
URN SPEC COLLECT METH UR: ABNORMAL
UROBILINOGEN UR STRIP-ACNC: 0.2 E.U./DL
WBC #/AREA URNS AUTO: 6 /HPF (ref 0–5)

## 2024-04-21 PROCEDURE — 94660 CPAP INITIATION&MGMT: CPT

## 2024-04-21 PROCEDURE — 96372 THER/PROPH/DIAG INJ SC/IM: CPT

## 2024-04-21 PROCEDURE — 6360000002 HC RX W HCPCS: Performed by: STUDENT IN AN ORGANIZED HEALTH CARE EDUCATION/TRAINING PROGRAM

## 2024-04-21 PROCEDURE — 6370000000 HC RX 637 (ALT 250 FOR IP): Performed by: STUDENT IN AN ORGANIZED HEALTH CARE EDUCATION/TRAINING PROGRAM

## 2024-04-21 PROCEDURE — 96376 TX/PRO/DX INJ SAME DRUG ADON: CPT

## 2024-04-21 PROCEDURE — 81001 URINALYSIS AUTO W/SCOPE: CPT

## 2024-04-21 PROCEDURE — 93010 ELECTROCARDIOGRAM REPORT: CPT | Performed by: INTERNAL MEDICINE

## 2024-04-21 PROCEDURE — 87086 URINE CULTURE/COLONY COUNT: CPT

## 2024-04-21 PROCEDURE — G0378 HOSPITAL OBSERVATION PER HR: HCPCS

## 2024-04-21 PROCEDURE — 6360000002 HC RX W HCPCS: Performed by: PHYSICIAN ASSISTANT

## 2024-04-21 PROCEDURE — 2700000000 HC OXYGEN THERAPY PER DAY

## 2024-04-21 PROCEDURE — 94761 N-INVAS EAR/PLS OXIMETRY MLT: CPT

## 2024-04-21 PROCEDURE — 87077 CULTURE AEROBIC IDENTIFY: CPT

## 2024-04-21 PROCEDURE — 2580000003 HC RX 258: Performed by: STUDENT IN AN ORGANIZED HEALTH CARE EDUCATION/TRAINING PROGRAM

## 2024-04-21 PROCEDURE — 87186 SC STD MICRODIL/AGAR DIL: CPT

## 2024-04-21 PROCEDURE — 94640 AIRWAY INHALATION TREATMENT: CPT

## 2024-04-21 PROCEDURE — 2580000003 HC RX 258: Performed by: PHYSICIAN ASSISTANT

## 2024-04-21 RX ORDER — POLYETHYLENE GLYCOL 3350 17 G/17G
17 POWDER, FOR SOLUTION ORAL DAILY PRN
Status: DISCONTINUED | OUTPATIENT
Start: 2024-04-21 | End: 2024-04-21

## 2024-04-21 RX ORDER — POLYETHYLENE GLYCOL 3350 17 G/17G
17 POWDER, FOR SOLUTION ORAL DAILY PRN
Status: DISCONTINUED | OUTPATIENT
Start: 2024-04-21 | End: 2024-04-26 | Stop reason: HOSPADM

## 2024-04-21 RX ORDER — ENOXAPARIN SODIUM 100 MG/ML
40 INJECTION SUBCUTANEOUS DAILY
Status: DISCONTINUED | OUTPATIENT
Start: 2024-04-21 | End: 2024-04-26 | Stop reason: HOSPADM

## 2024-04-21 RX ORDER — PREDNISONE 20 MG/1
40 TABLET ORAL DAILY
Status: DISCONTINUED | OUTPATIENT
Start: 2024-04-21 | End: 2024-04-21

## 2024-04-21 RX ORDER — AZITHROMYCIN 250 MG/1
500 TABLET, FILM COATED ORAL DAILY
Status: COMPLETED | OUTPATIENT
Start: 2024-04-21 | End: 2024-04-23

## 2024-04-21 RX ORDER — ONDANSETRON 4 MG/1
4 TABLET, ORALLY DISINTEGRATING ORAL EVERY 8 HOURS PRN
Status: DISCONTINUED | OUTPATIENT
Start: 2024-04-21 | End: 2024-04-26 | Stop reason: HOSPADM

## 2024-04-21 RX ORDER — BUDESONIDE 0.5 MG/2ML
500 INHALANT ORAL 2 TIMES DAILY
Status: DISCONTINUED | OUTPATIENT
Start: 2024-04-21 | End: 2024-04-21

## 2024-04-21 RX ORDER — DOCUSATE SODIUM 100 MG/1
100 CAPSULE, LIQUID FILLED ORAL 2 TIMES DAILY
Status: DISCONTINUED | OUTPATIENT
Start: 2024-04-21 | End: 2024-04-26 | Stop reason: HOSPADM

## 2024-04-21 RX ORDER — SODIUM CHLORIDE 9 MG/ML
INJECTION, SOLUTION INTRAVENOUS PRN
Status: DISCONTINUED | OUTPATIENT
Start: 2024-04-21 | End: 2024-04-26 | Stop reason: HOSPADM

## 2024-04-21 RX ORDER — ACETAMINOPHEN 325 MG/1
650 TABLET ORAL EVERY 6 HOURS PRN
Status: DISCONTINUED | OUTPATIENT
Start: 2024-04-21 | End: 2024-04-26 | Stop reason: HOSPADM

## 2024-04-21 RX ORDER — CITALOPRAM 20 MG/1
10 TABLET ORAL DAILY
Status: DISCONTINUED | OUTPATIENT
Start: 2024-04-21 | End: 2024-04-26 | Stop reason: HOSPADM

## 2024-04-21 RX ORDER — ARFORMOTEROL TARTRATE 15 UG/2ML
15 SOLUTION RESPIRATORY (INHALATION)
Status: DISCONTINUED | OUTPATIENT
Start: 2024-04-21 | End: 2024-04-26 | Stop reason: HOSPADM

## 2024-04-21 RX ORDER — SODIUM CHLORIDE 0.9 % (FLUSH) 0.9 %
5-40 SYRINGE (ML) INJECTION PRN
Status: DISCONTINUED | OUTPATIENT
Start: 2024-04-21 | End: 2024-04-26 | Stop reason: HOSPADM

## 2024-04-21 RX ORDER — BENZONATATE 100 MG/1
100 CAPSULE ORAL 3 TIMES DAILY PRN
Status: DISCONTINUED | OUTPATIENT
Start: 2024-04-21 | End: 2024-04-26 | Stop reason: HOSPADM

## 2024-04-21 RX ORDER — LEVOTHYROXINE SODIUM 0.15 MG/1
75 TABLET ORAL DAILY
Status: DISCONTINUED | OUTPATIENT
Start: 2024-04-21 | End: 2024-04-26 | Stop reason: HOSPADM

## 2024-04-21 RX ORDER — IPRATROPIUM BROMIDE AND ALBUTEROL SULFATE 2.5; .5 MG/3ML; MG/3ML
1 SOLUTION RESPIRATORY (INHALATION)
Status: DISCONTINUED | OUTPATIENT
Start: 2024-04-21 | End: 2024-04-22

## 2024-04-21 RX ORDER — ACETAMINOPHEN 650 MG/1
650 SUPPOSITORY RECTAL EVERY 6 HOURS PRN
Status: DISCONTINUED | OUTPATIENT
Start: 2024-04-21 | End: 2024-04-26 | Stop reason: HOSPADM

## 2024-04-21 RX ORDER — POTASSIUM CHLORIDE 20 MEQ/1
20 TABLET, EXTENDED RELEASE ORAL DAILY
Status: DISCONTINUED | OUTPATIENT
Start: 2024-04-21 | End: 2024-04-26 | Stop reason: HOSPADM

## 2024-04-21 RX ORDER — ONDANSETRON 2 MG/ML
4 INJECTION INTRAMUSCULAR; INTRAVENOUS EVERY 6 HOURS PRN
Status: DISCONTINUED | OUTPATIENT
Start: 2024-04-21 | End: 2024-04-26 | Stop reason: HOSPADM

## 2024-04-21 RX ORDER — GUAIFENESIN 600 MG/1
600 TABLET, EXTENDED RELEASE ORAL 2 TIMES DAILY
Status: DISCONTINUED | OUTPATIENT
Start: 2024-04-21 | End: 2024-04-26 | Stop reason: HOSPADM

## 2024-04-21 RX ORDER — BUDESONIDE 0.5 MG/2ML
500 INHALANT ORAL 2 TIMES DAILY
Status: DISCONTINUED | OUTPATIENT
Start: 2024-04-21 | End: 2024-04-26 | Stop reason: HOSPADM

## 2024-04-21 RX ORDER — IPRATROPIUM BROMIDE AND ALBUTEROL SULFATE 2.5; .5 MG/3ML; MG/3ML
1 SOLUTION RESPIRATORY (INHALATION)
Status: DISCONTINUED | OUTPATIENT
Start: 2024-04-21 | End: 2024-04-21

## 2024-04-21 RX ORDER — LACTULOSE 10 G/15ML
20 SOLUTION ORAL 2 TIMES DAILY PRN
COMMUNITY

## 2024-04-21 RX ORDER — ASPIRIN 81 MG/1
81 TABLET ORAL DAILY
Status: DISCONTINUED | OUTPATIENT
Start: 2024-04-21 | End: 2024-04-26 | Stop reason: HOSPADM

## 2024-04-21 RX ORDER — OXYBUTYNIN CHLORIDE 5 MG/1
5 TABLET ORAL 3 TIMES DAILY
Status: DISCONTINUED | OUTPATIENT
Start: 2024-04-21 | End: 2024-04-21

## 2024-04-21 RX ORDER — IPRATROPIUM BROMIDE AND ALBUTEROL SULFATE 2.5; .5 MG/3ML; MG/3ML
1 SOLUTION RESPIRATORY (INHALATION) EVERY 4 HOURS
Status: DISCONTINUED | OUTPATIENT
Start: 2024-04-21 | End: 2024-04-21

## 2024-04-21 RX ORDER — ACETAMINOPHEN 500 MG
500 TABLET ORAL EVERY 6 HOURS PRN
Status: DISCONTINUED | OUTPATIENT
Start: 2024-04-21 | End: 2024-04-21

## 2024-04-21 RX ORDER — PANTOPRAZOLE SODIUM 40 MG/1
40 TABLET, DELAYED RELEASE ORAL
Status: DISCONTINUED | OUTPATIENT
Start: 2024-04-21 | End: 2024-04-26 | Stop reason: HOSPADM

## 2024-04-21 RX ORDER — BENZONATATE 100 MG/1
100 CAPSULE ORAL 3 TIMES DAILY PRN
COMMUNITY

## 2024-04-21 RX ORDER — FLUTICASONE PROPIONATE 50 MCG
2 SPRAY, SUSPENSION (ML) NASAL DAILY PRN
Status: DISCONTINUED | OUTPATIENT
Start: 2024-04-21 | End: 2024-04-26 | Stop reason: HOSPADM

## 2024-04-21 RX ORDER — SODIUM CHLORIDE 0.9 % (FLUSH) 0.9 %
5-40 SYRINGE (ML) INJECTION EVERY 12 HOURS SCHEDULED
Status: DISCONTINUED | OUTPATIENT
Start: 2024-04-21 | End: 2024-04-26 | Stop reason: HOSPADM

## 2024-04-21 RX ORDER — ONDANSETRON 4 MG/1
4 TABLET, FILM COATED ORAL DAILY PRN
Status: DISCONTINUED | OUTPATIENT
Start: 2024-04-21 | End: 2024-04-21

## 2024-04-21 RX ORDER — ALBUTEROL SULFATE 90 UG/1
2 AEROSOL, METERED RESPIRATORY (INHALATION) EVERY 6 HOURS PRN
Status: DISCONTINUED | OUTPATIENT
Start: 2024-04-21 | End: 2024-04-26 | Stop reason: HOSPADM

## 2024-04-21 RX ADMIN — ARFORMOTEROL TARTRATE 15 MCG: 15 SOLUTION RESPIRATORY (INHALATION) at 22:01

## 2024-04-21 RX ADMIN — BUDESONIDE 500 MCG: 0.5 INHALANT RESPIRATORY (INHALATION) at 07:59

## 2024-04-21 RX ADMIN — SODIUM CHLORIDE, PRESERVATIVE FREE 10 ML: 5 INJECTION INTRAVENOUS at 21:30

## 2024-04-21 RX ADMIN — POTASSIUM CHLORIDE 20 MEQ: 1500 TABLET, EXTENDED RELEASE ORAL at 08:13

## 2024-04-21 RX ADMIN — BUDESONIDE 500 MCG: 0.5 INHALANT RESPIRATORY (INHALATION) at 22:01

## 2024-04-21 RX ADMIN — ACETAMINOPHEN 650 MG: 325 TABLET ORAL at 21:18

## 2024-04-21 RX ADMIN — IPRATROPIUM BROMIDE AND ALBUTEROL SULFATE 1 DOSE: .5; 3 SOLUTION RESPIRATORY (INHALATION) at 16:04

## 2024-04-21 RX ADMIN — Medication 1 TABLET: at 11:58

## 2024-04-21 RX ADMIN — LEVOTHYROXINE SODIUM 75 MCG: 0.15 TABLET ORAL at 08:13

## 2024-04-21 RX ADMIN — ENOXAPARIN SODIUM 40 MG: 100 INJECTION SUBCUTANEOUS at 08:12

## 2024-04-21 RX ADMIN — DOCUSATE SODIUM 100 MG: 100 CAPSULE, LIQUID FILLED ORAL at 21:18

## 2024-04-21 RX ADMIN — IPRATROPIUM BROMIDE AND ALBUTEROL SULFATE 1 DOSE: .5; 3 SOLUTION RESPIRATORY (INHALATION) at 08:00

## 2024-04-21 RX ADMIN — WATER 40 MG: 1 INJECTION INTRAMUSCULAR; INTRAVENOUS; SUBCUTANEOUS at 22:41

## 2024-04-21 RX ADMIN — ARFORMOTEROL TARTRATE 15 MCG: 15 SOLUTION RESPIRATORY (INHALATION) at 12:12

## 2024-04-21 RX ADMIN — SODIUM CHLORIDE, PRESERVATIVE FREE 10 ML: 5 INJECTION INTRAVENOUS at 12:06

## 2024-04-21 RX ADMIN — BENZONATATE 100 MG: 100 CAPSULE ORAL at 22:41

## 2024-04-21 RX ADMIN — SODIUM CHLORIDE, PRESERVATIVE FREE 10 ML: 5 INJECTION INTRAVENOUS at 08:15

## 2024-04-21 RX ADMIN — IPRATROPIUM BROMIDE AND ALBUTEROL SULFATE 1 DOSE: .5; 3 SOLUTION RESPIRATORY (INHALATION) at 12:11

## 2024-04-21 RX ADMIN — BENZONATATE 100 MG: 100 CAPSULE ORAL at 01:39

## 2024-04-21 RX ADMIN — GUAIFENESIN 600 MG: 600 TABLET, EXTENDED RELEASE ORAL at 01:39

## 2024-04-21 RX ADMIN — PREDNISONE 40 MG: 20 TABLET ORAL at 08:13

## 2024-04-21 RX ADMIN — IPRATROPIUM BROMIDE AND ALBUTEROL SULFATE 1 DOSE: .5; 3 SOLUTION RESPIRATORY (INHALATION) at 05:10

## 2024-04-21 RX ADMIN — DOCUSATE SODIUM 100 MG: 100 CAPSULE, LIQUID FILLED ORAL at 08:13

## 2024-04-21 RX ADMIN — WATER 40 MG: 1 INJECTION INTRAMUSCULAR; INTRAVENOUS; SUBCUTANEOUS at 17:15

## 2024-04-21 RX ADMIN — AZITHROMYCIN DIHYDRATE 500 MG: 250 TABLET ORAL at 08:13

## 2024-04-21 RX ADMIN — WATER 40 MG: 1 INJECTION INTRAMUSCULAR; INTRAVENOUS; SUBCUTANEOUS at 11:58

## 2024-04-21 RX ADMIN — ASPIRIN 81 MG: 81 TABLET, COATED ORAL at 08:16

## 2024-04-21 RX ADMIN — CITALOPRAM 10 MG: 20 TABLET, FILM COATED ORAL at 08:12

## 2024-04-21 RX ADMIN — GUAIFENESIN 600 MG: 600 TABLET, EXTENDED RELEASE ORAL at 08:13

## 2024-04-21 RX ADMIN — IPRATROPIUM BROMIDE AND ALBUTEROL SULFATE 1 DOSE: .5; 3 SOLUTION RESPIRATORY (INHALATION) at 22:01

## 2024-04-21 RX ADMIN — PANTOPRAZOLE SODIUM 40 MG: 40 TABLET, DELAYED RELEASE ORAL at 08:13

## 2024-04-21 RX ADMIN — GUAIFENESIN 600 MG: 600 TABLET, EXTENDED RELEASE ORAL at 21:18

## 2024-04-21 ASSESSMENT — PAIN SCALES - GENERAL
PAINLEVEL_OUTOF10: 5
PAINLEVEL_OUTOF10: 5

## 2024-04-21 ASSESSMENT — PAIN DESCRIPTION - DESCRIPTORS
DESCRIPTORS: ACHING
DESCRIPTORS: ACHING

## 2024-04-21 ASSESSMENT — PAIN DESCRIPTION - LOCATION
LOCATION: HEAD
LOCATION: HEAD

## 2024-04-21 NOTE — ED PROVIDER NOTES
I have personally performed a face to face diagnostic evaluation on this patient. I have fully participated in the care of this patient I personally saw the patient and performed a substantive portion of the visit including all aspects of the medical decision making.  I have reviewed and agree with all pertinent clinical information including history, physical exam, diagnostic tests, and the plan.      HPI: Bridgette Vo presented with severe shortness of breath, wheezing.  Patient has history of severe COPD, MARIAA.  She has been feeling worse for the last few days patient came in via EMS received DuoNebs and route.  Patient on a nonrebreather patient wears 3 L nasal cannula at baseline but was hypoxic on her nasal cannula denies any chest pain denies any recent fevers or chills.  Chief Complaint   Patient presents with    Respiratory Distress      Review of Systems: See ANKUR note  Vital Signs: BP (!) 112/90   Pulse (!) 109   Temp 97.6 °F (36.4 °C) (Oral)   Resp 22   Wt 83.6 kg (184 lb 4.9 oz)   SpO2 95%   BMI 31.64 kg/m²     Alert 88 y.o. female who appears ill, in acute distress  HENT: Atraumatic, oral mucosa moist  Neck: Grossly normal ROM  Chest/Lungs: Severe respiratory distress, accessory muscle use, diffuse wheezing  Musculoskeletal: Grossly normal ROM  Skin: No palor or diaphoresis    Medical Decision Making and Plan:  Pertinent Labs & Imaging studies reviewed. (See ANKUR chart for details)  I agree with ANKUR assessment and plan.  88-year-old female who presents for severe respiratory distress.  Patient confirms that she is DNR CCA.  I also spoke to her son personally on the phone who confirms this.  She is afebrile tachycardic but in severe respiratory distress immediately placed on BiPAP will continue to repeat treatments.  DuoNebs given.  Will plan on admission.  EKG without obvious signs of ischemia.  No chest pain.  Patient has mildly elevated troponin.  BNP is slightly elevated.  Chest x-ray is

## 2024-04-21 NOTE — ED NOTES
How does patient ambulate?   []Low Fall Risk (ambulates by themselves without support)  []Stand by assist   []Contact Guard   []Front wheel walker  [x]Wheelchair   []Steady  []Bed bound  []History of Lower Extremity Amputation  []Unknown, did not assess in the emergency department   How does patient take pills?  [x]Whole with Water  []Crushed in applesauce  []Crushed in pudding  []Other  []Unknown no oral medications were given in the ED  Is patient alert?   [x]Alert  []Drowsy but responds to voice  []Doesn't respond to voice but responds to painful stimuli  []Unresponsive  Is patient oriented?   [x]To person  []To place  []To time  [x]To situation  []Confused  []Agitated  []Follows commands  If patient is disoriented or from a Skill Nursing Facility has family been notified of admission?   [x]Yes   []No  Patient belongings?   []Cell phone  []Wallet   []Dentures  []Clothing  Any specific patient or family belongings/needs/dynamics?   none  Miscellaneous comments/pending orders?  Pt from assisted living.  Was having increasing in SOB all day.  After breathing treatments solumedrol and bipap pt breathing better     If there are any additional questions please reach out to the Emergency Department.

## 2024-04-21 NOTE — PLAN OF CARE
Problem: Discharge Planning  Goal: Discharge to home or other facility with appropriate resources  4/21/2024 0948 by Khadijah Najera RN  Outcome: Progressing  Flowsheets (Taken 4/21/2024 0825)  Discharge to home or other facility with appropriate resources: Identify barriers to discharge with patient and caregiver  4/21/2024 0357 by Vlad Campbell, RN  Outcome: Progressing  Flowsheets (Taken 4/21/2024 0111)  Discharge to home or other facility with appropriate resources: Identify discharge learning needs (meds, wound care, etc)     Problem: Skin/Tissue Integrity  Goal: Absence of new skin breakdown  Description: 1.  Monitor for areas of redness and/or skin breakdown  2.  Assess vascular access sites hourly  3.  Every 4-6 hours minimum:  Change oxygen saturation probe site  4.  Every 4-6 hours:  If on nasal continuous positive airway pressure, respiratory therapy assess nares and determine need for appliance change or resting period.  4/21/2024 0948 by Khadijah Najera RN  Outcome: Progressing  4/21/2024 0357 by Vlad Campbell, RN  Outcome: Progressing     Problem: Safety - Adult  Goal: Free from fall injury  4/21/2024 0948 by Khadijah Najera RN  Outcome: Progressing  4/21/2024 0357 by Vlad Campbell, RN  Outcome: Progressing     Problem: ABCDS Injury Assessment  Goal: Absence of physical injury  4/21/2024 0948 by Khadijah Najera RN  Outcome: Progressing  4/21/2024 0357 by Vlad Campbell, RN  Outcome: Progressing

## 2024-04-21 NOTE — H&P
11/30/23  Larry Gallegos MD   fluticasone (FLONASE) 50 MCG/ACT nasal spray 2 sprays by Each Nostril route daily 1/25/23   Milena Waller APRN - CNP   Fluticasone Furoate-Vilanterol 100-25 MCG/ACT AEPB Inhale 1 puff into the lungs daily 5/31/17   Andreina Sinha MD   budesonide (PULMICORT) 0.5 MG/2ML nebulizer suspension Take 2 mLs by nebulization 2 times daily    Andreina Sinha MD   zoledronic acid (RECLAST) 5 MG/100ML SOLN Infuse 100 mLs intravenously once for 1 dose 7/26/22 11/30/23  Carmelita Mullins MD   albuterol sulfate  (90 Base) MCG/ACT inhaler Inhale 2 puffs into the lungs every 6 hours as needed for Wheezing 4/13/22 11/30/23  Larry Gallegos MD   calcium-vitamin D (OSCAL-500) 500-200 MG-UNIT per tablet Take 1 tablet by mouth daily    Andreina Sinha MD   Multiple Vitamins-Minerals (THERAPEUTIC MULTIVITAMIN-MINERALS) tablet Take 1 tablet by mouth daily    Andreina Sinha MD   aspirin 81 MG EC tablet Take 1 tablet by mouth daily 12/31/21   ElianaKenny youssef MD   citalopram (CELEXA) 10 MG tablet Take 1 tablet by mouth daily 11/3/21   Radha Aleman, PA   polyethylene glycol (GLYCOLAX) 17 g packet Take 1 packet by mouth daily as needed for Constipation    Andreina Sinha MD   omeprazole (PRILOSEC) 20 MG delayed release capsule Take 1 capsule by mouth daily    Andreina Sinha MD   levocetirizine (XYZAL) 5 MG tablet Take 5 mg by mouth nightly  Patient not taking: Reported on 4/4/2023    Andreina Sinha MD   potassium chloride (KLOR-CON M) 20 MEQ extended release tablet Take 1 tablet by mouth daily 8/31/21   Radha Aleman PA   docusate (COLACE, DULCOLAX) 100 MG CAPS Take 100 mg by mouth 2 times daily 7/22/21   Celine Beasley APRN - CNP   acetaminophen (TYLENOL) 500 MG tablet Take 1 tablet by mouth every 6 hours as needed for Pain    Andreina Sinha MD   OXYGEN Use 3L of oxygen all the time  Patient not taking: Reported on  11/30/2023 5/21/21   Radha Aleman, PA   budesonide (PULMICORT) 0.5 MG/2ML nebulizer suspension Take 2 mLs by nebulization 2 times daily DX:COPD J44.9 7/28/20 11/30/23  Larry Gallegos MD   ondansetron (ZOFRAN) 4 MG tablet Take 1 tablet by mouth daily as needed for Nausea or Vomiting 7/13/20   Ned López MD   oxybutynin (DITROPAN) 5 MG tablet TAKE ONE TABLET BY MOUTH THREE TIMES A DAY 8/20/19   Carmelita Mullins MD   atorvastatin (LIPITOR) 10 MG tablet TAKE 1 TABLET BY MOUTH DAILY AT BEDTIME.  Patient taking differently: Take 1 tablet by mouth nightly TAKE 1 TABLET BY MOUTH DAILY AT BEDTIME. 7/12/19 11/30/23  Gary Bee MD   levothyroxine (SYNTHROID) 75 MCG tablet TAKE 1 TABLET BY MOUTH DAILY 6/11/19   Carmelita Mullins MD   ipratropium-albuterol (DUONEB) 0.5-2.5 (3) MG/3ML SOLN nebulizer solution Inhale 3 mLs into the lungs every 4 hours 3/13/18   Carmelita Mullins MD       Labs: Personally reviewed and interpreted for clinical significance.   Recent Labs     04/20/24  2100   WBC 8.6   HGB 14.6   HCT 43.6        Recent Labs     04/20/24  2133      K 4.6   CL 95*   CO2 31   BUN 14   CREATININE 0.8   CALCIUM 9.0     Recent Labs     04/20/24  2133   PROBNP 1,257*   TROPHS 30*     No results for input(s): \"LABA1C\" in the last 72 hours.  Recent Labs     04/20/24  2133   AST 26   ALT 10   BILITOT 0.4   ALKPHOS 75     No results for input(s): \"INR\", \"LACTA\", \"TSH\" in the last 72 hours.     Tj Lemus MD

## 2024-04-21 NOTE — ED PROVIDER NOTES
[facility] EMERGENCY DEPARTMENT  EMERGENCY DEPARTMENT ENCOUNTER        Pt Name: Bridgette Vo  MRN: [id number]  Birthdate 1935  Date of evaluation: 4/20/2024  Provider: Kristen Dickey PA-C  PCP: Carlota Read MD  Note Started: 11:48 PM EDT 4/20/24       I have seen and evaluated this patient with my supervising physician Dylan Cagle MD      CHIEF COMPLAINT       Chief Complaint   Patient presents with    Respiratory Distress       HISTORY OF PRESENT ILLNESS: 1 or more Elements     History From: Patient  Limitations to history : None    Bridgette Vo is a 88 y.o. female who presents to the emergency department today for evaluation for concerns of shortness of breath.  The patient states that she does have a history of COPD, and is on 3 L of oxygen continuously.  The patient reports that when she woke up today, she states that she noticed increasing shortness of breath, as well as wheezing.  EMS reports that the patient was 86% on her normal 3 L.  I did give her 2 breathing treatments and route.  When the patient arrives to the ED, she is hypoxic on her 3 L at 87%.  She is audibly wheezing, tachycardic, and tachypneic.  Patient is a DNR CC.  This was reviewed with her, she does not want to be intubated, or have CPR she reports that she just wants to be comfortable.  Patient states that she does have a history of a chronic cough, she states that this is productive of sputum but states that this is a chronic issue for her.  No change in the cough.  She has not any fevers.  She has no chest pain.  No abdominal pain, nausea, vomiting or diarrhea.  No urinary symptoms no other complaints    Nursing Notes were all reviewed and agreed with or any disagreements were addressed in the HPI.    REVIEW OF SYSTEMS :      Review of Systems   Constitutional:  Negative for activity change, appetite change, chills and fever.   HENT:  Negative for congestion and rhinorrhea.    Respiratory:  Positive

## 2024-04-21 NOTE — PLAN OF CARE
Problem: Discharge Planning  Goal: Discharge to home or other facility with appropriate resources  Outcome: Progressing  Flowsheets (Taken 4/21/2024 0111)  Discharge to home or other facility with appropriate resources: Identify discharge learning needs (meds, wound care, etc)     Problem: Skin/Tissue Integrity  Goal: Absence of new skin breakdown  Description: 1.  Monitor for areas of redness and/or skin breakdown  2.  Assess vascular access sites hourly  3.  Every 4-6 hours minimum:  Change oxygen saturation probe site  4.  Every 4-6 hours:  If on nasal continuous positive airway pressure, respiratory therapy assess nares and determine need for appliance change or resting period.  Outcome: Progressing     Problem: Safety - Adult  Goal: Free from fall injury  Outcome: Progressing     Problem: ABCDS Injury Assessment  Goal: Absence of physical injury  Outcome: Progressing

## 2024-04-21 NOTE — PROGRESS NOTES
Patient admitted after midnight with increasing shortness of breath and cough.  She wears 3 L at baseline.  Chest x-ray unremarkable.  She was on BiPAP and is currently on 3 and half liters of nasal cannula and saturating 92%.  She appears short of breath.  Lives in an apartment at [facility]    Will change prednisone to Solu-Medrol continue Pulmicort and DuoNebs, add Brovana nebs.    CODE STATUS discussed with the patient.  In the event of cardiopulmonary arrest she does not want chest compressions, shock, meds, or intubation.    DNR CCA    Mariaa Pino PA-C

## 2024-04-21 NOTE — PROGRESS NOTES
Shift assessment completed. Routine vitals stable. Scheduled medications given. Patient is awake, alert and oriented. Respirations are shallow and dyspnea at rest.  Call light within reach. Patient had HHNs tx, noted with increased wheezing after treatments completed. Patients HOB up and n/c in place on 3.5 liters. Asked RT if patient needs back on CPAP and she said no unless patient requests it. Diet ordered for patient. Swallow screening passed.

## 2024-04-22 LAB
BASE EXCESS BLDV CALC-SCNC: 6.9 MMOL/L (ref -3–3)
CO2 BLDV-SCNC: 73 MMOL/L
COHGB MFR BLDV: 3.6 % (ref 0–1.5)
HCO3 BLDV-SCNC: 31.4 MMOL/L (ref 23–29)
METHGB MFR BLDV: 0.2 %
O2 CT VFR BLDV CALC: 18 VOL %
O2 THERAPY: ABNORMAL
ORGANISM: ABNORMAL
PCO2 BLDV: 43 MMHG (ref 40–50)
PH BLDV: 7.47 [PH] (ref 7.35–7.45)
PO2 BLDV: 83.1 MMHG (ref 25–40)
REPORT: NORMAL
RESP PATH DNA+RNA PNL NPH NAA+NON-PROBE: ABNORMAL
SAO2 % BLDV: 98 %

## 2024-04-22 PROCEDURE — 94761 N-INVAS EAR/PLS OXIMETRY MLT: CPT

## 2024-04-22 PROCEDURE — 6370000000 HC RX 637 (ALT 250 FOR IP): Performed by: STUDENT IN AN ORGANIZED HEALTH CARE EDUCATION/TRAINING PROGRAM

## 2024-04-22 PROCEDURE — 6360000002 HC RX W HCPCS: Performed by: STUDENT IN AN ORGANIZED HEALTH CARE EDUCATION/TRAINING PROGRAM

## 2024-04-22 PROCEDURE — 94660 CPAP INITIATION&MGMT: CPT

## 2024-04-22 PROCEDURE — 99223 1ST HOSP IP/OBS HIGH 75: CPT | Performed by: STUDENT IN AN ORGANIZED HEALTH CARE EDUCATION/TRAINING PROGRAM

## 2024-04-22 PROCEDURE — 36415 COLL VENOUS BLD VENIPUNCTURE: CPT

## 2024-04-22 PROCEDURE — 6370000000 HC RX 637 (ALT 250 FOR IP): Performed by: PHYSICIAN ASSISTANT

## 2024-04-22 PROCEDURE — 0202U NFCT DS 22 TRGT SARS-COV-2: CPT

## 2024-04-22 PROCEDURE — 2580000003 HC RX 258: Performed by: STUDENT IN AN ORGANIZED HEALTH CARE EDUCATION/TRAINING PROGRAM

## 2024-04-22 PROCEDURE — 2700000000 HC OXYGEN THERAPY PER DAY

## 2024-04-22 PROCEDURE — 2580000003 HC RX 258: Performed by: PHYSICIAN ASSISTANT

## 2024-04-22 PROCEDURE — 96376 TX/PRO/DX INJ SAME DRUG ADON: CPT

## 2024-04-22 PROCEDURE — 96372 THER/PROPH/DIAG INJ SC/IM: CPT

## 2024-04-22 PROCEDURE — 94640 AIRWAY INHALATION TREATMENT: CPT

## 2024-04-22 PROCEDURE — 84443 ASSAY THYROID STIM HORMONE: CPT

## 2024-04-22 PROCEDURE — G0378 HOSPITAL OBSERVATION PER HR: HCPCS

## 2024-04-22 PROCEDURE — 6360000002 HC RX W HCPCS: Performed by: PHYSICIAN ASSISTANT

## 2024-04-22 PROCEDURE — 82803 BLOOD GASES ANY COMBINATION: CPT

## 2024-04-22 PROCEDURE — 96375 TX/PRO/DX INJ NEW DRUG ADDON: CPT

## 2024-04-22 RX ORDER — IPRATROPIUM BROMIDE AND ALBUTEROL SULFATE 2.5; .5 MG/3ML; MG/3ML
1 SOLUTION RESPIRATORY (INHALATION) EVERY 4 HOURS PRN
Status: DISCONTINUED | OUTPATIENT
Start: 2024-04-22 | End: 2024-04-26 | Stop reason: HOSPADM

## 2024-04-22 RX ORDER — FUROSEMIDE 10 MG/ML
40 INJECTION INTRAMUSCULAR; INTRAVENOUS ONCE
Status: COMPLETED | OUTPATIENT
Start: 2024-04-22 | End: 2024-04-22

## 2024-04-22 RX ORDER — IPRATROPIUM BROMIDE AND ALBUTEROL SULFATE 2.5; .5 MG/3ML; MG/3ML
1 SOLUTION RESPIRATORY (INHALATION)
Status: DISCONTINUED | OUTPATIENT
Start: 2024-04-22 | End: 2024-04-26 | Stop reason: HOSPADM

## 2024-04-22 RX ADMIN — WATER 40 MG: 1 INJECTION INTRAMUSCULAR; INTRAVENOUS; SUBCUTANEOUS at 11:25

## 2024-04-22 RX ADMIN — FUROSEMIDE 40 MG: 10 INJECTION, SOLUTION INTRAMUSCULAR; INTRAVENOUS at 15:35

## 2024-04-22 RX ADMIN — ASPIRIN 81 MG: 81 TABLET, COATED ORAL at 11:23

## 2024-04-22 RX ADMIN — LEVOTHYROXINE SODIUM 75 MCG: 0.15 TABLET ORAL at 05:52

## 2024-04-22 RX ADMIN — IPRATROPIUM BROMIDE AND ALBUTEROL SULFATE 1 DOSE: 2.5; .5 SOLUTION RESPIRATORY (INHALATION) at 19:42

## 2024-04-22 RX ADMIN — ARFORMOTEROL TARTRATE 15 MCG: 15 SOLUTION RESPIRATORY (INHALATION) at 19:42

## 2024-04-22 RX ADMIN — SODIUM CHLORIDE, PRESERVATIVE FREE 10 ML: 5 INJECTION INTRAVENOUS at 11:25

## 2024-04-22 RX ADMIN — IPRATROPIUM BROMIDE AND ALBUTEROL SULFATE 1 DOSE: 2.5; .5 SOLUTION RESPIRATORY (INHALATION) at 11:59

## 2024-04-22 RX ADMIN — AZITHROMYCIN DIHYDRATE 500 MG: 250 TABLET ORAL at 11:24

## 2024-04-22 RX ADMIN — POTASSIUM CHLORIDE 20 MEQ: 1500 TABLET, EXTENDED RELEASE ORAL at 11:24

## 2024-04-22 RX ADMIN — WATER 40 MG: 1 INJECTION INTRAMUSCULAR; INTRAVENOUS; SUBCUTANEOUS at 23:21

## 2024-04-22 RX ADMIN — PANTOPRAZOLE SODIUM 40 MG: 40 TABLET, DELAYED RELEASE ORAL at 05:52

## 2024-04-22 RX ADMIN — IPRATROPIUM BROMIDE AND ALBUTEROL SULFATE 1 DOSE: .5; 3 SOLUTION RESPIRATORY (INHALATION) at 00:50

## 2024-04-22 RX ADMIN — DOCUSATE SODIUM 100 MG: 100 CAPSULE, LIQUID FILLED ORAL at 11:24

## 2024-04-22 RX ADMIN — GUAIFENESIN 600 MG: 600 TABLET, EXTENDED RELEASE ORAL at 11:24

## 2024-04-22 RX ADMIN — ARFORMOTEROL TARTRATE 15 MCG: 15 SOLUTION RESPIRATORY (INHALATION) at 08:13

## 2024-04-22 RX ADMIN — CITALOPRAM 10 MG: 20 TABLET, FILM COATED ORAL at 11:23

## 2024-04-22 RX ADMIN — ENOXAPARIN SODIUM 40 MG: 100 INJECTION SUBCUTANEOUS at 11:23

## 2024-04-22 RX ADMIN — BUDESONIDE 500 MCG: 0.5 INHALANT RESPIRATORY (INHALATION) at 08:13

## 2024-04-22 RX ADMIN — IPRATROPIUM BROMIDE AND ALBUTEROL SULFATE 1 DOSE: 2.5; .5 SOLUTION RESPIRATORY (INHALATION) at 15:46

## 2024-04-22 RX ADMIN — DOCUSATE SODIUM 100 MG: 100 CAPSULE, LIQUID FILLED ORAL at 20:21

## 2024-04-22 RX ADMIN — SODIUM CHLORIDE, PRESERVATIVE FREE 10 ML: 5 INJECTION INTRAVENOUS at 15:35

## 2024-04-22 RX ADMIN — WATER 40 MG: 1 INJECTION INTRAMUSCULAR; INTRAVENOUS; SUBCUTANEOUS at 05:52

## 2024-04-22 RX ADMIN — GUAIFENESIN 600 MG: 600 TABLET, EXTENDED RELEASE ORAL at 20:21

## 2024-04-22 RX ADMIN — BUDESONIDE 500 MCG: 0.5 INHALANT RESPIRATORY (INHALATION) at 19:42

## 2024-04-22 RX ADMIN — SODIUM CHLORIDE, PRESERVATIVE FREE 10 ML: 5 INJECTION INTRAVENOUS at 20:22

## 2024-04-22 RX ADMIN — IPRATROPIUM BROMIDE AND ALBUTEROL SULFATE 1 DOSE: .5; 3 SOLUTION RESPIRATORY (INHALATION) at 04:59

## 2024-04-22 RX ADMIN — Medication 1 TABLET: at 12:09

## 2024-04-22 NOTE — CONSULTS
Pulmonary and Critical Care Medicine    CC: SOB and wheezing   Date: 2024  Admit Date:  2024  Reason for Consultation: AE-COPD  Consult Requesting Physician: Kailash Eagle MD     HPI     This is a 89 y/o F w/ a hx of COPD 3LNC, MARIAA on bipap, CHF, HTN, HLD, AS s/p TAVR (followed by my partner Dr. Gallegos) presented to [facility] on  with SOB. Patient was found to have acute on chronic hypoxic respiratory failure and acute hypercapnic respiratory failure 2/2 AE-COPD. She was admitted to the medical floor started on nebs, steroids, abx and bipap. Pulmonary medicine is consulted to help with the management, on my exam patient was in room 3374 she was on 4L NC, she felt better since admission.     ROS: negative except stated above    PMH:  has a past medical history of Allergic rhinitis, Anxiety, Aortic stenosis, Aortic stenosis, severe, CHF (congestive heart failure) (HCC), COPD (chronic obstructive pulmonary disease) ([facility]), Depression, Diabetes mellitus (HCC), Hypertension, Hypothyroidism, Nonrheumatic aortic valve stenosis, MARIAA treated with BiPAP, and Urinary incontinence.   PSH:  has a past surgical history that includes Hysterectomy, total abdominal; Carpal tunnel release (Bilateral); Cholecystectomy; Cataract extraction (Bilateral); Tonsillectomy; Uvulectomy; Cardiac catheterization (10/11/2021); Dental surgery (N/A, 2021); Aortic valve replacement (N/A, 2021); and Aortic valve replacement (N/A, 2021).    SH:  reports that she has never smoked. She has never used smokeless tobacco. She reports that she does not drink alcohol and does not use drugs.   FH: family history includes Breast Cancer in her daughter, maternal grandmother, mother, and sister; Prostate Cancer in her father.    Allergies: Patient has no known allergies.     OBJECTIVE DATA       PHYSICAL EXAM:   Temp  Av °F (36.7 °C)  Min: 97.5 °F (36.4 °C)  Max: 98.5 °F (36.9 °C)  Pulse  Av.5  Min: 59  Max: 98  BP

## 2024-04-22 NOTE — PLAN OF CARE
Problem: Discharge Planning  Goal: Discharge to home or other facility with appropriate resources  Outcome: Progressing  Flowsheets (Taken 4/21/2024 2042)  Discharge to home or other facility with appropriate resources: Identify discharge learning needs (meds, wound care, etc)     Problem: Skin/Tissue Integrity  Goal: Absence of new skin breakdown  Description: 1.  Monitor for areas of redness and/or skin breakdown  2.  Assess vascular access sites hourly  3.  Every 4-6 hours minimum:  Change oxygen saturation probe site  4.  Every 4-6 hours:  If on nasal continuous positive airway pressure, respiratory therapy assess nares and determine need for appliance change or resting period.  Outcome: Progressing     Problem: Safety - Adult  Goal: Free from fall injury  Outcome: Progressing     Problem: ABCDS Injury Assessment  Goal: Absence of physical injury  Outcome: Progressing     Problem: Pain  Goal: Verbalizes/displays adequate comfort level or baseline comfort level  Outcome: Progressing

## 2024-04-22 NOTE — CARE COORDINATION
04/22/24 1200   IMM Letter   Observation Status Letter date given: 04/22/24   Observation Status Letter time given: 1200   Observation Status Letter given to Patient/Family/Significant other/Guardian/POA/by: HARO Letter given to Patient by Esther with Patient Access. Patient signed HARO Letter.         Electronically signed by JOLEEN Taylor on 4/22/2024 at 12:00 PM

## 2024-04-22 NOTE — PROGRESS NOTES
[facility]    4/22/2024 12:39 PM        Name: Bridgette Vo .              Admitted: 4/20/2024  Primary Care Provider: Carlota Read MD (Tel: [phone])    Brief Course:   Patient admitted Sunday morning with increasing shortness of breath and cough. She wears 3 L at baseline. Chest x-ray unremarkable. She was on BiPAP most of the day Sunday. Lives in an apartment at [facility]     Subjective:    Feeling slightly improved today. Currently on 4L. She uses wheelchair at facility. She transfers herself from bed to chair but in general doesn't take more than a step or two.    Reviewed interval ancillary notes    Current Medications  ipratropium 0.5 mg-albuterol 2.5 mg (DUONEB) nebulizer solution 1 Dose, 4x Daily RT  ipratropium 0.5 mg-albuterol 2.5 mg (DUONEB) nebulizer solution 1 Dose, Q4H PRN  oyster shell calcium w/D 500-5 MG-MCG tablet 1 tablet, Daily  albuterol sulfate HFA (PROVENTIL;VENTOLIN;PROAIR) 108 (90 Base) MCG/ACT inhaler 2 puff, Q6H PRN  aspirin EC tablet 81 mg, Daily  budesonide (PULMICORT) nebulizer suspension 500 mcg, BID  citalopram (CELEXA) tablet 10 mg, Daily  docusate sodium (COLACE) capsule 100 mg, BID  fluticasone (FLONASE) 50 MCG/ACT nasal spray 2 spray, Daily PRN  levothyroxine (SYNTHROID) tablet 75 mcg, Daily  pantoprazole (PROTONIX) tablet 40 mg, QAM AC  potassium chloride (KLOR-CON M) extended release tablet 20 mEq, Daily  sodium chloride flush 0.9 % injection 5-40 mL, 2 times per day  sodium chloride flush 0.9 % injection 5-40 mL, PRN  0.9 % sodium chloride infusion, PRN  ondansetron (ZOFRAN-ODT) disintegrating tablet 4 mg, Q8H PRN   Or  ondansetron (ZOFRAN) injection 4 mg, Q6H PRN  polyethylene glycol (GLYCOLAX) packet 17 g, Daily PRN  enoxaparin (LOVENOX) injection 40 mg, Daily  acetaminophen (TYLENOL) tablet 650 mg, Q6H PRN   Or  acetaminophen (TYLENOL) suppository 650 mg, Q6H

## 2024-04-22 NOTE — RT PROTOCOL NOTE
RT Inhaler-Nebulizer Bronchodilator Protocol Note    There is a bronchodilator order in the chart from a provider indicating to follow the RT Bronchodilator Protocol and there is an “Initiate RT Inhaler-Nebulizer Bronchodilator Protocol” order as well (see protocol at bottom of note).    CXR Findings:  XR CHEST PORTABLE    Result Date: 4/20/2024  Stable chronic changes with no acute abnormality seen       The findings from the last RT Protocol Assessment were as follows:   History Pulmonary Disease: Chronic pulmonary disease  Respiratory Pattern: Dyspnea on exertion or RR 21-25 bpm  Breath Sounds: Intermittent or unilateral wheezes  Cough: Strong, productive  Indication for Bronchodilator Therapy:    Bronchodilator Assessment Score: 9    Aerosolized bronchodilator medication orders have been revised according to the RT Inhaler-Nebulizer Bronchodilator Protocol below.    Respiratory Therapist to perform RT Therapy Protocol Assessment initially then follow the protocol.  Repeat RT Therapy Protocol Assessment PRN for score 0-3 or on second treatment, BID, and PRN for scores above 3.    No Indications - adjust the frequency to every 6 hours PRN wheezing or bronchospasm, if no treatments needed after 48 hours then discontinue using Per Protocol order mode.     If indication present, adjust the RT bronchodilator orders based on the Bronchodilator Assessment Score as indicated below.  Use Inhaler orders unless patient has one or more of the following: on home nebulizer, not able to hold breath for 10 seconds, is not alert and oriented, cannot activate and use MDI correctly, or respiratory rate 25 breaths per minute or more, then use the equivalent nebulizer order(s) with same Frequency and PRN reasons based on the score.  If a patient is on this medication at home then do not decrease Frequency below that used at home.    0-3 - enter or revise RT bronchodilator order(s) to equivalent RT Bronchodilator order with Frequency

## 2024-04-22 NOTE — PROGRESS NOTES
Pt to triage to room 5550. Report called to THI Jason. All questions answered with no further to report. Patient made of transfer. Александр (son) called and notified of transfer and room number, acknowledged.

## 2024-04-23 LAB
BACTERIA UR CULT: ABNORMAL
GLUCOSE BLD-MCNC: 146 MG/DL (ref 70–99)
GLUCOSE BLD-MCNC: 158 MG/DL (ref 70–99)
GLUCOSE BLD-MCNC: 96 MG/DL (ref 70–99)
ORGANISM: ABNORMAL
PERFORMED ON: ABNORMAL
PERFORMED ON: ABNORMAL
PERFORMED ON: NORMAL
TSH SERPL DL<=0.005 MIU/L-ACNC: 1.88 UIU/ML (ref 0.27–4.2)

## 2024-04-23 PROCEDURE — 97530 THERAPEUTIC ACTIVITIES: CPT

## 2024-04-23 PROCEDURE — 6360000002 HC RX W HCPCS: Performed by: NURSE PRACTITIONER

## 2024-04-23 PROCEDURE — 94640 AIRWAY INHALATION TREATMENT: CPT

## 2024-04-23 PROCEDURE — 97535 SELF CARE MNGMENT TRAINING: CPT

## 2024-04-23 PROCEDURE — 2580000003 HC RX 258: Performed by: NURSE PRACTITIONER

## 2024-04-23 PROCEDURE — 2580000003 HC RX 258: Performed by: STUDENT IN AN ORGANIZED HEALTH CARE EDUCATION/TRAINING PROGRAM

## 2024-04-23 PROCEDURE — 97161 PT EVAL LOW COMPLEX 20 MIN: CPT

## 2024-04-23 PROCEDURE — 2700000000 HC OXYGEN THERAPY PER DAY

## 2024-04-23 PROCEDURE — 6370000000 HC RX 637 (ALT 250 FOR IP): Performed by: STUDENT IN AN ORGANIZED HEALTH CARE EDUCATION/TRAINING PROGRAM

## 2024-04-23 PROCEDURE — 99232 SBSQ HOSP IP/OBS MODERATE 35: CPT | Performed by: STUDENT IN AN ORGANIZED HEALTH CARE EDUCATION/TRAINING PROGRAM

## 2024-04-23 PROCEDURE — 1200000000 HC SEMI PRIVATE

## 2024-04-23 PROCEDURE — 6360000002 HC RX W HCPCS: Performed by: STUDENT IN AN ORGANIZED HEALTH CARE EDUCATION/TRAINING PROGRAM

## 2024-04-23 PROCEDURE — 96372 THER/PROPH/DIAG INJ SC/IM: CPT

## 2024-04-23 PROCEDURE — 96365 THER/PROPH/DIAG IV INF INIT: CPT

## 2024-04-23 PROCEDURE — 97165 OT EVAL LOW COMPLEX 30 MIN: CPT

## 2024-04-23 PROCEDURE — 94761 N-INVAS EAR/PLS OXIMETRY MLT: CPT

## 2024-04-23 PROCEDURE — 96376 TX/PRO/DX INJ SAME DRUG ADON: CPT

## 2024-04-23 PROCEDURE — 6360000002 HC RX W HCPCS: Performed by: PHYSICIAN ASSISTANT

## 2024-04-23 PROCEDURE — 6370000000 HC RX 637 (ALT 250 FOR IP): Performed by: PHYSICIAN ASSISTANT

## 2024-04-23 RX ADMIN — CEFTRIAXONE SODIUM 1000 MG: 1 INJECTION, POWDER, FOR SOLUTION INTRAMUSCULAR; INTRAVENOUS at 10:39

## 2024-04-23 RX ADMIN — IPRATROPIUM BROMIDE AND ALBUTEROL SULFATE 1 DOSE: 2.5; .5 SOLUTION RESPIRATORY (INHALATION) at 12:06

## 2024-04-23 RX ADMIN — POTASSIUM CHLORIDE 20 MEQ: 1500 TABLET, EXTENDED RELEASE ORAL at 10:19

## 2024-04-23 RX ADMIN — DOCUSATE SODIUM 100 MG: 100 CAPSULE, LIQUID FILLED ORAL at 20:27

## 2024-04-23 RX ADMIN — SODIUM CHLORIDE, PRESERVATIVE FREE 10 ML: 5 INJECTION INTRAVENOUS at 20:27

## 2024-04-23 RX ADMIN — PANTOPRAZOLE SODIUM 40 MG: 40 TABLET, DELAYED RELEASE ORAL at 05:43

## 2024-04-23 RX ADMIN — Medication 1 TABLET: at 10:19

## 2024-04-23 RX ADMIN — BUDESONIDE 500 MCG: 0.5 INHALANT RESPIRATORY (INHALATION) at 20:49

## 2024-04-23 RX ADMIN — CITALOPRAM 10 MG: 20 TABLET, FILM COATED ORAL at 10:18

## 2024-04-23 RX ADMIN — ARFORMOTEROL TARTRATE 15 MCG: 15 SOLUTION RESPIRATORY (INHALATION) at 09:09

## 2024-04-23 RX ADMIN — ENOXAPARIN SODIUM 40 MG: 100 INJECTION SUBCUTANEOUS at 10:19

## 2024-04-23 RX ADMIN — DOCUSATE SODIUM 100 MG: 100 CAPSULE, LIQUID FILLED ORAL at 10:19

## 2024-04-23 RX ADMIN — LEVOTHYROXINE SODIUM 75 MCG: 0.15 TABLET ORAL at 05:43

## 2024-04-23 RX ADMIN — WATER 40 MG: 1 INJECTION INTRAMUSCULAR; INTRAVENOUS; SUBCUTANEOUS at 22:53

## 2024-04-23 RX ADMIN — SODIUM CHLORIDE, PRESERVATIVE FREE 10 ML: 5 INJECTION INTRAVENOUS at 10:19

## 2024-04-23 RX ADMIN — SODIUM CHLORIDE: 9 INJECTION, SOLUTION INTRAVENOUS at 10:38

## 2024-04-23 RX ADMIN — ARFORMOTEROL TARTRATE 15 MCG: 15 SOLUTION RESPIRATORY (INHALATION) at 20:49

## 2024-04-23 RX ADMIN — GUAIFENESIN 600 MG: 600 TABLET, EXTENDED RELEASE ORAL at 20:27

## 2024-04-23 RX ADMIN — ASPIRIN 81 MG: 81 TABLET, COATED ORAL at 10:18

## 2024-04-23 RX ADMIN — AZITHROMYCIN DIHYDRATE 500 MG: 250 TABLET ORAL at 10:18

## 2024-04-23 RX ADMIN — IPRATROPIUM BROMIDE AND ALBUTEROL SULFATE 1 DOSE: 2.5; .5 SOLUTION RESPIRATORY (INHALATION) at 20:49

## 2024-04-23 RX ADMIN — GUAIFENESIN 600 MG: 600 TABLET, EXTENDED RELEASE ORAL at 10:18

## 2024-04-23 RX ADMIN — IPRATROPIUM BROMIDE AND ALBUTEROL SULFATE 1 DOSE: 2.5; .5 SOLUTION RESPIRATORY (INHALATION) at 16:01

## 2024-04-23 RX ADMIN — BUDESONIDE 500 MCG: 0.5 INHALANT RESPIRATORY (INHALATION) at 09:08

## 2024-04-23 RX ADMIN — IPRATROPIUM BROMIDE AND ALBUTEROL SULFATE 1 DOSE: 2.5; .5 SOLUTION RESPIRATORY (INHALATION) at 09:08

## 2024-04-23 RX ADMIN — WATER 40 MG: 1 INJECTION INTRAMUSCULAR; INTRAVENOUS; SUBCUTANEOUS at 11:51

## 2024-04-23 ASSESSMENT — PAIN SCALES - GENERAL: PAINLEVEL_OUTOF10: 2

## 2024-04-23 ASSESSMENT — PAIN DESCRIPTION - ORIENTATION: ORIENTATION: OTHER (COMMENT)

## 2024-04-23 ASSESSMENT — PAIN DESCRIPTION - LOCATION: LOCATION: BREAST

## 2024-04-23 NOTE — PROGRESS NOTES
Assessment complete. Vitals obtained. Patient resting in bed. Respirations even and easy. Call light in reach. Fall precautions in place. No needs expressed at this time. Will continue to monitor.

## 2024-04-23 NOTE — PROGRESS NOTES
[facility] - Inpatient Rehabilitation Department   Phone: [phone]    Occupational Therapy    [x] Initial Evaluation            [] Daily Treatment Note         [] Discharge Summary      Patient: Bridgette Vo   : 1935   MRN: [id number]   Date of Service:  2024    Admitting Diagnosis:  COPD with acute exacerbation (HCC)  Current Admission Summary: Per H&P \"88 y.o. female who presents to the emergency department today for evaluation for concerns of shortness of breath.  The patient states that she does have a history of COPD, and is on 3 L of oxygen continuously.  The patient reports that when she woke up today, she states that she noticed increasing shortness of breath, as well as wheezing.  EMS reports that the patient was 86% on her normal 3 L.  I did give her 2 breathing treatments and route.  When the patient arrives to the ED, she is hypoxic on her 3 L at 87%.  She is audibly wheezing, tachycardic, and tachypneic.  Patient is a DNR CC.  This was reviewed with her, she does not want to be intubated, or have CPR she reports that she just wants to be comfortable.  Patient states that she does have a history of a chronic cough, she states that this is productive of sputum but states that this is a chronic issue for her.  No change in the cough.  She has not any fevers.  She has no chest pain.  No abdominal pain, nausea, vomiting or diarrhea.  No urinary symptoms no other complaints \"  Past Medical History:  has a past medical history of Allergic rhinitis, Anxiety, Aortic stenosis, Aortic stenosis, severe, CHF (congestive heart failure) (HCC), COPD (chronic obstructive pulmonary disease) (HCC), Depression, Diabetes mellitus (HCC), Hypertension, Hypothyroidism, Nonrheumatic aortic valve stenosis, MARIAA treated with BiPAP, and Urinary incontinence.  Past Surgical History:  has a past surgical history that includes Hysterectomy, total abdominal; Carpal tunnel release (Bilateral);

## 2024-04-23 NOTE — PROGRESS NOTES
4 Eyes Skin Assessment     NAME:  Bridgette Vo  YOB: 1935  MEDICAL RECORD NUMBER:  [id number]    The patient is being assessed for  Admission    I agree that at least one RN has performed a thorough Head to Toe Skin Assessment on the patient. ALL assessment sites listed below have been assessed.      Areas assessed by both nurses:    Head, Face, Ears, Shoulders, Back, Chest, Arms, Elbows, Hands, Sacrum. Buttock, Coccyx, Ischium, Legs. Feet and Heels, and Under Medical Devices         Does the Patient have a Wound? No noted wound(s)       John Prevention initiated by RN: Yes  Wound Care Orders initiated by RN: No    Pressure Injury (Stage 3,4, Unstageable, DTI, NWPT, and Complex wounds) if present, place Wound referral order by RN under : No    New Ostomies, if present place, Ostomy referral order under : No     Nurse 1 eSignature: Electronically signed by Sarai العراقي RN on 4/23/24 at 2:34 AM EDT    **SHARE this note so that the co-signing nurse can place an eSignature**    Nurse 2 eSignature: Electronically signed by ALDEN ROSSI RN on 4/23/24 at 2:35 AM EDT

## 2024-04-23 NOTE — PROGRESS NOTES
Pulmonary and Critical Care Medicine    CC: SOB and wheezing   Date: 2024  Admit Date:  2024  Reason for Consultation: AE-COPD  Consult Requesting Physician: Kailash Eagle MD     HPI     This is a 87 y/o F w/ a hx of COPD 3LNC, MARIAA on bipap, CHF, HTN, HLD, AS s/p TAVR (followed by my partner Dr. Gallegos) presented to [facility] on  with SOB.     Overnight:  Parainfluenza positive  Feels better since yesterday    ROS: negative except stated above    OBJECTIVE DATA       PHYSICAL EXAM:   Temp  Av.9 °F (36.6 °C)  Min: 97.6 °F (36.4 °C)  Max: 98.5 °F (36.9 °C)  Pulse  Av.3  Min: 72  Max: 100  BP  Min: 105/63  Max: 138/66  SpO2  Av.5 %  Min: 90 %  Max: 97 %  FiO2   Av %  Min: 40 %  Max: 40 %    General: NAD  Neuro: A0x3  Lung: bilateral wheezing, mild non labored  Heart: regular rate    MEDICATIONS: Reviewed  Scheduled Meds:   cefTRIAXone (ROCEPHIN) IV  1,000 mg IntraVENous Q24H    ipratropium 0.5 mg-albuterol 2.5 mg  1 Dose Inhalation 4x Daily RT    oyster shell calcium w/D  1 tablet Oral Daily    methylPREDNISolone  40 mg IntraVENous Q12H    aspirin  81 mg Oral Daily    budesonide  500 mcg Nebulization BID    citalopram  10 mg Oral Daily    docusate sodium  100 mg Oral BID    levothyroxine  75 mcg Oral Daily    pantoprazole  40 mg Oral QAM AC    potassium chloride  20 mEq Oral Daily    sodium chloride flush  5-40 mL IntraVENous 2 times per day    enoxaparin  40 mg SubCUTAneous Daily    guaiFENesin  600 mg Oral BID    arformoterol tartrate  15 mcg Nebulization BID RT     Continuous Infusions:   sodium chloride 25 mL/hr at 24 1038     PRN Meds:.ipratropium 0.5 mg-albuterol 2.5 mg, albuterol sulfate HFA, fluticasone, sodium chloride flush, sodium chloride, ondansetron **OR** ondansetron, polyethylene glycol, acetaminophen **OR** acetaminophen, benzonatate     LABS: Reviewed.   Recent Labs     24  2133      K 4.6   CL 95*   CO2 31   BUN 14   CREATININE 0.8     Recent

## 2024-04-23 NOTE — PLAN OF CARE
Problem: Safety - Adult  Goal: Free from fall injury  Outcome: Progressing     Problem: Pain  Goal: Verbalizes/displays adequate comfort level or baseline comfort level  Outcome: Progressing  Flowsheets (Taken 4/22/2024 2015 by Lori Huynh RN)  Verbalizes/displays adequate comfort level or baseline comfort level:   Encourage patient to monitor pain and request assistance   Assess pain using appropriate pain scale   Administer analgesics based on type and severity of pain and evaluate response   Implement non-pharmacological measures as appropriate and evaluate response   Consider cultural and social influences on pain and pain management

## 2024-04-23 NOTE — PROGRESS NOTES
[facility]    4/23/2024 7:51 AM        Name: Bridgette Vo .              Admitted: 4/20/2024  Primary Care Provider: Carlota Read MD (Tel: [phone])    Brief Course:   Patient admitted Sunday morning with increasing shortness of breath and cough. She wears 3 L at baseline. Chest x-ray unremarkable. She was on BiPAP most of the day Sunday. Lives in an apartment at [facility]     Subjective:    Seen this am while up in the chair  Feels a little better today.  Eating \"ok\"   I decreased her oxygen to 3.5 liters /  sats 94%     + for parainfluenza virus       Reviewed interval ancillary notes    Current Medications  ipratropium 0.5 mg-albuterol 2.5 mg (DUONEB) nebulizer solution 1 Dose, 4x Daily RT  ipratropium 0.5 mg-albuterol 2.5 mg (DUONEB) nebulizer solution 1 Dose, Q4H PRN  oyster shell calcium w/D 500-5 MG-MCG tablet 1 tablet, Daily  methylPREDNISolone sodium succ (SOLU-MEDROL) 40 mg in sterile water 1 mL injection, Q12H  albuterol sulfate HFA (PROVENTIL;VENTOLIN;PROAIR) 108 (90 Base) MCG/ACT inhaler 2 puff, Q6H PRN  aspirin EC tablet 81 mg, Daily  budesonide (PULMICORT) nebulizer suspension 500 mcg, BID  citalopram (CELEXA) tablet 10 mg, Daily  docusate sodium (COLACE) capsule 100 mg, BID  fluticasone (FLONASE) 50 MCG/ACT nasal spray 2 spray, Daily PRN  levothyroxine (SYNTHROID) tablet 75 mcg, Daily  pantoprazole (PROTONIX) tablet 40 mg, QAM AC  potassium chloride (KLOR-CON M) extended release tablet 20 mEq, Daily  sodium chloride flush 0.9 % injection 5-40 mL, 2 times per day  sodium chloride flush 0.9 % injection 5-40 mL, PRN  0.9 % sodium chloride infusion, PRN  ondansetron (ZOFRAN-ODT) disintegrating tablet 4 mg, Q8H PRN   Or  ondansetron (ZOFRAN) injection 4 mg, Q6H PRN  polyethylene glycol (GLYCOLAX) packet 17 g, Daily PRN  enoxaparin (LOVENOX) injection 40 mg, Daily  acetaminophen (TYLENOL) tablet

## 2024-04-23 NOTE — PROGRESS NOTES
[facility] - Inpatient Rehabilitation Department   Phone: [phone]    Physical Therapy    [x] Initial Evaluation            [] Daily Treatment Note         [] Discharge Summary      Patient: Bridgette Vo   : 1935   MRN: [id number]   Date of Service:  2024  Admitting Diagnosis: COPD with acute exacerbation (HCC)  Current Admission Summary: 88 y.o. female who presents to the emergency department today for evaluation for concerns of shortness of breath.  The patient states that she does have a history of COPD, and is on 3 L of oxygen continuously.  The patient reports that when she woke up today, she states that she noticed increasing shortness of breath, as well as wheezing.  EMS reports that the patient was 86% on her normal 3 L.  I did give her 2 breathing treatments and route.  When the patient arrives to the ED, she is hypoxic on her 3 L at 87%.  She is audibly wheezing, tachycardic, and tachypneic.  Patient is a DNR CC.  This was reviewed with her, she does not want to be intubated, or have CPR she reports that she just wants to be comfortable.  Patient states that she does have a history of a chronic cough, she states that this is productive of sputum but states that this is a chronic issue for her.  No change in the cough.  She has not any fevers.  She has no chest pain.  No abdominal pain, nausea, vomiting or diarrhea.  No urinary symptoms no other complaints   Past Medical History:  has a past medical history of Allergic rhinitis, Anxiety, Aortic stenosis, Aortic stenosis, severe, CHF (congestive heart failure) (HCC), COPD (chronic obstructive pulmonary disease) (HCC), Depression, Diabetes mellitus (HCC), Hypertension, Hypothyroidism, Nonrheumatic aortic valve stenosis, MARIAA treated with BiPAP, and Urinary incontinence.  Past Surgical History:  has a past surgical history that includes Hysterectomy, total abdominal; Carpal tunnel release (Bilateral); Cholecystectomy; Cataract

## 2024-04-24 LAB
BACTERIA BLD CULT ORG #2: NORMAL
BACTERIA BLD CULT: NORMAL
GLUCOSE BLD-MCNC: 103 MG/DL (ref 70–99)
GLUCOSE BLD-MCNC: 110 MG/DL (ref 70–99)
GLUCOSE BLD-MCNC: 114 MG/DL (ref 70–99)
PERFORMED ON: ABNORMAL

## 2024-04-24 PROCEDURE — 6360000002 HC RX W HCPCS: Performed by: STUDENT IN AN ORGANIZED HEALTH CARE EDUCATION/TRAINING PROGRAM

## 2024-04-24 PROCEDURE — 1200000000 HC SEMI PRIVATE

## 2024-04-24 PROCEDURE — 6370000000 HC RX 637 (ALT 250 FOR IP): Performed by: STUDENT IN AN ORGANIZED HEALTH CARE EDUCATION/TRAINING PROGRAM

## 2024-04-24 PROCEDURE — 2700000000 HC OXYGEN THERAPY PER DAY

## 2024-04-24 PROCEDURE — 2580000003 HC RX 258: Performed by: STUDENT IN AN ORGANIZED HEALTH CARE EDUCATION/TRAINING PROGRAM

## 2024-04-24 PROCEDURE — 99232 SBSQ HOSP IP/OBS MODERATE 35: CPT | Performed by: STUDENT IN AN ORGANIZED HEALTH CARE EDUCATION/TRAINING PROGRAM

## 2024-04-24 PROCEDURE — 6360000002 HC RX W HCPCS: Performed by: PHYSICIAN ASSISTANT

## 2024-04-24 PROCEDURE — 94761 N-INVAS EAR/PLS OXIMETRY MLT: CPT

## 2024-04-24 PROCEDURE — 94640 AIRWAY INHALATION TREATMENT: CPT

## 2024-04-24 PROCEDURE — 2580000003 HC RX 258: Performed by: NURSE PRACTITIONER

## 2024-04-24 PROCEDURE — 6360000002 HC RX W HCPCS: Performed by: NURSE PRACTITIONER

## 2024-04-24 RX ORDER — PREDNISONE 20 MG/1
40 TABLET ORAL DAILY
Status: COMPLETED | OUTPATIENT
Start: 2024-04-25 | End: 2024-04-26

## 2024-04-24 RX ADMIN — IPRATROPIUM BROMIDE AND ALBUTEROL SULFATE 1 DOSE: 2.5; .5 SOLUTION RESPIRATORY (INHALATION) at 12:46

## 2024-04-24 RX ADMIN — ASPIRIN 81 MG: 81 TABLET, COATED ORAL at 10:00

## 2024-04-24 RX ADMIN — IPRATROPIUM BROMIDE AND ALBUTEROL SULFATE 1 DOSE: 2.5; .5 SOLUTION RESPIRATORY (INHALATION) at 20:05

## 2024-04-24 RX ADMIN — ARFORMOTEROL TARTRATE 15 MCG: 15 SOLUTION RESPIRATORY (INHALATION) at 20:05

## 2024-04-24 RX ADMIN — BUDESONIDE 500 MCG: 0.5 INHALANT RESPIRATORY (INHALATION) at 08:52

## 2024-04-24 RX ADMIN — IPRATROPIUM BROMIDE AND ALBUTEROL SULFATE 1 DOSE: 2.5; .5 SOLUTION RESPIRATORY (INHALATION) at 08:52

## 2024-04-24 RX ADMIN — GUAIFENESIN 600 MG: 600 TABLET, EXTENDED RELEASE ORAL at 09:59

## 2024-04-24 RX ADMIN — POTASSIUM CHLORIDE 20 MEQ: 1500 TABLET, EXTENDED RELEASE ORAL at 09:59

## 2024-04-24 RX ADMIN — SODIUM CHLORIDE, PRESERVATIVE FREE 10 ML: 5 INJECTION INTRAVENOUS at 10:00

## 2024-04-24 RX ADMIN — ENOXAPARIN SODIUM 40 MG: 100 INJECTION SUBCUTANEOUS at 09:57

## 2024-04-24 RX ADMIN — Medication 1 TABLET: at 09:59

## 2024-04-24 RX ADMIN — SODIUM CHLORIDE, PRESERVATIVE FREE 10 ML: 5 INJECTION INTRAVENOUS at 21:27

## 2024-04-24 RX ADMIN — PANTOPRAZOLE SODIUM 40 MG: 40 TABLET, DELAYED RELEASE ORAL at 06:06

## 2024-04-24 RX ADMIN — CEFTRIAXONE SODIUM 1000 MG: 1 INJECTION, POWDER, FOR SOLUTION INTRAMUSCULAR; INTRAVENOUS at 09:56

## 2024-04-24 RX ADMIN — WATER 40 MG: 1 INJECTION INTRAMUSCULAR; INTRAVENOUS; SUBCUTANEOUS at 12:22

## 2024-04-24 RX ADMIN — DOCUSATE SODIUM 100 MG: 100 CAPSULE, LIQUID FILLED ORAL at 09:59

## 2024-04-24 RX ADMIN — ARFORMOTEROL TARTRATE 15 MCG: 15 SOLUTION RESPIRATORY (INHALATION) at 08:52

## 2024-04-24 RX ADMIN — IPRATROPIUM BROMIDE AND ALBUTEROL SULFATE 1 DOSE: 2.5; .5 SOLUTION RESPIRATORY (INHALATION) at 15:29

## 2024-04-24 RX ADMIN — SODIUM CHLORIDE: 9 INJECTION, SOLUTION INTRAVENOUS at 09:55

## 2024-04-24 RX ADMIN — LEVOTHYROXINE SODIUM 75 MCG: 0.15 TABLET ORAL at 06:06

## 2024-04-24 RX ADMIN — DOCUSATE SODIUM 100 MG: 100 CAPSULE, LIQUID FILLED ORAL at 21:27

## 2024-04-24 RX ADMIN — GUAIFENESIN 600 MG: 600 TABLET, EXTENDED RELEASE ORAL at 21:27

## 2024-04-24 RX ADMIN — BUDESONIDE 500 MCG: 0.5 INHALANT RESPIRATORY (INHALATION) at 20:05

## 2024-04-24 RX ADMIN — CITALOPRAM 10 MG: 20 TABLET, FILM COATED ORAL at 09:59

## 2024-04-24 ASSESSMENT — PAIN SCALES - GENERAL: PAINLEVEL_OUTOF10: 0

## 2024-04-24 NOTE — CARE COORDINATION
Case Management Assessment  Initial Evaluation    Date/Time of Evaluation: 4/24/2024 4:37 PM  Assessment Completed by: Volodymyr Woody    If patient is discharged prior to next notation, then this note serves as note for discharge by case management.    Patient Name: Bridgette Vo                   YOB: 1935  Diagnosis: COPD exacerbation (HCC) [J44.1]  COPD with acute exacerbation (HCC) [J44.1]  Acute respiratory failure with hypoxia and hypercapnia (HCC) [J96.01, J96.02]                   Date / Time: 4/20/2024  8:26 PM    Patient Admission Status: Inpatient   Readmission Risk (Low < 19, Mod (19-27), High > 27): Readmission Risk Score: 12.7    Current PCP: Carlota Read MD  PCP verified by CM? Yes (Followed by Medical Director at the [facility])    Chart Reviewed: Yes      History Provided by: Patient  Patient Orientation: Alert and Oriented    Patient Cognition: Alert    Hospitalization in the last 30 days (Readmission):  No    If yes, Readmission Assessment in  Navigator will be completed.    Advance Directives:      Code Status: DNR-CCA   Patient's Primary Decision Maker is: Named in Scanned ACP Document      Discharge Planning:    Patient lives with: Alone Type of Home: Assisted living (From [facility])  Primary Care Giver: Other (Comment) (From [facility])  Patient Support Systems include: Children   Current Financial resources: Medicare  Current community resources: None  Current services prior to admission: None            Current DME:              Type of Home Care services:  Aide Services    ADLS  Prior functional level: Assistance with the following:, Bathing, Dressing, Mobility, Cooking, Housework, Shopping  Current functional level: Assistance with the following:, Bathing, Dressing, Toileting, Mobility    PT AM-PAC: 12 /24  OT AM-PAC: 14 /24    Family can provide assistance at DC: Yes  Would you like Case Management to discuss the discharge plan with any other family

## 2024-04-24 NOTE — PROGRESS NOTES
[facility]    4/24/2024 7:59 AM        Name: Bridgette Vo .              Admitted: 4/20/2024  Primary Care Provider: Carlota Read MD (Tel: [phone])    Brief Course:   Patient admitted Sunday morning with increasing shortness of breath and cough. She wears 3 L at baseline. Chest x-ray unremarkable. She was on BiPAP most of the day Sunday. Lives in an apartment at [facility]     Subjective:    Seen this am while in bed.   Feels a  better today.  Eating \"ok\" but food is \"terrible\"   I decreased her oxygen to 3 liters /  sats 94%     + for parainfluenza virus       Reviewed interval ancillary notes    Current Medications  cefTRIAXone (ROCEPHIN) 1,000 mg in sodium chloride 0.9 % 50 mL IVPB (mini-bag), Q24H  ipratropium 0.5 mg-albuterol 2.5 mg (DUONEB) nebulizer solution 1 Dose, 4x Daily RT  ipratropium 0.5 mg-albuterol 2.5 mg (DUONEB) nebulizer solution 1 Dose, Q4H PRN  oyster shell calcium w/D 500-5 MG-MCG tablet 1 tablet, Daily  methylPREDNISolone sodium succ (SOLU-MEDROL) 40 mg in sterile water 1 mL injection, Q12H  albuterol sulfate HFA (PROVENTIL;VENTOLIN;PROAIR) 108 (90 Base) MCG/ACT inhaler 2 puff, Q6H PRN  aspirin EC tablet 81 mg, Daily  budesonide (PULMICORT) nebulizer suspension 500 mcg, BID  citalopram (CELEXA) tablet 10 mg, Daily  docusate sodium (COLACE) capsule 100 mg, BID  fluticasone (FLONASE) 50 MCG/ACT nasal spray 2 spray, Daily PRN  levothyroxine (SYNTHROID) tablet 75 mcg, Daily  pantoprazole (PROTONIX) tablet 40 mg, QAM AC  potassium chloride (KLOR-CON M) extended release tablet 20 mEq, Daily  sodium chloride flush 0.9 % injection 5-40 mL, 2 times per day  sodium chloride flush 0.9 % injection 5-40 mL, PRN  0.9 % sodium chloride infusion, PRN  ondansetron (ZOFRAN-ODT) disintegrating tablet 4 mg, Q8H PRN   Or  ondansetron (ZOFRAN) injection 4 mg, Q6H PRN  polyethylene glycol (GLYCOLAX)

## 2024-04-24 NOTE — PROGRESS NOTES
Pulmonary and Critical Care Medicine    CC: SOB and wheezing   Date: 2024  Admit Date:  2024  Reason for Consultation: AE-COPD  Consult Requesting Physician: Kailash Eagle MD     HPI     This is a 89 y/o F w/ a hx of COPD 3LNC, MARIAA on bipap, CHF, HTN, HLD, AS s/p TAVR (followed by my partner Dr. Gallegos) presented to [facility] on  with SOB.     Overnight:  Respiratory perspective improved   She is down to 3.5L NC baseline 3L NC     ROS: negative except stated above    OBJECTIVE DATA       PHYSICAL EXAM:   Temp  Av.1 °F (36.7 °C)  Min: 97.3 °F (36.3 °C)  Max: 98.6 °F (37 °C)  Pulse  Av.1  Min: 81  Max: 103  BP  Min: 113/67  Max: 144/61  SpO2  Av.7 %  Min: 91 %  Max: 96 %    General: NAD  Neuro: A0x3  Lung: scant bilateral wheezing, non labored  Heart: regular rate    MEDICATIONS: Reviewed  Scheduled Meds:   cefTRIAXone (ROCEPHIN) IV  1,000 mg IntraVENous Q24H    ipratropium 0.5 mg-albuterol 2.5 mg  1 Dose Inhalation 4x Daily RT    oyster shell calcium w/D  1 tablet Oral Daily    methylPREDNISolone  40 mg IntraVENous Q12H    aspirin  81 mg Oral Daily    budesonide  500 mcg Nebulization BID    citalopram  10 mg Oral Daily    docusate sodium  100 mg Oral BID    levothyroxine  75 mcg Oral Daily    pantoprazole  40 mg Oral QAM AC    potassium chloride  20 mEq Oral Daily    sodium chloride flush  5-40 mL IntraVENous 2 times per day    enoxaparin  40 mg SubCUTAneous Daily    guaiFENesin  600 mg Oral BID    arformoterol tartrate  15 mcg Nebulization BID RT     Continuous Infusions:   sodium chloride 25 mL/hr at 24 0955     PRN Meds:.ipratropium 0.5 mg-albuterol 2.5 mg, albuterol sulfate HFA, fluticasone, sodium chloride flush, sodium chloride, ondansetron **OR** ondansetron, polyethylene glycol, acetaminophen **OR** acetaminophen, benzonatate     LABS: Reviewed.   No results for input(s): \"NA\", \"K\", \"CL\", \"CO2\", \"PHOS\", \"BUN\", \"CREATININE\", \"CA\" in the last 72 hours.    No results for

## 2024-04-25 LAB
GLUCOSE BLD-MCNC: 143 MG/DL (ref 70–99)
GLUCOSE BLD-MCNC: 161 MG/DL (ref 70–99)
GLUCOSE BLD-MCNC: 83 MG/DL (ref 70–99)
GLUCOSE BLD-MCNC: 88 MG/DL (ref 70–99)
PERFORMED ON: ABNORMAL
PERFORMED ON: ABNORMAL
PERFORMED ON: NORMAL
PERFORMED ON: NORMAL

## 2024-04-25 PROCEDURE — 6370000000 HC RX 637 (ALT 250 FOR IP): Performed by: STUDENT IN AN ORGANIZED HEALTH CARE EDUCATION/TRAINING PROGRAM

## 2024-04-25 PROCEDURE — 2700000000 HC OXYGEN THERAPY PER DAY

## 2024-04-25 PROCEDURE — 94640 AIRWAY INHALATION TREATMENT: CPT

## 2024-04-25 PROCEDURE — 2580000003 HC RX 258: Performed by: STUDENT IN AN ORGANIZED HEALTH CARE EDUCATION/TRAINING PROGRAM

## 2024-04-25 PROCEDURE — 6360000002 HC RX W HCPCS: Performed by: NURSE PRACTITIONER

## 2024-04-25 PROCEDURE — 6360000002 HC RX W HCPCS: Performed by: STUDENT IN AN ORGANIZED HEALTH CARE EDUCATION/TRAINING PROGRAM

## 2024-04-25 PROCEDURE — 1200000000 HC SEMI PRIVATE

## 2024-04-25 PROCEDURE — 97530 THERAPEUTIC ACTIVITIES: CPT

## 2024-04-25 PROCEDURE — 94761 N-INVAS EAR/PLS OXIMETRY MLT: CPT

## 2024-04-25 PROCEDURE — 6360000002 HC RX W HCPCS: Performed by: PHYSICIAN ASSISTANT

## 2024-04-25 PROCEDURE — 2580000003 HC RX 258: Performed by: NURSE PRACTITIONER

## 2024-04-25 PROCEDURE — 97535 SELF CARE MNGMENT TRAINING: CPT

## 2024-04-25 RX ORDER — ARFORMOTEROL TARTRATE 15 UG/2ML
15 SOLUTION RESPIRATORY (INHALATION)
Qty: 120 ML | Refills: 3 | Status: SHIPPED | OUTPATIENT
Start: 2024-04-25

## 2024-04-25 RX ORDER — PREDNISONE 20 MG/1
20 TABLET ORAL DAILY
Qty: 5 TABLET | Refills: 0 | Status: SHIPPED | OUTPATIENT
Start: 2024-04-25 | End: 2024-04-30

## 2024-04-25 RX ORDER — GUAIFENESIN 600 MG/1
600 TABLET, EXTENDED RELEASE ORAL 2 TIMES DAILY
Qty: 14 TABLET | Refills: 0 | Status: SHIPPED | OUTPATIENT
Start: 2024-04-25 | End: 2024-05-02

## 2024-04-25 RX ADMIN — IPRATROPIUM BROMIDE AND ALBUTEROL SULFATE 1 DOSE: 2.5; .5 SOLUTION RESPIRATORY (INHALATION) at 12:50

## 2024-04-25 RX ADMIN — CITALOPRAM 10 MG: 20 TABLET, FILM COATED ORAL at 09:12

## 2024-04-25 RX ADMIN — ARFORMOTEROL TARTRATE 15 MCG: 15 SOLUTION RESPIRATORY (INHALATION) at 22:06

## 2024-04-25 RX ADMIN — LEVOTHYROXINE SODIUM 75 MCG: 0.15 TABLET ORAL at 05:27

## 2024-04-25 RX ADMIN — POTASSIUM CHLORIDE 20 MEQ: 1500 TABLET, EXTENDED RELEASE ORAL at 09:12

## 2024-04-25 RX ADMIN — DOCUSATE SODIUM 100 MG: 100 CAPSULE, LIQUID FILLED ORAL at 20:29

## 2024-04-25 RX ADMIN — ENOXAPARIN SODIUM 40 MG: 100 INJECTION SUBCUTANEOUS at 09:12

## 2024-04-25 RX ADMIN — SODIUM CHLORIDE, PRESERVATIVE FREE 10 ML: 5 INJECTION INTRAVENOUS at 09:13

## 2024-04-25 RX ADMIN — GUAIFENESIN 600 MG: 600 TABLET, EXTENDED RELEASE ORAL at 20:29

## 2024-04-25 RX ADMIN — DOCUSATE SODIUM 100 MG: 100 CAPSULE, LIQUID FILLED ORAL at 09:12

## 2024-04-25 RX ADMIN — BUDESONIDE 500 MCG: 0.5 INHALANT RESPIRATORY (INHALATION) at 22:05

## 2024-04-25 RX ADMIN — BUDESONIDE 500 MCG: 0.5 INHALANT RESPIRATORY (INHALATION) at 08:37

## 2024-04-25 RX ADMIN — ARFORMOTEROL TARTRATE 15 MCG: 15 SOLUTION RESPIRATORY (INHALATION) at 08:37

## 2024-04-25 RX ADMIN — PREDNISONE 40 MG: 20 TABLET ORAL at 09:12

## 2024-04-25 RX ADMIN — PANTOPRAZOLE SODIUM 40 MG: 40 TABLET, DELAYED RELEASE ORAL at 05:27

## 2024-04-25 RX ADMIN — ASPIRIN 81 MG: 81 TABLET, COATED ORAL at 09:12

## 2024-04-25 RX ADMIN — Medication 1 TABLET: at 09:12

## 2024-04-25 RX ADMIN — IPRATROPIUM BROMIDE AND ALBUTEROL SULFATE 1 DOSE: 2.5; .5 SOLUTION RESPIRATORY (INHALATION) at 22:06

## 2024-04-25 RX ADMIN — CEFTRIAXONE SODIUM 1000 MG: 1 INJECTION, POWDER, FOR SOLUTION INTRAMUSCULAR; INTRAVENOUS at 09:26

## 2024-04-25 RX ADMIN — IPRATROPIUM BROMIDE AND ALBUTEROL SULFATE 1 DOSE: 2.5; .5 SOLUTION RESPIRATORY (INHALATION) at 08:37

## 2024-04-25 RX ADMIN — SODIUM CHLORIDE, PRESERVATIVE FREE 10 ML: 5 INJECTION INTRAVENOUS at 20:30

## 2024-04-25 RX ADMIN — GUAIFENESIN 600 MG: 600 TABLET, EXTENDED RELEASE ORAL at 09:13

## 2024-04-25 NOTE — PLAN OF CARE
Problem: Discharge Planning  Goal: Discharge to home or other facility with appropriate resources  4/25/2024 0919 by Guera Hoffmann RN  Outcome: Progressing  4/25/2024 0113 by Lindsey Cevallos RN  Outcome: Progressing     Problem: Skin/Tissue Integrity  Goal: Absence of new skin breakdown  Description: 1.  Monitor for areas of redness and/or skin breakdown  2.  Assess vascular access sites hourly  3.  Every 4-6 hours minimum:  Change oxygen saturation probe site  4.  Every 4-6 hours:  If on nasal continuous positive airway pressure, respiratory therapy assess nares and determine need for appliance change or resting period.  4/25/2024 0919 by Guera Hoffmann RN  Outcome: Progressing  4/25/2024 0113 by Lindsey Cevallos RN  Outcome: Progressing     Problem: Safety - Adult  Goal: Free from fall injury  4/25/2024 0919 by Guera Hoffmann RN  Outcome: Progressing  4/25/2024 0113 by Lindsey Cevallos RN  Outcome: Progressing     Problem: ABCDS Injury Assessment  Goal: Absence of physical injury  4/25/2024 0919 by Guera Hoffmann RN  Outcome: Progressing  4/25/2024 0113 by Lindsey Cevallos RN  Outcome: Progressing     Problem: Pain  Goal: Verbalizes/displays adequate comfort level or baseline comfort level  4/25/2024 0919 by Guera Hoffmann RN  Outcome: Progressing  4/25/2024 0113 by Lindsey Cevallos RN  Outcome: Progressing     Problem: Chronic Conditions and Co-morbidities  Goal: Patient's chronic conditions and co-morbidity symptoms are monitored and maintained or improved  4/25/2024 0919 by Guera Hoffmann RN  Outcome: Progressing  4/25/2024 0113 by Lindsey Cevallos RN  Outcome: Progressing

## 2024-04-25 NOTE — DISCHARGE INSTR - COC
Continuity of Care Form    Patient Name: Bridgette Vo   :  1935  MRN:  [id number]    Admit date:  2024  Discharge date:  ***    Code Status Order: DNR-CCA   Advance Directives:     Admitting Physician:  Tj Lemus MD  PCP: Carlota Read MD    Discharging Nurse: ***  Discharging Hospital Unit/Room#: 5TN-5575/5575-01  Discharging Unit Phone Number: ***    Emergency Contact:   Extended Emergency Contact Information  Primary Emergency Contact: marii vo  Home Phone: [phone]  Relation: Child  Secondary Emergency Contact: nagi vo jr  Address: [id number] [place] [address]           [address]           [address]  Home Phone: [phone]  Relation: Child    Past Surgical History:  Past Surgical History:   Procedure Laterality Date    AORTIC VALVE REPLACEMENT N/A 2021    TRANSCATHETER AORTIC VALVE REPLACEMENT FEMORAL APPROACH performed by Kenny Monroy MD at [facility] CVOR    AORTIC VALVE REPLACEMENT N/A 2021    TRANSCATHETER AORTIC VALVE REPLACEMENT FEMORAL APPROACH performed by All Parisi MD at [facility] CVOR    CARDIAC CATHETERIZATION  10/11/2021    CARPAL TUNNEL RELEASE Bilateral     CATARACT EXTRACTION Bilateral     CHOLECYSTECTOMY      DENTAL SURGERY N/A 2021    SIMPLE EXTRACTIONS TEETH # 4, 13, 18, 23, 24, 25, 26,  AND SURGICAL EXTRACTION TEETH # 2, 3, 14, 15, 19, 20, 21,  28,  29, 30, 31  AND ALSO ALVEOLOPLASTIES ALL FOUR QUADRANTS; GEL FOAM performed by Kd Robles DDS at [facility] OR    HYSTERECTOMY, TOTAL ABDOMINAL (CERVIX REMOVED)      TONSILLECTOMY      UVULECTOMY         Immunization History:   Immunization History   Administered Date(s) Administered    COVID-19, PFIZER PURPLE top, DILUTE for use, (age 12 y+), 30mcg/0.3mL 2021, 2021, 10/12/2021    Influenza, FLUAD, (age 65 y+), Adjuvanted, 0.5mL 2021    Influenza, High Dose (Fluzone 65 yrs and older) 10/07/2015, 2016, 10/19/2017, 10/11/2018    Pneumococcal, PCV-13,  scale): Pain Level: 0  Last Weight:   Wt Readings from Last 1 Encounters:   24 76.6 kg (168 lb 14.4 oz)     Mental Status:  {IP PT MENTAL STATUS:}    IV Access:  { GONZALO IV ACCESS:051202820}    Nursing Mobility/ADLs:  Walking   {CHP DME ADLs:902500500}  Transfer  {CHP DME ADLs:393786051}  Bathing  {CHP DME ADLs:287637388}  Dressing  {CHP DME ADLs:278145852}  Toileting  {CHP DME ADLs:119087832}  Feeding  {CHP DME ADLs:043272776}  Med Admin  {CHP DME ADLs:448827460}  Med Delivery   { GONZALO MED Delivery:842540722}    Wound Care Documentation and Therapy:  Wound 21 Coccyx Mid stage 2 & DTI (Active)   Number of days: 877        Elimination:  Continence:   Bowel: {YES / NO:}  Bladder: {YES / NO:}  Urinary Catheter: {Urinary Catheter:180719931}   Colostomy/Ileostomy/Ileal Conduit: {YES / NO:}       Date of Last BM: ***    Intake/Output Summary (Last 24 hours) at 2024 0817  Last data filed at 2024 0000  Gross per 24 hour   Intake --   Output 1700 ml   Net -1700 ml     I/O last 3 completed shifts:  In: -   Out: 2950 [Urine:2950]    Safety Concerns:     { GONZALO Safety Concerns:524327769}    Impairments/Disabilities:      {[facility] Impairments/Disabilities:839750862}    Nutrition Therapy:  Current Nutrition Therapy:   { GONZALO Diet List:244759531}    Routes of Feeding: {CHP DME Other Feedings:112105231}  Liquids: {Slp liquid thickness:88008}  Daily Fluid Restriction: {CHP DME Yes amt example:196467301}  Last Modified Barium Swallow with Video (Video Swallowing Test): {Done Not Done Date:}    Treatments at the Time of Hospital Discharge:   Respiratory Treatments: ***  Oxygen Therapy:  {Therapy; copd oxygen:21018}  Ventilator:    {[facility] Vent List:504986517}    Rehab Therapies: {THERAPEUTIC INTERVENTION:4802706765}  Weight Bearing Status/Restrictions: {MH CC Weight Bearin}  Other Medical Equipment (for information only, NOT a DME order):  {EQUIPMENT:329267301}  Other

## 2024-04-25 NOTE — CARE COORDINATION
KRISHAN saw patient was from [facility] and wanted to return there at discharge.  PT/OT notes today revealed patient is full 2 person assist at this time.  Spoke with Ann, head RN at the assisted living facility, who reported she already came and met with patient this morning to discuss these same concerns.  Informed patient that they only have 1 nurse per floor at the assisted living side and unable to meet pt's needs currently.  Ann stated pt is now agreeable to SNF.  Called Monika in admissions at SNF and made referral.  Also faxed a facesheet referral.  Waiting on a call back.  She will need a precert.    Addendum:  Monika w/admissions called KRISHAN and informed that they need a one time contract with this patient since they normally don't take her insurance, but stated that these always get approved if pt lives in the same facility.  Stated they will be able to start the precert tomorrow.    Electronically signed by JOLEEN Still, SARAIW on 4/25/2024 at 12:29 PM

## 2024-04-25 NOTE — PROGRESS NOTES
[facility] - Inpatient Rehabilitation Department   Phone: [phone]    Physical Therapy    [] Initial Evaluation            [x] Daily Treatment Note         [] Discharge Summary      Patient: Bridgette Vo   : 1935   MRN: [id number]   Date of Service:  2024  Admitting Diagnosis: COPD with acute exacerbation (HCC)  Current Admission Summary: 88 y.o. female who presents to the emergency department today for evaluation for concerns of shortness of breath.  The patient states that she does have a history of COPD, and is on 3 L of oxygen continuously.  The patient reports that when she woke up today, she states that she noticed increasing shortness of breath, as well as wheezing.  EMS reports that the patient was 86% on her normal 3 L.  I did give her 2 breathing treatments and route.  When the patient arrives to the ED, she is hypoxic on her 3 L at 87%.  She is audibly wheezing, tachycardic, and tachypneic.  Patient is a DNR CC.  This was reviewed with her, she does not want to be intubated, or have CPR she reports that she just wants to be comfortable.  Patient states that she does have a history of a chronic cough, she states that this is productive of sputum but states that this is a chronic issue for her.  No change in the cough.  She has not any fevers.  She has no chest pain.  No abdominal pain, nausea, vomiting or diarrhea.  No urinary symptoms no other complaints   Past Medical History:  has a past medical history of Allergic rhinitis, Anxiety, Aortic stenosis, Aortic stenosis, severe, CHF (congestive heart failure) (HCC), COPD (chronic obstructive pulmonary disease) (HCC), Depression, Diabetes mellitus (HCC), Hypertension, Hypothyroidism, Nonrheumatic aortic valve stenosis, MARIAA treated with BiPAP, and Urinary incontinence.  Past Surgical History:  has a past surgical history that includes Hysterectomy, total abdominal; Carpal tunnel release (Bilateral); Cholecystectomy; Cataract  to benefit from skilled PT in order to return to PLOF with all functional mobility prior to d/c from hospital.   Safety Interventions: patient left in chair, chair alarm in place, call light within reach, gait belt, patient at risk for falls, and nurse notified    Plan  Frequency: 3-5 x/per week  Current Treatment Recommendations: strengthening, balance training, functional mobility training, transfer training, and home exercise program    Goals  Patient Goals: Return home   Short Term Goals:  Time Frame: Upon d/c  Patient will complete bed mobility at [facility] independent   Patient will complete transfers at contact guard assistance   NO GOALS MET THIS DATE     Above goals reviewed on 4/25/2024.  All goals are ongoing at this time unless indicated above.      Therapy Session Time      Individual Group Co-treatment   Time In     1028   Time Out     1127   Minutes     59     Timed Code Treatment Minutes:   59  Total Treatment Minutes:  59     Second Session Therapy Time:   Individual Concurrent Group Co-treatment   Time In       1514   Time Out       1554   Minutes       40     Timed Code Treatment Minutes:  59+40    Total Treatment Minutes:   99    Electronically Signed By: Judy Kimbrough, PT  Judy Kimbrough PT, DPT [id number]

## 2024-04-25 NOTE — PROGRESS NOTES
[facility]    4/25/2024 5:38 PM        Name: Bridgette Vo .              Admitted: 4/20/2024  Primary Care Provider: Carlota Read MD (Tel: [phone])    Brief Course:   Patient admitted Sunday morning with increasing shortness of breath and cough. She wears 3 L at baseline. Chest x-ray unremarkable. She was on BiPAP most of the day Sunday. Lives in an apartment at [facility]     Subjective:    Seen this am while on [facility],  working with therapy she is weak today  Finally agrees that she needs a short stay at [facility].        Eating \"ok\" but food is \"terrible\"   I decreased her oxygen to 3 liters /  sats 94%     + for parainfluenza virus       Reviewed interval ancillary notes    Current Medications  predniSONE (DELTASONE) tablet 40 mg, Daily  cefTRIAXone (ROCEPHIN) 1,000 mg in sodium chloride 0.9 % 50 mL IVPB (mini-bag), Q24H  ipratropium 0.5 mg-albuterol 2.5 mg (DUONEB) nebulizer solution 1 Dose, 4x Daily RT  ipratropium 0.5 mg-albuterol 2.5 mg (DUONEB) nebulizer solution 1 Dose, Q4H PRN  oyster shell calcium w/D 500-5 MG-MCG tablet 1 tablet, Daily  albuterol sulfate HFA (PROVENTIL;VENTOLIN;PROAIR) 108 (90 Base) MCG/ACT inhaler 2 puff, Q6H PRN  aspirin EC tablet 81 mg, Daily  budesonide (PULMICORT) nebulizer suspension 500 mcg, BID  citalopram (CELEXA) tablet 10 mg, Daily  docusate sodium (COLACE) capsule 100 mg, BID  fluticasone (FLONASE) 50 MCG/ACT nasal spray 2 spray, Daily PRN  levothyroxine (SYNTHROID) tablet 75 mcg, Daily  pantoprazole (PROTONIX) tablet 40 mg, QAM AC  potassium chloride (KLOR-CON M) extended release tablet 20 mEq, Daily  sodium chloride flush 0.9 % injection 5-40 mL, 2 times per day  sodium chloride flush 0.9 % injection 5-40 mL, PRN  0.9 % sodium chloride infusion, PRN  ondansetron (ZOFRAN-ODT) disintegrating tablet 4 mg, Q8H PRN   Or  ondansetron (ZOFRAN) injection 4 mg, Q6H

## 2024-04-25 NOTE — PROGRESS NOTES
[facility] - Inpatient Rehabilitation Department   Phone: [phone]    Occupational Therapy    [] Initial Evaluation            [x] Daily Treatment Note         [] Discharge Summary      Patient: Bridgette Vo   : 1935   MRN: [id number]   Date of Service:  2024    Admitting Diagnosis:  COPD with acute exacerbation (HCC)  Current Admission Summary: Per H&P \"88 y.o. female who presents to the emergency department today for evaluation for concerns of shortness of breath.  The patient states that she does have a history of COPD, and is on 3 L of oxygen continuously.  The patient reports that when she woke up today, she states that she noticed increasing shortness of breath, as well as wheezing.  EMS reports that the patient was 86% on her normal 3 L.  I did give her 2 breathing treatments and route.  When the patient arrives to the ED, she is hypoxic on her 3 L at 87%.  She is audibly wheezing, tachycardic, and tachypneic.  Patient is a DNR CC.  This was reviewed with her, she does not want to be intubated, or have CPR she reports that she just wants to be comfortable.  Patient states that she does have a history of a chronic cough, she states that this is productive of sputum but states that this is a chronic issue for her.  No change in the cough.  She has not any fevers.  She has no chest pain.  No abdominal pain, nausea, vomiting or diarrhea.  No urinary symptoms no other complaints \"  Past Medical History:  has a past medical history of Allergic rhinitis, Anxiety, Aortic stenosis, Aortic stenosis, severe, CHF (congestive heart failure) (HCC), COPD (chronic obstructive pulmonary disease) (HCC), Depression, Diabetes mellitus (HCC), Hypertension, Hypothyroidism, Nonrheumatic aortic valve stenosis, MARIAA treated with BiPAP, and Urinary incontinence.  Past Surgical History:  has a past surgical history that includes Hysterectomy, total abdominal; Carpal tunnel release (Bilateral);

## 2024-04-26 VITALS
HEIGHT: 60 IN | HEART RATE: 99 BPM | OXYGEN SATURATION: 94 % | RESPIRATION RATE: 18 BRPM | BODY MASS INDEX: 32.76 KG/M2 | DIASTOLIC BLOOD PRESSURE: 71 MMHG | SYSTOLIC BLOOD PRESSURE: 117 MMHG | TEMPERATURE: 97.7 F | WEIGHT: 166.9 LBS

## 2024-04-26 LAB
GLUCOSE BLD-MCNC: 113 MG/DL (ref 70–99)
GLUCOSE BLD-MCNC: 114 MG/DL (ref 70–99)
GLUCOSE BLD-MCNC: 85 MG/DL (ref 70–99)
PERFORMED ON: ABNORMAL
PERFORMED ON: ABNORMAL
PERFORMED ON: NORMAL

## 2024-04-26 PROCEDURE — 2700000000 HC OXYGEN THERAPY PER DAY

## 2024-04-26 PROCEDURE — 94640 AIRWAY INHALATION TREATMENT: CPT

## 2024-04-26 PROCEDURE — 97530 THERAPEUTIC ACTIVITIES: CPT

## 2024-04-26 PROCEDURE — 6370000000 HC RX 637 (ALT 250 FOR IP): Performed by: STUDENT IN AN ORGANIZED HEALTH CARE EDUCATION/TRAINING PROGRAM

## 2024-04-26 PROCEDURE — 97535 SELF CARE MNGMENT TRAINING: CPT

## 2024-04-26 PROCEDURE — 6360000002 HC RX W HCPCS: Performed by: PHYSICIAN ASSISTANT

## 2024-04-26 PROCEDURE — 2580000003 HC RX 258: Performed by: STUDENT IN AN ORGANIZED HEALTH CARE EDUCATION/TRAINING PROGRAM

## 2024-04-26 PROCEDURE — 6360000002 HC RX W HCPCS: Performed by: STUDENT IN AN ORGANIZED HEALTH CARE EDUCATION/TRAINING PROGRAM

## 2024-04-26 PROCEDURE — 2580000003 HC RX 258: Performed by: NURSE PRACTITIONER

## 2024-04-26 PROCEDURE — 94761 N-INVAS EAR/PLS OXIMETRY MLT: CPT

## 2024-04-26 PROCEDURE — 6360000002 HC RX W HCPCS: Performed by: NURSE PRACTITIONER

## 2024-04-26 RX ADMIN — IPRATROPIUM BROMIDE AND ALBUTEROL SULFATE 1 DOSE: 2.5; .5 SOLUTION RESPIRATORY (INHALATION) at 15:54

## 2024-04-26 RX ADMIN — GUAIFENESIN 600 MG: 600 TABLET, EXTENDED RELEASE ORAL at 20:08

## 2024-04-26 RX ADMIN — BUDESONIDE 500 MCG: 0.5 INHALANT RESPIRATORY (INHALATION) at 08:29

## 2024-04-26 RX ADMIN — Medication 1 TABLET: at 07:55

## 2024-04-26 RX ADMIN — ASPIRIN 81 MG: 81 TABLET, COATED ORAL at 07:56

## 2024-04-26 RX ADMIN — IPRATROPIUM BROMIDE AND ALBUTEROL SULFATE 1 DOSE: 2.5; .5 SOLUTION RESPIRATORY (INHALATION) at 20:33

## 2024-04-26 RX ADMIN — POTASSIUM CHLORIDE 20 MEQ: 1500 TABLET, EXTENDED RELEASE ORAL at 07:56

## 2024-04-26 RX ADMIN — ARFORMOTEROL TARTRATE 15 MCG: 15 SOLUTION RESPIRATORY (INHALATION) at 08:29

## 2024-04-26 RX ADMIN — GUAIFENESIN 600 MG: 600 TABLET, EXTENDED RELEASE ORAL at 07:56

## 2024-04-26 RX ADMIN — LEVOTHYROXINE SODIUM 75 MCG: 0.15 TABLET ORAL at 05:30

## 2024-04-26 RX ADMIN — ENOXAPARIN SODIUM 40 MG: 100 INJECTION SUBCUTANEOUS at 07:56

## 2024-04-26 RX ADMIN — DOCUSATE SODIUM 100 MG: 100 CAPSULE, LIQUID FILLED ORAL at 20:08

## 2024-04-26 RX ADMIN — CITALOPRAM 10 MG: 20 TABLET, FILM COATED ORAL at 07:56

## 2024-04-26 RX ADMIN — BUDESONIDE 500 MCG: 0.5 INHALANT RESPIRATORY (INHALATION) at 20:33

## 2024-04-26 RX ADMIN — IPRATROPIUM BROMIDE AND ALBUTEROL SULFATE 1 DOSE: 2.5; .5 SOLUTION RESPIRATORY (INHALATION) at 08:29

## 2024-04-26 RX ADMIN — ARFORMOTEROL TARTRATE 15 MCG: 15 SOLUTION RESPIRATORY (INHALATION) at 20:32

## 2024-04-26 RX ADMIN — DOCUSATE SODIUM 100 MG: 100 CAPSULE, LIQUID FILLED ORAL at 07:56

## 2024-04-26 RX ADMIN — CEFTRIAXONE SODIUM 1000 MG: 1 INJECTION, POWDER, FOR SOLUTION INTRAMUSCULAR; INTRAVENOUS at 08:02

## 2024-04-26 RX ADMIN — IPRATROPIUM BROMIDE AND ALBUTEROL SULFATE 1 DOSE: 2.5; .5 SOLUTION RESPIRATORY (INHALATION) at 12:30

## 2024-04-26 RX ADMIN — SODIUM CHLORIDE, PRESERVATIVE FREE 10 ML: 5 INJECTION INTRAVENOUS at 07:59

## 2024-04-26 RX ADMIN — PANTOPRAZOLE SODIUM 40 MG: 40 TABLET, DELAYED RELEASE ORAL at 05:31

## 2024-04-26 RX ADMIN — PREDNISONE 40 MG: 20 TABLET ORAL at 07:56

## 2024-04-26 NOTE — CARE COORDINATION
Called Monika in admissions at [facility] and confirmed that they started pt's precert this morning to SNF.    Electronically signed by JOLEEN Still, SARAIW on 4/26/2024 at 12:08 PM

## 2024-04-26 NOTE — PROGRESS NOTES
Shift assessment complete; pt comfortably resting in bed. A&Ox4. AM meds given per the MAR. Patient coughing and producing small amount of sputum. Patient does not appear to be in distress. Call light and belongings within reach. The care plan and education has been reviewed and mutually agreed upon with the patient.

## 2024-04-26 NOTE — DISCHARGE SUMMARY
[facility]    Patient Demographics    Patient. Bridgette Vo  Date of Birth. 1935  MRN. [id number]     Primary care provider. Carlota Read MD  (Tel: [phone])    Admit date: 4/20/2024    Discharge date 4/26/2024  Note Date: 4/26/2024     Reason for Hospitalization.   Chief Complaint   Patient presents with    Respiratory Distress         Significant Findings.   Principal Problem:    COPD with acute exacerbation (HCC)  Resolved Problems:    * No resolved hospital problems. *       Problems and results from this hospitalization that need follow up.  Consider palliative care partnership if she fails to progress.  She is already a DNR CCA    Significant test results and incidental findings.  Chest xray:  Stable chronic changes with no acute abnormality seen    Invasive procedures and treatments.   None     Problem-based Hospital Course.  The patient lives in [place] at [facility] and uses a motorized scooter and wc.  She sought care in the ED due to increased shortness of breath.  She uses 3 liters of oxgyen continuous and also uses BPAP at .  She was admitted and followed by Pulmonary. She was treated for the following:    COPD exacerbation with para influenza virus:  she received 3 days of zithromax and rocephin during her stay.  She was treated with steroids and was at her baseline  oxygen requirement at the time of dc.  She was weak and was d/c back to the skilled side at [place].   UTI:  culture with E coli: she has completed 3 days of therapy with rocephin ( PT did not have any Sx of UTI)  History of aortic stenosis-s/p TAVR  Hypothyroid:  on replacement therapy,  TSH in range at 1.88          Consults.  IP CONSULT TO PULMONOLOGY  IP CONSULT TO HOME CARE NEEDS    Physical examination on discharge day.   /75   Pulse 61   Temp 97.8 °F (36.6 °C) (Oral)   Resp 18   Ht  DAILY AT BEDTIME.               Where to Get Your Medications        You can get these medications from any pharmacy    Bring a paper prescription for each of these medications  arformoterol tartrate 15 MCG/2ML Nebu  guaiFENesin 600 MG extended release tablet  predniSONE 20 MG tablet         Discharge recommendations given to patient.  Follow Up.  in 1 week   Disposition.  SNF  Activity. activity as tolerated  Diet: ADULT DIET; Easy to Chew      Spent 35 minutes in discharge process.    Signed:  JOSE ALFREDO Patel CNP     4/26/2024 7:16 AM

## 2024-04-26 NOTE — PLAN OF CARE
Problem: Discharge Planning  Goal: Discharge to home or other facility with appropriate resources  Outcome: Progressing  Flowsheets (Taken 4/26/2024 4548)  Discharge to home or other facility with appropriate resources: Identify barriers to discharge with patient and caregiver     Problem: Skin/Tissue Integrity  Goal: Absence of new skin breakdown  Description: 1.  Monitor for areas of redness and/or skin breakdown  2.  Assess vascular access sites hourly  3.  Every 4-6 hours minimum:  Change oxygen saturation probe site  4.  Every 4-6 hours:  If on nasal continuous positive airway pressure, respiratory therapy assess nares and determine need for appliance change or resting period.  Outcome: Progressing     Problem: Safety - Adult  Goal: Free from fall injury  Outcome: Progressing     Problem: ABCDS Injury Assessment  Goal: Absence of physical injury  Outcome: Progressing

## 2024-04-26 NOTE — RT PROTOCOL NOTE
RT Nebulizer Bronchodilator Protocol Note    There is a bronchodilator order in the chart from a provider indicating to follow the RT Bronchodilator Protocol and there is an “Initiate RT Bronchodilator Protocol” order as well (see protocol at bottom of note).    CXR Findings:  No results found.    The findings from the last RT Protocol Assessment were as follows:  Smoking: Chronic pulmonary disease  Respiratory Pattern: Regular pattern and RR 12-20 bpm  Breath Sounds: Inspiratory and expiratory or bilateral wheezing and/or rhonchi  Cough: Strong, productive  Indication for Bronchodilator Therapy: On home bronchodilators  Bronchodilator Assessment Score: 9    Aerosolized bronchodilator medication orders have been revised according to the RT Nebulizer Bronchodilator Protocol below.    Respiratory Therapist to perform RT Therapy Protocol Assessment initially then follow the protocol.  Repeat RT Therapy Protocol Assessment PRN for score 0-3 or on second treatment, BID, and PRN for scores above 3.    No Indications - adjust the frequency to every 6 hours PRN wheezing or bronchospasm, if no treatments needed after 48 hours then discontinue using Per Protocol order mode.     If indication present, adjust the RT bronchodilator orders based on the Bronchodilator Assessment Score as indicated below.  If a patient is on this medication at home then do not decrease Frequency below that used at home.    0-3 - enter or revise RT bronchodilator order(s) to equivalent RT Bronchodilator order with Frequency of every 4 hours PRN for wheezing or increased work of breathing using Per Protocol order mode.       4-6 - enter or revise RT Bronchodilator order(s) to two equivalent RT bronchodilator orders with one order with BID Frequency and one order with Frequency of every 4 hours PRN wheezing or increased work of breathing using Per Protocol order mode.         7-10 - enter or revise RT Bronchodilator order(s) to two equivalent RT

## 2024-04-26 NOTE — PROGRESS NOTES
[facility] - Inpatient Rehabilitation Department   Phone: [phone]    Occupational Therapy    [] Initial Evaluation            [x] Daily Treatment Note         [] Discharge Summary      Patient: Bridgette Vo   : 1935   MRN: [id number]   Date of Service:  2024    Admitting Diagnosis:  COPD with acute exacerbation (HCC)  Current Admission Summary: Per H&P \"88 y.o. female who presents to the emergency department today for evaluation for concerns of shortness of breath.  The patient states that she does have a history of COPD, and is on 3 L of oxygen continuously.  The patient reports that when she woke up today, she states that she noticed increasing shortness of breath, as well as wheezing.  EMS reports that the patient was 86% on her normal 3 L.  I did give her 2 breathing treatments and route.  When the patient arrives to the ED, she is hypoxic on her 3 L at 87%.  She is audibly wheezing, tachycardic, and tachypneic.  Patient is a DNR CC.  This was reviewed with her, she does not want to be intubated, or have CPR she reports that she just wants to be comfortable.  Patient states that she does have a history of a chronic cough, she states that this is productive of sputum but states that this is a chronic issue for her.  No change in the cough.  She has not any fevers.  She has no chest pain.  No abdominal pain, nausea, vomiting or diarrhea.  No urinary symptoms no other complaints \"  Past Medical History:  has a past medical history of Allergic rhinitis, Anxiety, Aortic stenosis, Aortic stenosis, severe, CHF (congestive heart failure) (HCC), COPD (chronic obstructive pulmonary disease) (HCC), Depression, Diabetes mellitus (HCC), Hypertension, Hypothyroidism, Nonrheumatic aortic valve stenosis, MARIAA treated with BiPAP, and Urinary incontinence.  Past Surgical History:  has a past surgical history that includes Hysterectomy, total abdominal; Carpal tunnel release (Bilateral);  Concurrent Group Co-treatment   Time In      0915   Time Out      1015   Minutes      60     Second Session Therapy Time:   Individual Concurrent Group Co-treatment   Time In 1429     1413   Time Out 1436     1429   Minutes 8     16     Timed Code Treatment Minutes:  60 + 24 minutes    Total Treatment Minutes:  84 minutes        Electronically Signed By: SANDRA Hua OTR/L [id number]

## 2024-04-26 NOTE — CARE COORDINATION
SW received a call that patient's precert was approved for [facility].  Earliest transport available was 9:30pm via [facility].  Called Monika in admissions and informed.  Informed RN who will inform patient.  HENS done.  Discharge packet done.  All documents faxed.    Discharge Plan:  [facility]  [address] [place] [address]  Phone: [phone]  Fax: [phone]    [facility] at 9:30pm tonight    Electronically signed by JOLEEN Still, SARAIW on 4/26/2024 at 6:16 PM

## 2024-04-26 NOTE — PROGRESS NOTES
[facility] - Inpatient Rehabilitation Department   Phone: [phone]    Physical Therapy    [] Initial Evaluation            [x] Daily Treatment Note         [] Discharge Summary      Patient: Bridgette Vo   : 1935   MRN: [id number]   Date of Service:  2024  Admitting Diagnosis: COPD with acute exacerbation (HCC)  Current Admission Summary: 88 y.o. female who presents to the emergency department today for evaluation for concerns of shortness of breath.  The patient states that she does have a history of COPD, and is on 3 L of oxygen continuously.  The patient reports that when she woke up today, she states that she noticed increasing shortness of breath, as well as wheezing.  EMS reports that the patient was 86% on her normal 3 L.  I did give her 2 breathing treatments and route.  When the patient arrives to the ED, she is hypoxic on her 3 L at 87%.  She is audibly wheezing, tachycardic, and tachypneic.  Patient is a DNR CC.  This was reviewed with her, she does not want to be intubated, or have CPR she reports that she just wants to be comfortable.  Patient states that she does have a history of a chronic cough, she states that this is productive of sputum but states that this is a chronic issue for her.  No change in the cough.  She has not any fevers.  She has no chest pain.  No abdominal pain, nausea, vomiting or diarrhea.  No urinary symptoms no other complaints   Past Medical History:  has a past medical history of Allergic rhinitis, Anxiety, Aortic stenosis, Aortic stenosis, severe, CHF (congestive heart failure) (HCC), COPD (chronic obstructive pulmonary disease) (HCC), Depression, Diabetes mellitus (HCC), Hypertension, Hypothyroidism, Nonrheumatic aortic valve stenosis, MARIAA treated with BiPAP, and Urinary incontinence.  Past Surgical History:  has a past surgical history that includes Hysterectomy, total abdominal; Carpal tunnel release (Bilateral); Cholecystectomy; Cataract

## 2024-04-29 ENCOUNTER — TELEPHONE (OUTPATIENT)
Dept: PULMONOLOGY | Age: 89
End: 2024-04-29

## 2024-04-29 NOTE — TELEPHONE ENCOUNTER
Called  [employer] [facility] [phone], left msg to call our office back to schedule hfu appt   [employer] Pregnancy And Lactation Text: The risk during pregnancy and breastfeeding is uncertain with this medication.

## 2024-04-29 NOTE — TELEPHONE ENCOUNTER
----- Message from Raghav Moore MD sent at 4/24/2024 12:33 PM EDT -----  Follow up with Dr. Gallegos 4-6 weeks thanks

## 2024-12-11 NOTE — ED PROVIDER NOTES
Primary Care Physician: ELISE Sloan   Attending Physician: Mike Beaver MD     History   Chief Complaint   Patient presents with    Shortness of Breath     From [facility] via EMS. SOB and dyspnea since last night, weakness. Expiratory wheezing. Given duoneb around 3-4 AM. O2 as needed at baseline. Hx of COPD, CHF. HPI   Angela Adams is a 82 y.o. female history of COPD on as needed oxygen at home but on CPAP at night, congestive heart failure, obstructive sleep apnea presenting complaining of shortness of breath started last night persisted forcing her to seek care. She stated that she has been wheezing she did take her breathing treatments at home as well as 1 from EMS with no significant improvement. Denies fevers chest pain leg swelling dysuria abdominal pain diarrhea or other infectious causes. Past Medical History:   Diagnosis Date    Allergic rhinitis     CHF (congestive heart failure) (HCC)     COPD (chronic obstructive pulmonary disease) (HCC)     Depression     Hypothyroidism     MARIAA treated with BiPAP 2/14/2018        Past Surgical History:   Procedure Laterality Date    CARPAL TUNNEL RELEASE      bilateral    CHOLECYSTECTOMY      EYE SURGERY      bilateral cataracts    HYSTERECTOMY, TOTAL ABDOMINAL      TONSILLECTOMY      UVULECTOMY          Family History   Problem Relation Age of Onset    Cancer Mother         breast    Breast Cancer Mother     Cancer Father         prostate    Cancer Sister         breast    Breast Cancer Daughter     Breast Cancer Maternal Grandmother         Social History     Socioeconomic History    Marital status:       Spouse name: None    Number of children: None    Years of education: None    Highest education level: None   Occupational History    None   Tobacco Use    Smoking status: Never Smoker    Smokeless tobacco: Never Used   Vaping Use    Vaping Use: Never used   Substance and Sexual Activity    Alcohol use: No    Drug use: No    Sexual activity: Never   Other Topics Concern    None   Social History Narrative    None     Social Determinants of Health     Financial Resource Strain:     Difficulty of Paying Living Expenses:    Food Insecurity:     Worried About Running Out of Food in the Last Year:     [address] in the Last Year:    Transportation Needs:     Lack of Transportation (Medical):  Lack of Transportation (Non-Medical):    Physical Activity:     Days of Exercise per Week:     Minutes of Exercise per Session:    Stress:     Feeling of Stress :    Social Connections:     Frequency of Communication with Friends and Family:     Frequency of Social Gatherings with Friends and Family:     Attends Denominational Services:     Active Member of Clubs or Organizations:     Attends Club or Organization Meetings:     Marital Status:    Intimate Partner Violence:     Fear of Current or Ex-Partner:     Emotionally Abused:     Physically Abused:     Sexually Abused:         Review of Systems   10 total systems reviewed and found to be negative unless otherwise noted in HPI     Physical Exam   /66   Pulse 110   Temp 98.1 °F (36.7 °C) (Oral)   Resp 20   Ht 5' 5\" (1.651 m)   Wt 197 lb (89.4 kg)   SpO2 97%   BMI 32.78 kg/m²      CONSTITUTIONAL: ill appearing, in no acute distress   HEAD: atraumatic, normocephalic   EYES: PERRL, No injection, discharge or scleral icterus. ENT: Moist mucous membranes. NECK: Normal ROM, NO LAD   CARDIOVASCULAR: Regular rate and rhythm. No murmurs or gallop. PULMONARY/CHEST:  respiratory distress and wheezing bilaterally bilaterally. ABDOMEN: Soft, Non-distended and non-tender, without guarding or rebound. SKIN: Acyanotic, warm, dry   MUSCULOSKELETAL: No swelling, tenderness or deformity   NEUROLOGICAL: Awake and oriented x 3. Pulses intact. Grossly nonfocal   Nursing note and vitals reviewed.      ED Course & Medical Decision Making   [address]  [place]   Phone [phone]   COMPREHENSIVE METABOLIC PANEL W/ REFLEX TO MG FOR LOW K - Abnormal; Notable for the following components:    Sodium 135 (*)     Chloride 94 (*)     Glucose 107 (*)     BUN 21 (*)     GFR Non- 60 (*)     Total Protein 8.5 (*)     Albumin/Globulin Ratio 1.0 (*)     All other components within normal limits    Narrative:     Performed at:  [facility] [address]   Phone [phone]   TROPONIN - Abnormal; Notable for the following components:    Troponin 0.02 (*)     All other components within normal limits    Narrative:     Performed at:  [facility] [address] [employer]   Phone [phone]  - Abnormal; Notable for the following components:    Pro-BNP 1,024 (*)     All other components within normal limits    Narrative:     Performed at:  [facility] [address],  [place], [address]   Phone [phone]   PROCALCITONIN - Abnormal; Notable for the following components:    Procalcitonin 0.18 (*)     All other components within normal limits    Narrative:     Performed at:  [facility] [address],  [place], [address]   Phone [phone]   BLOOD GAS, VENOUS - Abnormal; Notable for the following components:    pCO2, Lei 53.3 (*)     pO2, Lei 65.5 (*)     HCO3, Venous 31.9 (*)     Base Excess, Lei 5.6 (*)     Carboxyhemoglobin 2.7 (*)     All other components within normal limits    Narrative:     Performed at:  [facility] [address] [employer]   Phone [phone]   CBC - Abnormal; Notable for the following components:    Hemoglobin 11.9 (*)     Hematocrit 35.9 (*)     All other components within normal limits    Narrative:     Performed at:  [facility]  [address] [facility],  [place], [address]   Phone [phone]   BASIC METABOLIC PANEL - Abnormal; Notable for the following components:    Chloride 97 (*)     Glucose 176 (*)     BUN 24 (*)     GFR Non- 53 (*)     All other components within normal limits    Narrative:     Performed at:  [facility]  [address] [facility],  [place], [address]   Phone [phone]   CULTURE, RESPIRATORY   URINE RT REFLEX TO CULTURE    Narrative:     Performed at:  [facility]  [address] [facility],  [place], [address]   Phone [phone]   CBC   BASIC METABOLIC PANEL      XR CHEST PORTABLE   Final Result   No acute cardiopulmonary disease. DEXA BONE DENSITY AXIAL SKELETON    Result Date: 6/25/2021  EXAMINATION: BONE DENSITOMETRY 6/25/2021 10:43 am TECHNIQUE: A bone density DEXA scan was performed of the right forearm and bilateral hips on a [employer] system. COMPARISON: None. HISTORY: ORDERING SYSTEM PROVIDED HISTORY: Menopause TECHNOLOGIST PROVIDED HISTORY: Reason for exam:->menopause Relevant Medical/Surgical History: Pt has contrast in her bowel, states she has not had a contrast study recently. Pt unable to straighten legs completely for hip scans. Rt forearm done while lying on table. Limited mobility Baseline postmenopausal screening FINDINGS: Right FOREARM: The bone mineral density in the total right forearm is measured at 0.386 g/cm2, which corresponds to a T-score of -3.6 and a Z-score of -0.2. This is within the osteoporotic range by WHO criteria. LEFT HIP: The bone mineral density in the total hip is measured at 0.780 g/cm2 corresponding to a T-score of -1.3 and a Z-score of 0.9. This is within the osteopenic range by WHO criteria. The bone mineral density of the femoral neck is measured at 0.779 g/cm2 corresponding to a T-score of -0.6 and a Z-score of 1.8. This is within the normal range by WHO criteria.  RIGHT HIP: The bone mineral density in the total hip is measured at 0.780 g/cm2 corresponding to a T-score of -1.3 and a Z-score of 0.9. This is within the osteopenic range by WHO criteria. The bone mineral density of the femoral neck is measured at 0.779 g/cm2 corresponding to a T-score of -0.6 and a Z-score of 1.3. This is within the normal range by WHO criteria. FRAX analysis was not reported. Osteoporosis by WHO criteria. XR CHEST PORTABLE    Result Date: 7/16/2021  EXAMINATION: ONE XRAY VIEW OF THE CHEST 7/16/2021 2:28 pm COMPARISON: 06/27/2018 HISTORY: ORDERING SYSTEM PROVIDED HISTORY: SOB TECHNOLOGIST PROVIDED HISTORY: Reason for exam:->SOB Reason for Exam: Shortness of Breath (From [facility] via EMS. SOB and dyspnea since last night, weakness. Expiratory wheezing. Given duoneb around 3-4 AM. O2 as needed at baseline. Hx of COPD, CHF. ) Acuity: Acute Type of Exam: Initial FINDINGS: The cardiomediastinal silhouette is unremarkable. Aortic vascular calcification. The lungs are clear. No infiltrate, pleural fluid or evidence of overt failure. No acute cardiopulmonary disease. [facility] FOUZIA DIGITAL SCREEN BILATERAL    Result Date: 7/6/2021  EXAMINATION: SCREENING DIGITAL BILATERAL  MAMMOGRAM WITH TOMOSYNTHESIS, 6/25/2021 TECHNIQUE: Screening mammography was performed with tomosynthesis including MLO and CC views of the bilateral breasts. Computer aided detection was used for the interpretation of this exam. COMPARISON: 2015, 2013 2012 HISTORY: Screening. FINDINGS: Limited study with suboptimal image quality due to patient's condition and positioning. There are scattered areas of fibroglandular density. There are no suspicious abnormalities. There has been no significant interval change. No definite mammographic evidence of malignancy. BIRADS: BIRADS - CATEGORY 1 Negative, no evidence of malignancy. Normal interval follow-up is recommended in 12 months.  OVERALL ASSESSMENT - NEGATIVE A letter of notification will be sent to the patient regarding the results. The [facility] of Rad[facility] recommend annual mammograms for women 40 years and older. EKG INTERPRETATION:  EKG by my preliminary interpretation shows sinus rhythm with rate of 92, normal axis, normal intervals, with no ST changes indicative of ischemia at this time. PROCEDURES:   Procedures    ASSESSMENT AND PLAN:  Jr Diaz is a 82 y.o. female  history of COPD on as needed oxygen at home but on CPAP at night, congestive heart failure, obstructive sleep apnea presenting complaining of shortness of breath started last night persisted forcing her to seek care. She stated that she has been wheezing she did take her breathing treatments at home as well as 1 from EMS with no significant improvement. On presentation patient appears to be in mild respiratory distress and wheezing bilaterally. Given her presentation I was concerned for COPD exacerbation versus heart failure. I did give patient another breathing treatment. She also received Decadron. X-ray so far shows no acute lung disease. Labs significant for PCO2 of 55 and no old VBG for comparisons. There is also elevated proBNP which is chronic and elevated procalcitonin. Despite this findings I believe that her symptoms are most likely secondary to COPD exacerbation versus possible pulmonary hypertension. Also symptoms might be secondary to heart failure. I think pneumonia is possible but less likely given no fevers and no white count. She is tachycardic I do not think there is a PE is most likely because she received multiple doses of albuterol treatment. With wheezing on exam I believe this is COPD exacerbation. Patient still is in respiratory distress and now has been placed on BiPAP because she feels she is getting tired. At this time I am recommending the patient be admitted at least to stepdown versus ICU.   Hospitalist has been consulted pending admission. Of note she is also be given a dose of 40 of Lasix. Hospitalist was consulted patient was admitted to the service for further evaluation and treatment. ClINICAL IMPRESSION:  1. Hypoxia    2. Acute on chronic respiratory failure with hypoxia (HCC)    3. COPD exacerbation ([address]        DISPOSITION    Hospitalist admit   -Findings and recommendations explained to patient. She expressed understanding and agreed with the plan. Jazlyn Yost MD (electronically signed)  7/17/2021  _________________________________________________________________________________________  Amount and/or Complexity of Data Reviewed:  Clinical lab tests: ordered and reviewed   Tests in the radiology section of CPT®: ordered and reviewed   Tests in the medicine section of CPT®: ordered and reviewed   Decide to obtain previous medical records or to obtain history from someone other than the patient  Obtain history from someone other than the patient no  Review and summarize past medical records:yes  I looked up the patient in our electronic medical record:yes  Discuss the patient with other providers:yes  Independent visualization of images, tracings, or specimens:yes  Risk of Complications, Morbidity, and/or Mortality:Moderate  Presenting problems: moderate Diagnostic procedures: moderate yes Management options: moderate     Critical Care Attestation   Total critical care time: 35 minutes minimum   Critical care time does not include separately billable procedures and treating other patients. Critical care was necessary to treat or prevent imminent or life-threatening deterioration of the following conditions: Respiratory failure with hypoxia secondary to heart failure and COPD exacerbation. Pt supplemental oxygen and treated urgently in the ED with BIPAP.  Case discussed with consultants.     _________________________________________________________________________________________  This record is transcribed utilizing voice recognition technology. There are inherent limitations in this technology. In addition, there may be limitations in editing of this report. If there are any discrepancies, please contact me directly.         Luishniihenrietta Villeda Mc, MD  07/17/21 203 Detail Level: Zone Continue Regimen: Can take Minocycline once daily as needed for flares

## (undated) DEVICE — BLADE, TONGUE, 6", STERILE: Brand: MEDLINE

## (undated) DEVICE — SUPPORT WRST AD W3.5XL9IN DIA14.5IN ART SFT ADJ HK AND LOOP

## (undated) DEVICE — SOLUTION IV IRRIG WATER 1000ML POUR BRL 2F7114

## (undated) DEVICE — MAJOR SET UP PK

## (undated) DEVICE — PRESSURE MONITORING SET: Brand: TRUWAVE

## (undated) DEVICE — EXTENSION SET, 2 INJECTION SITES, MALE LUER LOCK ADAPTER WITH RETRACTABLE COLLAR: Brand: INTERLINK

## (undated) DEVICE — BLADE SURG NO12 S STL STR DISP GLASSVAN

## (undated) DEVICE — 3.0MM PRECISION NEURO (MATCH HEAD)

## (undated) DEVICE — HYPODERMIC SAFETY NEEDLE: Brand: MAGELLAN

## (undated) DEVICE — INTENDED FOR TISSUE SEPARATION, AND OTHER PROCEDURES THAT REQUIRE A SHARP SURGICAL BLADE TO PUNCTURE OR CUT.: Brand: BARD-PARKER ® STAINLESS STEEL BLADES

## (undated) DEVICE — NEEDLE HYPO 18GA L1.5IN THN WALL PIVOTING SHLD BVL ORIENTED

## (undated) DEVICE — DRESSING WND SM W2.5XL4IN BRTH W/ CATH SECUREMENT AND

## (undated) DEVICE — BLANKET WRM W40.2XL55.9IN IORT LO BODY + MISTRAL AIR

## (undated) DEVICE — SET CATH 20GA L1.75IN RAD ART POLYUR RADPQ W/ INTEGR

## (undated) DEVICE — HEADREST POS OD9IN THICKNESS 2IN RASPBERRY FOAM RNG CUSH

## (undated) DEVICE — SYRINGE MED 10ML TRNSLUC BRL PLUNG BLK MRK POLYPR CTRL

## (undated) DEVICE — PROTECTOR EYE PT SELF ADH NS OPT GRD LF

## (undated) DEVICE — MEDI-VAC YANKAUER SUCTION HANDLE W/BULBOUS TIP: Brand: CARDINAL HEALTH

## (undated) DEVICE — GLOVE SURG SZ 75 L12IN FNGR THK94MIL TRNSLUC YEL LTX

## (undated) DEVICE — OPTIFOAM GENTLE LIQUITRAP, SACRUM, 7"X7": Brand: MEDLINE

## (undated) DEVICE — YANKAUER OPEN TIP, NO VENT: Brand: ARGYLE

## (undated) DEVICE — MERCY FAIRFIELD TURNOVER KIT: Brand: MEDLINE INDUSTRIES, INC.

## (undated) DEVICE — STERILE POLYISOPRENE POWDER-FREE SURGICAL GLOVES: Brand: PROTEXIS

## (undated) DEVICE — 2.1MM CROSS-CUT FISSURE

## (undated) DEVICE — GAUZE,SPONGE,4"X4",8PLY,STRL,LF,10/TRAY: Brand: MEDLINE

## (undated) DEVICE — Z DISCONTINUED USE 2272117 DRAPE SURG 3 QTR N INVASIVE 2 LAYR DISP

## (undated) DEVICE — SOLUTION IRRIG 1000ML 0.9% SOD CHL USP POUR PLAS BTL